# Patient Record
Sex: MALE | Race: BLACK OR AFRICAN AMERICAN | NOT HISPANIC OR LATINO | Employment: OTHER | URBAN - METROPOLITAN AREA
[De-identification: names, ages, dates, MRNs, and addresses within clinical notes are randomized per-mention and may not be internally consistent; named-entity substitution may affect disease eponyms.]

---

## 2017-01-30 ENCOUNTER — TELEPHONE (OUTPATIENT)
Dept: INTERNAL MEDICINE | Facility: CLINIC | Age: 62
End: 2017-01-30

## 2017-01-30 NOTE — TELEPHONE ENCOUNTER
The pt states he has testicular pain and swelling and wants to know who Dr Haque would recommend he see, or if he should come in to Dr Haque..     Please call the pt's cell at [phone]     Appt with DR Clancy at 1:15PM.  Chinyere Lyman RN 3:42 PM on 1/30/2017.

## 2017-01-31 ENCOUNTER — OFFICE VISIT (OUTPATIENT)
Dept: INTERNAL MEDICINE | Facility: CLINIC | Age: 62
End: 2017-01-31

## 2017-01-31 VITALS
BODY MASS INDEX: 24.61 KG/M2 | WEIGHT: 176.4 LBS | DIASTOLIC BLOOD PRESSURE: 74 MMHG | SYSTOLIC BLOOD PRESSURE: 129 MMHG | RESPIRATION RATE: 16 BRPM | HEART RATE: 72 BPM

## 2017-01-31 DIAGNOSIS — N50.89 SCROTAL SWELLING: Primary | ICD-10-CM

## 2017-01-31 ASSESSMENT — PAIN SCALES - GENERAL: PAINLEVEL: NO PAIN (0)

## 2017-01-31 NOTE — PROGRESS NOTES
PRIMARY CARE CLINIC    Resident Clinic Note        Visit Date: January 31, 2017    Russ Agee  MRN: [id number]  YOB: 1955    HPI:  Russ Agee is a 61 year old male with a h/o LUTS, depression and hepatitis C that presents with scrotal pain and swelling.      The patient reports that 2 months ago he started taking chinese herbs to help with his BPH symptoms.  Shortly after he developed left scrotal swelling and pain.  He discontinued the herbs and the pain resolved in 1-1.5 weeks.  However, since that time the swelling has persisted.  Currently pain free.  No penile discharge.  Continues to have weakness of urine stream and nocturia but not worsening.  No skin lesions.  No fever, chills, sweats, weight changes.  Reports inguinal hernia with surgical repair ~10 years ago.  Scrotal swelling was reducible at that time.      ROS:  4 point ROS was reviewed and negative other than stated in HPI    Past medical history reviewed.    Past Medical History   Diagnosis Date     Chronic hepatitis C (H)      Depressive disorder      Substance abuse      Malignant neoplasm of prostate (H)      Mucous cyst of joint      Anxiety      Depression      Palpitations        Allergies   Allergen Reactions     Nkda [No Known Drug Allergies]        Past Surgical History   Procedure Laterality Date     Excise mass finger  2/19/2013     Procedure: EXCISE MASS FINGER;  Mass Excision Left Index Finger     (local anesthesia);  Surgeon: Raina Dorsey MD;  Location:  OR     Esophagoscopy, gastroscopy, duodenoscopy (egd), combined N/A 4/9/2015     Procedure: COMBINED ESOPHAGOSCOPY, GASTROSCOPY, DUODENOSCOPY (EGD);  Surgeon: Raina Romo MD;  Location: [facility]     Endoscopic ultrasound, esophagoscopy, gastroscopy, duodenoscopy (egd), combined  12/16/15      ugi endoscopy w eus N/A 12/16/2015     Procedure: COMBINED ENDOSCOPIC ULTRASOUND, ESOPHAGOSCOPY, GASTROSCOPY, DUODENOSCOPY (EGD);  Surgeon: Brady Acuna  MD Elian;  Location:  GI       Family History   Problem Relation Age of Onset     DIABETES Sister      DIABETES Son      Alcohol/Drug Other      Arthritis Other      Circulatory Other      Eye Disorder Other      Depression Other      Genetic Disorder Other      Psychotic Disorder Other      CANCER No family hx of      no skin cancer     Skin Cancer No family hx of        Social History     Social History     Marital Status: Single     Spouse Name: N/A     Number of Children: N/A     Years of Education: N/A     Occupational History     Not on file.     Social History Main Topics     Smoking status: Former Smoker -- 0.50 packs/day for 6 years     Types: Cigarettes     Quit date: 09/16/2012     Smokeless tobacco: Never Used     Alcohol Use: No      Comment: stopped 10/2014     Drug Use: No     Sexual Activity: Not on file     Other Topics Concern     Not on file     Social History Narrative       Current Outpatient Prescriptions   Medication     esomeprazole (NEXIUM) 40 MG capsule     alfuzosin (UROXATRAL) 10 MG 24 hr tablet     sildenafil (REVATIO/VIAGRA) 20 MG tablet     oxybutynin (DITROPAN) 5 MG tablet     traZODone (DESYREL) 50 MG tablet     calcium polycarbophil (RA FIBER-CAP) 625 MG tablet     clomiPRAMINE (ANAFRANIL) 25 MG capsule     clonazePAM (KLONOPIN) 0.5 MG tablet     diazepam (VALIUM) 5 MG tablet     lidocaine (XYLOCAINE) 2 % jelly     PEG 3350-KCl-NaBcb-NaCl-NaSulf 240 G SOLR     TOPIRAMATE PO     terazosin (HYTRIN) 5 MG capsule     OLANZapine (ZYPREXA) 5 MG tablet     tamsulosin (FLOMAX) 0.4 MG 24 hr capsule     omeprazole (PRILOSEC) 20 MG capsule     gentamicin 0.008% in 1000ml 0.9% NaCl (GARANYCIN) SOLN nasal irrigation     desonide (DESOWEN) 0.05 % ointment     ketoconazole (NIZORAL) 2 % shampoo     sucralfate (CARAFATE) 1 GM tablet     Urea 20 % CREA     pimecrolimus (ELIDEL) 1 % cream     hydrocortisone (ANUSOL-HC) 25 MG suppository     nicotine polacrilex (NICORETTE) 2 MG gum     albuterol  (ALBUTEROL) 108 (90 BASE) MCG/ACT inhaler     vilazodone (VIIBRYD) 40 MG TABS     Cyanocobalamin (VITAMIN B-12 CR PO)     Psyllium (METAMUCIL PO)     No current facility-administered medications for this visit.       Vitals:  /74 mmHg  Pulse 72  Resp 16  Wt 80.015 kg (176 lb 6.4 oz)    Physical Exam:  General: NAD  HEENT: Oral mucosa moist, EOM intact  Resp: Non-labored  Abd: Soft, non-tender, BS+, no masses appreciated  : Left sided swelling with scrotal fullness, difficult to palpate testicle and epididymis, non-tender, no hernia, no palpable LNs  Extremities: WWP, no pedal edema  Neuro: AAOx3, no lateralizing symptoms or focal neurologic deficits    A/P:  Russ was seen today for testicular/scrotal pain.    Diagnoses and all orders for this visit:    Scrotal swelling  Non-tender, no signs of infection or vascular compromise.  Most consistent with hydrocele.  Also consider varicocele, inguinal hernia, testicular mass less likely.  Will obtain scrotal ultrasound for further evaluation.   Comments:  left  Orders:  -     US Testicular and Scrotum; Future    Health Maintenance: None    Follow-up: Will contact with u/s results    Patient seen and discussed with Dr. Kapoor.    Lauri Clancy MD, MPH  PGY-3, Internal Medicine   [phone]         Pt was seen and examined with Dr. Clancy.  I agree with his documentation as noted above.    My additional comments: None    Nabila Kapoor MD

## 2017-01-31 NOTE — MR AVS SNAPSHOT
After Visit Summary   1/31/2017    Russ Agee    MRN: [id number]           Patient Information     Date Of Birth          1955        Visit Information        Provider Department      1/31/2017 1:15 PM Cosme Clancy MD [facility]        Today's Diagnoses     Scrotal swelling    -  1       Care Instructions    Primary Care Center Medication Refill Request Information:  * Please contact your pharmacy regarding ANY request for medication refills.  ** [facility] Prescription Fax = [phone]  * Please allow 3 business days for routine medication refills.  * Please allow 5 business days for controlled substance medication refills.     Primary Care Center Test Result notification information:  *You will be notified with in 7-10 days of your appointment day regarding the results of your test.  If you are on MyChart you will be notified as soon as the provider has reviewed the results and signed off on them.    Scrotal ultrasound, will contact with results.         Follow-ups after your visit        Follow-up notes from your care team     Return if symptoms worsen or fail to improve.      Your next 10 appointments already scheduled     Jan 31, 2017  4:00 PM   US TESTICULAR AND SCROTUM with UCUS3   [facility] ([facility])    [address] [place] [address] [phone]           Please bring a list of your medicines (including vitamins, minerals and over-the-counter drugs). Also, tell your doctor about any allergies you may have. Wear comfortable clothes and leave your valuables at home.  You do not need to do anything special to prepare for your exam.  Please call the Imaging Department at your exam site with any questions.            Feb 23, 2017  1:40 PM   (Arrive by 1:25 PM)   Return Visit with Andrew Schmitz MD   [facility] ([facility])    [address] [facility] [address] [facility] [address]  [address] [facility] [address] [phone]              Future tests that were ordered for you today     Open Future Orders        Priority Expected Expires Ordered    US Testicular and Scrotum Routine  1/31/2018 1/31/2017            Who to contact     Please call your clinic at [phone] to:    Ask questions about your health    Make or cancel appointments    Discuss your medicines    Learn about your test results    Speak to your doctor   If you have compliments or concerns about an experience at your clinic, or if you wish to file a complaint, please contact [facility] Patient Relations at [phone] or email us at [email or web address]         Additional Information About Your Visit        [employer] Information     [employer] gives you secure access to your electronic health record. If you see a primary care provider, you can also send messages to your care team and make appointments. If you have questions, please call your primary care clinic.  If you do not have a primary care provider, please call [phone] and they will assist you.      [employer] is an electronic gateway that provides easy, online access to your medical records. With [employer], you can request a clinic appointment, read your test results, renew a prescription or communicate with your care team.     To access your existing account, please contact your [facility] Clinic or call [phone] for assistance.        Care EveryWhere ID     This is your Care EveryWhere ID. This could be used by other organizations to access your [place] medical records  [id number]        Your Vitals Were     Pulse Respirations                72 16           Blood Pressure from Last 3 Encounters:   01/31/17 129/74   11/01/16 111/71   10/19/16 129/80    Weight from Last 3 Encounters:   01/31/17 80.015 kg (176 lb 6.4 oz)   11/01/16 76.023 kg (167 lb 9.6 oz)    10/19/16 75.297 kg (166 lb)                 Today's Medication Changes          These changes are accurate as of: 1/31/17  1:41 PM.  If you have any questions, ask your nurse or doctor.               These medicines have changed or have updated prescriptions.        Dose/Directions    alfuzosin 10 MG 24 hr tablet   Commonly known as:  UROXATRAL   This may have changed:  Another medication with the same name was removed. Continue taking this medication, and follow the directions you see here.   Used for:  Benign non-nodular prostatic hyperplasia, presence of lower urinary tract symptoms unspecified   Changed by:  Gerardo Trotter MD        Dose:  10 mg   Take 1 tablet (10 mg) by mouth daily   Quantity:  30 tablet   Refills:  11       sucralfate 1 GM tablet   Commonly known as:  CARAFATE   This may have changed:  Another medication with the same name was removed. Continue taking this medication, and follow the directions you see here.   Used for:  Polyp of stomach        Dose:  1 g   Take 1 tablet (1 g) by mouth 4 times daily   Quantity:  40 tablet   Refills:  3         Stop taking these medicines if you haven't already. Please contact your care team if you have questions.     doxycycline 100 MG capsule   Commonly known as:  VIBRAMYCIN   Stopped by:  Cosme Clancy MD           oxyCODONE-acetaminophen 5-325 MG per tablet   Commonly known as:  PERCOCET   Stopped by:  Cosme Clancy MD                    Primary Care Provider Office Phone # Fax #    Donnie SARAVIA MD [place] [phone]       [address] [address]        Thank you!     Thank you for choosing [facility]  for your care. Our goal is always to provide you with excellent care. Hearing back from our patients is one way we can continue to improve our services. Please take a few minutes to complete the written survey that you may receive in the mail after your visit  with us. Thank you!             Your Updated Medication List - Protect others around you: Learn how to safely use, store and throw away your medicines at www.disposemymeds.org.          This list is accurate as of: 1/31/17  1:41 PM.  Always use your most recent med list.                   Brand Name Dispense Instructions for use    albuterol 108 (90 BASE) MCG/ACT Inhaler    albuterol    4 Inhaler    Inhale 2 puffs into the lungs every 6 hours       alfuzosin 10 MG 24 hr tablet    UROXATRAL    30 tablet    Take 1 tablet (10 mg) by mouth daily       clomiPRAMINE 25 MG capsule    ANAFRANIL         clonazePAM 0.5 MG tablet    klonoPIN         desonide 0.05 % ointment    DESOWEN    60 g    To affected areas of the face for itch and red bumps twice a day as needed.       diazepam 5 MG tablet    VALIUM     Take 5 mg by mouth       esomeprazole 40 MG CR capsule    nexIUM    30 capsule    Take 1 capsule (40 mg) by mouth every morning (before breakfast) Take 30-60 minutes before eating.       gentamicin 0.008% in 1000ml 0.9% NaCl Soln nasal irrigation    GARAMYCIN    1000 mL    Spray 30 mLs in nostril 2 times daily Irrigate 30 mL between each nostril       hydrocortisone 25 MG Suppository    ANUSOL-HC    28 suppository    Place 1 suppository (25 mg) rectally 2 times daily       ketoconazole 2 % shampoo    NIZORAL    240 mL    To entire wet scalp and face in affected areas and then wash off after several minutes three times a week.       lidocaine 2 % topical gel    XYLOCAINE     Apply 1 Tube topically       METAMUCIL PO      Take 3 tsp by mouth 2 times daily.       nicotine polacrilex 2 MG gum    NICORETTE     Place 2 mg inside cheek as needed for smoking cessation       omeprazole 20 MG CR capsule    priLOSEC    90 capsule    Take 1 capsule (20 mg) by mouth daily       oxybutynin 5 MG tablet    DITROPAN    180 tablet    Take 1 tablet (5 mg) by mouth 2 times daily       PEG 3350-KCl-NaBcb-NaCl-NaSulf 240 G Solr      Use if  you do not have a Bowel Movement for 24 hours       pimecrolimus 1 % cream    ELIDEL    60 g    Apply topically 2 times daily To affected areas of the face for dryness and itch as needed.       RA FIBER- MG tablet   Generic drug:  calcium polycarbophil      Take 3 tablets by mouth twice daily       sildenafil 20 MG tablet    REVATIO/VIAGRA    90 tablet    Take 1 to 5 tabs as needed for sexual activity.       sucralfate 1 GM tablet    CARAFATE    40 tablet    Take 1 tablet (1 g) by mouth 4 times daily       tamsulosin 0.4 MG capsule    FLOMAX    120 capsule    TAKE 2 CAPSULES BY MOUTH DAILY.       terazosin 5 MG capsule    HYTRIN     Take 5 mg by mouth       TOPIRAMATE PO          traZODone 50 MG tablet    DESYREL         Urea 20 % Crea     60 g    To affected, ridged nails daily.       VIIBRYD 40 MG Tabs tablet   Generic drug:  vilazodone      Take 1 tablet by mouth daily       VITAMIN B-12 CR PO      Take 1 tablet by mouth daily.       zyPREXA 5 MG tablet   Generic drug:  OLANZapine      Take 5 mg by mouth

## 2017-01-31 NOTE — PATIENT INSTRUCTIONS
Primary Care Center Medication Refill Request Information:  * Please contact your pharmacy regarding ANY request for medication refills.  ** [facility] Prescription Fax = [phone]  * Please allow 3 business days for routine medication refills.  * Please allow 5 business days for controlled substance medication refills.     Primary Care Center Test Result notification information:  *You will be notified with in 7-10 days of your appointment day regarding the results of your test.  If you are on MyChart you will be notified as soon as the provider has reviewed the results and signed off on them.    Scrotal ultrasound, will contact with results.

## 2017-02-01 ENCOUNTER — TELEPHONE (OUTPATIENT)
Dept: INTERNAL MEDICINE | Facility: CLINIC | Age: 62
End: 2017-02-01

## 2017-02-01 NOTE — TELEPHONE ENCOUNTER
----- Message from Sergio Uribe RN sent at 1/31/2017  4:44 PM CST -----  Precious, home care nurse from [facility],  She wants to review the med list with you.  Also please fax the med list to [phone].  Precious can be reached at [phone]        Med list faxed.  Chinyere Lyman RN 9:10 AM on 2/1/2017.

## 2017-02-04 ENCOUNTER — MEDICAL CORRESPONDENCE (OUTPATIENT)
Dept: HEALTH INFORMATION MANAGEMENT | Facility: CLINIC | Age: 62
End: 2017-02-04

## 2017-02-20 ENCOUNTER — PRE VISIT (OUTPATIENT)
Dept: UROLOGY | Facility: CLINIC | Age: 62
End: 2017-02-20

## 2017-02-21 NOTE — TELEPHONE ENCOUNTER
Patient coming in to discuss testicle pain. Patient had scrotal US on 1/31/17. Patient last saw urology in 12/2016 with Dr Trotter for BPH. Records/orders in [facility]. No need to call patient

## 2017-02-27 ENCOUNTER — TELEPHONE (OUTPATIENT)
Dept: UROLOGY | Facility: CLINIC | Age: 62
End: 2017-02-27

## 2017-02-27 NOTE — TELEPHONE ENCOUNTER
Patients home health nurse called and stated patient is not taking his alfuzosin or oxybutynin because of side effects .  He is taking over the counter urinozinc and he is feeling better. Tonya Bowman LPN Staff Nurse

## 2017-03-27 DIAGNOSIS — K21.9 GASTROESOPHAGEAL REFLUX DISEASE WITHOUT ESOPHAGITIS: ICD-10-CM

## 2017-03-27 RX ORDER — ESOMEPRAZOLE MAGNESIUM 40 MG/1
40 CAPSULE, DELAYED RELEASE ORAL
Qty: 30 CAPSULE | Refills: 3 | Status: SHIPPED | OUTPATIENT
Start: 2017-03-27 | End: 2017-05-30

## 2017-04-05 ENCOUNTER — MEDICAL CORRESPONDENCE (OUTPATIENT)
Dept: HEALTH INFORMATION MANAGEMENT | Facility: CLINIC | Age: 62
End: 2017-04-05

## 2017-04-10 ENCOUNTER — OFFICE VISIT (OUTPATIENT)
Dept: INTERNAL MEDICINE | Facility: CLINIC | Age: 62
End: 2017-04-10

## 2017-04-10 VITALS
SYSTOLIC BLOOD PRESSURE: 111 MMHG | RESPIRATION RATE: 16 BRPM | BODY MASS INDEX: 24.53 KG/M2 | HEART RATE: 62 BPM | WEIGHT: 175.9 LBS | OXYGEN SATURATION: 96 % | DIASTOLIC BLOOD PRESSURE: 69 MMHG

## 2017-04-10 DIAGNOSIS — M25.511 CHRONIC PAIN OF BOTH SHOULDERS: ICD-10-CM

## 2017-04-10 DIAGNOSIS — M25.512 CHRONIC PAIN OF BOTH SHOULDERS: ICD-10-CM

## 2017-04-10 DIAGNOSIS — B19.20 HEPATITIS C VIRUS INFECTION, UNSPECIFIED CHRONICITY: Primary | ICD-10-CM

## 2017-04-10 DIAGNOSIS — G89.29 CHRONIC PAIN OF BOTH SHOULDERS: ICD-10-CM

## 2017-04-10 DIAGNOSIS — C61 MALIGNANT NEOPLASM OF PROSTATE (H): ICD-10-CM

## 2017-04-10 DIAGNOSIS — B19.20 HEPATITIS C VIRUS INFECTION, UNSPECIFIED CHRONICITY: ICD-10-CM

## 2017-04-10 LAB
ALBUMIN SERPL-MCNC: 3.6 G/DL (ref 3.4–5)
ALP SERPL-CCNC: 54 U/L (ref 40–150)
ALT SERPL W P-5'-P-CCNC: 17 U/L (ref 0–70)
ANION GAP SERPL CALCULATED.3IONS-SCNC: 4 MMOL/L (ref 3–14)
AST SERPL W P-5'-P-CCNC: 16 U/L (ref 0–45)
BASOPHILS # BLD AUTO: 0 10E9/L (ref 0–0.2)
BASOPHILS NFR BLD AUTO: 0.7 %
BILIRUB SERPL-MCNC: 0.3 MG/DL (ref 0.2–1.3)
BUN SERPL-MCNC: 16 MG/DL (ref 7–30)
CALCIUM SERPL-MCNC: 9 MG/DL (ref 8.5–10.1)
CHLORIDE SERPL-SCNC: 107 MMOL/L (ref 94–109)
CO2 SERPL-SCNC: 30 MMOL/L (ref 20–32)
CREAT SERPL-MCNC: 0.95 MG/DL (ref 0.66–1.25)
CRP SERPL-MCNC: 3.3 MG/L (ref 0–8)
DIFFERENTIAL METHOD BLD: ABNORMAL
EOSINOPHIL # BLD AUTO: 0.1 10E9/L (ref 0–0.7)
EOSINOPHIL NFR BLD AUTO: 1.2 %
ERYTHROCYTE [DISTWIDTH] IN BLOOD BY AUTOMATED COUNT: 13.2 % (ref 10–15)
ERYTHROCYTE [SEDIMENTATION RATE] IN BLOOD BY WESTERGREN METHOD: 8 MM/H (ref 0–20)
GFR SERPL CREATININE-BSD FRML MDRD: 80 ML/MIN/1.7M2
GLUCOSE SERPL-MCNC: 84 MG/DL (ref 70–99)
HCT VFR BLD AUTO: 39.9 % (ref 40–53)
HGB BLD-MCNC: 13.1 G/DL (ref 13.3–17.7)
IMM GRANULOCYTES # BLD: 0 10E9/L (ref 0–0.4)
IMM GRANULOCYTES NFR BLD: 0.2 %
LYMPHOCYTES # BLD AUTO: 1.4 10E9/L (ref 0.8–5.3)
LYMPHOCYTES NFR BLD AUTO: 24.4 %
MCH RBC QN AUTO: 32 PG (ref 26.5–33)
MCHC RBC AUTO-ENTMCNC: 32.8 G/DL (ref 31.5–36.5)
MCV RBC AUTO: 97 FL (ref 78–100)
MONOCYTES # BLD AUTO: 0.6 10E9/L (ref 0–1.3)
MONOCYTES NFR BLD AUTO: 10 %
NEUTROPHILS # BLD AUTO: 3.6 10E9/L (ref 1.6–8.3)
NEUTROPHILS NFR BLD AUTO: 63.5 %
NRBC # BLD AUTO: 0 10*3/UL
NRBC BLD AUTO-RTO: 0 /100
PLATELET # BLD AUTO: 150 10E9/L (ref 150–450)
POTASSIUM SERPL-SCNC: 4.4 MMOL/L (ref 3.4–5.3)
PROT SERPL-MCNC: 7.6 G/DL (ref 6.8–8.8)
PSA SERPL-MCNC: 6.08 UG/L (ref 0–4)
RBC # BLD AUTO: 4.1 10E12/L (ref 4.4–5.9)
SODIUM SERPL-SCNC: 141 MMOL/L (ref 133–144)
WBC # BLD AUTO: 5.7 10E9/L (ref 4–11)

## 2017-04-10 PROCEDURE — 87522 HEPATITIS C REVRS TRNSCRPJ: CPT | Performed by: UROLOGY

## 2017-04-10 ASSESSMENT — PAIN SCALES - GENERAL: PAINLEVEL: MODERATE PAIN (5)

## 2017-04-10 NOTE — PROGRESS NOTES
Subjective:  Russ is a pleasant 59 year-old man who came in today for follow up today.  He had colonoscopy 11/13/15 repeat in 5 years. He had upper EGD and EUS with Dr. Acuna, GI 12/16/15 with gastric polyp bx.  He had fairly unremarkable Holter Monitor 1/6/16.   He was 2/16/16 in Dermatology. He was seen by Dr. Delaney, ENT 3/10/16 and 5/16/16 for sinus issues.  He states about one month of B shoulder pain with movement. More L than R; no known injury. He has been treated for Hep C.     Objective:  General appearance: Patient appears to be in pain, but is alert  HEENT; negative   Cardio: Normal rate and rhythm, no murmurs  Respiratory: Normal breath sounds  Abdomen: Non-tender  B shoulders w/o tenderness        Assessment and Plan:    (1) B shoulder complaints; labs X-rays and I will see him back in about 2 weeks.  (2) He will continue to follow up in GI for Hep C management. His last Hep C viral load 12/1/15 was undetectable. I placed referral today 4/10/17. Ordered Viral load today. He was treated with Harvoni and Ribaviran   (3) He was seen by Dr. Trotter Urology 11/1/16 for elevated PSA  (4) He was seen 5/16/16 by Dr. Delaney for neck complaints and sinus issues  (5) He was seen Dr. Naranjo Cardiology for ongoing palpitations 4/13/16.  (6) Reflux changed to Nexium; ordered EGD 10/16  (7) He states he sees his Psychiatry doctor once a  Month in [place].         Total time spent 25 minutes.  More than 50% of the time spent with Mr. Agee on counseling / coordinating his care

## 2017-04-10 NOTE — MR AVS SNAPSHOT
After Visit Summary   4/10/2017    Russ Agee    MRN: [id number]           Patient Information     Date Of Birth          1955        Visit Information        Provider Department      4/10/2017 3:05 PM Donnie Haque MD [facility]        Today's Diagnoses     Hepatitis C virus infection, unspecified chronicity    -  1    Chronic pain of both shoulders          Care Instructions    Primary Care Center Medication Refill Request Information:  * Please contact your pharmacy regarding ANY request for medication refills.  ** [facility] Prescription Fax = [phone]  * Please allow 3 business days for routine medication refills.  * Please allow 5 business days for controlled substance medication refills.     Primary Care Center Test Result notification information:  *You will be notified with in 7-10 days of your appointment day regarding the results of your test.  If you are on MyChart you will be notified as soon as the provider has reviewed the results and signed off on them.    Primary Care Center Phone Number [phone]    Colonoscopy at [facility] [phone]              Follow-ups after your visit        Additional Services     GASTROENTEROLOGY ADULT REFERRAL       Hep C                  Your next 10 appointments already scheduled     Apr 10, 2017  4:00 PM CDT   LAB with  LAB   [facility] ([facility])    [address] [place] [address] [phone]           Patient must bring picture ID.  Patient should be prepared to give a urine specimen  Please do not eat 10-12 hours before your appointment if you are coming in fasting for labs on lipids, cholesterol, or glucose (sugar).  Pregnant women should follow their Care Team instructions. Water with medications is okay. Do not drink coffee or other fluids.   If you have concerns about taking  your medications, please ask at office or if scheduling via [employer], send  a message by clicking on Secure Messaging, Message Your Care Team.            Apr 10, 2017  5:15 PM CDT   XR SHOULDER RIGHT 2 VIEWS with [email or web address]   [facility] ([facility])    [address] [place] [address] [phone]           Please bring a list of your current medicines to your exam. (Include vitamins, minerals and over-thecounter medicines.) Leave your valuables at home.  Tell your doctor if there is a chance you may be pregnant.  You do not need to do anything special for this exam.            Apr 10, 2017  5:35 PM CDT   XR SHOULDER LEFT 2 VIEWS with [email or web address]   [facility] ([facility])    [address] [address] [phone]           Please bring a list of your current medicines to your exam. (Include vitamins, minerals and over-thecounter medicines.) Leave your valuables at home.  Tell your doctor if there is a chance you may be pregnant.  You do not need to do anything special for this exam.            May 01, 2017  2:05 PM CDT   (Arrive by 1:50 PM)   Return Visit with Donnie Haque MD   [facility] ([facility] [place])    [address] [place] [address] [phone]              Future tests that were ordered for you today     Open Future Orders        Priority Expected Expires Ordered    Hepatitis C RNA quantitative Routine 4/10/2017 4/10/2018 4/10/2017    Erythrocyte sedimentation rate Routine 4/10/2017 4/10/2018 4/10/2017    CRP inflammation Routine 4/10/2017 4/10/2018 4/10/2017    CBC with platelets differential Routine 4/10/2017 4/24/2017 4/10/2017    Comprehensive metabolic panel Routine 4/10/2017 4/10/2018 4/10/2017            Who to contact     Please call your clinic at [phone] to:    Ask questions about your health    Make or cancel appointments    Discuss your medicines    Learn  about your test results    Speak to your doctor   If you have compliments or concerns about an experience at your clinic, or if you wish to file a complaint, please contact [facility] Patient Relations at [phone] or email us at [email or web address]         Additional Information About Your Visit        Wikidatahart Information     [employer] gives you secure access to your electronic health record. If you see a primary care provider, you can also send messages to your care team and make appointments. If you have questions, please call your primary care clinic.  If you do not have a primary care provider, please call [phone] and they will assist you.      [employer] is an electronic gateway that provides easy, online access to your medical records. With [employer], you can request a clinic appointment, read your test results, renew a prescription or communicate with your care team.     To access your existing account, please contact your [facility] or call [phone] for assistance.        Care EveryWhere ID     This is your Care EveryWhere ID. This could be used by other organizations to access your [place] medical records  [id number]        Your Vitals Were     Pulse Respirations Pulse Oximetry BMI (Body Mass Index)          62 16 96% 24.53 kg/m2         Blood Pressure from Last 3 Encounters:   04/10/17 111/69   01/31/17 129/74   11/01/16 111/71    Weight from Last 3 Encounters:   04/10/17 79.8 kg (175 lb 14.4 oz)   01/31/17 80 kg (176 lb 6.4 oz)   11/01/16 76 kg (167 lb 9.6 oz)              We Performed the Following     GASTROENTEROLOGY ADULT REFERRAL     XR Shoulder Left 2 Views     XR Shoulder Right 2 Views        Primary Care Provider Office Phone # Fax #    Donnie Haque [phone]       [address] [address]        Thank you!     Thank you for choosing [facility]  for your care. Our goal is always to provide you with excellent care. Hearing back from our patients is one way we can continue to improve our services. Please take a few minutes to complete the written survey that you may receive in the mail after your visit with us. Thank you!             Your Updated Medication List - Protect others around you: Learn how to safely use, store and throw away your medicines at www.disposemymeds.org.          This list is accurate as of: 4/10/17  3:40 PM.  Always use your most recent med list.                   Brand Name Dispense Instructions for use    albuterol 108 (90 BASE) MCG/ACT Inhaler    albuterol    4 Inhaler    Inhale 2 puffs into the lungs every 6 hours       alfuzosin 10 MG 24 hr tablet    UROXATRAL    30 tablet    Take 1 tablet (10 mg) by mouth daily       clomiPRAMINE 25 MG capsule    ANAFRANIL         clonazePAM 0.5 MG tablet    klonoPIN         desonide 0.05 % ointment    DESOWEN    60 g    To affected areas of the face for itch and red bumps twice a day as needed.       diazepam 5 MG tablet    VALIUM     Take 5 mg by mouth       esomeprazole 40 MG CR capsule    nexIUM    30 capsule    Take 1 capsule (40 mg) by mouth every morning (before breakfast) Take 30-60 minutes before eating.       gentamicin 0.008% in 1000ml 0.9% NaCl Soln nasal irrigation    GARAMYCIN    1000 mL    Spray 30 mLs in nostril 2 times daily Irrigate 30 mL between each nostril       hydrocortisone 25 MG Suppository    ANUSOL-HC    28 suppository    Place 1 suppository (25 mg) rectally 2 times daily       ketoconazole 2 % shampoo    NIZORAL    240 mL    To entire wet scalp and face in affected areas and then wash off after several minutes three times a week.       lidocaine 2 % topical gel    XYLOCAINE     Apply 1 Tube topically       METAMUCIL PO      Take 3 tsp by mouth 2 times daily.       nicotine polacrilex 2 MG gum    NICORETTE     Place 2 mg inside cheek as needed for smoking  cessation       omeprazole 20 MG CR capsule    priLOSEC    90 capsule    Take 1 capsule (20 mg) by mouth daily       oxybutynin 5 MG tablet    DITROPAN    180 tablet    Take 1 tablet (5 mg) by mouth 2 times daily       PEG 3350-KCl-NaBcb-NaCl-NaSulf 240 G Solr      Use if you do not have a Bowel Movement for 24 hours       pimecrolimus 1 % cream    ELIDEL    60 g    Apply topically 2 times daily To affected areas of the face for dryness and itch as needed.       RA FIBER- MG tablet   Generic drug:  calcium polycarbophil      Take 3 tablets by mouth twice daily       sildenafil 20 MG tablet    REVATIO/VIAGRA    90 tablet    Take 1 to 5 tabs as needed for sexual activity.       sucralfate 1 GM tablet    CARAFATE    40 tablet    Take 1 tablet (1 g) by mouth 4 times daily       tamsulosin 0.4 MG capsule    FLOMAX    120 capsule    TAKE 2 CAPSULES BY MOUTH DAILY.       terazosin 5 MG capsule    HYTRIN     Take 5 mg by mouth       TOPIRAMATE PO          traZODone 50 MG tablet    DESYREL         Urea 20 % Crea     60 g    To affected, ridged nails daily.       VIIBRYD 40 MG Tabs tablet   Generic drug:  vilazodone      Take 1 tablet by mouth daily       VITAMIN B-12 CR PO      Take 1 tablet by mouth daily.       zyPREXA 5 MG tablet   Generic drug:  OLANZapine      Take 5 mg by mouth

## 2017-04-10 NOTE — NURSING NOTE
Chief Complaint   Patient presents with     Pain     Patient is here to discuss pain in the joints on both sides near the shoulder.      Frida Messer LPN at 3:14 PM on 4/10/2017.

## 2017-04-10 NOTE — PATIENT INSTRUCTIONS
Primary Care Center Medication Refill Request Information:  * Please contact your pharmacy regarding ANY request for medication refills.  ** [facility] Prescription Fax = [phone]  * Please allow 3 business days for routine medication refills.  * Please allow 5 business days for controlled substance medication refills.     Primary Care Center Test Result notification information:  *You will be notified with in 7-10 days of your appointment day regarding the results of your test.  If you are on MyChart you will be notified as soon as the provider has reviewed the results and signed off on them.    Primary Care Center Phone Number [phone]    [facility] [phone]

## 2017-04-13 ENCOUNTER — TELEPHONE (OUTPATIENT)
Dept: INTERNAL MEDICINE | Facility: CLINIC | Age: 62
End: 2017-04-13

## 2017-04-13 DIAGNOSIS — M25.512 CHRONIC PAIN OF BOTH SHOULDERS: Primary | ICD-10-CM

## 2017-04-13 DIAGNOSIS — G89.29 CHRONIC PAIN OF BOTH SHOULDERS: Primary | ICD-10-CM

## 2017-04-13 DIAGNOSIS — M25.511 CHRONIC PAIN OF BOTH SHOULDERS: Primary | ICD-10-CM

## 2017-04-13 LAB
HCV RNA SERPL NAA+PROBE-ACNC: NORMAL [IU]/ML
HCV RNA SERPL NAA+PROBE-LOG IU: NORMAL LOG IU/ML

## 2017-04-13 NOTE — TELEPHONE ENCOUNTER
I sent pt. Results letter and separate letter for future labs because of abnormal X-ray R shoulder    This encounter.     LUTHER Haque        Dear Mr. Agee;    Your labs are stable. Your shoulder X-rays have some subtle findings. I have some future recommendations:    (1) Keep your 5/1/17 follow up with me    (2) I recommend you get some additional laboratory tests and I have placed orders for this today. You can call [phone] to schedule this laboratory test      LUTHER Haque MD

## 2017-05-01 ENCOUNTER — OFFICE VISIT (OUTPATIENT)
Dept: INTERNAL MEDICINE | Facility: CLINIC | Age: 62
End: 2017-05-01

## 2017-05-01 VITALS
DIASTOLIC BLOOD PRESSURE: 86 MMHG | BODY MASS INDEX: 25.13 KG/M2 | HEART RATE: 62 BPM | SYSTOLIC BLOOD PRESSURE: 151 MMHG | WEIGHT: 180.2 LBS

## 2017-05-01 DIAGNOSIS — M25.511 CHRONIC RIGHT SHOULDER PAIN: Primary | ICD-10-CM

## 2017-05-01 DIAGNOSIS — M25.511 CHRONIC PAIN OF BOTH SHOULDERS: ICD-10-CM

## 2017-05-01 DIAGNOSIS — G89.29 CHRONIC RIGHT SHOULDER PAIN: Primary | ICD-10-CM

## 2017-05-01 DIAGNOSIS — G89.29 CHRONIC PAIN OF BOTH SHOULDERS: ICD-10-CM

## 2017-05-01 DIAGNOSIS — M25.512 CHRONIC PAIN OF BOTH SHOULDERS: ICD-10-CM

## 2017-05-01 PROCEDURE — 82784 ASSAY IGA/IGD/IGG/IGM EACH: CPT | Performed by: INTERNAL MEDICINE

## 2017-05-01 PROCEDURE — 86334 IMMUNOFIX E-PHORESIS SERUM: CPT | Performed by: INTERNAL MEDICINE

## 2017-05-01 PROCEDURE — 00000402 ZZHCL STATISTIC TOTAL PROTEIN: Performed by: INTERNAL MEDICINE

## 2017-05-01 PROCEDURE — 83883 ASSAY NEPHELOMETRY NOT SPEC: CPT | Performed by: INTERNAL MEDICINE

## 2017-05-01 PROCEDURE — 84165 PROTEIN E-PHORESIS SERUM: CPT | Performed by: INTERNAL MEDICINE

## 2017-05-01 PROCEDURE — 86335 IMMUNFIX E-PHORSIS/URINE/CSF: CPT | Performed by: INTERNAL MEDICINE

## 2017-05-01 ASSESSMENT — PAIN SCALES - GENERAL: PAINLEVEL: SEVERE PAIN (7)

## 2017-05-01 NOTE — PROGRESS NOTES
Subjective:  Russ is a pleasant 59 year-old man who came in today for follow up today.  He had colonoscopy 11/13/15 repeat in 5 years. He had upper EGD and EUS with Dr. Acuna, GI 12/16/15 with gastric polyp bx.  He had fairly unremarkable Holter Monitor 1/6/16.   He was 2/16/16 in Dermatology. He was seen by Dr. Delaney, ENT 3/10/16 and 5/16/16 for sinus issues.  He states about one month of B shoulder pain with movement. More R than L today He has been treated for Hep C.     Objective:  General appearance: Patient appears to be in pain, but is alert  HEENT; negative   Cardio: Normal rate and rhythm, no murmurs  Respiratory: Normal breath sounds  Abdomen: Non-tender  R shoulder with some decreased ROM; tenderness on top of shoulder.         Assessment and Plan:    (1) B shoulder complaints; he states R more than L shoulder. X-ray done on 4/10/17 R side shows some subtle lucency. Will get MRI R shoulder and have him see ortho. Ordered SPEP/immunofixation as well.   (2) He will continue to follow up in GI for Hep C management. His last Hep C viral load 12/1/15 was undetectable. I placed referral today 4/10/17. Ordered Viral load today. He was treated with Harvoni and Ribaviran   (3) He was seen by Dr. Trotter Urology 11/1/16 for elevated PSA  (4) He was seen 5/16/16 by Dr. Delaney for neck complaints and sinus issues  (5) He was seen Dr. Naranjo Cardiology for ongoing palpitations 4/13/16.  (6) Reflux changed to Nexium; ordered EGD 10/16  (7) He states he sees his Psychiatry doctor once a  Month in [place].         Total time spent 25 minutes.  More than 50% of the time spent with Mr. Agee on counseling / coordinating his care

## 2017-05-01 NOTE — NURSING NOTE
Chief Complaint   Patient presents with     Shoulder Pain     Patient here for shoulder pain     Mckenna Mckeon LPN at 2:11 PM on 5/1/2017.

## 2017-05-01 NOTE — MR AVS SNAPSHOT
After Visit Summary   5/1/2017    Russ Agee    MRN: [id number]           Patient Information     Date Of Birth          1955        Visit Information        Provider Department      5/1/2017 2:05 PM Donnie Haque MD [facility]        Today's Diagnoses     Chronic right shoulder pain    -  1      Care Instructions    Radiology (Imaging Center) [phone]  Radiology ([facility]) [phone] (2nd Floor [facility])    Orthopaedics & Xray [phone]          Follow-ups after your visit        Additional Services     ORTHOPEDICS ADULT REFERRAL       R shoulder pain                  Future tests that were ordered for you today     Open Future Orders        Priority Expected Expires Ordered    MR Upper Ext Joint Rt w/o & w Contrast Routine  5/1/2018 5/1/2017            Who to contact     Please call your clinic at [phone] to:    Ask questions about your health    Make or cancel appointments    Discuss your medicines    Learn about your test results    Speak to your doctor   If you have compliments or concerns about an experience at your clinic, or if you wish to file a complaint, please contact [facility] Patient Relations at [phone] or email us at [email or web address]         Additional Information About Your Visit        MyChart Information     [employer] (formerly [employer]) gives you secure access to your electronic health record. If you see a primary care provider, you can also send messages to your care team and make appointments. If you have questions, please call your primary care clinic.  If you do not have a primary care provider, please call [phone] and they will assist you.      [employer] (formerly [employer]) is an electronic gateway that provides easy, online access to your medical records. With [employer] ([employer]), you can request a clinic appointment, read your test results, renew a prescription or communicate with your care team.     To access your existing  account, please contact your [facility] or call [phone] for assistance.        Care EveryWhere ID     This is your Care EveryWhere ID. This could be used by other organizations to access your [place] medical records  [id number]        Your Vitals Were     Pulse BMI (Body Mass Index)                62 25.13 kg/m2           Blood Pressure from Last 3 Encounters:   05/01/17 151/86   04/10/17 111/69   01/31/17 129/74    Weight from Last 3 Encounters:   05/01/17 81.7 kg (180 lb 3.2 oz)   04/10/17 79.8 kg (175 lb 14.4 oz)   01/31/17 80 kg (176 lb 6.4 oz)              We Performed the Following     ORTHOPEDICS ADULT REFERRAL        Primary Care Provider Office Phone # Fax #    Donnie Haque [phone]       [address] [address]        Thank you!     Thank you for choosing [facility]  for your care. Our goal is always to provide you with excellent care. Hearing back from our patients is one way we can continue to improve our services. Please take a few minutes to complete the written survey that you may receive in the mail after your visit with us. Thank you!             Your Updated Medication List - Protect others around you: Learn how to safely use, store and throw away your medicines at www.disposemymeds.org.          This list is accurate as of: 5/1/17  3:46 PM.  Always use your most recent med list.                   Brand Name Dispense Instructions for use    albuterol 108 (90 BASE) MCG/ACT Inhaler    albuterol    4 Inhaler    Inhale 2 puffs into the lungs every 6 hours       alfuzosin 10 MG 24 hr tablet    UROXATRAL    30 tablet    Take 1 tablet (10 mg) by mouth daily       clomiPRAMINE 25 MG capsule    ANAFRANIL         clonazePAM 0.5 MG tablet    klonoPIN         desonide 0.05 % ointment    DESOWEN    60 g    To affected areas of the face for itch and red bumps twice a day as needed.        diazepam 5 MG tablet    VALIUM     Take 5 mg by mouth       esomeprazole 40 MG CR capsule    nexIUM    30 capsule    Take 1 capsule (40 mg) by mouth every morning (before breakfast) Take 30-60 minutes before eating.       gentamicin 0.008% in 1000ml 0.9% NaCl Soln nasal irrigation    GARAMYCIN    1000 mL    Spray 30 mLs in nostril 2 times daily Irrigate 30 mL between each nostril       hydrocortisone 25 MG Suppository    ANUSOL-HC    28 suppository    Place 1 suppository (25 mg) rectally 2 times daily       ketoconazole 2 % shampoo    NIZORAL    240 mL    To entire wet scalp and face in affected areas and then wash off after several minutes three times a week.       lidocaine 2 % topical gel    XYLOCAINE     Apply 1 Tube topically       METAMUCIL PO      Take 3 tsp by mouth 2 times daily.       nicotine polacrilex 2 MG gum    NICORETTE     Place 2 mg inside cheek as needed for smoking cessation       omeprazole 20 MG CR capsule    priLOSEC    90 capsule    Take 1 capsule (20 mg) by mouth daily       oxybutynin 5 MG tablet    DITROPAN    180 tablet    Take 1 tablet (5 mg) by mouth 2 times daily       PEG 3350-KCl-NaBcb-NaCl-NaSulf 240 G Solr      Use if you do not have a Bowel Movement for 24 hours       pimecrolimus 1 % cream    ELIDEL    60 g    Apply topically 2 times daily To affected areas of the face for dryness and itch as needed.       RA FIBER- MG tablet   Generic drug:  calcium polycarbophil      Take 3 tablets by mouth twice daily       sildenafil 20 MG tablet    REVATIO/VIAGRA    90 tablet    Take 1 to 5 tabs as needed for sexual activity.       sucralfate 1 GM tablet    CARAFATE    40 tablet    Take 1 tablet (1 g) by mouth 4 times daily       tamsulosin 0.4 MG capsule    FLOMAX    120 capsule    TAKE 2 CAPSULES BY MOUTH DAILY.       terazosin 5 MG capsule    HYTRIN     Take 5 mg by mouth       TOPIRAMATE PO          traZODone 50 MG tablet    DESYREL         Urea 20 % Crea cream     60 g    To  affected, ridged nails daily.       VIIBRYD 40 MG Tabs tablet   Generic drug:  vilazodone      Take 1 tablet by mouth daily       VITAMIN B-12 CR PO      Take 1 tablet by mouth daily.       zyPREXA 5 MG tablet   Generic drug:  OLANZapine      Take 5 mg by mouth

## 2017-05-01 NOTE — PATIENT INSTRUCTIONS
Radiology ([facility] Center) [phone]  Radiology ([facility]) [phone] (2nd Floor [facility])    Orthopaedics & Xray [phone]

## 2017-05-02 LAB
ALBUMIN SERPL ELPH-MCNC: 3.8 G/DL (ref 3.7–5.1)
ALPHA1 GLOB SERPL ELPH-MCNC: 0.2 G/DL (ref 0.2–0.4)
ALPHA2 GLOB SERPL ELPH-MCNC: 0.7 G/DL (ref 0.5–0.9)
B-GLOBULIN SERPL ELPH-MCNC: 0.6 G/DL (ref 0.6–1)
GAMMA GLOB SERPL ELPH-MCNC: 1.2 G/DL (ref 0.7–1.6)
IGA SERPL-MCNC: 189 MG/DL (ref 70–380)
IGG SERPL-MCNC: 1350 MG/DL (ref 695–1620)
IGM SERPL-MCNC: 28 MG/DL (ref 60–265)
KAPPA LC UR-MCNC: 1.42 MG/DL (ref 0.33–1.94)
KAPPA LC/LAMBDA SER: 0.85 {RATIO} (ref 0.26–1.65)
LAMBDA LC SERPL-MCNC: 1.68 MG/DL (ref 0.57–2.63)
M PROTEIN SERPL ELPH-MCNC: 0 G/DL
PROT ELPH PNL UR ELPH: NORMAL
PROT PATTERN SERPL ELPH-IMP: NORMAL
PROT PATTERN SERPL IFE-IMP: ABNORMAL

## 2017-05-04 LAB
ALBUMIN UR QL: ABNORMAL
ALPHA1 GLOB 24H UR QL ELPH: ABNORMAL
ALPHA2 GLOB UR QL ELPH: ABNORMAL
B-GLOBULIN UR QL ELPH: ABNORMAL
COLLECT DURATION TIME SPEC: ABNORMAL H
GAMMA GLOB UR QL ELPH: ABNORMAL
INTERPRETATION UR IFE-IMP: ABNORMAL
KAPPA LC FREE 24H UR-MRATE: ABNORMAL MG/(24.H)
KAPPA LC FREE UR-MCNC: 0.96 MG/DL
KAPPA LC FREE/LAMBDA FREE UR: 24
LAMBDA LC FREE 24H UR-MRATE: ABNORMAL MG/(24.H)
LAMBDA LC FREE UR-MCNC: 0.04 MG/DL
PROT 24H UR-MRATE: ABNORMAL G/(24.H)
SPECIMEN VOL ?TM UR: ABNORMAL ML

## 2017-05-15 DIAGNOSIS — R94.5 ABNORMAL RESULTS OF LIVER FUNCTION STUDIES: Primary | ICD-10-CM

## 2017-05-18 ENCOUNTER — PRE VISIT (OUTPATIENT)
Dept: GASTROENTEROLOGY | Facility: CLINIC | Age: 62
End: 2017-05-18

## 2017-05-18 NOTE — TELEPHONE ENCOUNTER
Was the patient contacted by phone and reminded of the upcoming visit? Yes, spoke with pt re moving appointments up so provider can have requested 60 min appt due to not seeing him in 7 years. Pt verbally understood and agreed.    Was the patient instructed to bring a current list of all medications to the appointment or instructed to bring in all medication bottles? Yes, patient verbalized understanding    Was the patient instructed to arrive prior to the appointment time to have ordered labs drawn? Yes, patient verbalized understanding    Were the needed lab orders placed? Yes    Diya Tracy CMA

## 2017-05-30 ENCOUNTER — OFFICE VISIT (OUTPATIENT)
Dept: GASTROENTEROLOGY | Facility: CLINIC | Age: 62
End: 2017-05-30
Attending: NURSE PRACTITIONER
Payer: MEDICARE

## 2017-05-30 ENCOUNTER — OFFICE VISIT (OUTPATIENT)
Dept: INTERNAL MEDICINE | Facility: CLINIC | Age: 62
End: 2017-05-30

## 2017-05-30 VITALS
HEIGHT: 71 IN | HEART RATE: 67 BPM | RESPIRATION RATE: 16 BRPM | WEIGHT: 175.2 LBS | BODY MASS INDEX: 24.53 KG/M2 | TEMPERATURE: 98 F | SYSTOLIC BLOOD PRESSURE: 119 MMHG | OXYGEN SATURATION: 99 % | DIASTOLIC BLOOD PRESSURE: 74 MMHG

## 2017-05-30 DIAGNOSIS — R76.8 HEPATITIS B CORE ANTIBODY POSITIVE: ICD-10-CM

## 2017-05-30 DIAGNOSIS — G89.29 CHRONIC PAIN OF BOTH SHOULDERS: ICD-10-CM

## 2017-05-30 DIAGNOSIS — M25.512 CHRONIC PAIN OF BOTH SHOULDERS: ICD-10-CM

## 2017-05-30 DIAGNOSIS — B19.20 HEPATITIS C VIRUS INFECTION, UNSPECIFIED CHRONICITY: ICD-10-CM

## 2017-05-30 DIAGNOSIS — R76.8 HEPATITIS B CORE ANTIBODY POSITIVE: Primary | ICD-10-CM

## 2017-05-30 DIAGNOSIS — M25.511 CHRONIC PAIN OF BOTH SHOULDERS: ICD-10-CM

## 2017-05-30 DIAGNOSIS — M25.511 CHRONIC RIGHT SHOULDER PAIN: Primary | ICD-10-CM

## 2017-05-30 DIAGNOSIS — R94.5 ABNORMAL RESULTS OF LIVER FUNCTION STUDIES: ICD-10-CM

## 2017-05-30 DIAGNOSIS — G89.29 CHRONIC RIGHT SHOULDER PAIN: Primary | ICD-10-CM

## 2017-05-30 LAB
ALBUMIN SERPL-MCNC: 3.5 G/DL (ref 3.4–5)
ALP SERPL-CCNC: 58 U/L (ref 40–150)
ALT SERPL W P-5'-P-CCNC: 16 U/L (ref 0–70)
ANION GAP SERPL CALCULATED.3IONS-SCNC: 7 MMOL/L (ref 3–14)
AST SERPL W P-5'-P-CCNC: 19 U/L (ref 0–45)
BILIRUB DIRECT SERPL-MCNC: <0.1 MG/DL (ref 0–0.2)
BILIRUB SERPL-MCNC: 0.4 MG/DL (ref 0.2–1.3)
BUN SERPL-MCNC: 16 MG/DL (ref 7–30)
CALCIUM SERPL-MCNC: 8.8 MG/DL (ref 8.5–10.1)
CHLORIDE SERPL-SCNC: 109 MMOL/L (ref 94–109)
CO2 SERPL-SCNC: 29 MMOL/L (ref 20–32)
CREAT SERPL-MCNC: 0.96 MG/DL (ref 0.66–1.25)
ERYTHROCYTE [DISTWIDTH] IN BLOOD BY AUTOMATED COUNT: 13.2 % (ref 10–15)
GFR SERPL CREATININE-BSD FRML MDRD: 80 ML/MIN/1.7M2
GLUCOSE SERPL-MCNC: 121 MG/DL (ref 70–99)
HCT VFR BLD AUTO: 41.7 % (ref 40–53)
HGB BLD-MCNC: 13.5 G/DL (ref 13.3–17.7)
INR PPP: 1.14 (ref 0.86–1.14)
MCH RBC QN AUTO: 31.3 PG (ref 26.5–33)
MCHC RBC AUTO-ENTMCNC: 32.4 G/DL (ref 31.5–36.5)
MCV RBC AUTO: 97 FL (ref 78–100)
PLATELET # BLD AUTO: 186 10E9/L (ref 150–450)
POTASSIUM SERPL-SCNC: 3.8 MMOL/L (ref 3.4–5.3)
PROT SERPL-MCNC: 7.4 G/DL (ref 6.8–8.8)
RBC # BLD AUTO: 4.31 10E12/L (ref 4.4–5.9)
SODIUM SERPL-SCNC: 145 MMOL/L (ref 133–144)
WBC # BLD AUTO: 4.9 10E9/L (ref 4–11)

## 2017-05-30 PROCEDURE — 80076 HEPATIC FUNCTION PANEL: CPT | Performed by: NURSE PRACTITIONER

## 2017-05-30 PROCEDURE — 99212 OFFICE O/P EST SF 10 MIN: CPT | Mod: ZF

## 2017-05-30 PROCEDURE — 85027 COMPLETE CBC AUTOMATED: CPT | Performed by: NURSE PRACTITIONER

## 2017-05-30 PROCEDURE — 80048 BASIC METABOLIC PNL TOTAL CA: CPT | Performed by: NURSE PRACTITIONER

## 2017-05-30 PROCEDURE — 36415 COLL VENOUS BLD VENIPUNCTURE: CPT | Performed by: NURSE PRACTITIONER

## 2017-05-30 PROCEDURE — 85610 PROTHROMBIN TIME: CPT | Performed by: NURSE PRACTITIONER

## 2017-05-30 PROCEDURE — 91200 LIVER ELASTOGRAPHY: CPT | Mod: ZF

## 2017-05-30 ASSESSMENT — PAIN SCALES - GENERAL: PAINLEVEL: NO PAIN (0)

## 2017-05-30 NOTE — MR AVS SNAPSHOT
After Visit Summary   5/30/2017    Russ Agee    MRN: [id number]           Patient Information     Date Of Birth          1955        Visit Information        Provider Department      5/30/2017 1:35 PM Donnie Haque MD [facility]        Today's Diagnoses     Chronic right shoulder pain    -  1       Follow-ups after your visit        Your next 10 appointments already scheduled     May 30, 2017  1:35 PM CDT   (Arrive by 1:20 PM)   Return Visit with Donnie Haque MD   [facility] ([facility])    [address] [place] [address]  [facility] [id number] [phone]            Jun 02, 2017  3:30 PM CDT   (Arrive by 3:15 PM)   New Patient Visit with Shorty Melvin MD   [facility] ([facility])    [address] [place] [address]  [facility] [id number] [phone]            Jul 26, 2017  1:35 PM CDT   (Arrive by 1:20 PM)   Return Visit with Donnie Haque MD   [facility] ([facility])    [address] [place] [address]  [facility] [id number] [phone]              Who to contact     Please call your clinic at [phone] to:    Ask questions about your health    Make or cancel appointments    Discuss your medicines    Learn about your test results    Speak to your doctor   If you have compliments or concerns about an experience at your clinic, or if you wish to file a complaint, please contact [facility] Patient Relations at [phone] or email us at [email or web address]         Additional Information About Your Visit        Care EveryWhere ID     This is your Care EveryWhere ID. This could be used by other organizations to access your [facility] medical records  [id number]         Blood Pressure from Last 3 Encounters:   05/30/17 119/74   05/01/17 151/86   04/10/17 111/69    Weight from  Last 3 Encounters:   05/30/17 79.5 kg (175 lb 3.2 oz)   05/01/17 81.7 kg (180 lb 3.2 oz)   04/10/17 79.8 kg (175 lb 14.4 oz)              Today, you had the following     No orders found for display       Primary Care Provider Office Phone # Fax #    Donnie Haque [phone]       [address] [address]        Thank you!     Thank you for choosing [facility]  for your care. Our goal is always to provide you with excellent care. Hearing back from our patients is one way we can continue to improve our services. Please take a few minutes to complete the written survey that you may receive in the mail after your visit with us. Thank you!             Your Updated Medication List - Protect others around you: Learn how to safely use, store and throw away your medicines at www.disposemymeds.org.          This list is accurate as of: 5/30/17 12:44 PM.  Always use your most recent med list.                   Brand Name Dispense Instructions for use    albuterol 108 (90 BASE) MCG/ACT Inhaler    albuterol    4 Inhaler    Inhale 2 puffs into the lungs every 6 hours       alfuzosin 10 MG 24 hr tablet    UROXATRAL    30 tablet    Take 1 tablet (10 mg) by mouth daily       desonide 0.05 % ointment    DESOWEN    60 g    To affected areas of the face for itch and red bumps twice a day as needed.       hydrocortisone 25 MG Suppository    ANUSOL-HC    28 suppository    Place 1 suppository (25 mg) rectally 2 times daily       ketoconazole 2 % shampoo    NIZORAL    240 mL    To entire wet scalp and face in affected areas and then wash off after several minutes three times a week.       lidocaine 2 % topical gel    XYLOCAINE     Apply 1 Tube topically       METAMUCIL PO      Take 3 tsp by mouth 2 times daily.       nicotine polacrilex 2 MG gum    NICORETTE     Place 2 mg inside cheek as needed for smoking cessation       RA FIBER- MG tablet   Generic  drug:  calcium polycarbophil      Take 3 tablets by mouth twice daily       sildenafil 20 MG tablet    REVATIO/VIAGRA    90 tablet    Take 1 to 5 tabs as needed for sexual activity.       traZODone 50 MG tablet    DESYREL     Take 50 mg by mouth At Bedtime       Urea 20 % Crea cream     60 g    To affected, ridged nails daily.

## 2017-05-30 NOTE — MR AVS SNAPSHOT
After Visit Summary   5/30/2017    Russ Agee    MRN: [id number]           Patient Information     Date Of Birth          1955        Visit Information        Provider Department      5/30/2017 11:00 AM Branden Hinojosa NP [facility]        Today's Diagnoses     Hepatitis B core antibody positive    -  1    Hepatitis C virus infection, unspecified chronicity        Chronic pain of both shoulders          Care Instructions    You are cured of Hepatitis C and have no scarring in the liver.     You have had Hepatitis B but have gotten rid of it.     You do not need any further follow up in the liver clinic. The best news we can give our patients.           Follow-ups after your visit        Your next 10 appointments already scheduled     May 30, 2017  1:35 PM CDT   (Arrive by 1:20 PM)   Return Visit with Donnie Haque MD   [facility] ([facility])    [address] [place] [address] [phone]              Future tests that were ordered for you today     Open Future Orders        Priority Expected Expires Ordered    Fibrosis Scan (In-Clinic) Routine 5/30/2017 5/30/2018 5/30/2017            Who to contact     If you have questions or need follow up information about today's clinic visit or your schedule please contact [facility] directly at [phone].  Normal or non-critical lab and imaging results will be communicated to you by MyChart, letter or phone within 4 business days after the clinic has received the results. If you do not hear from us within 7 days, please contact the clinic through MyChart or phone. If you have a critical or abnormal lab result, we will notify you by phone as soon as possible.  Submit refill requests through [email or web address] or call your pharmacy and they will forward the refill request to us. Please allow 3 business days for your refill to be completed.          Additional  "Information About Your Visit        Care EveryWhere ID     This is your Care EveryWhere ID. This could be used by other organizations to access your [facility] medical records  [id number]        Your Vitals Were     Pulse Temperature Respirations Height Pulse Oximetry BMI (Body Mass Index)    67 98  F (36.7  C) (Oral) 16 1.803 m (5' 10.98\") 99% 24.45 kg/m2       Blood Pressure from Last 3 Encounters:   05/30/17 119/74   05/01/17 151/86   04/10/17 111/69    Weight from Last 3 Encounters:   05/30/17 79.5 kg (175 lb 3.2 oz)   05/01/17 81.7 kg (180 lb 3.2 oz)   04/10/17 79.8 kg (175 lb 14.4 oz)               Primary Care Provider Office Phone # Fax #    Donnie Haque [phone]       [address] [address]        Thank you!     Thank you for choosing [facility]  for your care. Our goal is always to provide you with excellent care. Hearing back from our patients is one way we can continue to improve our services. Please take a few minutes to complete the written survey that you may receive in the mail after your visit with us. Thank you!             Your Updated Medication List - Protect others around you: Learn how to safely use, store and throw away your medicines at www.disposemymeds.org.          This list is accurate as of: 5/30/17 11:38 AM.  Always use your most recent med list.                   Brand Name Dispense Instructions for use    albuterol 108 (90 BASE) MCG/ACT Inhaler    albuterol    4 Inhaler    Inhale 2 puffs into the lungs every 6 hours       alfuzosin 10 MG 24 hr tablet    UROXATRAL    30 tablet    Take 1 tablet (10 mg) by mouth daily       desonide 0.05 % ointment    DESOWEN    60 g    To affected areas of the face for itch and red bumps twice a day as needed.       hydrocortisone 25 MG Suppository    ANUSOL-HC    28 suppository    Place 1 suppository (25 mg) rectally 2 times daily       ketoconazole 2 % shampoo    NIZORAL    240 " mL    To entire wet scalp and face in affected areas and then wash off after several minutes three times a week.       lidocaine 2 % topical gel    XYLOCAINE     Apply 1 Tube topically       METAMUCIL PO      Take 3 tsp by mouth 2 times daily.       nicotine polacrilex 2 MG gum    NICORETTE     Place 2 mg inside cheek as needed for smoking cessation       RA FIBER- MG tablet   Generic drug:  calcium polycarbophil      Take 3 tablets by mouth twice daily       sildenafil 20 MG tablet    REVATIO/VIAGRA    90 tablet    Take 1 to 5 tabs as needed for sexual activity.       traZODone 50 MG tablet    DESYREL     Take 50 mg by mouth At Bedtime       Urea 20 % Crea cream     60 g    To affected, ridged nails daily.

## 2017-05-30 NOTE — PATIENT INSTRUCTIONS
You are cured of Hepatitis C and have no scarring in the liver.     You have had Hepatitis B but have gotten rid of it.     You do not need any further follow up in the liver clinic. The best news we can give our patients.

## 2017-05-30 NOTE — NURSING NOTE
"Chief Complaint   Patient presents with     RECHECK     History of Hep C       Initial /74 (BP Location: Right arm, Patient Position: Chair, Cuff Size: Adult Regular)  Pulse 67  Temp 98  F (36.7  C) (Oral)  Resp 16  Ht 1.803 m (5' 10.98\")  Wt 79.5 kg (175 lb 3.2 oz)  SpO2 99%  BMI 24.45 kg/m2 Estimated body mass index is 24.45 kg/(m^2) as calculated from the following:    Height as of this encounter: 1.803 m (5' 10.98\").    Weight as of this encounter: 79.5 kg (175 lb 3.2 oz).  Medication Reconciliation: complete     Diya Tracy [facility]  5/30/2017 11:02 AM        "

## 2017-05-30 NOTE — PROGRESS NOTES
REASON FOR VISIT:  Followup previous hepatitis C infection.      HISTORY OF PRESENT ILLNESS:  Mr. Russ Agee is a pleasant 61-year-old male who has a remote history of hepatitis C infection.  He has undergone hepatitis C treatment twice before for his  genotype 3 (once in 2010 with peginterferon and ribavirin) and a second time with Harvoni and ribavirin for 24 weeks.  Fortunately, he did achieve a sustained virologic response.  He did undergo a liver biopsy in 2006 which showed stage 0 fibrosis.  Of note, he did previously have hepatitis B but has gotten rid of it.  He was sent by his primary care provider, Dr. Donnie Haque, for a followup appointment to just double check and make sure he did not need any more followup here in the Liver Clinic.      ALLERGIES AND MEDICATIONS:  Listed in the electronic medical record.      VITAL SIGNS:  Blood pressure 119/74, heart rate 67 beats per minute, respiratory rate 16, temperature 98, O2 sat 99% on room air.      LABORATORY DATA:  Results from today showed the following:  White count 4.9, hemoglobin 13.5, platelet count 186,000.  Sodium 145, potassium 3.8, glucose 121, creatinine 0.96.  Liver panel entirely normal.  INR 1.14.        Fibrosis scan from today showed his liver stiffness score to be 5.6, which puts him in the stage 0 fibrosis range.      ASSESSMENT:  Previous hepatitis B infection, previous hepatitis C infection.      PLAN:  At this time, I did spend time talking with Mr. Agee that he is indeed cured of hepatitis C and does not have any scarring in the liver.  We did spend time talking about his old history of hepatitis B infection and the risk factors for reactivation in the setting of immunosuppression medications.  I did get his permission to add this history on his problem list to help prevent any issues of prescribers inadvertently giving him strong immunosuppressant medications without follow up in the Liver Clinic.  Otherwise, he is doing remarkably well  and does not need any more followup in the Liver Clinic unless there are significant changes.  He appreciates this evaluation and is pleased with the news.  All of his questions were addressed.      Ten minutes were spent with this patient, greater than 50% spent in counseling and coordination of care.        cc: Donnie Haque MD

## 2017-05-30 NOTE — LETTER
5/30/2017      RE: Russ Agee  [address]  [address]  [place] [address]       REASON FOR VISIT:  Followup previous hepatitis C infection.      HISTORY OF PRESENT ILLNESS:  Mr. Russ Agee is a pleasant 61-year-old male who has a remote history of hepatitis C infection.  He has undergone hepatitis C treatment twice before for his  genotype 3 (once in 2010 with peginterferon and ribavirin) and a second time with Harvoni and ribavirin for 24 weeks.  Fortunately, he did achieve a sustained virologic response.  He did undergo a liver biopsy in 2006 which showed stage 0 fibrosis.  Of note, he did previously have hepatitis B but has gotten rid of it.  He was sent by his primary care provider, Dr. Donnie Haque, for a followup appointment to just double check and make sure he did not need any more followup here in the Liver Clinic.      ALLERGIES AND MEDICATIONS:  Listed in the electronic medical record.      VITAL SIGNS:  Blood pressure 119/74, heart rate 67 beats per minute, respiratory rate 16, temperature 98, O2 sat 99% on room air.      LABORATORY DATA:  Results from today showed the following:  White count 4.9, hemoglobin 13.5, platelet count 186,000.  Sodium 145, potassium 3.8, glucose 121, creatinine 0.96.  Liver panel entirely normal.  INR 1.14.        Fibrosis scan from today showed his liver stiffness score to be 5.6, which puts him in the stage 0 fibrosis range.      ASSESSMENT:  Previous hepatitis B infection, previous hepatitis C infection.      PLAN:  At this time, I did spend time talking with Mr. Agee that he is indeed cured of hepatitis C and does not have any scarring in the liver.  We did spend time talking about his old history of hepatitis B infection and the risk factors for reactivation in the setting of immunosuppression medications.  I did get his permission to add this history on his problem list to help prevent any issues of prescribers inadvertently giving him strong immunosuppressant  medications without follow up in the Liver Clinic.  Otherwise, he is doing remarkably well and does not need any more followup in the Liver Clinic unless there are significant changes.  He appreciates this evaluation and is pleased with the news.  All of his questions were addressed.      Ten minutes were spent with this patient, greater than 50% spent in counseling and coordination of care.        cc: MD Branden Kebede NP

## 2017-05-30 NOTE — PROGRESS NOTES
Subjective:  Russ is a pleasant 59 year-old man who came in today for follow up today.  He had colonoscopy 11/13/15 repeat in 5 years. He had upper EGD and EUS with Dr. Acuna, GI 12/16/15 with gastric polyp bx.  He had fairly unremarkable Holter Monitor 1/6/16.   He was 2/16/16 in Dermatology. He was seen by Dr. Delaney, ENT 3/10/16 and 5/16/16 for sinus issues.  He states about one month of B shoulder pain with movement. More R than L today He has been treated for Hep C.     Objective:  General appearance:   HEENT; negative   Cardio: Normal rate and rhythm, no murmurs  Respiratory: Normal breath sounds  Abdomen: Non-tender  R shoulder with some decreased ROM; tenderness on top of shoulder.         Assessment and Plan:    (1) B shoulder complaints; he states R more than L shoulder. X-ray done on 4/10/17 R side shows some subtle lucency.  MRI R shoulder and have him see ortho (referral placed 5/1/17). Ordered SPEP/immunofixation as well normal   (2) He will continue to follow up in GI for Hep C management. His last Hep C viral load 12/1/15 was undetectable. He was seen by Ms. Hinojosa today  (3) He was seen by Dr. Trotter [facility] 11/1/16 for elevated PSA  (4) He was seen 5/16/16 by Dr. Delaney for neck complaints and sinus issues  (5) He was seen Dr. Naranjo Cardiology for ongoing palpitations 4/13/16.  (6) Reflux changed to Nexium; ordered EGD 10/16  (7) He states he sees his Psychiatry doctor once a  Month in [place].         Total time spent 15 minutes.  More than 50% of the time spent with Mr. Agee on counseling / coordinating his care

## 2017-05-31 ENCOUNTER — TELEPHONE (OUTPATIENT)
Dept: INTERNAL MEDICINE | Facility: CLINIC | Age: 62
End: 2017-05-31

## 2017-05-31 DIAGNOSIS — K21.9 ESOPHAGEAL REFLUX: Primary | ICD-10-CM

## 2017-05-31 RX ORDER — ESOMEPRAZOLE MAGNESIUM 40 MG/1
40 CAPSULE, DELAYED RELEASE ORAL
Qty: 90 CAPSULE | Refills: 3 | Status: SHIPPED | OUTPATIENT
Start: 2017-05-31 | End: 2018-06-25

## 2017-05-31 NOTE — TELEPHONE ENCOUNTER
RX sent to [facility] and pt called.  Chinyere Lyman RN 3:53 PM on 5/31/2017.

## 2017-05-31 NOTE — TELEPHONE ENCOUNTER
----- Message from Shazia Loomis sent at 5/31/2017 11:13 AM CDT -----  Regarding: Medication  Please call Precious Robles [phone] regarding patients medication.  Precious is the Home Health nurse    Nexium for reflux- needing prior auth. Call pt's [place] [facility]-Precious [phone]

## 2017-05-31 NOTE — TELEPHONE ENCOUNTER
Prior Authorization Not Needed per Insurance    Medication: Nexium   Insurance Company: [employer] - Phone [phone] Fax [phone]  Expected CoPay:      Pharmacy Filling the Rx:    Pharmacy Notified:    Patient Notified:

## 2017-06-02 ENCOUNTER — OFFICE VISIT (OUTPATIENT)
Dept: ORTHOPEDICS | Facility: CLINIC | Age: 62
End: 2017-06-02

## 2017-06-02 VITALS
WEIGHT: 175 LBS | OXYGEN SATURATION: 99 % | DIASTOLIC BLOOD PRESSURE: 76 MMHG | HEART RATE: 70 BPM | HEIGHT: 70 IN | BODY MASS INDEX: 25.05 KG/M2 | SYSTOLIC BLOOD PRESSURE: 120 MMHG

## 2017-06-02 DIAGNOSIS — M75.41 SHOULDER IMPINGEMENT SYNDROME, RIGHT: Primary | ICD-10-CM

## 2017-06-02 DIAGNOSIS — M75.111 INCOMPLETE TEAR OF RIGHT ROTATOR CUFF: ICD-10-CM

## 2017-06-02 RX ORDER — LIDOCAINE HYDROCHLORIDE 10 MG/ML
5 INJECTION, SOLUTION INFILTRATION; PERINEURAL ONCE
Status: DISCONTINUED | OUTPATIENT
Start: 2017-06-02 | End: 2017-11-14

## 2017-06-02 RX ORDER — TRIAMCINOLONE ACETONIDE 40 MG/ML
40 INJECTION, SUSPENSION INTRA-ARTICULAR; INTRAMUSCULAR ONCE
Status: DISCONTINUED | OUTPATIENT
Start: 2017-06-02 | End: 2017-11-14

## 2017-06-02 NOTE — LETTER
6/2/2017      RE: Russ Agee  [address]  [address]  [facility] [address]        Subjective:   Russ Agee is a 61 year old male who comes in today for RIGHT shoulder pain.    Background:   Date of injury: NKDA   Duration of symptoms: 4 months  Mechanism of Injury: Insidious Onset; Unknown   Intensity: 6/10 at rest, 9/10 with activity   Aggravating factors: Lifting, reaching  Relieving Factors: rest  Prior Evaluation: Prior Physician Evalutation: Dr. Haque  ALLERGIES: He is allergic to nkda [no known drug allergies].    CURRENT MEDICATIONS: He has a current medication list which includes the following prescription(s): esomeprazole, alfuzosin, sildenafil, trazodone, calcium polycarbophil, lidocaine, desonide, ketoconazole, urea, hydrocortisone, nicotine polacrilex, albuterol, and psyllium.          Exam:   There were no vitals taken for this visit.            PMH:  Past Medical History:   Diagnosis Date     Anxiety      Chronic hepatitis C (H)      Depression      Depressive disorder      Malignant neoplasm of prostate (H)      Mucous cyst of joint      Palpitations      Substance abuse        Active problem list:  Patient Active Problem List   Diagnosis     CARDIOVASCULAR SCREENING; LDL GOAL LESS THAN 160     Alcohol addiction (H)     Malignant neoplasm of prostate (H)     Mucous cyst of joint     Depressive disorder     Skin lesion     Patellofemoral disorder of left knee     Right knee DJD     Oral lesion     Candidiasis of mouth     SVT (supraventricular tachycardia) (H)     Other chest pain     Dermatitis, seborrheic     History of hepatitis C     Palpitations     Hepatitis B core antibody positive       FH:  Family History   Problem Relation Age of Onset     DIABETES Sister      DIABETES Son      Alcohol/Drug Other      Arthritis Other      Circulatory Other      Eye Disorder Other      Depression Other      Genetic Disorder Other      Psychotic Disorder Other      CANCER No family hx of      no skin cancer      Skin Cancer No family hx of        SH:  Social History     Social History     Marital status: Single     Spouse name: N/A     Number of children: N/A     Years of education: N/A     Occupational History     Not on file.     Social History Main Topics     Smoking status: Former Smoker     Packs/day: 0.50     Years: 6.00     Types: Cigarettes     Quit date: 9/16/2012     Smokeless tobacco: Never Used     Alcohol use No      Comment: stopped 10/2014     Drug use: No     Sexual activity: Not on file     Other Topics Concern     Not on file     Social History Narrative       MEDS:  See EMR, reviewed  ALL:  See EMR, reviewed    REVIEW OF SYSTEMS:  CONSTITUTIONAL:NEGATIVE for fever, chills, change in weight  INTEGUMENTARY/SKIN: NEGATIVE for worrisome rashes, moles or lesions  EYES: NEGATIVE for vision changes or irritation  ENT/MOUTH: NEGATIVE for ear, mouth and throat problems  RESP:NEGATIVE for significant cough or SOB  BREAST: NEGATIVE for masses, tenderness or discharge  CV: NEGATIVE for chest pain, palpitations or peripheral edema  GI: NEGATIVE for nausea, abdominal pain, heartburn, or change in bowel habits  :NEGATIVE for frequency, dysuria, or hematuria  :NEGATIVE for frequency, dysuria, or hematuria  NEURO: NEGATIVE for weakness, dizziness or paresthesias  ENDOCRINE: NEGATIVE for temperature intolerance, skin/hair changes  HEME/ALLERGY/IMMUNE: NEGATIVE for bleeding problems  PSYCHIATRIC: NEGATIVE for changes in mood or affect      EXAM: MR Right  Shoulder without  contrast 5/5/2017 8:40 AM     TECHNIQUE: Multiplanar, multisequence imaging of the right shoulder  were obtained without administration of intravenous or intra-articular  gadolinium contrast with increased field of view in attempt to include  more part of humerus.     History: Pain in right shoulder, Other chronic pain     Comparison: April 10, 2017     Findings:     ROTATOR CUFF and ASSOCIATED STRUCTURES  Rotator cuff: Low to moderate grade  bursal sided tearing involving the  anterior most fibers of the supraspinatus at the footprint. There is  also moderate grade intrasubstance tearing with likely articular sided  tear communication of the supraspinatus/infraspinatus junctional  fibers near the footprint. Tendinosis of the supraspinatus and  infraspinatus. Cystic change of the greater tuberosity. Teres minor  tendon is intact. Tendinosis of subscapularis.     Bursa: Trace subacromial/subdeltoid bursal fluid.     Musculature: Muscle bulk of rotator cuff is preserved.  Deltoid muscle  bulk is also preserved.  No muscle edema.     Acromioclavicular joint  There are mild degenerative changes of the acromioclavicular joint.  Acromion is type 3 in sagittal morphology with 9 mm inferiorly  oriented enthesophyte.  Coracoacromial ligament is not thickened.     OSSEOUS STRUCTURES  No fracture, marrow contusion or marrow infiltration.     LONG BICIPITAL TENDON  The long head of the biceps tendon is normally situated within the  bicipital groove. No complete or partial biceps tendon tear is  present.     GLENOHUMERAL JOINT  Joint fluid: Physiologic amount of joint fluid is  present.     Cartilage and subarticular bone:  No focal hyaline cartilage defects  are noted. No Hill-Sachs, reverse Hill-Sachs, or bony Bankart lesions  are seen.     Labrum: Limited assessment on this study with relative lack of joint  distention shows posterosuperior labral tearing approximately from  9:00 to 12:00.     ANCILLARY FINDINGS:         Impression:  1. Low to moderate grade bursal sided tearing of the anteriormost  fibers of supraspinatus at the footprint.  2. Moderate grade intrasubstance tear with likely small articular  sided tearing extension of the supraspinatus/infraspinatus junctional  fibers near the footprint.  3. 9:00 to 12:00 labral tearing.  4. Type III (hooked) acromion.  5. The visualized portion of bone marrow is unremarkable though not  entire extent of the  humerus matching to the previous radiograph is  included in the field-of-view despite relative increase in the  field-of-view.     JESUS ALBERTO SANTOS        3 views right shoulder radiographs 4/10/2017 4:16 PM     History: Unspecified viral hepatitis C without hepatic coma, Pain in  left shoulder, Other chronic pain, Pain in right shoulder     Comparison: None available.     Findings:     AP internal, external rotation and transscapular Y views of the right  shoulder were obtained.      No acute osseous abnormality.  Glenohumeral and acromioclavicular  joints are congruent.     Moderate degenerative changes of the acromioclavicular joint. Mild  degenerative change of the glenohumeral joint.     There is areas of patchy lucencies involving the right humeral shaft.  The remainder of bone appears osteopenic.     Soft tissue is unremarkable.  The visualized lung is clear.         Impression:  1. No acute osseous abnormality.  2. Patchy lucency of the right humeral shaft, which may be secondary  to osteopenia though underlying marrow replacing process cannot be  excluded. Consider lab correlation.     JESUS ALBERTO SANTOS        3 views left shoulder radiographs 4/10/2017 4:19 PM     History: Unspecified viral hepatitis C without hepatic coma, Pain in  left shoulder, Other chronic pain, Pain in right shoulder     Comparison: None available.     Findings:     AP internal, external rotation and transscapular Y views of the left  shoulder were obtained.      No acute osseous abnormality.  Glenohumeral and acromioclavicular  joints are congruent.     Moderate degenerative changes of the acromioclavicular joint.  No  substantial degenerative change of the glenohumeral joint.     Bones appear osteopenic.     Soft tissue is unremarkable.  The visualized lung is clear.         Impression:  1. No acute osseous abnormality.  2. Moderate acromioclavicular joint degenerative change.     JESUS ALBERTO SANTOS          SUBJECTIVE:  This  61-year-old male is seen at the request of Dr. Haque in consultation.  Over the last 2 months, he has a tendency to notice right-sided shoulder discomfort with overhead reaching and an outstretched arm.  He has been having trouble putting on his clothes because of it.  He denies any specific injury to his shoulder.  He does have a history of prostate cancer.  He has had an MRI of his shoulder that shows a partially torn rotator cuff and some labral degenerative changes as well.  He also has a type 3 acromion with some spurring noted.      OBJECTIVE:  He has a head forward, shoulder forward posture.  Overhead impingement signs are positive, but in the supine position with the shoulders relaxed, I am able to get full forward flexion and abduction and internal rotation to the shoulder, symmetric to his opposite shoulder with no signs of a frozen shoulder.  His strength is intact to the deltoid, supraspinatus, infraspinatus and subscapularis.  He is nontender over the AC joint, anterior cuff or biceps tendon.      PLAN:  He would like to try a cortisone shot for pain relief, so after informed consent about bleeding, infection or steroid flare and after prepping with surgical scrub, he was injected in the right shoulder from a posterior subacromial approach with 1 cc of Kenalog-40 and 5 cc of 1% lidocaine with the last cc directed over the tender anterior cuff.  The medicine went in easily.  He left the clinic ambulatory.  He will see Physical Therapy at [place] for a rotator cuff protocol.  He understands that if he is not improving with this it may be reasonable to consider seeing a surgeon due to the subacromial spur and concomitant rotator cuff changes seen on his MRI.  He will follow up if not improving.         Shorty Melvin MD

## 2017-06-02 NOTE — MR AVS SNAPSHOT
"              After Visit Summary   6/2/2017    Russ Agee    MRN: [id number]           Patient Information     Date Of Birth          1955        Visit Information        Provider Department      6/2/2017 3:30 PM Shorty Melvin MD [facility]        Today's Diagnoses     Shoulder impingement syndrome, right    -  1    Incomplete tear of right rotator cuff           Follow-ups after your visit        Additional Services     PHYSICAL THERAPY REFERRAL (Internal)       Physical Therapy Referral                  Your next 10 appointments already scheduled     Jul 26, 2017  1:35 PM CDT   (Arrive by 1:20 PM)   Return Visit with Donnie Haque MD   [facility] ([facility])    [address] [place] [address]  [address] [facility] [address] [phone]              Who to contact     Please call your clinic at [phone] to:    Ask questions about your health    Make or cancel appointments    Discuss your medicines    Learn about your test results    Speak to your doctor   If you have compliments or concerns about an experience at your clinic, or if you wish to file a complaint, please contact [facility] Patient Relations at [phone] or email us at [email or web address]         Additional Information About Your Visit        Care EveryWhere ID     This is your Care EveryWhere ID. This could be used by other organizations to access your [facility] medical records  [id number]        Your Vitals Were     Pulse Height Pulse Oximetry BMI (Body Mass Index)          70 5' 10\" (1.778 m) 99% 25.11 kg/m2         Blood Pressure from Last 3 Encounters:   06/02/17 120/76   05/30/17 119/74   05/01/17 151/86    Weight from Last 3 Encounters:   06/02/17 175 lb (79.4 kg)   05/30/17 175 lb 3.2 oz (79.5 kg)   05/01/17 180 lb 3.2 oz (81.7 kg)              We Performed the Following     Large Joint/Bursa injection and/or drainage - " Unilateral (Shoulder, Knee) [20610]     PHYSICAL THERAPY REFERRAL (Internal)        Primary Care Provider Office Phone # Fax #    Donnie Haque [phone]       [address] [address]        Thank you!     Thank you for choosing [facility]  for your care. Our goal is always to provide you with excellent care. Hearing back from our patients is one way we can continue to improve our services. Please take a few minutes to complete the written survey that you may receive in the mail after your visit with us. Thank you!             Your Updated Medication List - Protect others around you: Learn how to safely use, store and throw away your medicines at www.disposemymeds.org.          This list is accurate as of: 6/2/17 11:59 PM.  Always use your most recent med list.                   Brand Name Dispense Instructions for use    albuterol 108 (90 BASE) MCG/ACT Inhaler    albuterol    4 Inhaler    Inhale 2 puffs into the lungs every 6 hours       alfuzosin 10 MG 24 hr tablet    UROXATRAL    30 tablet    Take 1 tablet (10 mg) by mouth daily       desonide 0.05 % ointment    DESOWEN    60 g    To affected areas of the face for itch and red bumps twice a day as needed.       esomeprazole 40 MG CR capsule    nexIUM    90 capsule    Take 1 capsule (40 mg) by mouth every morning (before breakfast) Take 30-60 minutes before eating.       hydrocortisone 25 MG Suppository    ANUSOL-HC    28 suppository    Place 1 suppository (25 mg) rectally 2 times daily       ketoconazole 2 % shampoo    NIZORAL    240 mL    To entire wet scalp and face in affected areas and then wash off after several minutes three times a week.       lidocaine 2 % topical gel    XYLOCAINE     Apply 1 Tube topically       METAMUCIL PO      Take 3 tsp by mouth 2 times daily.       nicotine polacrilex 2 MG gum    NICORETTE     Place 2 mg inside cheek as needed for smoking cessation       RA  FIBER- MG tablet   Generic drug:  calcium polycarbophil      Take 3 tablets by mouth twice daily       sildenafil 20 MG tablet    REVATIO/VIAGRA    90 tablet    Take 1 to 5 tabs as needed for sexual activity.       traZODone 50 MG tablet    DESYREL     Take 50 mg by mouth At Bedtime       Urea 20 % Crea cream     60 g    To affected, ridged nails daily.

## 2017-06-02 NOTE — PROGRESS NOTES
Subjective:   Russ Agee is a 61 year old male who comes in today for RIGHT shoulder pain.    Background:   Date of injury: NKDA   Duration of symptoms: 4 months  Mechanism of Injury: Insidious Onset; Unknown   Intensity: 6/10 at rest, 9/10 with activity   Aggravating factors: Lifting, reaching  Relieving Factors: rest  Prior Evaluation: Prior Physician Evalutation: Dr. Haque  ALLERGIES: He is allergic to nkda [no known drug allergies].    CURRENT MEDICATIONS: He has a current medication list which includes the following prescription(s): esomeprazole, alfuzosin, sildenafil, trazodone, calcium polycarbophil, lidocaine, desonide, ketoconazole, urea, hydrocortisone, nicotine polacrilex, albuterol, and psyllium.          Exam:   There were no vitals taken for this visit.            PMH:  Past Medical History:   Diagnosis Date     Anxiety      Chronic hepatitis C (H)      Depression      Depressive disorder      Malignant neoplasm of prostate (H)      Mucous cyst of joint      Palpitations      Substance abuse        Active problem list:  Patient Active Problem List   Diagnosis     CARDIOVASCULAR SCREENING; LDL GOAL LESS THAN 160     Alcohol addiction (H)     Malignant neoplasm of prostate (H)     Mucous cyst of joint     Depressive disorder     Skin lesion     Patellofemoral disorder of left knee     Right knee DJD     Oral lesion     Candidiasis of mouth     SVT (supraventricular tachycardia) (H)     Other chest pain     Dermatitis, seborrheic     History of hepatitis C     Palpitations     Hepatitis B core antibody positive       FH:  Family History   Problem Relation Age of Onset     DIABETES Sister      DIABETES Son      Alcohol/Drug Other      Arthritis Other      Circulatory Other      Eye Disorder Other      Depression Other      Genetic Disorder Other      Psychotic Disorder Other      CANCER No family hx of      no skin cancer     Skin Cancer No family hx of        SH:  Social History     Social History      Marital status: Single     Spouse name: N/A     Number of children: N/A     Years of education: N/A     Occupational History     Not on file.     Social History Main Topics     Smoking status: Former Smoker     Packs/day: 0.50     Years: 6.00     Types: Cigarettes     Quit date: 9/16/2012     Smokeless tobacco: Never Used     Alcohol use No      Comment: stopped 10/2014     Drug use: No     Sexual activity: Not on file     Other Topics Concern     Not on file     Social History Narrative       MEDS:  See EMR, reviewed  ALL:  See EMR, reviewed    REVIEW OF SYSTEMS:  CONSTITUTIONAL:NEGATIVE for fever, chills, change in weight  INTEGUMENTARY/SKIN: NEGATIVE for worrisome rashes, moles or lesions  EYES: NEGATIVE for vision changes or irritation  ENT/MOUTH: NEGATIVE for ear, mouth and throat problems  RESP:NEGATIVE for significant cough or SOB  BREAST: NEGATIVE for masses, tenderness or discharge  CV: NEGATIVE for chest pain, palpitations or peripheral edema  GI: NEGATIVE for nausea, abdominal pain, heartburn, or change in bowel habits  :NEGATIVE for frequency, dysuria, or hematuria  :NEGATIVE for frequency, dysuria, or hematuria  NEURO: NEGATIVE for weakness, dizziness or paresthesias  ENDOCRINE: NEGATIVE for temperature intolerance, skin/hair changes  HEME/ALLERGY/IMMUNE: NEGATIVE for bleeding problems  PSYCHIATRIC: NEGATIVE for changes in mood or affect      EXAM: MR Right  Shoulder without  contrast 5/5/2017 8:40 AM     TECHNIQUE: Multiplanar, multisequence imaging of the right shoulder  were obtained without administration of intravenous or intra-articular  gadolinium contrast with increased field of view in attempt to include  more part of humerus.     History: Pain in right shoulder, Other chronic pain     Comparison: April 10, 2017     Findings:     ROTATOR CUFF and ASSOCIATED STRUCTURES  Rotator cuff: Low to moderate grade bursal sided tearing involving the  anterior most fibers of the supraspinatus at the  footprint. There is  also moderate grade intrasubstance tearing with likely articular sided  tear communication of the supraspinatus/infraspinatus junctional  fibers near the footprint. Tendinosis of the supraspinatus and  infraspinatus. Cystic change of the greater tuberosity. Teres minor  tendon is intact. Tendinosis of subscapularis.     Bursa: Trace subacromial/subdeltoid bursal fluid.     Musculature: Muscle bulk of rotator cuff is preserved.  Deltoid muscle  bulk is also preserved.  No muscle edema.     Acromioclavicular joint  There are mild degenerative changes of the acromioclavicular joint.  Acromion is type 3 in sagittal morphology with 9 mm inferiorly  oriented enthesophyte.  Coracoacromial ligament is not thickened.     OSSEOUS STRUCTURES  No fracture, marrow contusion or marrow infiltration.     LONG BICIPITAL TENDON  The long head of the biceps tendon is normally situated within the  bicipital groove. No complete or partial biceps tendon tear is  present.     GLENOHUMERAL JOINT  Joint fluid: Physiologic amount of joint fluid is  present.     Cartilage and subarticular bone:  No focal hyaline cartilage defects  are noted. No Hill-Sachs, reverse Hill-Sachs, or bony Bankart lesions  are seen.     Labrum: Limited assessment on this study with relative lack of joint  distention shows posterosuperior labral tearing approximately from  9:00 to 12:00.     ANCILLARY FINDINGS:         Impression:  1. Low to moderate grade bursal sided tearing of the anteriormost  fibers of supraspinatus at the footprint.  2. Moderate grade intrasubstance tear with likely small articular  sided tearing extension of the supraspinatus/infraspinatus junctional  fibers near the footprint.  3. 9:00 to 12:00 labral tearing.  4. Type III (hooked) acromion.  5. The visualized portion of bone marrow is unremarkable though not  entire extent of the humerus matching to the previous radiograph is  included in the field-of-view despite  relative increase in the  field-of-view.     JESUS ALBERTO SANTOS        3 views right shoulder radiographs 4/10/2017 4:16 PM     History: Unspecified viral hepatitis C without hepatic coma, Pain in  left shoulder, Other chronic pain, Pain in right shoulder     Comparison: None available.     Findings:     AP internal, external rotation and transscapular Y views of the right  shoulder were obtained.      No acute osseous abnormality.  Glenohumeral and acromioclavicular  joints are congruent.     Moderate degenerative changes of the acromioclavicular joint. Mild  degenerative change of the glenohumeral joint.     There is areas of patchy lucencies involving the right humeral shaft.  The remainder of bone appears osteopenic.     Soft tissue is unremarkable.  The visualized lung is clear.         Impression:  1. No acute osseous abnormality.  2. Patchy lucency of the right humeral shaft, which may be secondary  to osteopenia though underlying marrow replacing process cannot be  excluded. Consider lab correlation.     JESUS ALBERTO SANTOS        3 views left shoulder radiographs 4/10/2017 4:19 PM     History: Unspecified viral hepatitis C without hepatic coma, Pain in  left shoulder, Other chronic pain, Pain in right shoulder     Comparison: None available.     Findings:     AP internal, external rotation and transscapular Y views of the left  shoulder were obtained.      No acute osseous abnormality.  Glenohumeral and acromioclavicular  joints are congruent.     Moderate degenerative changes of the acromioclavicular joint.  No  substantial degenerative change of the glenohumeral joint.     Bones appear osteopenic.     Soft tissue is unremarkable.  The visualized lung is clear.         Impression:  1. No acute osseous abnormality.  2. Moderate acromioclavicular joint degenerative change.     JESUS ALBERTO SANTOS          SUBJECTIVE:  This 61-year-old male is seen at the request of Dr. Haque in consultation.  Over the last 2  months, he has a tendency to notice right-sided shoulder discomfort with overhead reaching and an outstretched arm.  He has been having trouble putting on his clothes because of it.  He denies any specific injury to his shoulder.  He does have a history of prostate cancer.  He has had an MRI of his shoulder that shows a partially torn rotator cuff and some labral degenerative changes as well.  He also has a type 3 acromion with some spurring noted.      OBJECTIVE:  He has a head forward, shoulder forward posture.  Overhead impingement signs are positive, but in the supine position with the shoulders relaxed, I am able to get full forward flexion and abduction and internal rotation to the shoulder, symmetric to his opposite shoulder with no signs of a frozen shoulder.  His strength is intact to the deltoid, supraspinatus, infraspinatus and subscapularis.  He is nontender over the AC joint, anterior cuff or biceps tendon.      PLAN:  He would like to try a cortisone shot for pain relief, so after informed consent about bleeding, infection or steroid flare and after prepping with surgical scrub, he was injected in the right shoulder from a posterior subacromial approach with 1 cc of Kenalog-40 and 5 cc of 1% lidocaine with the last cc directed over the tender anterior cuff.  The medicine went in easily.  He left the clinic ambulatory.  He will see Physical Therapy at [place] for a rotator cuff protocol.  He understands that if he is not improving with this it may be reasonable to consider seeing a surgeon due to the subacromial spur and concomitant rotator cuff changes seen on his MRI.  He will follow up if not improving.

## 2017-06-02 NOTE — Clinical Note
Thank you for allowing me to see your patient in Sports Medicine Clinic.  Please see the attached copy of our visit.  Sincerely,  Shorty Melvin MD

## 2017-07-11 ENCOUNTER — TELEPHONE (OUTPATIENT)
Dept: INTERNAL MEDICINE | Facility: CLINIC | Age: 62
End: 2017-07-11

## 2017-07-11 NOTE — TELEPHONE ENCOUNTER
----- Message from Lenore Shelton sent at 7/10/2017 11:52 AM CDT -----  Regarding: Dr. Haque, Suspected small stroke per pt. Nurse please f/u  AIDA Brooks says patient has droopiness in the R. side of face, thinks patient may have had small stoke. Patient was walking yesterday in the park, patient reports feeling dizzy and then not knowing what happened. Patient has been very weak, but patient is walking ok today and has equal hand grasp. BP was 90/60 but wasn't drinking water  today. Temp is fine and lungs are clear. Please call Precious back at Phone: [phone] Ok to leave a message.      Left message for pt/Precious to call back.  Chinyere Lyman RN 9:56 AM on 7/11/2017.  Pt to ER for evaluation.  Chinyere Lyman RN 10:48 AM on 7/11/2017.

## 2017-07-11 NOTE — TELEPHONE ENCOUNTER
----- Message from [place] sent at 7/11/2017  2:32 PM CDT -----  Regarding: Return call   Contact: [phone]  Precious called you back.     Called patient and tried to get pt to go to ED but patient refused to go. She states she tried her best to convince the patient to go to the ED, but he states he is feeling better and does not want to go.   Noted and documented.  Chinyere Lyman RN 3:11 PM on 7/11/2017.

## 2017-07-26 ENCOUNTER — HOSPITAL ENCOUNTER (OUTPATIENT)
Dept: MRI IMAGING | Facility: CLINIC | Age: 62
Discharge: HOME | End: 2017-07-26
Attending: INTERNAL MEDICINE | Admitting: INTERNAL MEDICINE
Payer: MEDICARE

## 2017-07-26 ENCOUNTER — OFFICE VISIT (OUTPATIENT)
Dept: INTERNAL MEDICINE | Facility: CLINIC | Age: 62
End: 2017-07-26

## 2017-07-26 ENCOUNTER — HOSPITAL ENCOUNTER (OUTPATIENT)
Dept: MRI IMAGING | Facility: CLINIC | Age: 62
End: 2017-07-26
Attending: INTERNAL MEDICINE
Payer: MEDICARE

## 2017-07-26 VITALS
WEIGHT: 172 LBS | SYSTOLIC BLOOD PRESSURE: 124 MMHG | DIASTOLIC BLOOD PRESSURE: 71 MMHG | BODY MASS INDEX: 24.68 KG/M2 | RESPIRATION RATE: 18 BRPM | HEART RATE: 56 BPM

## 2017-07-26 DIAGNOSIS — G83.9 PARALYSIS (H): ICD-10-CM

## 2017-07-26 DIAGNOSIS — R97.20 ELEVATED PROSTATE SPECIFIC ANTIGEN (PSA): ICD-10-CM

## 2017-07-26 DIAGNOSIS — Z12.5 ENCOUNTER FOR SCREENING FOR MALIGNANT NEOPLASM OF PROSTATE: ICD-10-CM

## 2017-07-26 DIAGNOSIS — M79.622 PAIN OF LEFT UPPER ARM: ICD-10-CM

## 2017-07-26 DIAGNOSIS — M79.622 PAIN OF LEFT UPPER ARM: Primary | ICD-10-CM

## 2017-07-26 LAB
ALBUMIN SERPL-MCNC: 3.5 G/DL (ref 3.4–5)
ALP SERPL-CCNC: 58 U/L (ref 40–150)
ALT SERPL W P-5'-P-CCNC: 17 U/L (ref 0–70)
ANION GAP SERPL CALCULATED.3IONS-SCNC: 5 MMOL/L (ref 3–14)
AST SERPL W P-5'-P-CCNC: 14 U/L (ref 0–45)
BASOPHILS # BLD AUTO: 0 10E9/L (ref 0–0.2)
BASOPHILS NFR BLD AUTO: 0.4 %
BILIRUB SERPL-MCNC: 0.4 MG/DL (ref 0.2–1.3)
BUN SERPL-MCNC: 11 MG/DL (ref 7–30)
CALCIUM SERPL-MCNC: 8.7 MG/DL (ref 8.5–10.1)
CHLORIDE SERPL-SCNC: 107 MMOL/L (ref 94–109)
CO2 SERPL-SCNC: 28 MMOL/L (ref 20–32)
CREAT SERPL-MCNC: 0.9 MG/DL (ref 0.66–1.25)
CRP SERPL-MCNC: 7.4 MG/L (ref 0–8)
DIFFERENTIAL METHOD BLD: ABNORMAL
EOSINOPHIL # BLD AUTO: 0.1 10E9/L (ref 0–0.7)
EOSINOPHIL NFR BLD AUTO: 2.5 %
ERYTHROCYTE [DISTWIDTH] IN BLOOD BY AUTOMATED COUNT: 13.3 % (ref 10–15)
ERYTHROCYTE [SEDIMENTATION RATE] IN BLOOD BY WESTERGREN METHOD: 18 MM/H (ref 0–20)
GFR SERPL CREATININE-BSD FRML MDRD: 85 ML/MIN/1.7M2
GLUCOSE SERPL-MCNC: 74 MG/DL (ref 70–99)
HCT VFR BLD AUTO: 38.4 % (ref 40–53)
HGB BLD-MCNC: 12.8 G/DL (ref 13.3–17.7)
IMM GRANULOCYTES # BLD: 0 10E9/L (ref 0–0.4)
IMM GRANULOCYTES NFR BLD: 0.4 %
LYMPHOCYTES # BLD AUTO: 1.5 10E9/L (ref 0.8–5.3)
LYMPHOCYTES NFR BLD AUTO: 27 %
MCH RBC QN AUTO: 31.8 PG (ref 26.5–33)
MCHC RBC AUTO-ENTMCNC: 33.3 G/DL (ref 31.5–36.5)
MCV RBC AUTO: 96 FL (ref 78–100)
MONOCYTES # BLD AUTO: 0.6 10E9/L (ref 0–1.3)
MONOCYTES NFR BLD AUTO: 11 %
NEUTROPHILS # BLD AUTO: 3.3 10E9/L (ref 1.6–8.3)
NEUTROPHILS NFR BLD AUTO: 58.7 %
NRBC # BLD AUTO: 0 10*3/UL
NRBC BLD AUTO-RTO: 0 /100
PLATELET # BLD AUTO: 142 10E9/L (ref 150–450)
POTASSIUM SERPL-SCNC: 4.3 MMOL/L (ref 3.4–5.3)
PROT SERPL-MCNC: 7.2 G/DL (ref 6.8–8.8)
PSA SERPL-ACNC: 6.42 UG/L (ref 0–4)
RBC # BLD AUTO: 4.02 10E12/L (ref 4.4–5.9)
SODIUM SERPL-SCNC: 140 MMOL/L (ref 133–144)
TROPONIN I SERPL-MCNC: NORMAL UG/L (ref 0–0.04)
WBC # BLD AUTO: 5.6 10E9/L (ref 4–11)

## 2017-07-26 PROCEDURE — 72141 MRI NECK SPINE W/O DYE: CPT

## 2017-07-26 PROCEDURE — 25000128 H RX IP 250 OP 636: Performed by: INTERNAL MEDICINE

## 2017-07-26 PROCEDURE — A9585 GADOBUTROL INJECTION: HCPCS | Performed by: INTERNAL MEDICINE

## 2017-07-26 PROCEDURE — 70553 MRI BRAIN STEM W/O & W/DYE: CPT

## 2017-07-26 RX ORDER — GADOBUTROL 604.72 MG/ML
7.5 INJECTION INTRAVENOUS ONCE
Status: COMPLETED | OUTPATIENT
Start: 2017-07-26 | End: 2017-07-26

## 2017-07-26 RX ADMIN — GADOBUTROL 7.5 ML: 604.72 INJECTION INTRAVENOUS at 17:46

## 2017-07-26 ASSESSMENT — PAIN SCALES - GENERAL: PAINLEVEL: MODERATE PAIN (5)

## 2017-07-26 NOTE — NURSING NOTE
Chief Complaint   Patient presents with     Shoulder right     Here for right shoulder joint pain     Paralysis     Also here for paralysis of index and pinky left finger including painful right arm.     Roderick Livingston CMA at 1:47 PM on 7/26/2017

## 2017-07-26 NOTE — PROGRESS NOTES
Subjective:  Russ is a pleasant 59 year-old man who came in today for follow up today.  He had colonoscopy 11/13/15 repeat in 5 years. He had upper EGD and EUS with Dr. Acnua, GI 12/16/15 with gastric polyp bx.  He had fairly unremarkable Holter Monitor 1/6/16.   He was 2/16/16 in Dermatology. He was seen by Dr. Delaney, ENT 3/10/16 and 5/16/16 for sinus issues. He has been treated for Hep C. He states about 6 months (he did not mentioned this before) paralysis of one or more of L fingers. More often 4th and 5th fingers followed by L arm pain. This can happen several times a week and last episode was last week. He has some weakness as well. No injury, no chest, axilla, neck or L arm injury. No F/C/NS.  No new medication.     Objective:  General appearance: NAD, cooperative, alert.  HEENT; negative   Cardio: Normal rate and rhythm, no murmurs  Respiratory: Normal breath sounds  Abdomen: Non-tender  Neurological exam: B UE/LE/proximal/distal is normal and symmetric for sensation, reflexes, and strength. No tenderness to palpation of L shoulder L upper arm, L forearm, or hand. No hand contractures. Balance and gait are normal. B Cranial nerves normal.     EKG; SR at 56; no acute findings and no change from 2/17/17    Assessment and Plan:    (1) L arm/hand complaints. Labs, CXR, EKG, and MRI/MRA brain and cervical spine. He may need EMG (bracial and/or carpal tunnel pathology). CT chest for L axial pathology?    (2) He will continue to follow up in GI for Hep C management. His last Hep C viral load 12/1/15 was undetectable. He was seen by Ms. Hinojosa today  (3) He was seen by Dr. Trotter Urology 11/1/16 for elevated PSA  (4) He was seen 5/16/16 by Dr. Delaney for neck complaints and sinus issues  (5) He was seen Dr. Naranjo Cardiology for ongoing palpitations 4/13/16.  (6) Reflux changed to Nexium; ordered EGD 10/16  (7) He states he sees his Psychiatry doctor once a  Month in [place].     I will see him back tomorrow  7/27/17.      Total time spent 40 minutes.  More than 50% of the time spent with Mr. Agee on counseling / coordinating his care

## 2017-07-26 NOTE — PATIENT INSTRUCTIONS
[facility] Medication Refill Request Information:  * Please contact your pharmacy regarding ANY request for medication refills.  ** [facility] Prescription Fax = [phone]  * Please allow 3 business days for routine medication refills.  * Please allow 5 business days for controlled substance medication refills.     [facility] Test Result notification information:  *You will be notified with in 7-10 days of your appointment day regarding the results of your test.  If you are on MyChart you will be notified as soon as the provider has reviewed the results and signed off on them.    [facility] [phone]

## 2017-07-26 NOTE — LETTER
Patient:  Russ Agee  :   1955  MRN:     [id number]        Mr. Russ Agee  [address]  [place] [address]        2017    Dear Mr. Agee,    Thank you for choosing the [facility] for your healthcare needs.  We appreciate the opportunity to serve you.    The following are your recent test results.     Dear Mr. Agee;     Your results are attached:     (1) I recommend thyroid ultrasound and endocrinology appt. To evaluate the thyroid nodules seen on the MRI. I placed orders for this today     (2) Your PSA is still elevated and rising and I recommend you follow up with Dr. Trotter Urology and I placed a referral today     (3) You have a lung nodule and I recommend a chest CT scan today     We will discuss this further at your appointment with me today.     Results for orders placed or performed in visit on 17   MRI Cervical Spine w/o Contrast    Narrative    MRI CERVICAL SPINE WITHOUT CONTRAST 2017 5:51 PM     HISTORY: Left upper arm pain.    TECHNIQUE: Multiplanar, multisequence MRI of the cervical spine  without contrast.     COMPARISON: None.    FINDINGS: Normal cervical lordosis. Anterior posterior alignment of  the spine is within normal limits. Vertebral body height is maintained  without evidence of fracture. There are no destructive osseous  lesions. There is mild loss of disc height and disc desiccation at  C2-C3, C3-C4, C4-C5, C5-C6, and C6-C7.    The cervical spinal cord and visualized portions of the thoracic  spinal cord appear normal. The visualized portions of the brainstem  and cerebellum appear normal.    Level by level as follows:    C2-C3:  Central posterior disc osteophyte complex without spinal canal  or neural foraminal narrowing.     C3-C4:  Small posterior disc osteophyte bulge asymmetric in the right  foraminal region results in mild right neural foraminal narrowing. No  central canal narrowing or left neural  foraminal narrowing.     C4-C5:  Small posterior disc osteophyte complex and right-sided  uncinate and facet hypertrophy results in mild right neural foraminal  narrowing. No central canal stenosis or left neural foraminal  narrowing.     C5-C6:  Small posterior disc osteophyte complex and bilateral  uncovertebral and facet hypertrophy results in mild bilateral neural  foraminal narrowing. No spinal canal narrowing.     C6-C7:  Small posterior disc osteophyte complex. No significant spinal  canal or neural foraminal narrowing.     C7-T1: No significant spinal canal or neural foraminal narrowing.     There are bilateral thyroid gland nodules measuring 1.1 cm on the left  and 0.5 cm on the right. Management per the guidelines as follows: In  a patient without known thyroid disease, an incidental thyroid nodule  without microcalcifications measuring <1.0 cm in size in a patient <35  years old or <1.5 cm in a patient >35 years old is most likely benign  and does not typically require follow-up.      Impression    IMPRESSION:    1. Multilevel degenerative changes in the cervical spine as described  above with mild right neural foraminal narrowing at C3-C4 and C4-C5  and mild bilateral neural foraminal narrowing at C5-C6.    2. Bilateral thyroid gland nodules as described above.    BRIAN CASTANEDA MD   MRI Brain w & w/o contrast    Narrative    MRI BRAIN WITHOUT AND WITH CONTRAST  7/26/2017 6:11 PM    HISTORY: Paralytic syndrome. Pain in left upper arm.     TECHNIQUE: Multiplanar, multisequence MRI of the brain without and  with 7.5ml Gadavist.    COMPARISON: Sinus CT 12/3/2013.    FINDINGS:  There is generalized atrophy of the brain. White matter T2  hyperintensities are seen in the cerebral hemispheres consistent with  sequelae of small vessel ischemic disease. There is no evidence of  hemorrhage, mass, acute infarct, or anomaly. There are no gadolinium  enhancing lesions.    The facial structures appear normal. The  arteries at the base of the  brain and the dural venous sinuses appear patent.       Impression    IMPRESSION:  1. No evidence of acute infarct, mass, or hemorrhage. No etiology  identified to account for left upper arm pain.  2. There is generalized atrophy of the brain. White matter changes are  present in the cerebral hemispheres that are consistent with small  vessel ischemic disease in this age patient.        BRIAN CASTANEDA MD   EKG Performed in Clinic w/ Provider Reading Fee   Result Value Ref Range    Interpretation ECG Click View Image link to view waveform and result      Please contact your provider if you have any questions or concerns.  We look forward to serving your needs in the future.      Sincerely,    LUTHER Haque

## 2017-07-26 NOTE — MR AVS SNAPSHOT
After Visit Summary   7/26/2017    Russ Agee    MRN: [id number]           Patient Information     Date Of Birth          1955        Visit Information        Provider Department      7/26/2017 1:35 PM Donnie Haque MD [facility]        Today's Diagnoses     Pain of left upper arm    -  1    Paralysis (H)        Elevated prostate specific antigen (PSA)        Encounter for screening for malignant neoplasm of prostate           Care Instructions    Primary Care Center Medication Refill Request Information:  * Please contact your pharmacy regarding ANY request for medication refills.  ** [facility] Prescription Fax = [phone]  * Please allow 3 business days for routine medication refills.  * Please allow 5 business days for controlled substance medication refills.     Primary Care Center Test Result notification information:  *You will be notified with in 7-10 days of your appointment day regarding the results of your test.  If you are on MyChart you will be notified as soon as the provider has reviewed the results and signed off on them.    Primary Care Center [phone]               Follow-ups after your visit        Your next 10 appointments already scheduled     Jul 26, 2017  2:45 PM CDT   LAB with  LAB   [facility] ([facility])    [address] [place] [address] [phone]           Patient must bring picture ID. Patient should be prepared to give a urine specimen  Please do not eat 10-12 hours before your appointment if you are coming in fasting for labs on lipids, cholesterol, or glucose (sugar). Pregnant women should follow their Care Team instructions. Water with medications is okay. Do not drink coffee or other fluids. If you have concerns about taking  your medications, please ask at office or if scheduling via Grow, send a message by clicking on Secure Messaging, Message Your Care Team.            Jul  26, 2017  3:05 PM CDT   (Arrive by 2:50 PM)   XR CHEST 2 VIEWS with UCXR1   [facility] ([facility])    [address]  1st Floor  [facility] [id number] [phone]           Please bring a list of your current medicines to your exam. (Include vitamins, minerals and over-thecounter medicines.) Leave your valuables at home.  Tell your doctor if there is a chance you may be pregnant.  You do not need to do anything special for this exam.            Jul 26, 2017  5:00 PM CDT   MR CERVICAL SPINE W/O CONTRAST with URMR2   [facility] [place] MRI ([facility])    [address] [facility] [address] [phone]           Take your medicines as usual, unless your doctor tells you not to. Bring a list of your current medicines to your exam (including vitamins, minerals and over-the-counter drugs). Also bring the results of similar scans you may have had.  Please remove any body piercings and hair extensions before you arrive.  Follow your doctor s orders. If you do not, we may have to postpone your exam.  You will not have contrast for this exam. You do not need to do anything special to prepare.  The MRI machine uses a strong magnet. Please wear clothes without metal (snaps, zippers). A sweatsuit works well, or we may give you a hospital gown.   **IMPORTANT** THE INSTRUCTIONS BELOW ARE ONLY FOR THOSE PATIENTS WHO HAVE BEEN TOLD THEY WILL RECEIVE SEDATION OR GENERAL ANESTHESIA DURING THEIR MRI PROCEDURE:  IF YOU WILL RECEIVE SEDATION (take medicine to help you relax during your exam):   You must get the medicine from your doctor before you arrive. Bring the medicine to the exam. Do not take it at home.   Arrive one hour early. Bring someone who can take you home after the test. Your medicine will make you sleepy. After the exam, you may not drive, take a bus or take a taxi by yourself.   No eating 8 hours before  your exam. You may have clear liquids up until 4 hours before your exam. (Clear liquids include water, clear tea, black coffee and fruit juice without pulp.)  IF YOU WILL RECEIVE ANESTHESIA (be asleep for your exam):   Arrive 1 1/2 hours early. Bring someone who can take you home after the test. You may not drive, take a bus or take a taxi by yourself.   No eating 8 hours before your exam. You may have clear liquids up until 4 hours before your exam. (Clear liquids include water, clear tea, black coffee and fruit juice without pulp.)   You will spend four to five hours in the recovery room.  Please call the Imaging Department at your exam site with any questions.            Jul 27, 2017  2:30 PM CDT   (Arrive by 2:15 PM)   Return Visit with Donnie Haque MD   [facility] ([facility])    [address] [place] [address]  [address] [facility] [address] [phone]            Sep 18, 2017  1:15 PM CDT   (Arrive by 1:00 PM)   New Allergy with Wellington Calderon MD   [facility] ([facility])    [address] [place] [address] [phone]           Do not take anti-histamines or Zantac for seven day prior to your appointment.              Future tests that were ordered for you today     Open Future Orders        Priority Expected Expires Ordered    PSA screen Routine 7/26/2017 7/26/2018 7/26/2017    XR Chest 2 Views Routine 7/26/2017 7/26/2018 7/26/2017    Troponin I Routine 7/26/2017 8/9/2017 7/26/2017    CBC with platelets differential Routine 7/26/2017 8/9/2017 7/26/2017    Erythrocyte sedimentation rate Routine 7/26/2017 7/26/2018 7/26/2017    CRP inflammation Routine 7/26/2017 7/26/2018 7/26/2017    Comprehensive metabolic panel Routine 7/26/2017 7/26/2018 7/26/2017            Who to contact     Please call your clinic at [phone] to:    Ask questions about your health    Make or  cancel appointments    Discuss your medicines    Learn about your test results    Speak to your doctor   If you have compliments or concerns about an experience at your clinic, or if you wish to file a complaint, please contact [facility] Patient Relations at [phone] or email us at [email or web address]         Additional Information About Your Visit        Care EveryWhere ID     This is your Care EveryWhere ID. This could be used by other organizations to access your [facility] medical records  [id number]        Your Vitals Were     Pulse Respirations BMI (Body Mass Index)             56 18 24.68 kg/m2          Blood Pressure from Last 3 Encounters:   07/26/17 124/71   06/02/17 120/76   05/30/17 119/74    Weight from Last 3 Encounters:   07/26/17 78 kg (172 lb)   06/02/17 79.4 kg (175 lb)   05/30/17 79.5 kg (175 lb 3.2 oz)              We Performed the Following     EKG Performed in Clinic w/ Provider Reading Fee     MRI Brain w & w/o contrast     MRI Cervical Spine w/o Contrast        Primary Care Provider Office Phone # Fax #    Donnie SARAVIA MD [place] [phone]       [facility] [address] [address]        Equal Access to Services     MEREDITH LEÓN : Hadii aad ku hadasho Soomaali, waaxda luqadaha, qaybta kaalmada adeegyada, waxay kusumin jese paz. So [facility] [phone].    ATENCIÓN: Si habla español, tiene a french disposición servicios gratuitos de asistencia lingüística. Llame al [phone].    We comply with applicable federal civil rights laws and [place] laws. We do not discriminate on the basis of race, color, national origin, age, disability sex, sexual orientation or gender identity.            Thank you!     Thank you for choosing [facility]  for your care. Our goal is always to provide you with excellent care. Hearing back from our patients is one way we can continue to improve our  services. Please take a few minutes to complete the written survey that you may receive in the mail after your visit with us. Thank you!             Your Updated Medication List - Protect others around you: Learn how to safely use, store and throw away your medicines at www.disposemymeds.org.          This list is accurate as of: 7/26/17  2:40 PM.  Always use your most recent med list.                   Brand Name Dispense Instructions for use Diagnosis    albuterol 108 (90 BASE) MCG/ACT Inhaler    albuterol    4 Inhaler    Inhale 2 puffs into the lungs every 6 hours    COPD (chronic obstructive pulmonary disease) (H)       alfuzosin 10 MG 24 hr tablet    UROXATRAL    30 tablet    Take 1 tablet (10 mg) by mouth daily    Benign non-nodular prostatic hyperplasia, presence of lower urinary tract symptoms unspecified       desonide 0.05 % ointment    DESOWEN    60 g    To affected areas of the face for itch and red bumps twice a day as needed.    Dermatitis, seborrheic       esomeprazole 40 MG CR capsule    nexIUM    90 capsule    Take 1 capsule (40 mg) by mouth every morning (before breakfast) Take 30-60 minutes before eating.    Esophageal reflux       hydrocortisone 25 MG Suppository    ANUSOL-HC    28 suppository    Place 1 suppository (25 mg) rectally 2 times daily    Internal hemorrhoids       ketoconazole 2 % shampoo    NIZORAL    240 mL    To entire wet scalp and face in affected areas and then wash off after several minutes three times a week.    Dermatitis, seborrheic       lidocaine 2 % topical gel    XYLOCAINE     Apply 1 Tube topically        METAMUCIL PO      Take 3 tsp by mouth 2 times daily.        nicotine polacrilex 2 MG gum    NICORETTE     Place 2 mg inside cheek as needed for smoking cessation        RA FIBER- MG tablet   Generic drug:  calcium polycarbophil      Take 3 tablets by mouth twice daily        sildenafil 20 MG tablet    REVATIO/VIAGRA    90 tablet    Take 1 to 5 tabs as needed for  sexual activity.    Other male erectile dysfunction       traZODone 50 MG tablet    DESYREL     Take 50 mg by mouth At Bedtime        Urea 20 % Crea cream     60 g    To affected, ridged nails daily.    Dystrophic nail

## 2017-07-27 ENCOUNTER — OFFICE VISIT (OUTPATIENT)
Dept: INTERNAL MEDICINE | Facility: CLINIC | Age: 62
End: 2017-07-27

## 2017-07-27 VITALS
WEIGHT: 172.7 LBS | HEART RATE: 73 BPM | BODY MASS INDEX: 24.78 KG/M2 | SYSTOLIC BLOOD PRESSURE: 116 MMHG | DIASTOLIC BLOOD PRESSURE: 71 MMHG

## 2017-07-27 DIAGNOSIS — M79.602 PAIN OF LEFT UPPER EXTREMITY: ICD-10-CM

## 2017-07-27 DIAGNOSIS — R91.8 PULMONARY NODULES: ICD-10-CM

## 2017-07-27 DIAGNOSIS — C61 PROSTATE CANCER (H): ICD-10-CM

## 2017-07-27 DIAGNOSIS — E04.1 THYROID NODULE: Primary | ICD-10-CM

## 2017-07-27 LAB — INTERPRETATION ECG - MUSE: NORMAL

## 2017-07-27 ASSESSMENT — PAIN SCALES - GENERAL: PAINLEVEL: NO PAIN (0)

## 2017-07-27 NOTE — MR AVS SNAPSHOT
After Visit Summary   7/27/2017    Russ Agee    MRN: [id number]           Patient Information     Date Of Birth          1955        Visit Information        Provider Department      7/27/2017 2:30 PM Donnie Haque MD [facility]        Today's Diagnoses     Thyroid nodule    -  1    Prostate cancer (H)        Pulmonary nodules        Pain of left upper extremity           Follow-ups after your visit        Additional Services     ENDOCRINOLOGY ADULT REFERRAL       Thyroid nodule(s)            UROLOGY ADULT REFERRAL       Prostate cancer elevated PSA                  Your next 10 appointments already scheduled     Jul 27, 2017  4:20 PM CDT   (Arrive by 4:05 PM)   CT CHEST W/O CONTRAST with UCCT1   [facility] CT ([facility])    [address] [place] [id number] [phone]           Please bring any scans or X-rays taken at other hospitals, if similar tests were done. Also bring a list of your medicines, including vitamins, minerals and over-the-counter drugs. It is safest to leave personal items at home.  Be sure to tell your doctor:   If you have any allergies.   If there s any chance you are pregnant.   If you are breastfeeding.   If you have any special needs.  You do not need to do anything special to prepare.  Please wear loose clothing, such as a sweat suit or jogging clothes. Avoid snaps, zippers and other metal. We may ask you to undress and put on a hospital gown.            Jul 27, 2017  5:00 PM CDT   US THYROID with UCUS3   [facility] US ([facility])    [address] [address] [phone]           Please bring a list of your medicines (including vitamins, minerals and over-the-counter drugs). Also, tell your doctor about any allergies you may have. Wear comfortable clothes and leave your valuables at home.  You do not need  to do anything special to prepare for your exam.  Please call the Imaging Department at your exam site with any questions.            Aug 28, 2017 11:00 AM CDT   (Arrive by 10:45 AM)   EMG with Syed Valera MD   [facility] ([facility])    [address] [place] [id number] [phone]            Aug 28, 2017 12:35 PM CDT   (Arrive by 12:20 PM)   Return Visit with Donnie Haque MD   [facility] ([facility])    [address] [place] [id number] [phone]            Sep 18, 2017  1:15 PM CDT   (Arrive by 1:00 PM)   New Allergy with Wellington Calderon MD   [facility] ([facility])    [address] [place] [id number] [phone]           Do not take anti-histamines or Zantac for seven day prior to your appointment.            Dec 05, 2017 10:00 AM CST   (Arrive by 9:45 AM)   Return Prostate Cancer with Gerardo Trotter MD   [facility] ([facility])    [address] [place] [id number] [phone]              Future tests that were ordered for you today     Open Future Orders        Priority Expected Expires Ordered    EMG Routine  7/27/2018 7/27/2017    US Thyroid Routine  7/27/2018 7/27/2017            Who to contact     Please call your clinic at [phone] to:    Ask questions about your health    Make or cancel appointments    Discuss your medicines    Learn about your test results    Speak to your doctor   If you have compliments or concerns about an experience at your clinic, or if you wish to file a complaint, please contact [facility] Patient Relations at [phone] or email us at [email or web address]         Additional Information About  Your Visit        Care EveryWhere ID     This is your Care EveryWhere ID. This could be used by other organizations to access your [facility] medical records  [id number]        Your Vitals Were     Pulse BMI (Body Mass Index)                73 24.78 kg/m2           Blood Pressure from Last 3 Encounters:   07/27/17 116/71   07/26/17 124/71   06/02/17 120/76    Weight from Last 3 Encounters:   07/27/17 78.3 kg (172 lb 11.2 oz)   07/26/17 78 kg (172 lb)   06/02/17 79.4 kg (175 lb)              We Performed the Following     CT Chest w/o contrast     ENDOCRINOLOGY ADULT REFERRAL     UROLOGY ADULT REFERRAL        Primary Care Provider Office Phone # Fax #    Donnie Haque [phone]       [address] [address]        Equal Access to Services     ELENA [facility] Center[facility]EL : Hadii lamont hurtado hadasho Socaleb, waaxda luqadaha, qaybta kaalmada adeegyada, machelle herndon haymagalie stein . So [facility] [phone].    ATENCIÓN: Si habla español, tiene a french disposición servicios gratuitos de asistencia lingüística. Llame al [phone].    We comply with applicable federal civil rights laws and [place] laws. We do not discriminate on the basis of race, color, national origin, age, disability sex, sexual orientation or gender identity.            Thank you!     Thank you for choosing [facility]  for your care. Our goal is always to provide you with excellent care. Hearing back from our patients is one way we can continue to improve our services. Please take a few minutes to complete the written survey that you may receive in the mail after your visit with us. Thank you!             Your Updated Medication List - Protect others around you: Learn how to safely use, store and throw away your medicines at www.disposemymeds.org.          This list is accurate as of: 7/27/17  3:30 PM.  Always use your most recent med list.                   Brand Name Dispense  Instructions for use Diagnosis    albuterol 108 (90 BASE) MCG/ACT Inhaler    albuterol    4 Inhaler    Inhale 2 puffs into the lungs every 6 hours    COPD (chronic obstructive pulmonary disease) (H)       alfuzosin 10 MG 24 hr tablet    UROXATRAL    30 tablet    Take 1 tablet (10 mg) by mouth daily    Benign non-nodular prostatic hyperplasia, presence of lower urinary tract symptoms unspecified       desonide 0.05 % ointment    DESOWEN    60 g    To affected areas of the face for itch and red bumps twice a day as needed.    Dermatitis, seborrheic       esomeprazole 40 MG CR capsule    nexIUM    90 capsule    Take 1 capsule (40 mg) by mouth every morning (before breakfast) Take 30-60 minutes before eating.    Esophageal reflux       hydrocortisone 25 MG Suppository    ANUSOL-HC    28 suppository    Place 1 suppository (25 mg) rectally 2 times daily    Internal hemorrhoids       ketoconazole 2 % shampoo    NIZORAL    240 mL    To entire wet scalp and face in affected areas and then wash off after several minutes three times a week.    Dermatitis, seborrheic       lidocaine 2 % topical gel    XYLOCAINE     Apply 1 Tube topically        METAMUCIL PO      Take 3 tsp by mouth 2 times daily.        nicotine polacrilex 2 MG gum    NICORETTE     Place 2 mg inside cheek as needed for smoking cessation        RA FIBER- MG tablet   Generic drug:  calcium polycarbophil      Take 3 tablets by mouth twice daily        sildenafil 20 MG tablet    REVATIO/VIAGRA    90 tablet    Take 1 to 5 tabs as needed for sexual activity.    Other male erectile dysfunction       traZODone 50 MG tablet    DESYREL     Take 50 mg by mouth At Bedtime        Urea 20 % Crea cream     60 g    To affected, ridged nails daily.    Dystrophic nail

## 2017-07-27 NOTE — NURSING NOTE
Chief Complaint   Patient presents with     Results     Patient here to go over results.     Mckenna Mckeon LPN at 2:35 PM on 7/27/2017.

## 2017-07-27 NOTE — PROGRESS NOTES
Subjective:  Russ is a pleasant 59 year-old man who came in today for follow up today. I saw him yesterday for several complaints listed below.  He had colonoscopy 11/13/15 repeat in 5 years. He had upper EGD and EUS with Dr. Acuna, GI 12/16/15 with gastric polyp bx.  He had fairly unremarkable Holter Monitor 1/6/16.   He was 2/16/16 in Dermatology. He was seen by Dr. Delaney, ENT 3/10/16 and 5/16/16 for sinus issues. He has been treated for Hep C. He states about 6 months (he did not mentioned this before) paralysis of one or more of L fingers. More often 4th and 5th fingers followed by L arm pain. This can happen several times a week and last episode was last week. He has some weakness as well. No injury, no chest, axilla, neck or L arm injury. No F/C/NS.  No new medication.     Objective:  General appearance: NAD, cooperative, alert.  HEENT; negative   Cardio: Normal rate and rhythm, no murmurs  Respiratory: Normal breath sounds  Abdomen: Non-tender  Neurological exam: B UE/LE/proximal/distal is normal and symmetric for sensation, reflexes, and strength. No tenderness to palpation of L shoulder L upper arm, L forearm, or hand. No hand contractures. Balance and gait are normal. B Cranial nerves normal.       Assessment and Plan:    (1) L arm/hand complaints. Labs, CXR, EKG done yesterday.  MRI/MRA brain and cervical spine w/o significant findings. Ordered  EMG (bracial and/or carpal tunnel pathology). CT chest for L axial pathology ordered.   (2) He will continue to follow up in GI for Hep C management. His last Hep C viral load 12/1/15 was undetectable. He was seen by Ms. Hinojosa today  (3) He was seen by Dr. Trotter Urology 11/1/16 for elevated PSA; PSA still elevated and placed Urology referral today 7/27/17  (4) He was seen 5/16/16 by Dr. Delaney for neck complaints and sinus issues  (5) He was seen Dr. Naranjo Cardiology for ongoing palpitations 4/13/16.  (6) Reflux changed to Nexium; ordered EGD 10/16  (7) He states  he sees his Psychiatry doctor once a  Month in [place].   (8) Thyroid nodules seen on Cervical MRI; thyroid U/S and referral to Endocrine    I will see him back in about 3 weeks.          Total time spent 25 minutes.  More than 50% of the time spent with Mr. Agee on counseling / coordinating his care

## 2017-08-03 ENCOUNTER — MEDICAL CORRESPONDENCE (OUTPATIENT)
Dept: HEALTH INFORMATION MANAGEMENT | Facility: CLINIC | Age: 62
End: 2017-08-03

## 2017-09-06 ENCOUNTER — OFFICE VISIT (OUTPATIENT)
Dept: ENDOCRINOLOGY | Facility: CLINIC | Age: 62
End: 2017-09-06

## 2017-09-06 VITALS
DIASTOLIC BLOOD PRESSURE: 77 MMHG | HEART RATE: 73 BPM | HEIGHT: 70 IN | BODY MASS INDEX: 24.42 KG/M2 | WEIGHT: 170.6 LBS | SYSTOLIC BLOOD PRESSURE: 126 MMHG

## 2017-09-06 DIAGNOSIS — E04.1 THYROID NODULE: ICD-10-CM

## 2017-09-06 DIAGNOSIS — E04.1 THYROID NODULE: Primary | ICD-10-CM

## 2017-09-06 LAB — TSH SERPL DL<=0.005 MIU/L-ACNC: 0.7 MU/L (ref 0.4–4)

## 2017-09-06 ASSESSMENT — PAIN SCALES - GENERAL: PAINLEVEL: NO PAIN (0)

## 2017-09-06 NOTE — PATIENT INSTRUCTIONS
Blood work today.     We will assist you to schedule a needle biopsy of the thyroid nodule.     INFORMATION FOR PATIENTS  THYROID NODULES AND   FINE NEEDLE ASPIRATION BIOPSY OF THE THYROID    The finding of a thyroid nodule is almost never an emergency.  Thyroid nodules are common, occurring in up to 50% of patients over the age of 50 years.      Innocent (not important enough to have been detected during life) thyroid cancer may be found in around 5% of people.   Likewise, about 5-15% of patients with demonstrated thyroid nodules will prove to have thyroid cancer if subjected to aggressive diagnostic measures.  As a rule, thyroid cancer has an excellent prognosis.  Therefore, in each patient with thyroid nodules, the decision has to be made about how important it is to identify and treat a cancer, if present.      When we find nodules on the thyroid we use ultrasound and either palpation or ultrasound guided biopsy to help determine which patients, from the large number with nodules on the thyroid, are in the group containing an important thyroid cancer.      Ultrasound Guided Fine Needle Aspiration Biopsy of the Thyroid uses the ultrasound eye to see the nodule and the needle during the biopsy procedure.  This procedure is performed in the radiology department.  The radiologist uses a small needle to remove cells from the specific area of the thyroid. The cells are then analyzed under the microscope to determine whether or not they might be cancer.      The results of thyroid biopsy come in 4 categories    1. Benign or negative for cancer.  This is most common result, found in approximately 60-70% of biopsy specimens.  There remains a < 6 % chance that this result is wrong.    2. Positive for cancer.  This is found about 5 % of the time. There remains a  < 1% chance that cancer is not present when we make this diagnosis by biopsy. This result leads to a recommendation for surgery to make the final diagnosis of  cancer.     3. Indeterminate, or the grey zone.  This is found in approximately 20-30% of patients.  This diagnosis identifies a higher risk group, often resulting in diagnostic surgery to establish the final diagnosis.  If all patients in this group go to surgery, only 10-30%, on average, prove to have cancer.  This group is subdivided into 3 subcategories: atypia of undetermined significance, follicular neoplasm and suspicious, with increasing risk.      4. Insufficient for diagnosis. This is found in 5-10% of biopsies. When this occurs we need to repeat the biopsy.      The greatest risk of thyroid biopsy is that the result is not clear (that it is in the indeterminate grey zone group), resulting in future thyroid surgery for what is likely to be benign thyroid disease.  There is a small risk of bleeding or infection.      If your doctor has recommended you have an ultrasound guided fine needle aspiration biopsy of the thyroid, your appointment may be scheduled by calling [phone].  Be sure to specify that the procedure is to be ultrasound-guided and that it is to be scheduled in radiology        To expedite your medication refill(s), please contact your pharmacy and have them fax a refill request to: [phone].  *Please allow 3 business days for routine medication refills.  *Please allow 5 business days for controlled substance medication refills.  --------------------  For scheduling appointments (including lab work), please request an appointment through [employer], or call: [phone].    For questions for your provider or the endocrine nurse, please send a [employer] message.  For after-hours urgent issues, please dial [phone], and ask to speak with the Endocrinologist On-Call.  --------------------  Please Note: If you are active on [employer], all future test results will be sent by [employer] message only and will no longer be sent by mail. You may also receive communication directly from your  physician.

## 2017-09-06 NOTE — PROGRESS NOTES
"Attending tie-in statement: Patient seen and examined by me, images reviewed and re-read by me, discussed with Dr Dan whose note I have reviewed and with which I agree  .  Key elements and diagnostic points include the following:  'I have reviewed imges  3/4/15 chest CT - left thyroid nodule is seen anteriorly  Neck US 7/27/17: left thyroid nodule anterior superior 1.2 x 1 x 1.6 cm , hypoechoic  9/7/17 FNAB procedure (day following appt) - FNAB of left nodule - proper needle placement   [place]  No radiation exposure  No family history    GENERAL pleasant man in NAD  /77  Pulse 73  Ht 1.778 m (5' 10\")  Wt 77.4 kg (170 lb 9.6 oz)  BMI 24.48 kg/m2  SKIN: normal color, temperature, texture without hirsutism, alopecia or purple striae  HEENT: PER, EOMI, no scleral icterus, eyelid retraction, stare, lid lag, proptosis or conjunctival injection.    NECK: supple.  No visible neck masses, cervical adenopathy, carotid bruits or goiter. I am uanble to feel thyroid or nodules  LUNGS: clear to auscultation bilaterally.   CARDIAC: RRR, S1, S2 without murmurs, rubs or gallops.    ABDOMEN: positive bowel sounds, soft   BACK: normal spinal contour.    NEURO: Alert, responds appropriately to questions,  moves all extremities, gait normal, no tremor of the outstretched hand      A/p:  1.  Thyroid nodules  Left dominant nodule 1.6 cm  Agree with FNAB of this nodule.   Information for patients with thyroid nodules discussed.    Maria C Borrego MD  "

## 2017-09-06 NOTE — LETTER
"9/6/2017       RE: Russ Agee  [address]  [facility] [id number]     Dear Colleague,    Thank you for referring your patient, Russ Agee, to the [facility] ENDOCRINOLOGY at [facility]. Please see a copy of my visit note below.    Attending tie-in statement: Patient seen and examined by me, images reviewed and re-read by me, discussed with Dr Dan whose note I have reviewed and with which I agree  .  Key elements and diagnostic points include the following:  'I have reviewed imges  3/4/15 chest CT - left thyroid nodule is seen anteriorly  Neck US 7/27/17: left thyroid nodule anterior superior 1.2 x 1 x 1.6 cm , hypoechoic  9/7/17 FNAB procedure (day following appt) - FNAB of left nodule - proper needle placement   [place]  No radiation exposure  No family history    GENERAL pleasant man in NAD  /77  Pulse 73  Ht 1.778 m (5' 10\")  Wt 77.4 kg (170 lb 9.6 oz)  BMI 24.48 kg/m2  SKIN: normal color, temperature, texture without hirsutism, alopecia or purple striae  HEENT: PER, EOMI, no scleral icterus, eyelid retraction, stare, lid lag, proptosis or conjunctival injection.    NECK: supple.  No visible neck masses, cervical adenopathy, carotid bruits or goiter. I am uanble to feel thyroid or nodules  LUNGS: clear to auscultation bilaterally.   CARDIAC: RRR, S1, S2 without murmurs, rubs or gallops.    ABDOMEN: positive bowel sounds, soft   BACK: normal spinal contour.    NEURO: Alert, responds appropriately to questions,  moves all extremities, gait normal, no tremor of the outstretched hand      A/p:  1.  Thyroid nodules  Left dominant nodule 1.6 cm  Agree with FNAB of this nodule.   Information for patients with thyroid nodules discussed.    Maria C Borrego MD    Endocrinology Clinic Visit    Chief Complaint: Consult (NEW PATIENT- THYROID NODULE)     Information obtained from:Patient    Subjective:         HPI: Russ Agee is a 61 year old year old male with " history of thyroid nodule who is seen in consultation at Donnie Haque's request for the same.     62 yo male patient who was noted to have an incidental finding of a thyroid nodule done on CT of the chest. This was followed up by ultrasound of the thyroid (report cc below). In summary he has two sub-centimeter  Nodules on the right and left and one left sided nodule which is 1.6 cm in the largest diameter.  Isoechoic nodule.     He doesn't report any family history of thyroid cancer or neck and head radiation treatment.  Denies hyper or hypothyroid symptoms. Denies compressive symptoms including problem with swallowing, breathing or pain involving the neck.        Right lobe:  Nodule 1:  Nodule measurement: 0.7 x 0.5 x 0.8 cm.  Echogenicity: Anechoic  Consistency: cystic  Calcifications: None  Hypervascular: Peripheral vascularity, without discrete solid  component     Isthmus: Unremarkable     Left Lobe:   Nodule 1:  Nodule measurement: 1.2 x 1.0 x 1.6 cm   Echogenicity: Isoechoic  Consistency: Predominantly solid with minimal cystic component.  Calcifications: None  Hypervascular: yes     Nodule 2:  Nodule measurement: 0.4 x 0.3 x 0.5 cm   Echogenicity: Anechoic  Consistency: cystic  Calcifications: None  Hypervascular: no     Allergies   Allergen Reactions     Nkda [No Known Drug Allergies]        Current Outpatient Prescriptions   Medication Sig Dispense Refill     esomeprazole (NEXIUM) 40 MG CR capsule Take 1 capsule (40 mg) by mouth every morning (before breakfast) Take 30-60 minutes before eating. 90 capsule 3     alfuzosin (UROXATRAL) 10 MG 24 hr tablet Take 1 tablet (10 mg) by mouth daily 30 tablet 11     sildenafil (REVATIO/VIAGRA) 20 MG tablet Take 1 to 5 tabs as needed for sexual activity. 90 tablet 11     traZODone (DESYREL) 50 MG tablet Take 50 mg by mouth At Bedtime        calcium polycarbophil (RA FIBER-CAP) 625 MG tablet Take 3 tablets by mouth twice daily       lidocaine (XYLOCAINE) 2 % jelly  Apply 1 Tube topically       desonide (DESOWEN) 0.05 % ointment To affected areas of the face for itch and red bumps twice a day as needed. 60 g 11     ketoconazole (NIZORAL) 2 % shampoo To entire wet scalp and face in affected areas and then wash off after several minutes three times a week. 240 mL 11     Urea 20 % CREA To affected, ridged nails daily. 60 g 5     hydrocortisone (ANUSOL-HC) 25 MG suppository Place 1 suppository (25 mg) rectally 2 times daily 28 suppository 3     nicotine polacrilex (NICORETTE) 2 MG gum Place 2 mg inside cheek as needed for smoking cessation       albuterol (ALBUTEROL) 108 (90 BASE) MCG/ACT inhaler Inhale 2 puffs into the lungs every 6 hours 4 Inhaler 3     Psyllium (METAMUCIL PO) Take 3 tsp by mouth 2 times daily.         Review of Systems     11 point review system (Constitutional, HENT, Eyes, Respiratory, Cardiovascular, Gastrointestinal, Genitourinary, Musculoskeletal,Neurological, Psychiatric/Behavioural, Endocrine) is negative or is as per HPI above except known problem with urination related to his known prostate issue.     Past Medical History:   Diagnosis Date     Anxiety      Chronic hepatitis C (H)      Depression      Depressive disorder      Malignant neoplasm of prostate (H)      Mucous cyst of joint      Palpitations      Substance abuse        Past Surgical History:   Procedure Laterality Date     ENDOSCOPIC ULTRASOUND, ESOPHAGOSCOPY, GASTROSCOPY, DUODENOSCOPY (EGD), COMBINED  12/16/15     ESOPHAGOSCOPY, GASTROSCOPY, DUODENOSCOPY (EGD), COMBINED N/A 4/9/2015    Procedure: COMBINED ESOPHAGOSCOPY, GASTROSCOPY, DUODENOSCOPY (EGD);  Surgeon: Raina Romo MD;  Location: [facility]     EXCISE MASS FINGER  2/19/2013    Procedure: EXCISE MASS FINGER;  Mass Excision Left Index Finger     (local anesthesia);  Surgeon: Raina Dorsey MD;  Location:  OR      [facility] ENDOSCOPY W EUS N/A 12/16/2015    Procedure: COMBINED ENDOSCOPIC ULTRASOUND, ESOPHAGOSCOPY,  "GASTROSCOPY, DUODENOSCOPY (EGD);  Surgeon: Brady Acuna MD;  Location:  GI       Family History   Problem Relation Age of Onset     DIABETES Sister      DIABETES Son      Alcohol/Drug Other      Arthritis Other      Circulatory Other      Eye Disorder Other      Depression Other      Genetic Disorder Other      Psychotic Disorder Other      CANCER No family hx of      no skin cancer     Skin Cancer No family hx of        Social History     Social History     Marital status: Single     Spouse name: N/A     Number of children: N/A     Years of education: N/A     Social History Main Topics     Smoking status: Former Smoker     Packs/day: 0.50     Years: 6.00     Types: Cigarettes     Quit date: 9/16/2012     Smokeless tobacco: Never Used     Alcohol use No      Comment: stopped 10/2014     Drug use: No     Sexual activity: Not Asked     Other Topics Concern     None     Social History Narrative       Objective:   /77  Pulse 73  Ht 1.778 m (5' 10\")  Wt 77.4 kg (170 lb 9.6 oz)  BMI 24.48 kg/m2  Constitutional: Pleasant no acute cardiopulmonary distress.   EYES: anicteric, normal extra-ocular movements, no lid lag or retraction   HEENT: Mouth/Throat: Mucous membrane is moist. Oropharynx is clear. No adenopathy. Thyroid normal in size; right sided more palpable than the left side. No nodule appreciated and no tenderness.   Cardiovascular: RRR, S1, S2 normal.   Pulmonary/Chest: CTAB. No wheezing or rales   Abdominal: +BS. Non tender to palpation.   Neurological: Alert and oriented.   Extremities: No edema.   Psychological: appropriate mood and affect     In House Labs:   Lab Results   Component Value Date    A1C 5.0 11/16/2012    A1C 5.3 04/17/2012       TSH   Date Value Ref Range Status   09/06/2017 0.70 0.40 - 4.00 mU/L Final   04/12/2016 0.59 0.40 - 4.00 mU/L Final   09/28/2015 0.54 0.40 - 4.00 mU/L Final   01/06/2014 0.41 0.4 - 5.0 mU/L Final   05/02/2012 0.71 0.4 - 5.0 mU/L Final     T4 Free   Date " Value Ref Range Status   10/05/2005 1.25 0.70 - 1.85 ng/dL Final       Creatinine   Date Value Ref Range Status   07/26/2017 0.90 0.66 - 1.25 mg/dL Final   ]    Recent Labs   Lab Test  01/06/14   1309   CHOL  179   HDL  64   LDL  101   TRIG  73   CHOLHDLRATIO  2.8       No results found for: [id number], [id number], [id number]      Assessment/Treatment Plan:      Russ Agee is a 61 year old year old male with Thyroid nodule who is here for consultation of the same.     Thyroid nodule: we reviewed thyroid ultrasound and chest CT/2017 and neck CT/2016 with respect to thyroid.  He has two sub-centimeter  Nodules on the right and left and one left sided nodule which is 1.6 cm in the largest diameter.  This nodule meets criteria for a biopsy per guideline. We noted that this nodule was also present in the neck CT from 2016.  Discussed management options with the patient; agrees with FNA.  Radiology referral placed.   FNA was done by radiology on 9/7/17 and results are pending at this time.       Patient Instructions   Blood work today.     We will assist you to schedule a needle biopsy of the thyroid nodule.     INFORMATION FOR PATIENTS  THYROID NODULES AND   FINE NEEDLE ASPIRATION BIOPSY OF THE THYROID    The finding of a thyroid nodule is almost never an emergency.  Thyroid nodules are common, occurring in up to 50% of patients over the age of 50 years.      Innocent (not important enough to have been detected during life) thyroid cancer may be found in around 5% of people.   Likewise, about 5-15% of patients with demonstrated thyroid nodules will prove to have thyroid cancer if subjected to aggressive diagnostic measures.  As a rule, thyroid cancer has an excellent prognosis.  Therefore, in each patient with thyroid nodules, the decision has to be made about how important it is to identify and treat a cancer, if present.      When we find nodules on the thyroid we use ultrasound and either palpation or ultrasound  guided biopsy to help determine which patients, from the large number with nodules on the thyroid, are in the group containing an important thyroid cancer.      Ultrasound Guided Fine Needle Aspiration Biopsy of the Thyroid uses the ultrasound eye to see the nodule and the needle during the biopsy procedure.  This procedure is performed in the radiology department.  The radiologist uses a small needle to remove cells from the specific area of the thyroid. The cells are then analyzed under the microscope to determine whether or not they might be cancer.      The results of thyroid biopsy come in 4 categories    1. Benign or negative for cancer.  This is most common result, found in approximately 60-70% of biopsy specimens.  There remains a < 6 % chance that this result is wrong.    2. Positive for cancer.  This is found about 5 % of the time. There remains a  < 1% chance that cancer is not present when we make this diagnosis by biopsy. This result leads to a recommendation for surgery to make the final diagnosis of cancer.     3. Indeterminate, or the grey zone.  This is found in approximately 20-30% of patients.  This diagnosis identifies a higher risk group, often resulting in diagnostic surgery to establish the final diagnosis.  If all patients in this group go to surgery, only 10-30%, on average, prove to have cancer.  This group is subdivided into 3 subcategories: atypia of undetermined significance, follicular neoplasm and suspicious, with increasing risk.      4. Insufficient for diagnosis. This is found in 5-10% of biopsies. When this occurs we need to repeat the biopsy.      The greatest risk of thyroid biopsy is that the result is not clear (that it is in the indeterminate grey zone group), resulting in future thyroid surgery for what is likely to be benign thyroid disease.  There is a small risk of bleeding or infection.      If your doctor has recommended you have an ultrasound guided fine needle  aspiration biopsy of the thyroid, your appointment may be scheduled by calling [phone].  Be sure to specify that the procedure is to be ultrasound-guided and that it is to be scheduled in radiology        To expedite your medication refill(s), please contact your pharmacy and have them fax a refill request to: [phone].  *Please allow 3 business days for routine medication refills.  *Please allow 5 business days for controlled substance medication refills.  --------------------  For scheduling appointments (including lab work), please request an appointment through [facility] [employer], or call: [phone].    For questions for your provider or the endocrine nurse, please send a [employer] message.  For after-hours urgent issues, please dial [phone], and ask to speak with the Endocrinologist On-Call.  --------------------  Please Note: If you are active on [employer] [employer], all future test results will be sent by [employer] message only and will no longer be sent by mail. You may also receive communication directly from your physician.        I will contact the patient with the test results.  Return to clinic in 12 months.    Test and/or medications prescribed today:  Orders Placed This Encounter   Procedures     US guided thyroid FNA     TSH with free T4 reflex       Patient seen and discussed with endocrinology attending Dr. Borrego.     Jayden Dan MD  Endocrinology fellow   [phone]  Division of Endocrinology and Diabetes

## 2017-09-06 NOTE — MR AVS SNAPSHOT
After Visit Summary   9/6/2017    Russ Agee    MRN: [id number]           Patient Information     Date Of Birth          1955        Visit Information        Provider Department      9/6/2017 2:15 PM Jayden Dan MD [facility] Endocrinology        Today's Diagnoses     Thyroid nodule    -  1      Care Instructions    Blood work today.     We will assist you to schedule a needle biopsy of the thyroid nodule.     INFORMATION FOR PATIENTS  THYROID NODULES AND   FINE NEEDLE ASPIRATION BIOPSY OF THE THYROID    The finding of a thyroid nodule is almost never an emergency.  Thyroid nodules are common, occurring in up to 50% of patients over the age of 50 years.      Innocent (not important enough to have been detected during life) thyroid cancer may be found in around 5% of people.   Likewise, about 5-15% of patients with demonstrated thyroid nodules will prove to have thyroid cancer if subjected to aggressive diagnostic measures.  As a rule, thyroid cancer has an excellent prognosis.  Therefore, in each patient with thyroid nodules, the decision has to be made about how important it is to identify and treat a cancer, if present.      When we find nodules on the thyroid we use ultrasound and either palpation or ultrasound guided biopsy to help determine which patients, from the large number with nodules on the thyroid, are in the group containing an important thyroid cancer.      Ultrasound Guided Fine Needle Aspiration Biopsy of the Thyroid uses the ultrasound eye to see the nodule and the needle during the biopsy procedure.  This procedure is performed in the radiology department.  The radiologist uses a small needle to remove cells from the specific area of the thyroid. The cells are then analyzed under the microscope to determine whether or not they might be cancer.      The results of thyroid biopsy come in 4 categories    1. Benign or negative for cancer.  This is most common result, found  in approximately 60-70% of biopsy specimens.  There remains a < 6 % chance that this result is wrong.    2. Positive for cancer.  This is found about 5 % of the time. There remains a  < 1% chance that cancer is not present when we make this diagnosis by biopsy. This result leads to a recommendation for surgery to make the final diagnosis of cancer.     3. Indeterminate, or the grey zone.  This is found in approximately 20-30% of patients.  This diagnosis identifies a higher risk group, often resulting in diagnostic surgery to establish the final diagnosis.  If all patients in this group go to surgery, only 10-30%, on average, prove to have cancer.  This group is subdivided into 3 subcategories: atypia of undetermined significance, follicular neoplasm and suspicious, with increasing risk.      4. Insufficient for diagnosis. This is found in 5-10% of biopsies. When this occurs we need to repeat the biopsy.      The greatest risk of thyroid biopsy is that the result is not clear (that it is in the indeterminate grey zone group), resulting in future thyroid surgery for what is likely to be benign thyroid disease.  There is a small risk of bleeding or infection.      If your doctor has recommended you have an ultrasound guided fine needle aspiration biopsy of the thyroid, your appointment may be scheduled by calling [phone].  Be sure to specify that the procedure is to be ultrasound-guided and that it is to be scheduled in radiology        To expedite your medication refill(s), please contact your pharmacy and have them fax a refill request to: [phone].  *Please allow 3 business days for routine medication refills.  *Please allow 5 business days for controlled substance medication refills.  --------------------  For scheduling appointments (including lab work), please request an appointment through ideeli, or call: [phone].    For questions for your provider or the endocrine nurse, please send a ideeli  message.  For after-hours urgent issues, please dial [phone], and ask to speak with the Endocrinologist On-Call.  --------------------  Please Note: If you are active on Ujogo, all future test results will be sent by Ujogo message only and will no longer be sent by mail. You may also receive communication directly from your physician.            Follow-ups after your visit        Follow-up notes from your care team     Return in about 1 year (around 9/6/2018).      Your next 10 appointments already scheduled     Sep 06, 2017  3:15 PM CDT   LAB with  LAB   [facility] ([facility])    [address] [place] [address] [phone]           Patient must bring picture ID. Patient should be prepared to give a urine specimen  Please do not eat 10-12 hours before your appointment if you are coming in fasting for labs on lipids, cholesterol, or glucose (sugar). Pregnant women should follow their Care Team instructions. Water with medications is okay. Do not drink coffee or other fluids. If you have concerns about taking  your medications, please ask at office or if scheduling via Ujogo, send a message by clicking on Secure Messaging, Message Your Care Team.            Sep 18, 2017  1:15 PM CDT   (Arrive by 1:00 PM)   New Allergy with Wellington Calderon MD   [facility] ([facility])    [address] [place] [address] [phone]           Do not take anti-histamines or Zantac for seven day prior to your appointment.            Sep 26, 2017  2:35 PM CDT   (Arrive by 2:20 PM)   Return Visit with Donnie Haque MD   [facility] ([facility])    [address] [place] [address]  4th [facility] [address] [phone]            Oct 09, 2017 11:30 AM CDT   (Arrive by 11:15 AM)   EMG with Syed Valera MD   [facility] (  [facility])    [address]  3rd Floor  [facility] [address]   [phone]            Nov 04, 2017 10:30 AM CDT   (Arrive by 10:15 AM)   Return Prostate Cancer with Gerardo Trotter MD   [facility] ([facility])    [address]  4th Floor  [facility] [address]   [phone]            Dec 05, 2017 10:00 AM CST   (Arrive by 9:45 AM)   Return Prostate Cancer with Gerardo Trotter MD   [facility] ([facility])    [address]  4th [facility] [address]   [phone]              Future tests that were ordered for you today     Open Future Orders        Priority Expected Expires Ordered    US guided thyroid FNA Routine  9/6/2018 9/6/2017    TSH with free T4 reflex Routine  9/6/2018 9/6/2017            Who to contact     Please call your clinic at [phone] to:    Ask questions about your health    Make or cancel appointments    Discuss your medicines    Learn about your test results    Speak to your doctor   If you have compliments or concerns about an experience at your clinic, or if you wish to file a complaint, please contact [facility] Patient Relations at [phone] or email us at [email or web address]         Additional Information About Your Visit        [employer] Information     [employer] is an electronic gateway that provides easy, online access to your medical records. With [employer], you can request a clinic appointment, read your test results, renew a prescription or communicate with your care team.     To sign up for [employer] visit the website at [email or web address]   You will be asked to enter the access code listed below, as well as some personal information. Please follow the directions to create your username and password.    "  Your access code is: [email or web address]  Expires: 12/3/2017  6:30 AM     Your access code will  in 90 days. If you need help or a new code, please contact your [facility] or call [phone] for assistance.        Care EveryWhere ID     This is your Care EveryWhere ID. This could be used by other organizations to access your [place] medical records  [id number]        Your Vitals Were     Pulse Height BMI (Body Mass Index)             73 1.778 m (5' 10\") 24.48 kg/m2          Blood Pressure from Last 3 Encounters:   17 126/77   17 116/71   17 124/71    Weight from Last 3 Encounters:   17 77.4 kg (170 lb 9.6 oz)   17 78.3 kg (172 lb 11.2 oz)   17 78 kg (172 lb)               Primary Care Provider Office Phone # Fax #    Donnie Haque [phone]       [address] [address]        Equal Access to Services     [facility]: Hadii lamont ku hadasho Soomaali, waaxda luqadaha, qaybta kaalmada adenellyyada, machelle stein . So [facility] [phone].    ATENCIÓN: Si habla español, tiene a french disposición servicios gratuitos de asistencia lingüística. [facility] [phone].    We comply with applicable federal civil rights laws and [place] laws. We do not discriminate on the basis of race, color, national origin, age, disability sex, sexual orientation or gender identity.            Thank you!     Thank you for choosing [facility] ENDOCRINOLOGY  for your care. Our goal is always to provide you with excellent care. Hearing back from our patients is one way we can continue to improve our services. Please take a few minutes to complete the written survey that you may receive in the mail after your visit with us. Thank you!             Your Updated Medication List - Protect others around you: Learn how to safely use, store and throw away your medicines at www.disposemymeds.org.          This list is " accurate as of: 9/6/17  2:42 PM.  Always use your most recent med list.                   Brand Name Dispense Instructions for use Diagnosis    albuterol 108 (90 BASE) MCG/ACT Inhaler    PROAIR HFA    4 Inhaler    Inhale 2 puffs into the lungs every 6 hours    COPD (chronic obstructive pulmonary disease) (H)       alfuzosin 10 MG 24 hr tablet    UROXATRAL    30 tablet    Take 1 tablet (10 mg) by mouth daily    Benign non-nodular prostatic hyperplasia, presence of lower urinary tract symptoms unspecified       desonide 0.05 % ointment    DESOWEN    60 g    To affected areas of the face for itch and red bumps twice a day as needed.    Dermatitis, seborrheic       esomeprazole 40 MG CR capsule    nexIUM    90 capsule    Take 1 capsule (40 mg) by mouth every morning (before breakfast) Take 30-60 minutes before eating.    Esophageal reflux       hydrocortisone 25 MG Suppository    ANUSOL-HC    28 suppository    Place 1 suppository (25 mg) rectally 2 times daily    Internal hemorrhoids       ketoconazole 2 % shampoo    NIZORAL    240 mL    To entire wet scalp and face in affected areas and then wash off after several minutes three times a week.    Dermatitis, seborrheic       lidocaine 2 % topical gel    XYLOCAINE     Apply 1 Tube topically        METAMUCIL PO      Take 3 tsp by mouth 2 times daily.        nicotine polacrilex 2 MG gum    NICORETTE     Place 2 mg inside cheek as needed for smoking cessation        RA FIBER- MG tablet   Generic drug:  calcium polycarbophil      Take 3 tablets by mouth twice daily        sildenafil 20 MG tablet    REVATIO/VIAGRA    90 tablet    Take 1 to 5 tabs as needed for sexual activity.    Other male erectile dysfunction       traZODone 50 MG tablet    DESYREL     Take 50 mg by mouth At Bedtime        Urea 20 % Crea cream     60 g    To affected, ridged nails daily.    Dystrophic nail

## 2017-09-06 NOTE — NURSING NOTE
Chief Complaint   Patient presents with     Consult     NEW PATIENT- THYROID NODULE     Norma Smiley, FCO  Endocrinology & Diabetes 3G

## 2017-09-08 ENCOUNTER — PRE VISIT (OUTPATIENT)
Dept: PULMONOLOGY | Facility: CLINIC | Age: 62
End: 2017-09-08

## 2017-09-08 NOTE — TELEPHONE ENCOUNTER
1.  Date/reason for appt:9/18/17, Allergy     2.  Referring provider: JAVY SCHREIBER    3.  Call to patient (Yes / No - short description): No, referred     4.  Previous care at / records requested from:   [facility]

## 2017-09-08 NOTE — PROGRESS NOTES
Endocrinology Clinic Visit    Chief Complaint: Consult (NEW PATIENT- THYROID NODULE)     Information obtained from:Patient    Subjective:         HPI: Russ Agee is a 61 year old year old male with history of thyroid nodule who is seen in consultation at Donnie Haque's request for the same.     62 yo male patient who was noted to have an incidental finding of a thyroid nodule done on CT of the chest. This was followed up by ultrasound of the thyroid (report cc below). In summary he has two sub-centimeter  Nodules on the right and left and one left sided nodule which is 1.6 cm in the largest diameter.  Isoechoic nodule.     He doesn't report any family history of thyroid cancer or neck and head radiation treatment.  Denies hyper or hypothyroid symptoms. Denies compressive symptoms including problem with swallowing, breathing or pain involving the neck.        Right lobe:  Nodule 1:  Nodule measurement: 0.7 x 0.5 x 0.8 cm.  Echogenicity: Anechoic  Consistency: cystic  Calcifications: None  Hypervascular: Peripheral vascularity, without discrete solid  component     Isthmus: Unremarkable     Left Lobe:   Nodule 1:  Nodule measurement: 1.2 x 1.0 x 1.6 cm   Echogenicity: Isoechoic  Consistency: Predominantly solid with minimal cystic component.  Calcifications: None  Hypervascular: yes     Nodule 2:  Nodule measurement: 0.4 x 0.3 x 0.5 cm   Echogenicity: Anechoic  Consistency: cystic  Calcifications: None  Hypervascular: no     Allergies   Allergen Reactions     Nkda [No Known Drug Allergies]        Current Outpatient Prescriptions   Medication Sig Dispense Refill     esomeprazole (NEXIUM) 40 MG CR capsule Take 1 capsule (40 mg) by mouth every morning (before breakfast) Take 30-60 minutes before eating. 90 capsule 3     alfuzosin (UROXATRAL) 10 MG 24 hr tablet Take 1 tablet (10 mg) by mouth daily 30 tablet 11     sildenafil (REVATIO/VIAGRA) 20 MG tablet Take 1 to 5 tabs as needed for sexual activity. 90 tablet 11      traZODone (DESYREL) 50 MG tablet Take 50 mg by mouth At Bedtime        calcium polycarbophil (RA FIBER-CAP) 625 MG tablet Take 3 tablets by mouth twice daily       lidocaine (XYLOCAINE) 2 % jelly Apply 1 Tube topically       desonide (DESOWEN) 0.05 % ointment To affected areas of the face for itch and red bumps twice a day as needed. 60 g 11     ketoconazole (NIZORAL) 2 % shampoo To entire wet scalp and face in affected areas and then wash off after several minutes three times a week. 240 mL 11     Urea 20 % CREA To affected, ridged nails daily. 60 g 5     hydrocortisone (ANUSOL-HC) 25 MG suppository Place 1 suppository (25 mg) rectally 2 times daily 28 suppository 3     nicotine polacrilex (NICORETTE) 2 MG gum Place 2 mg inside cheek as needed for smoking cessation       albuterol (ALBUTEROL) 108 (90 BASE) MCG/ACT inhaler Inhale 2 puffs into the lungs every 6 hours 4 Inhaler 3     Psyllium (METAMUCIL PO) Take 3 tsp by mouth 2 times daily.         Review of Systems     11 point review system (Constitutional, HENT, Eyes, Respiratory, Cardiovascular, Gastrointestinal, Genitourinary, Musculoskeletal,Neurological, Psychiatric/Behavioural, Endocrine) is negative or is as per HPI above except known problem with urination related to his known prostate issue.     Past Medical History:   Diagnosis Date     Anxiety      Chronic hepatitis C (H)      Depression      Depressive disorder      Malignant neoplasm of prostate (H)      Mucous cyst of joint      Palpitations      Substance abuse        Past Surgical History:   Procedure Laterality Date     ENDOSCOPIC ULTRASOUND, ESOPHAGOSCOPY, GASTROSCOPY, DUODENOSCOPY (EGD), COMBINED  12/16/15     ESOPHAGOSCOPY, GASTROSCOPY, DUODENOSCOPY (EGD), COMBINED N/A 4/9/2015    Procedure: COMBINED ESOPHAGOSCOPY, GASTROSCOPY, DUODENOSCOPY (EGD);  Surgeon: Raina Romo MD;  Location: [facility]     EXCISE MASS FINGER  2/19/2013    Procedure: EXCISE MASS FINGER;  Mass Excision Left  "Index Finger     (local anesthesia);  Surgeon: Raina Dorsey MD;  Location: [facility]      [facility] ENDOSCOPY W EUS N/A 12/16/2015    Procedure: COMBINED ENDOSCOPIC ULTRASOUND, ESOPHAGOSCOPY, GASTROSCOPY, DUODENOSCOPY (EGD);  Surgeon: Brady Acuna MD;  Location: [facility]       Family History   Problem Relation Age of Onset     DIABETES Sister      DIABETES Son      Alcohol/Drug Other      Arthritis Other      Circulatory Other      Eye Disorder Other      Depression Other      Genetic Disorder Other      Psychotic Disorder Other      CANCER No family hx of      no skin cancer     Skin Cancer No family hx of        Social History     Social History     Marital status: Single     Spouse name: N/A     Number of children: N/A     Years of education: N/A     Social History Main Topics     Smoking status: Former Smoker     Packs/day: 0.50     Years: 6.00     Types: Cigarettes     Quit date: 9/16/2012     Smokeless tobacco: Never Used     Alcohol use No      Comment: stopped 10/2014     Drug use: No     Sexual activity: Not Asked     Other Topics Concern     None     Social History Narrative       Objective:   /77  Pulse 73  Ht 1.778 m (5' 10\")  Wt 77.4 kg (170 lb 9.6 oz)  BMI 24.48 kg/m2  Constitutional: Pleasant no acute cardiopulmonary distress.   EYES: anicteric, normal extra-ocular movements, no lid lag or retraction   HEENT: Mouth/Throat: Mucous membrane is moist. Oropharynx is clear. No adenopathy. Thyroid normal in size; right sided more palpable than the left side. No nodule appreciated and no tenderness.   Cardiovascular: RRR, S1, S2 normal.   Pulmonary/Chest: CTAB. No wheezing or rales   Abdominal: +BS. Non tender to palpation.   Neurological: Alert and oriented.   Extremities: No edema.   Psychological: appropriate mood and affect     In House Labs:   Lab Results   Component Value Date    A1C 5.0 11/16/2012    A1C 5.3 04/17/2012       TSH   Date Value Ref Range Status   09/06/2017 0.70 0.40 - 4.00 " mU/L Final   04/12/2016 0.59 0.40 - 4.00 mU/L Final   09/28/2015 0.54 0.40 - 4.00 mU/L Final   01/06/2014 0.41 0.4 - 5.0 mU/L Final   05/02/2012 0.71 0.4 - 5.0 mU/L Final     T4 Free   Date Value Ref Range Status   10/05/2005 1.25 0.70 - 1.85 ng/dL Final       Creatinine   Date Value Ref Range Status   07/26/2017 0.90 0.66 - 1.25 mg/dL Final   ]    Recent Labs   Lab Test  01/06/14   1309   CHOL  179   HDL  64   LDL  101   TRIG  73   CHOLHDLRATIO  2.8       No results found for: [id number], [id number], [id number]      Assessment/Treatment Plan:      Russ Agee is a 61 year old year old male with Thyroid nodule who is here for consultation of the same.     Thyroid nodule: we reviewed thyroid ultrasound and chest CT/2017 and neck CT/2016 with respect to thyroid.  He has two sub-centimeter  Nodules on the right and left and one left sided nodule which is 1.6 cm in the largest diameter.  This nodule meets criteria for a biopsy per guideline. We noted that this nodule was also present in the neck CT from 2016.  Discussed management options with the patient; agrees with FNA.  Radiology referral placed.   FNA was done by radiology on 9/7/17 and results are pending at this time.       Patient Instructions   Blood work today.     We will assist you to schedule a needle biopsy of the thyroid nodule.     INFORMATION FOR PATIENTS  THYROID NODULES AND   FINE NEEDLE ASPIRATION BIOPSY OF THE THYROID    The finding of a thyroid nodule is almost never an emergency.  Thyroid nodules are common, occurring in up to 50% of patients over the age of 50 years.      Innocent (not important enough to have been detected during life) thyroid cancer may be found in around 5% of people.   Likewise, about 5-15% of patients with demonstrated thyroid nodules will prove to have thyroid cancer if subjected to aggressive diagnostic measures.  As a rule, thyroid cancer has an excellent prognosis.  Therefore, in each patient with thyroid nodules, the  decision has to be made about how important it is to identify and treat a cancer, if present.      When we find nodules on the thyroid we use ultrasound and either palpation or ultrasound guided biopsy to help determine which patients, from the large number with nodules on the thyroid, are in the group containing an important thyroid cancer.      Ultrasound Guided Fine Needle Aspiration Biopsy of the Thyroid uses the ultrasound eye to see the nodule and the needle during the biopsy procedure.  This procedure is performed in the radiology department.  The radiologist uses a small needle to remove cells from the specific area of the thyroid. The cells are then analyzed under the microscope to determine whether or not they might be cancer.      The results of thyroid biopsy come in 4 categories    1. Benign or negative for cancer.  This is most common result, found in approximately 60-70% of biopsy specimens.  There remains a < 6 % chance that this result is wrong.    2. Positive for cancer.  This is found about 5 % of the time. There remains a  < 1% chance that cancer is not present when we make this diagnosis by biopsy. This result leads to a recommendation for surgery to make the final diagnosis of cancer.     3. Indeterminate, or the grey zone.  This is found in approximately 20-30% of patients.  This diagnosis identifies a higher risk group, often resulting in diagnostic surgery to establish the final diagnosis.  If all patients in this group go to surgery, only 10-30%, on average, prove to have cancer.  This group is subdivided into 3 subcategories: atypia of undetermined significance, follicular neoplasm and suspicious, with increasing risk.      4. Insufficient for diagnosis. This is found in 5-10% of biopsies. When this occurs we need to repeat the biopsy.      The greatest risk of thyroid biopsy is that the result is not clear (that it is in the indeterminate grey zone group), resulting in future thyroid  surgery for what is likely to be benign thyroid disease.  There is a small risk of bleeding or infection.      If your doctor has recommended you have an ultrasound guided fine needle aspiration biopsy of the thyroid, your appointment may be scheduled by calling [phone].  Be sure to specify that the procedure is to be ultrasound-guided and that it is to be scheduled in radiology        To expedite your medication refill(s), please contact your pharmacy and have them fax a refill request to: [phone].  *Please allow 3 business days for routine medication refills.  *Please allow 5 business days for controlled substance medication refills.  --------------------  For scheduling appointments (including lab work), please request an appointment through [employer], or call: [phone].    For questions for your provider or the endocrine nurse, please send a [employer] message.  For after-hours urgent issues, please dial [phone], and ask to speak with the Endocrinologist On-Call.  --------------------  Please Note: If you are active on [employer], all future test results will be sent by [employer] message only and will no longer be sent by mail. You may also receive communication directly from your physician.        I will contact the patient with the test results.  Return to clinic in 12 months.    Test and/or medications prescribed today:  Orders Placed This Encounter   Procedures     US guided thyroid FNA     TSH with free T4 reflex       Patient seen and discussed with endocrinology attending Dr. Borrego.     Jayden Dan MD  Endocrinology fellow   [phone]  Division of Endocrinology and Diabetes

## 2017-09-14 ENCOUNTER — TELEPHONE (OUTPATIENT)
Dept: ENDOCRINOLOGY | Facility: CLINIC | Age: 62
End: 2017-09-14

## 2017-09-14 NOTE — TELEPHONE ENCOUNTER
Thyroid, left, nodule #1, ultrasound guided fine needle aspiration:   Benign   Consistent with a benign nodule (includes adenomatoid nodule, colloid   nodule, etc.)     The [place] implied risk of malignancy and recommended clinical   management:   Benign has a 0-3% risk of malignancy, recommended management is clinical   follow-up     The above results and next plan discussed with pt on 9/14/17 at 10:00 am .   Repeat ultrasound in one year.

## 2017-09-18 ENCOUNTER — OFFICE VISIT (OUTPATIENT)
Dept: PULMONOLOGY | Facility: CLINIC | Age: 62
End: 2017-09-18
Attending: ALLERGY & IMMUNOLOGY
Payer: MEDICARE

## 2017-09-18 VITALS
SYSTOLIC BLOOD PRESSURE: 128 MMHG | OXYGEN SATURATION: 100 % | RESPIRATION RATE: 16 BRPM | HEART RATE: 70 BPM | DIASTOLIC BLOOD PRESSURE: 77 MMHG

## 2017-09-18 DIAGNOSIS — J31.0 CHRONIC RHINITIS, UNSPECIFIED TYPE: Primary | ICD-10-CM

## 2017-09-18 DIAGNOSIS — L27.2 DERMATITIS DUE TO FOOD TAKEN INTERNALLY: ICD-10-CM

## 2017-09-18 PROCEDURE — 95004 PERQ TESTS W/ALRGNC XTRCS: CPT | Performed by: ALLERGY & IMMUNOLOGY

## 2017-09-18 PROCEDURE — 99212 OFFICE O/P EST SF 10 MIN: CPT | Mod: ZF

## 2017-09-18 ASSESSMENT — PAIN SCALES - GENERAL: PAINLEVEL: NO PAIN (0)

## 2017-09-18 NOTE — LETTER
"2017       RE: Russ Agee  [address]  [facility] [address]     Dear Colleague,    Thank you for referring your patient, Russ Agee, to the [facility] at [facility]. Please see a copy of my visit note below.    Reason for Visit  Russ Agee is a 61 year old male who is referred by Donnie Haque for allergies.    Allergy HPI    Russ presents today for an initial visit.  He states he is \"allergic to everything.\" States he is allergic to milk corn air.He gets a bad order for foods and thinks this is a food allergy. No causes bloating. He also had rectal prolapse is very young things trigger. No also causes facial swelling. He needs laxatives and has hemorrhoids and wonders if foods are triggering this. He also services allergic to peanuts. For the nasal congestion sores worse. Flonase does help. No allergy felt tried. Has occasional sneezing.    Social history used to smoke stopped 6-7 years.    Family history he said 6 of his brothers  age 2-5 months he thinks because of milk when they were in [place].    The patient was seen and examined by Wellington Garcia MD   Current Outpatient Prescriptions   Medication     esomeprazole (NEXIUM) 40 MG CR capsule     alfuzosin (UROXATRAL) 10 MG 24 hr tablet     sildenafil (REVATIO/VIAGRA) 20 MG tablet     traZODone (DESYREL) 50 MG tablet     calcium polycarbophil (RA FIBER-CAP) 625 MG tablet     lidocaine (XYLOCAINE) 2 % jelly     desonide (DESOWEN) 0.05 % ointment     ketoconazole (NIZORAL) 2 % shampoo     Urea 20 % CREA     hydrocortisone (ANUSOL-HC) 25 MG suppository     nicotine polacrilex (NICORETTE) 2 MG gum     albuterol (ALBUTEROL) 108 (90 BASE) MCG/ACT inhaler     Psyllium (METAMUCIL PO)     Current Facility-Administered Medications   Medication     triamcinolone acetonide (KENALOG-40) injection 40 mg     lidocaine 1 % injection 5 mL     Allergies   Allergen Reactions "     Nkda [No Known Drug Allergies]      Social History     Social History     Marital status: Single     Spouse name: N/A     Number of children: N/A     Years of education: N/A     Occupational History     Not on file.     Social History Main Topics     Smoking status: Former Smoker     Packs/day: 0.50     Years: 6.00     Types: Cigarettes     Quit date: 9/16/2012     Smokeless tobacco: Never Used     Alcohol use No      Comment: stopped 10/2014     Drug use: No     Sexual activity: Not on file     Other Topics Concern     Not on file     Social History Narrative     Past Medical History:   Diagnosis Date     Anxiety      Chronic hepatitis C (H)      Depression      Depressive disorder      Malignant neoplasm of prostate (H)      Mucous cyst of joint      Palpitations      Substance abuse      Past Surgical History:   Procedure Laterality Date     ENDOSCOPIC ULTRASOUND, ESOPHAGOSCOPY, GASTROSCOPY, DUODENOSCOPY (EGD), COMBINED  12/16/15     ESOPHAGOSCOPY, GASTROSCOPY, DUODENOSCOPY (EGD), COMBINED N/A 4/9/2015    Procedure: COMBINED ESOPHAGOSCOPY, GASTROSCOPY, DUODENOSCOPY (EGD);  Surgeon: Raina Romo MD;  Location:  GI     EXCISE MASS FINGER  2/19/2013    Procedure: EXCISE MASS FINGER;  Mass Excision Left Index Finger     (local anesthesia);  Surgeon: Raina Dorsey MD;  Location:  OR      UGI ENDOSCOPY W EUS N/A 12/16/2015    Procedure: COMBINED ENDOSCOPIC ULTRASOUND, ESOPHAGOSCOPY, GASTROSCOPY, DUODENOSCOPY (EGD);  Surgeon: Brady Acuna MD;  Location:  GI     Family History   Problem Relation Age of Onset     DIABETES Sister      DIABETES Son      Alcohol/Drug Other      Arthritis Other      Circulatory Other      Eye Disorder Other      Depression Other      Genetic Disorder Other      Psychotic Disorder Other      CANCER No family hx of      no skin cancer     Skin Cancer No family hx of      Thyroid Disease No family hx of      Thyroid Cancer No family hx of          ROS   A  complete ROS was otherwise negative except as noted in the HPI and the end of the note.  /77 (BP Location: Right arm, Patient Position: Chair, Cuff Size: Adult Regular)  Pulse 70  Resp 16  SpO2 100%  Exam:   GENERAL APPEARANCE: Well developed, well nourished, alert, and in no apparent distress.  EYES: PERRL, EOMI, conjunctiva clear non-injected  HENT: Nasal mucosa with mild edema and no discharge. No nasal polyps.    EARS: Canals clear, TMs normal  MOUTH: Oral mucosa is moist, without any lesions, no tonsillar enlargement, no oropharyngeal exudate.  RESP: Good air flow throughout.  No crackles. No rhonchi. No wheezes.  CV: Normal S1, S2, regular rhythm, normal rate. No murmur.  No rub. No gallop. No LE edema.   MS: Extremities normal. No clubbing. No cyanosis.  SKIN: No rashes noted  NEURO: Speech normal, normal strength and tone, normal gait and stance  PSYCH: Normal mentation, orientation to person, place, and time.  Results: 43 percutaneous environmental and 6 food allergen (and 2 controls) extracts placed by MA and read by MD.  All negative.          Assessment and plan: Probably has a concern that he is allergic to almost everything. I did explain to him what allergic reactions are and how they manifest. Most of his reactions are not allergic in nature. A few reactions that that could possibly be allergic we did testing and he was negative to these I do not feel that most of his allergic reactions are indeed allergic in nature but more intolerances unfortunately allergy medications and testing does not pick these up. He can try off certain foods to see if it is benefiting him.            Again, thank you for allowing me to participate in the care of your patient.      Sincerely,    Wellington Garcia MD

## 2017-09-18 NOTE — PROGRESS NOTES
"Reason for Visit  Russ Agee is a 61 year old male who is referred by Donnie Haque for allergies.    Allergy HPI    Russ presents today for an initial visit.  He states he is \"allergic to everything.\" States he is allergic to milk corn air.He gets a bad order for foods and thinks this is a food allergy. No causes bloating. He also had rectal prolapse is very young things trigger. No also causes facial swelling. He needs laxatives and has hemorrhoids and wonders if foods are triggering this. He also services allergic to peanuts. For the nasal congestion sores worse. Flonase does help. No allergy felt tried. Has occasional sneezing.    Social history used to smoke stopped 6-7 years.    Family history he said 6 of his brothers  age 2-5 months he thinks because of milk when they were in [place].    The patient was seen and examined by Wellington Garcia MD   Current Outpatient Prescriptions   Medication     esomeprazole (NEXIUM) 40 MG CR capsule     alfuzosin (UROXATRAL) 10 MG 24 hr tablet     sildenafil (REVATIO/VIAGRA) 20 MG tablet     traZODone (DESYREL) 50 MG tablet     calcium polycarbophil (RA FIBER-CAP) 625 MG tablet     lidocaine (XYLOCAINE) 2 % jelly     desonide (DESOWEN) 0.05 % ointment     ketoconazole (NIZORAL) 2 % shampoo     Urea 20 % CREA     hydrocortisone (ANUSOL-HC) 25 MG suppository     nicotine polacrilex (NICORETTE) 2 MG gum     albuterol (ALBUTEROL) 108 (90 BASE) MCG/ACT inhaler     Psyllium (METAMUCIL PO)     Current Facility-Administered Medications   Medication     triamcinolone acetonide (KENALOG-40) injection 40 mg     lidocaine 1 % injection 5 mL     Allergies   Allergen Reactions     Nkda [No Known Drug Allergies]      Social History     Social History     Marital status: Single     Spouse name: N/A     Number of children: N/A     Years of education: N/A     Occupational History     Not on file.     Social History Main Topics     Smoking status: Former Smoker     Packs/day: " 0.50     Years: 6.00     Types: Cigarettes     Quit date: 9/16/2012     Smokeless tobacco: Never Used     Alcohol use No      Comment: stopped 10/2014     Drug use: No     Sexual activity: Not on file     Other Topics Concern     Not on file     Social History Narrative     Past Medical History:   Diagnosis Date     Anxiety      Chronic hepatitis C (H)      Depression      Depressive disorder      Malignant neoplasm of prostate (H)      Mucous cyst of joint      Palpitations      Substance abuse      Past Surgical History:   Procedure Laterality Date     ENDOSCOPIC ULTRASOUND, ESOPHAGOSCOPY, GASTROSCOPY, DUODENOSCOPY (EGD), COMBINED  12/16/15     ESOPHAGOSCOPY, GASTROSCOPY, DUODENOSCOPY (EGD), COMBINED N/A 4/9/2015    Procedure: COMBINED ESOPHAGOSCOPY, GASTROSCOPY, DUODENOSCOPY (EGD);  Surgeon: Raina Romo MD;  Location:  GI     EXCISE MASS FINGER  2/19/2013    Procedure: EXCISE MASS FINGER;  Mass Excision Left Index Finger     (local anesthesia);  Surgeon: Raina Dorsey MD;  Location:  OR      UGI ENDOSCOPY W EUS N/A 12/16/2015    Procedure: COMBINED ENDOSCOPIC ULTRASOUND, ESOPHAGOSCOPY, GASTROSCOPY, DUODENOSCOPY (EGD);  Surgeon: Brady Acuna MD;  Location:  GI     Family History   Problem Relation Age of Onset     DIABETES Sister      DIABETES Son      Alcohol/Drug Other      Arthritis Other      Circulatory Other      Eye Disorder Other      Depression Other      Genetic Disorder Other      Psychotic Disorder Other      CANCER No family hx of      no skin cancer     Skin Cancer No family hx of      Thyroid Disease No family hx of      Thyroid Cancer No family hx of          ROS   A complete ROS was otherwise negative except as noted in the HPI and the end of the note.  /77 (BP Location: Right arm, Patient Position: Chair, Cuff Size: Adult Regular)  Pulse 70  Resp 16  SpO2 100%  Exam:   GENERAL APPEARANCE: Well developed, well nourished, alert, and in no apparent  distress.  EYES: PERRL, EOMI, conjunctiva clear non-injected  HENT: Nasal mucosa with mild edema and no discharge. No nasal polyps.    EARS: Canals clear, TMs normal  MOUTH: Oral mucosa is moist, without any lesions, no tonsillar enlargement, no oropharyngeal exudate.  RESP: Good air flow throughout.  No crackles. No rhonchi. No wheezes.  CV: Normal S1, S2, regular rhythm, normal rate. No murmur.  No rub. No gallop. No LE edema.   MS: Extremities normal. No clubbing. No cyanosis.  SKIN: No rashes noted  NEURO: Speech normal, normal strength and tone, normal gait and stance  PSYCH: Normal mentation, orientation to person, place, and time.  Results: 43 percutaneous environmental and 6 food allergen (and 2 controls) extracts placed by MA and read by MD.  All negative.          Assessment and plan: Probably has a concern that he is allergic to almost everything. I did explain to him what allergic reactions are and how they manifest. Most of his reactions are not allergic in nature. A few reactions that that could possibly be allergic we did testing and he was negative to these I do not feel that most of his allergic reactions are indeed allergic in nature but more intolerances unfortunately allergy medications and testing does not pick these up. He can try off certain foods to see if it is benefiting him.

## 2017-09-18 NOTE — NURSING NOTE
Chief Complaint   Patient presents with     Allergy Consult     Patient is being seen for consultation of allergies      Sandrine Hansen CMA at 12:44 PM on 9/18/2017

## 2017-09-18 NOTE — MR AVS SNAPSHOT
After Visit Summary   9/18/2017    Russ Agee    MRN: [id number]           Patient Information     Date Of Birth          1955        Visit Information        Provider Department      9/18/2017 1:15 PM Wellington Calderon MD [facility]        Today's Diagnoses     Chronic rhinitis, unspecified type    -  1    Dermatitis due to food taken internally           Follow-ups after your visit        Follow-up notes from your care team     Return if symptoms worsen or fail to improve.      Your next 10 appointments already scheduled     Sep 26, 2017  2:35 PM CDT   (Arrive by 2:20 PM)   Return Visit with Donnie Haque MD   [facility] ([facility])    [address] [place] [address] [id number] [phone]            Oct 09, 2017 11:30 AM CDT   (Arrive by 11:15 AM)   EMG with Syed Valera MD   [facility] ([facility])    [address] [place] [address] [id number] [phone]            Nov 04, 2017 10:30 AM CDT   (Arrive by 10:15 AM)   Return Prostate Cancer with Gerardo Trotter MD   [facility] ([facility])    [address] [place] [id number] [phone]            Dec 05, 2017 10:00 AM CST   (Arrive by 9:45 AM)   Return Prostate Cancer with Gerardo Trotter MD   [facility] ([facility])    [address] [place] [id number] [phone]              Who to contact     If you have questions or need follow up information about today's clinic visit or your schedule please contact [facility] directly at [phone].  Normal or non-critical lab and imaging results will be communicated to you by Barry  "letter or phone within 4 business days after the clinic has received the results. If you do not hear from us within 7 days, please contact the clinic through Cloze or phone. If you have a critical or abnormal lab result, we will notify you by phone as soon as possible.  Submit refill requests through Cloze or call your pharmacy and they will forward the refill request to us. Please allow 3 business days for your refill to be completed.          Additional Information About Your Visit        Cloze Information     Cloze lets you send messages to your doctor, view your test results, renew your prescriptions, schedule appointments and more. To sign up, go to [email or web address] . Click on \"Log in\" on the left side of the screen, which will take you to the Welcome page. Then click on \"Sign up Now\" on the right side of the page.     You will be asked to enter the access code listed below, as well as some personal information. Please follow the directions to create your username and password.     Your access code is: [email or web address]  Expires: 12/3/2017  6:30 AM     Your access code will  in 90 days. If you need help or a new code, please call your [place] clinic or [phone].        Care EveryWhere ID     This is your Care EveryWhere ID. This could be used by other organizations to access your [place] medical records  [id number]        Your Vitals Were     Pulse Respirations Pulse Oximetry             70 16 100%          Blood Pressure from Last 3 Encounters:   17 128/77   17 126/77   17 116/71    Weight from Last 3 Encounters:   17 77.4 kg (170 lb 9.6 oz)   17 78.3 kg (172 lb 11.2 oz)   17 78 kg (172 lb)              We Performed the Following     Percutaneous test with allergic extract with number of test to be specified        Primary Care Provider Office Phone # Fax #    Donnie Haque [phone]       [address] " [address]        Equal Access to Services     [facility]: Hadii lamont hurtado stuart Bourne, wavidhyada luqroslyn, qavicenteta deysistefanykory oswald, waxpoornima yanick paz. So [facility] [phone].    ATENCIÓN: Si habla español, tiene a french disposición servicios gratuitos de asistencia lingüística. Llame al [phone].    We comply with applicable federal civil rights laws and [place] laws. We do not discriminate on the basis of race, color, national origin, age, disability sex, sexual orientation or gender identity.            Thank you!     Thank you for choosing [facility]  for your care. Our goal is always to provide you with excellent care. Hearing back from our patients is one way we can continue to improve our services. Please take a few minutes to complete the written survey that you may receive in the mail after your visit with us. Thank you!             Your Updated Medication List - Protect others around you: Learn how to safely use, store and throw away your medicines at www.disposemymeds.org.          This list is accurate as of: 9/18/17  2:55 PM.  Always use your most recent med list.                   Brand Name Dispense Instructions for use Diagnosis    albuterol 108 (90 BASE) MCG/ACT Inhaler    PROAIR HFA    4 Inhaler    Inhale 2 puffs into the lungs every 6 hours    COPD (chronic obstructive pulmonary disease) (H)       alfuzosin 10 MG 24 hr tablet    UROXATRAL    30 tablet    Take 1 tablet (10 mg) by mouth daily    Benign non-nodular prostatic hyperplasia, presence of lower urinary tract symptoms unspecified       desonide 0.05 % ointment    DESOWEN    60 g    To affected areas of the face for itch and red bumps twice a day as needed.    Dermatitis, seborrheic       esomeprazole 40 MG CR capsule    nexIUM    90 capsule    Take 1 capsule (40 mg) by mouth every morning (before breakfast) Take 30-60 minutes before eating.    Esophageal reflux       hydrocortisone 25  MG Suppository    ANUSOL-HC    28 suppository    Place 1 suppository (25 mg) rectally 2 times daily    Internal hemorrhoids       ketoconazole 2 % shampoo    NIZORAL    240 mL    To entire wet scalp and face in affected areas and then wash off after several minutes three times a week.    Dermatitis, seborrheic       lidocaine 2 % topical gel    XYLOCAINE     Apply 1 Tube topically        METAMUCIL PO      Take 3 tsp by mouth 2 times daily.        nicotine polacrilex 2 MG gum    NICORETTE     Place 2 mg inside cheek as needed for smoking cessation        RA FIBER- MG tablet   Generic drug:  calcium polycarbophil      Take 3 tablets by mouth twice daily        sildenafil 20 MG tablet    REVATIO    90 tablet    Take 1 to 5 tabs as needed for sexual activity.    Other male erectile dysfunction       traZODone 50 MG tablet    DESYREL     Take 50 mg by mouth At Bedtime        Urea 20 % Crea cream     60 g    To affected, ridged nails daily.    Dystrophic nail

## 2017-09-26 ENCOUNTER — OFFICE VISIT (OUTPATIENT)
Dept: INTERNAL MEDICINE | Facility: CLINIC | Age: 62
End: 2017-09-26

## 2017-09-26 VITALS
BODY MASS INDEX: 24.54 KG/M2 | RESPIRATION RATE: 16 BRPM | SYSTOLIC BLOOD PRESSURE: 130 MMHG | DIASTOLIC BLOOD PRESSURE: 83 MMHG | WEIGHT: 171 LBS | HEART RATE: 61 BPM

## 2017-09-26 DIAGNOSIS — Z23 NEED FOR PROPHYLACTIC VACCINATION AND INOCULATION AGAINST INFLUENZA: ICD-10-CM

## 2017-09-26 DIAGNOSIS — Z23 NEED FOR PROPHYLACTIC VACCINATION WITH TETANUS-DIPHTHERIA (TD): ICD-10-CM

## 2017-09-26 ASSESSMENT — PAIN SCALES - GENERAL: PAINLEVEL: NO PAIN (0)

## 2017-09-26 NOTE — NURSING NOTE
"FLU VACCINE QUESTIONNAIRE:  Ask the following questions of all parties who want influenza vaccination:     CONTRAINDICATIONS  1.  Is the patient age less than 6 months?  NO  2.  Has the person to be vaccinated ever had Guillain-[place] syndrome? NO  3.  Has the person to be vaccinated had the vaccine this year? NO  4.  Is the person to be vaccinated sick today? NO  5.  Does the person to be vaccinated have an allergy to eggs or a component of the vaccine? NO  6.  Has the person to vaccinated ever had a serious reaction to an influenza vaccination in the past? NO    If the answer to ALL of the above questions is \"No\", then please administer the influenza vaccine per the standard protocol.  If the patient answered \"Yes\" to questions 1 or 2, do not administer the vaccine. If the patient answered \"Yes\" to question 3, do not administer the vaccine unless the patient is a child receiving the vaccine in two doses. If the patient answered \"Yes\" to questions 4, 5, and/or 6, get additional details on sickness and/or reaction and refer to provider. If you have any questions regarding contraindications, please refer to the provider.                                                         INFLUENZA VACCINATION NOTE      Information sheet given to patient and questions answered.       "

## 2017-09-26 NOTE — PATIENT INSTRUCTIONS
[facility], [place]: [phone]     [facility] Medication Refill Request Information:  * Please contact your pharmacy regarding ANY request for medication refills.  ** [facility] Prescription Fax = [phone]  * Please allow 3 business days for routine medication refills.  * Please allow 5 business days for controlled substance medication refills.     [facility] Test Result notification information:  *You will be notified with in 7-10 days of your appointment day regarding the results of your test.  If you are on MyChart you will be notified as soon as the provider has reviewed the results and signed off on them.

## 2017-09-26 NOTE — NURSING NOTE
Chief Complaint   Patient presents with     Results     Patient is here to follow up with CT results     Christina Grove CMA 2:52 PM on 9/26/2017.'

## 2017-09-26 NOTE — MR AVS SNAPSHOT
After Visit Summary   9/26/2017    Russ Agee    MRN: [id number]           Patient Information     Date Of Birth          1955        Visit Information        Provider Department      9/26/2017 2:35 PM Donnie Haque MD [facility]        Today's Diagnoses     Need for prophylactic vaccination and inoculation against influenza        Need for prophylactic vaccination with tetanus-diphtheria (TD)          Care Instructions    [facility]: [phone]     [facility] Medication Refill Request Information:  * Please contact your pharmacy regarding ANY request for medication refills.  ** [facility] Prescription Fax = [phone]  * Please allow 3 business days for routine medication refills.  * Please allow 5 business days for controlled substance medication refills.     [facility] Test Result notification information:  *You will be notified with in 7-10 days of your appointment day regarding the results of your test.  If you are on MyChart you will be notified as soon as the provider has reviewed the results and signed off on them.            Follow-ups after your visit        Your next 10 appointments already scheduled     Oct 09, 2017 11:30 AM CDT   (Arrive by 11:15 AM)   EMG with Syed Valera MD   [facility] EMG ([facility])    [address] [place] [address]  [address] [facility] [address] [phone]            Oct 28, 2017 10:30 AM CDT   (Arrive by 10:15 AM)   Return Prostate Cancer with Gerardo Trotter MD   [facility]rology and Inst for Prostate and Urologic Cancers ([facility])    [address] [place] [address]  [address] [facility] [id number] [phone]            Nov 27, 2017  2:35 PM CST   (Arrive by 2:20 PM)   Return Visit with Donnie Haque MD   [facility] Primary Care Clinic ([facility])    [address] [place] [address]    [phone]            Dec 05, 2017 10:00 AM CST   (Arrive by 9:45 AM)   Return Prostate Cancer with Gerardo Trotter MD   [facility] and Urologic Cancers ([facility])    [address] [place] [id number] [phone]              Who to contact     Please call your clinic at [phone] to:    Ask questions about your health    Make or cancel appointments    Discuss your medicines    Learn about your test results    Speak to your doctor   If you have compliments or concerns about an experience at your clinic, or if you wish to file a complaint, please contact [facility] Patient Relations at [phone] or email us at [email or web address]         Additional Information About Your Visit        [employer]OM[employer] Innovations[employer]upen Information     ShopSocially is an electronic gateway that provides easy, online access to your medical records. With ShopSocially, you can request a clinic appointment, read your test results, renew a prescription or communicate with your care team.     To sign up for ShopSocially visit the website at [email or web address]   You will be asked to enter the access code listed below, as well as some personal information. Please follow the directions to create your username and password.     Your access code is: [email or web address]  Expires: 12/3/2017  6:30 AM     Your access code will  in 90 days. If you need help or a new code, please contact your [facility] or call [phone] for assistance.        Care EveryWhere ID     This is your Care EveryWhere ID. This could be used by other organizations to access your [place] medical records  [id number]        Your Vitals Were     Pulse Respirations BMI (Body Mass Index)             61 16 24.54 kg/m2          Blood Pressure from Last 3 Encounters:   17 130/83   17 128/77   17 126/77    Weight from Last 3  Encounters:   09/26/17 77.6 kg (171 lb)   09/06/17 77.4 kg (170 lb 9.6 oz)   07/27/17 78.3 kg (172 lb 11.2 oz)              We Performed the Following     FLU VACCINE, 3 YRS +, IM  [01055]     TDAP ( BOOSTRIX AGES 10-64)        Primary Care Provider Office Phone # Fax #    Donnie RANI MD [place] [phone] [address] [address]        Equal Access to Services     [facility]: Hadii aad ku hadasho Soomaali, waaxda luqadaha, qaybta kaalmada adeegyada, waxpoornima stein . So [facility] [phone].    ATENCIÓN: Si habla español, tiene a french disposición servicios gratuitos de asistencia lingüística. Llame al [phone].    We comply with applicable federal civil rights laws and [place] laws. We do not discriminate on the basis of race, color, national origin, age, disability sex, sexual orientation or gender identity.            Thank you!     Thank you for choosing [facility]  for your care. Our goal is always to provide you with excellent care. Hearing back from our patients is one way we can continue to improve our services. Please take a few minutes to complete the written survey that you may receive in the mail after your visit with us. Thank you!             Your Updated Medication List - Protect others around you: Learn how to safely use, store and throw away your medicines at www.disposemymeds.org.          This list is accurate as of: 9/26/17  3:37 PM.  Always use your most recent med list.                   Brand Name Dispense Instructions for use Diagnosis    albuterol 108 (90 BASE) MCG/ACT Inhaler    PROAIR HFA    4 Inhaler    Inhale 2 puffs into the lungs every 6 hours    COPD (chronic obstructive pulmonary disease) (H)       alfuzosin 10 MG 24 hr tablet    UROXATRAL    30 tablet    Take 1 tablet (10 mg) by mouth daily    Benign non-nodular prostatic hyperplasia, presence of lower urinary tract symptoms unspecified        desonide 0.05 % ointment    DESOWEN    60 g    To affected areas of the face for itch and red bumps twice a day as needed.    Dermatitis, seborrheic       esomeprazole 40 MG CR capsule    nexIUM    90 capsule    Take 1 capsule (40 mg) by mouth every morning (before breakfast) Take 30-60 minutes before eating.    Esophageal reflux       hydrocortisone 25 MG Suppository    ANUSOL-HC    28 suppository    Place 1 suppository (25 mg) rectally 2 times daily    Internal hemorrhoids       ketoconazole 2 % shampoo    NIZORAL    240 mL    To entire wet scalp and face in affected areas and then wash off after several minutes three times a week.    Dermatitis, seborrheic       lidocaine 2 % topical gel    XYLOCAINE     Apply 1 Tube topically        METAMUCIL PO      Take 3 tsp by mouth 2 times daily.        nicotine polacrilex 2 MG gum    NICORETTE     Place 2 mg inside cheek as needed for smoking cessation        RA FIBER- MG tablet   Generic drug:  calcium polycarbophil      Take 3 tablets by mouth twice daily        sildenafil 20 MG tablet    REVATIO    90 tablet    Take 1 to 5 tabs as needed for sexual activity.    Other male erectile dysfunction       traZODone 50 MG tablet    DESYREL     Take 50 mg by mouth At Bedtime        Urea 20 % Crea cream     60 g    To affected, ridged nails daily.    Dystrophic nail

## 2017-09-26 NOTE — PROGRESS NOTES
Subjective:  Russ is a pleasant 59 year-old man who came in today for follow up today. I saw him yesterday for several complaints listed below.  He had colonoscopy 11/13/15 repeat in 5 years. He had upper EGD and EUS with Dr. Acuna, GI 12/16/15 with gastric polyp bx.  He had fairly unremarkable Holter Monitor 1/6/16.   He was 2/16/16 in Dermatology. He was seen by Dr. Delaney, ENT 3/10/16 and 5/16/16 for sinus issues. He has been treated for Hep C. He states about 6 months (he did not mentioned this before) paralysis of one or more of L fingers. More often 4th and 5th fingers followed by L arm pain. This can happen several times a week and last episode was last week. He has some weakness as well. No injury, no chest, axilla, neck or L arm injury. No F/C/NS.  No new medication.     Objective:  General appearance: NAD, cooperative, alert.  HEENT; negative   Cardio: Normal rate and rhythm, no murmurs  Respiratory: Normal breath sounds  Abdomen: Non-tender  Neurological exam: B UE/LE/proximal/distal is normal and symmetric for sensation, reflexes, and strength. No tenderness to palpation of L shoulder L upper arm, L forearm, or hand. No hand contractures. Balance and gait are normal. B Cranial nerves normal.       Assessment and Plan:    (1) L arm/hand complaints.   MRI/MRA brain and cervical spine w/o significant findings. Ordered  EMG (bracial and/or carpal tunnel pathology). CT chest for L axial pathology ordered. This is scheduled for 10/9/17  (2) He will continue to follow up in GI for Hep C management. His last Hep C viral load 12/1/15 was undetectable.   (3) He was seen by Dr. Trotter Urology 11/1/16 for elevated PSA; PSA still elevated and he has  Urology follow up with Dr. Trotter 10/17  (4) He was seen 5/16/16 by Dr. Delaney for neck complaints and sinus issues  (5) He was seen Dr. Naranjo Cardiology for ongoing palpitations 4/13/16.  (6) Reflux changed to Nexium;  EGD 10/15  (7) He states he sees his Psychiatry  doctor once a  Month in [place].   (8) Thyroid nodules seen on Cervical MRI; thyroid U/S; seen in  Endocrine 9/7/17 bx. Was benign   (9) Immunizations today for Td and influenza    I will see him back in about 2 months.          Total time spent 15 minutes.  More than 50% of the time spent with Mr. Agee on counseling / coordinating his care

## 2017-10-02 ENCOUNTER — MEDICAL CORRESPONDENCE (OUTPATIENT)
Dept: HEALTH INFORMATION MANAGEMENT | Facility: CLINIC | Age: 62
End: 2017-10-02

## 2017-10-09 ENCOUNTER — OFFICE VISIT (OUTPATIENT)
Dept: NEUROLOGY | Facility: CLINIC | Age: 62
End: 2017-10-09

## 2017-10-09 DIAGNOSIS — M25.512 BILATERAL SHOULDER PAIN, UNSPECIFIED CHRONICITY: Primary | ICD-10-CM

## 2017-10-09 DIAGNOSIS — M25.511 BILATERAL SHOULDER PAIN, UNSPECIFIED CHRONICITY: Primary | ICD-10-CM

## 2017-10-09 NOTE — PROGRESS NOTES
[facility]  Electrodiagnostic Laboratory    Nerve Conduction & EMG Report          Patient:       Russ Agee  Patient ID:    [id number]  Gender:        Male  YOB: 1955  Age:           61 Years 10 Months        History & Examination: 61 years old male with bilateral shoulder pain.       Techniques: Motor conduction studies were done with surface recording electrodes. Sensory conduction studies were performed with surface electrodes, unless indicated otherwise by (n), designating the use of subdermal recording electrodes. Temperature was monitored and recorded throughout the study. Upper extremities were maintained at a temperature of 32 degrees Centigrade or higher.  Lower extremities were maintained at a temperature of 31degrees Centigrade or higher. EMG was done with a concentric needle electrode.        Results:   Nerve conduction studies  1. Sensory motor nerve conduction studies in the left upper extremities are normal.    Needle EMG  1. Needle EMG was performed in the selected muscles in the left upper extremity. There were no abnormal spontaneous activities. MUAPs were normal in morphology and recruitment patterns.     Interpretation:  1. This test is normal.   2. Neurophysiologic findings are not supportive of mononeuropathy, brachial plexopathy or cervical radiculopathy in the left upper extremity.       EMG Physician: Syed Valera MD              Sensory NCS      Nerve / Sites Rec. Site Onset Peak NP Amp Ref. PP Amp Dist Michael Ref. Temp     ms ms  V  V  V cm m/s m/s  C   L MEDIAN - Dig II Anti      Wrist Dig II 2.55 3.85 45.3 10.0 90.2 14 54.9 48.0 29.2   L ULNAR - Dig V Anti      Wrist Dig V 2.55 3.65 22.7 8.0 14.2 12.5 49.0 48.0 31.5   L RADIAL - Snuff      Forearm Snuff 2.14 2.76 29.8 15.0 44.6 12.5 58.5 48.0 31.6       Motor NCS      Nerve / Sites Rec. Site Lat Ref. Amp Ref. Rel Amp Dist Michael Ref. Dur. Area Temp.     ms ms mV mV % cm m/s m/s ms %  C   L MEDIAN - APB       Wrist APB 3.44 4.40 9.1 5.0 100 8   8.18 100 31.6      Elbow APB 8.02  8.8  96.6 25 54.5 48.0 8.59 99.5 31.7   L ULNAR - ADM      Wrist ADM 3.33 3.50 8.0 5.0 100 8   8.44 100 31.6      B.Elbow ADM 7.81  7.6  95.2 23 51.3 48.0 8.02 95.9 31.6      A.Elbow ADM 9.69  7.5  94.4 9 48.0 48.0 8.39 95 31.6       F  Wave      Nerve Min F Lat Max F Lat Mean FLat Temp.    ms ms ms  C   L MEDIAN 27.92 32.14 29.16 31.7   L ULNAR 28.85 34.11 31.77 31.6       EMG Summary Table     Spontaneous MUAP Recruitment    IA Fib Fasc H.F. Amp Dur. PPP Pattern   L. BICEPS Normal None None None N N None Normal   L. DELTOID Normal None None None N N None Normal   L. TRICEPS Normal None None None N N None Normal   L. FLEX CARPI RAD Normal None None None N N None Normal   L. PRON TERES Normal None None None N N None Normal   L. FIRST D INTEROSS Normal None None None N N None Normal

## 2017-10-09 NOTE — LETTER
10/9/2017       RE: Russ Agee  [address]  [facility] [address]     Dear Colleague,    Thank you for referring your patient, Russ Agee, to the [facility] EMG at [facility]. Please see a copy of my visit note below.        [facility]  Electrodiagnostic Laboratory    Nerve Conduction & EMG Report          Patient:       Russ Agee  Patient ID:    [id number]  Gender:        Male  YOB: 1955  Age:           61 Years 10 Months        History & Examination: 61 years old male with bilateral shoulder pain.       Techniques: Motor conduction studies were done with surface recording electrodes. Sensory conduction studies were performed with surface electrodes, unless indicated otherwise by (n), designating the use of subdermal recording electrodes. Temperature was monitored and recorded throughout the study. Upper extremities were maintained at a temperature of 32 degrees Centigrade or higher.  Lower extremities were maintained at a temperature of 31degrees Centigrade or higher. EMG was done with a concentric needle electrode.        Results:   Nerve conduction studies  1. Sensory motor nerve conduction studies in the left upper extremities are normal.    Needle EMG  1. Needle EMG was performed in the selected muscles in the left upper extremity. There were no abnormal spontaneous activities. MUAPs were normal in morphology and recruitment patterns.     Interpretation:  1. This test is normal.   2. Neurophysiologic findings are not supportive of mononeuropathy, brachial plexopathy or cervical radiculopathy in the left upper extremity.       EMG Physician: Syed Valera MD              Sensory NCS      Nerve / Sites Rec. Site Onset Peak NP Amp Ref. PP Amp Dist Michael Ref. Temp     ms ms  V  V  V cm m/s m/s  C   L MEDIAN - Dig II Anti      Wrist Dig II 2.55 3.85 45.3 10.0 90.2 14 54.9 48.0 29.2   L ULNAR - Dig V Anti      Wrist Dig V 2.55 3.65 22.7  8.0 14.2 12.5 49.0 48.0 31.5   L RADIAL - Snuff      Forearm Snuff 2.14 2.76 29.8 15.0 44.6 12.5 58.5 48.0 31.6       Motor NCS      Nerve / Sites Rec. Site Lat Ref. Amp Ref. Rel Amp Dist Michael Ref. Dur. Area Temp.     ms ms mV mV % cm m/s m/s ms %  C   L MEDIAN - APB      Wrist APB 3.44 4.40 9.1 5.0 100 8   8.18 100 31.6      Elbow APB 8.02  8.8  96.6 25 54.5 48.0 8.59 99.5 31.7   L ULNAR - ADM      Wrist ADM 3.33 3.50 8.0 5.0 100 8   8.44 100 31.6      B.Elbow ADM 7.81  7.6  95.2 23 51.3 48.0 8.02 95.9 31.6      A.Elbow ADM 9.69  7.5  94.4 9 48.0 48.0 8.39 95 31.6       F  Wave      Nerve Min F Lat Max F Lat Mean FLat Temp.    ms ms ms  C   L MEDIAN 27.92 32.14 29.16 31.7   L ULNAR 28.85 34.11 31.77 31.6       EMG Summary Table     Spontaneous MUAP Recruitment    IA Fib Fasc H.F. Amp Dur. PPP Pattern   L. BICEPS Normal None None None N N None Normal   L. DELTOID Normal None None None N N None Normal   L. TRICEPS Normal None None None N N None Normal   L. FLEX CARPI RAD Normal None None None N N None Normal   L. PRON TERES Normal None None None N N None Normal   L. FIRST D INTEROSS Normal None None None N N None Normal                          Again, thank you for allowing me to participate in the care of your patient.      Sincerely,    Syed Valera MD

## 2017-10-09 NOTE — MR AVS SNAPSHOT
After Visit Summary   10/9/2017    Russ Agee    MRN: [id number]           Patient Information     Date Of Birth          1955        Visit Information        Provider Department      10/9/2017 11:30 AM Syed Valera MD [facility] EMG        Today's Diagnoses     Bilateral shoulder pain, unspecified chronicity    -  1       Follow-ups after your visit        Your next 10 appointments already scheduled     Oct 28, 2017 10:30 AM CDT   (Arrive by 10:15 AM)   Return Prostate Cancer with Gerardo Trotter MD   [facility] Urology and Inst for Prostate and Urologic Cancers ([facility])    [address] [place] [id number] [phone]            Oct 30, 2017  6:45 PM CDT   (Arrive by 6:30 PM)   Return Visit with Luzmaria Delaney MD   [facility] Ear Nose and Throat ([facility])    [address] [place] [id number] [phone]            Nov 27, 2017  2:35 PM CST   (Arrive by 2:20 PM)   Return Visit with Donnie Haque MD   [facility] Primary Care Clinic ([facility])    [address] [place] [address] [phone]            Dec 05, 2017 10:00 AM CST   (Arrive by 9:45 AM)   Return Prostate Cancer with Gerardo Trotter MD   [facility] Inst for Prostate and Urologic Cancers ([facility])    [address] [place] [id number] [phone]              Who to contact     Please call your clinic at [phone] to:    Ask questions about your health    Make or cancel appointments    Discuss your medicines    Learn about your test results    Speak to your doctor   If you have compliments or concerns about an experience at your clinic, or if you wish to file a complaint, please contact [facility] Patient Relations at [phone] or email us at  [email or web address]         Additional Information About Your Visit        ConnectifyharOMG Information     Connectifyhart is an electronic gateway that provides easy, online access to your medical records. With Skycross, you can request a clinic appointment, read your test results, renew a prescription or communicate with your care team.     To sign up for Skycross visit the website at [email or web address]   You will be asked to enter the access code listed below, as well as some personal information. Please follow the directions to create your username and password.     Your access code is: [email or web address]  Expires: 12/3/2017  6:30 AM     Your access code will  in 90 days. If you need help or a new code, please contact your [facility] or call [phone] for assistance.        Care EveryWhere ID     This is your Care EveryWhere ID. This could be used by other organizations to access your [place] medical records  [id number]         Blood Pressure from Last 3 Encounters:   17 130/83   17 128/77   17 126/77    Weight from Last 3 Encounters:   17 77.6 kg (171 lb)   17 77.4 kg (170 lb 9.6 oz)   17 78.3 kg (172 lb 11.2 oz)              We Performed the Following     NCS Motor with or without F-Wave, 7-8 nerves (76665)     NEEDLE EMG 1 EXTREMITY (73053)        Primary Care Provider Office Phone # Fax #    Donnie Haque [phone]       [address] [address]        Equal Access to Services     MEREDITH LEÓN : Hadseamus Bourne, wavidhyada migueladaha, qavicenteta deysialmachelle shea. So [facility] [phone].    ATENCIÓN: Si habla español, tiene a french disposición servicios gratuitos de asistencia lingüística. Grace yun [phone].    We comply with applicable federal civil rights laws and [place] laws. We do not discriminate on the basis of race, color, national  origin, age, disability, sex, sexual orientation, or gender identity.            Thank you!     Thank you for choosing [facility]  for your care. Our goal is always to provide you with excellent care. Hearing back from our patients is one way we can continue to improve our services. Please take a few minutes to complete the written survey that you may receive in the mail after your visit with us. Thank you!             Your Updated Medication List - Protect others around you: Learn how to safely use, store and throw away your medicines at www.disposemymeds.org.          This list is accurate as of: 10/9/17 11:59 PM.  Always use your most recent med list.                   Brand Name Dispense Instructions for use Diagnosis    albuterol 108 (90 BASE) MCG/ACT Inhaler    PROAIR HFA    4 Inhaler    Inhale 2 puffs into the lungs every 6 hours    COPD (chronic obstructive pulmonary disease) (H)       alfuzosin 10 MG 24 hr tablet    UROXATRAL    30 tablet    Take 1 tablet (10 mg) by mouth daily    Benign non-nodular prostatic hyperplasia, presence of lower urinary tract symptoms unspecified       desonide 0.05 % ointment    DESOWEN    60 g    To affected areas of the face for itch and red bumps twice a day as needed.    Dermatitis, seborrheic       esomeprazole 40 MG CR capsule    nexIUM    90 capsule    Take 1 capsule (40 mg) by mouth every morning (before breakfast) Take 30-60 minutes before eating.    Esophageal reflux       hydrocortisone 25 MG Suppository    ANUSOL-HC    28 suppository    Place 1 suppository (25 mg) rectally 2 times daily    Internal hemorrhoids       ketoconazole 2 % shampoo    NIZORAL    240 mL    To entire wet scalp and face in affected areas and then wash off after several minutes three times a week.    Dermatitis, seborrheic       lidocaine 2 % topical gel    XYLOCAINE     Apply 1 Tube topically        METAMUCIL PO      Take 3 tsp by mouth 2 times daily.        nicotine polacrilex 2 MG gum     NICORETTE     Place 2 mg inside cheek as needed for smoking cessation        RA FIBER- MG tablet   Generic drug:  calcium polycarbophil      Take 3 tablets by mouth twice daily        sildenafil 20 MG tablet    REVATIO    90 tablet    Take 1 to 5 tabs as needed for sexual activity.    Other male erectile dysfunction       traZODone 50 MG tablet    DESYREL     Take 50 mg by mouth At Bedtime        Urea 20 % Crea cream     60 g    To affected, ridged nails daily.    Dystrophic nail

## 2017-10-10 ENCOUNTER — PRE VISIT (OUTPATIENT)
Dept: UROLOGY | Facility: CLINIC | Age: 62
End: 2017-10-10

## 2017-10-28 ENCOUNTER — OFFICE VISIT (OUTPATIENT)
Dept: UROLOGY | Facility: CLINIC | Age: 62
End: 2017-10-28

## 2017-10-28 VITALS
HEART RATE: 82 BPM | BODY MASS INDEX: 24.5 KG/M2 | HEIGHT: 71 IN | WEIGHT: 175 LBS | SYSTOLIC BLOOD PRESSURE: 119 MMHG | DIASTOLIC BLOOD PRESSURE: 75 MMHG

## 2017-10-28 DIAGNOSIS — R39.9 LOWER URINARY TRACT SYMPTOMS (LUTS): Primary | ICD-10-CM

## 2017-10-28 LAB
ALBUMIN UR-MCNC: NEGATIVE MG/DL
APPEARANCE UR: CLEAR
BILIRUB UR QL STRIP: NEGATIVE
COLOR UR AUTO: YELLOW
GLUCOSE UR STRIP-MCNC: NEGATIVE MG/DL
HGB UR QL STRIP: NEGATIVE
KETONES UR STRIP-MCNC: NEGATIVE MG/DL
LEUKOCYTE ESTERASE UR QL STRIP: NEGATIVE
MUCOUS THREADS #/AREA URNS LPF: PRESENT /LPF
NITRATE UR QL: NEGATIVE
PH UR STRIP: 5 PH (ref 5–7)
RBC #/AREA URNS AUTO: <1 /HPF (ref 0–2)
SOURCE: ABNORMAL
SP GR UR STRIP: 1.01 (ref 1–1.03)
SQUAMOUS #/AREA URNS AUTO: <1 /HPF (ref 0–1)
UROBILINOGEN UR STRIP-MCNC: 0 MG/DL (ref 0–2)
WBC #/AREA URNS AUTO: 1 /HPF (ref 0–2)

## 2017-10-28 RX ORDER — CEFAZOLIN SODIUM 1 G/3ML
1 INJECTION, POWDER, FOR SOLUTION INTRAMUSCULAR; INTRAVENOUS SEE ADMIN INSTRUCTIONS
Status: CANCELLED | OUTPATIENT
Start: 2017-10-28

## 2017-10-28 RX ORDER — MIRABEGRON 25 MG/1
25 TABLET, FILM COATED, EXTENDED RELEASE ORAL DAILY
Qty: 30 TABLET | Refills: 11 | Status: SHIPPED | OUTPATIENT
Start: 2017-10-28 | End: 2017-11-14

## 2017-10-28 ASSESSMENT — PAIN SCALES - GENERAL: PAINLEVEL: NO PAIN (0)

## 2017-10-28 NOTE — LETTER
10/28/2017      RE: Russ Agee  [address]  [facility] [address]       REASON FOR VISIT TODAY:  Prostate cancer.      HISTORY OF PRESENT ILLNESS:  Mr. Russ Agee is a 61-year-old gentleman followed in our clinic for a history of prostate cancer.  The patient was originally diagnosed with [place] 3 + 3 = 6 in 1% of 1 core in 2009.  He subsequently had a negative prostate biopsy on 10/22/2010 as well as a negative biopsy in 2012 and an MRI-guided biopsy in 2014, all of which have been negative for further evidence of cancer.  The patient's PSAs have been stable in the mid 5 range for most of the time period.         The patient also had an MRI on 09/12/2016 that showed a 59 gram prostate with only PI-RADS 2 lesions.  He also had urodynamics on 10/26/2016 that did show obstruction.        The patient has tried multiple medications for his urination including most recently Uroxatral, but this did not help.  He has tried some Chinese herbs, which he feels helped a little but did not completely alleviate his symptoms.  He is interested in other possible interventions for his urination at this time.  The patient does also take sildenafil for some erectile dysfunction and notes that he does have what appears to be retrograde ejaculation.      PHYSICAL EXAMINATION:  On exam, his blood pressure 119/75, pulse is 82.  He is in no acute distress.      DATA:  The patient's PSAs include values of 6.08 in 04/2017, 6.42 on 07/26/2017 and 6.02 back on 04/20/2015.  The patient's AUA symptom score today is measured at 35/35, bothersome index of 6, and his BRENDA is 0.      ASSESSMENT AND PLAN:  Over half of today's 45-minute visit was spent counseling the patient regarding his prostate cancer and his lower urinary tract symptoms.  I suggested to Mr. Agee that his PSA remains largely stable close to the values they have been over the last several years, and given his 4 previous biopsies at this point, we will not intervene  further, but instead will continue to monitor him.      With regard to his urination, the patient again is interested in further intervention.  We discussed medication options including trying Myrbetriq versus finasteride versus interventional procedures including a Rezum water vapor treatment versus transurethral resection of the prostate or even a ***.  The patient has had a cystoscopy in the past.        At this point in time, the patient is interested in moving forward with Rezum water vapor treatment.  The patient did also request some medications to try again in the meantime.  We therefore have given him a prescription for Myrbetriq but the patient was cautioned that it may not be covered by his insurance and the patient will therefore have to determine if it is financially possible or not.       We will look to get the patient on the schedule in the near future for his Rezum and we will collect a urine today for culture in preparation.         ERICKA TROTTER MD             D: 10/28/2017 12:54   T: 10/31/2017 13:21   MT: JASON      Name:     TONIA MC   MRN:      [id number]        Account:      [id number]   :      1955           Service Date: 10/28/2017      Document: [id number]       Ericka Trotter MD

## 2017-10-28 NOTE — LETTER
10/28/2017      RE: Russ Agee  [address]  [facility] [address]       REASON FOR VISIT TODAY:  Prostate cancer.      HISTORY OF PRESENT ILLNESS:  Mr. Russ Agee is a 61-year-old gentleman followed in our clinic for a history of prostate cancer.  The patient was originally diagnosed with [place] 3 + 3 = 6 in 1% of 1 core in 2009.  He subsequently had a negative prostate biopsy on 10/22/2010 as well as a negative biopsy in 2012 and an MRI-guided biopsy in 2014, all of which have been negative for further evidence of cancer.  The patient's PSAs have been stable in the mid 5 range for most of the time period.         The patient also had an MRI on 09/12/2016 that showed a 59 gram prostate with only PI-RADS 2 lesions.  He also had urodynamics on 10/26/2016 that did show obstruction.        The patient has tried multiple medications for his urination including most recently Uroxatral, but this did not help.  He has tried some Chinese herbs, which he feels helped a little but did not completely alleviate his symptoms.  He is interested in other possible interventions for his urination at this time.  The patient does also take sildenafil for some erectile dysfunction and notes that he does have what appears to be retrograde ejaculation.      PHYSICAL EXAMINATION:  On exam, his blood pressure 119/75, pulse is 82.  He is in no acute distress.      DATA:  The patient's PSAs include values of 6.08 in 04/2017, 6.42 on 07/26/2017 and 6.02 back on 04/20/2015.  The patient's AUA symptom score today is measured at 35/35, bothersome index of 6, and his BRENDA is 0.      ASSESSMENT AND PLAN:  Over half of today's 45-minute visit was spent counseling the patient regarding his prostate cancer and his lower urinary tract symptoms.  I suggested to Mr. Agee that his PSA remains largely stable close to the values they have been over the last several years, and given his 4 previous biopsies at this point, we will not intervene  further, but instead will continue to monitor him.      With regard to his urination, the patient again is interested in further intervention.  We discussed medication options including trying Myrbetriq versus finasteride versus interventional procedures including a Rezum water vapor treatment versus transurethral resection of the prostate or even a ***.  The patient has had a cystoscopy in the past.        At this point in time, the patient is interested in moving forward with Rezum water vapor treatment.  The patient did also request some medications to try again in the meantime.  We therefore have given him a prescription for Myrbetriq but the patient was cautioned that it may not be covered by his insurance and the patient will therefore have to determine if it is financially possible or not.       We will look to get the patient on the schedule in the near future for his Rezum and we will collect a urine today for culture in preparation.         ERICKA TROTTER MD             D: 10/28/2017 12:54   T: 10/31/2017 13:21   MT: JASON      Name:     TONIA MC   MRN:      [id number]        Account:      [id number]   :      1955           Service Date: 10/28/2017      Document: [id number]       Ericka Trotter MD

## 2017-10-28 NOTE — LETTER
10/28/2017      RE: Russ Agee  [address]  [facility] [place] [address]       REASON FOR VISIT TODAY:  Prostate cancer.      HISTORY OF PRESENT ILLNESS:  Mr. Russ Agee is a 61-year-old gentleman followed in our clinic for a history of prostate cancer.  The patient was originally diagnosed with [place] 3 + 3 = 6 in 1% of 1 core in 2009.  He subsequently had a negative prostate biopsy on 10/22/2010 as well as a negative biopsy in 2012 and an MRI-guided biopsy in 2014, all of which have been negative for further evidence of cancer.  The patient's PSAs have been stable in the mid 5 range for most of the time period.         The patient also had an MRI on 09/12/2016 that showed a 59 gram prostate with only PI-RADS 2 lesions.  He also had urodynamics on 10/26/2016 that did show obstruction.        The patient has tried multiple medications for his urination including most recently Uroxatral, but this did not help.  He has tried some Chinese herbs, which he feels helped a little but did not completely alleviate his symptoms.  He is interested in other possible interventions for his urination at this time.  The patient does also take sildenafil for some erectile dysfunction and notes that he does have what appears to be retrograde ejaculation.      PHYSICAL EXAMINATION:  On exam, his blood pressure 119/75, pulse is 82.  He is in no acute distress.      DATA:  The patient's PSAs include values of 6.08 in 04/2017, 6.42 on 07/26/2017 and 6.02 back on 04/20/2015.  The patient's AUA symptom score today is measured at 35/35, bothersome index of 6, and his BRENDA is 0.      ASSESSMENT AND PLAN:  Over half of today's 45-minute visit was spent counseling the patient regarding his prostate cancer and his lower urinary tract symptoms.  I suggested to Mr. Agee that his PSA remains largely stable close to the values they have been over the last several years, and given his 4 previous biopsies at this point, we will not intervene  further, but instead will continue to monitor him.      With regard to his urination, the patient again is interested in further intervention.  We discussed medication options including trying Myrbetriq versus finasteride versus interventional procedures including a Rezum water vapor treatment versus transurethral resection of the prostate or even a ***.  The patient has had a cystoscopy in the past.        At this point in time, the patient is interested in moving forward with Rezum water vapor treatment.  The patient did also request some medications to try again in the meantime.  We therefore have given him a prescription for Myrbetriq but the patient was cautioned that it may not be covered by his insurance and the patient will therefore have to determine if it is financially possible or not.       We will look to get the patient on the schedule in the near future for his Rezum and we will collect a urine today for culture in preparation.         ERICKA TROTTER MD             D: 10/28/2017 12:54   T: 10/31/2017 13:21   MT: JASON      Name:     TONIA MC   MRN:      [id number]        Account:      [id number]   :      1955           Service Date: 10/28/2017      Document: [id number]       Ericka Trotter MD

## 2017-10-28 NOTE — LETTER
10/28/2017    RE: Russ Agee  [address]  [facility] [address]     REASON FOR VISIT TODAY:  Prostate cancer.      HISTORY OF PRESENT ILLNESS:  Mr. Russ Agee is a 61-year-old gentleman followed in our clinic for a history of prostate cancer.  The patient was originally diagnosed with [place] 3 + 3 = 6 in 1% of 1 core in 2009.  He subsequently had a negative prostate biopsy on 10/22/2010 as well as a negative biopsy in 2012 and an MRI-guided biopsy in 2014, all of which have been negative for further evidence of cancer.  The patient's PSAs have been stable in the mid 5 range for most of the time period.         The patient also had an MRI on 09/12/2016 that showed a 59 gram prostate with only PI-RADS 2 lesions.  He also had urodynamics on 10/26/2016 that did show obstruction.        The patient has tried multiple medications for his urination including most recently Uroxatral, but this did not help.  He has tried some Chinese herbs, which he feels helped a little but did not completely alleviate his symptoms.  He is interested in other possible interventions for his urination at this time.  The patient does also take sildenafil for some erectile dysfunction and notes that he does have what appears to be retrograde ejaculation.      PHYSICAL EXAMINATION:  On exam, his blood pressure 119/75, pulse is 82.  He is in no acute distress.      DATA:  The patient's PSAs include values of 6.08 in 04/2017, 6.42 on 07/26/2017 and 6.02 back on 04/20/2015.  The patient's AUA symptom score today is measured at 35/35, bothersome index of 6, and his BRENDA is 0.      ASSESSMENT AND PLAN:  Over half of today's 45-minute visit was spent counseling the patient regarding his prostate cancer and his lower urinary tract symptoms.  I suggested to Mr. Agee that his PSA remains largely stable close to the values they have been over the last several years, and given his 4 previous biopsies at this point, we will not intervene  further, but instead will continue to monitor him.      With regard to his urination, the patient again is interested in further intervention.  We discussed medication options including trying Myrbetriq versus finasteride versus interventional procedures including a Rezum water vapor treatment versus transurethral resection of the prostate or even a ***.  The patient has had a cystoscopy in the past.        At this point in time, the patient is interested in moving forward with Rezum water vapor treatment.  The patient did also request some medications to try again in the meantime.  We therefore have given him a prescription for Myrbetriq but the patient was cautioned that it may not be covered by his insurance and the patient will therefore have to determine if it is financially possible or not.       We will look to get the patient on the schedule in the near future for his Rezum and we will collect a urine today for culture in preparation.         ERICKA PARNELL MD        D: 10/28/2017 12:54   T: 10/31/2017 13:21   MT: JASON    Name:     TONIA MC   MRN:      [id number]        Account:      [id number]   :      1955           Service Date: 10/28/2017    Document: [id number]

## 2017-10-28 NOTE — LETTER
10/28/2017    RE: Russ Agee  [address]  [facility] [address]     REASON FOR VISIT TODAY:  Prostate cancer.      HISTORY OF PRESENT ILLNESS:  Mr. Russ Agee is a 61-year-old gentleman followed in our clinic for a history of prostate cancer.  The patient was originally diagnosed with [place] 3 + 3 = 6 in 1% of 1 core in 2009.  He subsequently had a negative prostate biopsy on 10/22/2010 as well as a negative biopsy in 2012 and an MRI-guided biopsy in 2014, all of which have been negative for further evidence of cancer.  The patient's PSAs have been stable in the mid 5 range for most of the time period.         The patient also had an MRI on 09/12/2016 that showed a 59 gram prostate with only PI-RADS 2 lesions.  He also had urodynamics on 10/26/2016 that did show obstruction.        The patient has tried multiple medications for his urination including most recently Uroxatral, but this did not help.  He has tried some Chinese herbs, which he feels helped a little but did not completely alleviate his symptoms.  He is interested in other possible interventions for his urination at this time.  The patient does also take sildenafil for some erectile dysfunction and notes that he does have what appears to be retrograde ejaculation.      PHYSICAL EXAMINATION:  On exam, his blood pressure 119/75, pulse is 82.  He is in no acute distress.      DATA:  The patient's PSAs include values of 6.08 in 04/2017, 6.42 on 07/26/2017 and 6.02 back on 04/20/2015.  The patient's AUA symptom score today is measured at 35/35, bothersome index of 6, and his BRENDA is 0.      ASSESSMENT AND PLAN:  Over half of today's 45-minute visit was spent counseling the patient regarding his prostate cancer and his lower urinary tract symptoms.  I suggested to Mr. Agee that his PSA remains largely stable close to the values they have been over the last several years, and given his 4 previous biopsies at this point, we will not intervene  further, but instead will continue to monitor him.      With regard to his urination, the patient again is interested in further intervention.  We discussed medication options including trying Myrbetriq versus finasteride versus interventional procedures including a Rezum water vapor treatment versus transurethral resection of the prostate or even a HoLEP.  The patient has had a cystoscopy in the past.        At this point in time, the patient is interested in moving forward with Rezum water vapor treatment.  The patient did also request some medications to try again in the meantime.  We therefore have given him a prescription for Myrbetriq but the patient was cautioned that it may not be covered by his insurance and the patient will therefore have to determine if it is financially possible or not.       We will look to get the patient on the schedule in the near future for his Rezum and we will collect a urine today for culture in preparation.         ERICKA PARNELL MD        D: 10/28/2017 12:54   T: 10/31/2017 13:21   MT: JASON    Name:     TONIA MC   MRN:      [id number]        Account:      [id number]   :      1955           Service Date: 10/28/2017    Document: [id number]

## 2017-10-28 NOTE — MR AVS SNAPSHOT
After Visit Summary   10/28/2017    Russ Agee    MRN: [id number]           Patient Information     Date Of Birth          1955        Visit Information        Provider Department      10/28/2017 10:30 AM Gerardo Trotter MD [facility]state [facility] Urologic [facility]        Today's Diagnoses     Lower urinary tract symptoms (LUTS)    -  1      Care Instructions    Rezume           Follow-ups after your visit        Additional Services     PAC Visit Referral (For [facility] Only)       Does this visit require an Anesthesia consult?  No    H&P done by: PAC      Please be aware that coverage of these services is subject to the terms and limitations of your health insurance plan.  Call member services at your health plan with any benefit or coverage questions.      Please bring the following to your appointment:  >>   Any x-rays, CTs or MRIs which have been performed.  Contact the facility where they were done to arrange for  prior to your scheduled appointment.  Any new CT, MRI or other procedures ordered by your specialist must be performed at a [place] facility or coordinated by your clinic's referral office.    >>   List of current medications  >>   This referral request   >>   Any documents/labs given to you for this referral                  Your next 10 appointments already scheduled     Nov 27, 2017  2:35 PM CST   (Arrive by 2:20 PM)   Return Visit with Donnie Haque MD   [facility] ([facility])    [address] [place] [id number] [phone]            Dec 05, 2017 10:00 AM CST   (Arrive by 9:45 AM)   Return Prostate Cancer with Gerardo Trotter MD   [facility] and [facility] for Prostate [facility] Urologic Cancers ([facility])    [address] [place] [id number] [phone]              Who to contact     Please call your clinic  "at [phone] to:    Ask questions about your health    Make or cancel appointments    Discuss your medicines    Learn about your test results    Speak to your doctor   If you have compliments or concerns about an experience at your clinic, or if you wish to file a complaint, please contact [facility] Patient Relations at [phone] or email us at [email or web address]         Additional Information About Your Visit        [employer] Information     [employer] is an electronic gateway that provides easy, online access to your medical records. With [employer], you can request a clinic appointment, read your test results, renew a prescription or communicate with your care team.     To sign up for [employer] visit the website at [email or web address]   You will be asked to enter the access code listed below, as well as some personal information. Please follow the directions to create your username and password.     Your access code is: [email or web address]  Expires: 12/3/2017  6:30 AM     Your access code will  in 90 days. If you need help or a new code, please contact your [facility] Clinic or call [phone] for assistance.        Care EveryWhere ID     This is your Care EveryWhere ID. This could be used by other organizations to access your [facility] medical records  [id number]        Your Vitals Were     Pulse Height BMI (Body Mass Index)             82 1.803 m (5' 11\") 24.41 kg/m2          Blood Pressure from Last 3 Encounters:   No data found for BP    Weight from Last 3 Encounters:   No data found for Wt              Today, you had the following     No orders found for display         Today's Medication Changes          These changes are accurate as of: 10/28/17 11:59 PM.  If you have any questions, ask your nurse or doctor.               Start taking these medicines.        Dose/Directions    mirabegron 25 MG 24 hr tablet   Commonly known as:  " MYRBETRIQ   Used for:  Lower urinary tract symptoms (LUTS)   Started by:  Gerardo Trotter MD        Dose:  25 mg   Take 1 tablet (25 mg) by mouth daily   Quantity:  30 tablet   Refills:  11            Where to get your medicines      These medications were sent to [facility][id number] - [place], [place] - [address]  [address] [address]     Phone:  [phone]     mirabegron 25 MG 24 hr tablet                Primary Care Provider Office Phone # Fax #    Donnie Haque [phone] [address] [facility] [address]        Equal Access to Services     ELENA [facility] Center[facility]ANUEL : Hadii lamont hurtado hadasho Socaleb, waaxda luqadaha, qaybta kaalmada adeegyada, machelle paz. So [facility] [phone].    ATENCIÓN: Si habla español, tiene a french disposición servicios gratuitos de asistencia lingüística. Llame al [phone].    We comply with applicable federal civil rights laws and [place] laws. We do not discriminate on the basis of race, color, national origin, age, disability, sex, sexual orientation, or gender identity.            Thank you!     Thank you for choosing [facility] AND [facility] FOR PROSTATE AND UROLOGIC CANCERS  for your care. Our goal is always to provide you with excellent care. Hearing back from our patients is one way we can continue to improve our services. Please take a few minutes to complete the written survey that you may receive in the mail after your visit with us. Thank you!             Your Updated Medication List - Protect others around you: Learn how to safely use, store and throw away your medicines at www.disposemymeds.org.          This list is accurate as of: 10/28/17 11:59 PM.  Always use your most recent med list.                   Brand Name Dispense Instructions for use Diagnosis    albuterol 108 (90 BASE) MCG/ACT Inhaler    PROAIR HFA    4 Inhaler    Inhale 2 puffs into the lungs every 6  hours    COPD (chronic obstructive pulmonary disease) (H)       alfuzosin 10 MG 24 hr tablet    UROXATRAL    30 tablet    Take 1 tablet (10 mg) by mouth daily    Benign non-nodular prostatic hyperplasia, presence of lower urinary tract symptoms unspecified       desonide 0.05 % ointment    DESOWEN    60 g    To affected areas of the face for itch and red bumps twice a day as needed.    Dermatitis, seborrheic       esomeprazole 40 MG CR capsule    nexIUM    90 capsule    Take 1 capsule (40 mg) by mouth every morning (before breakfast) Take 30-60 minutes before eating.    Esophageal reflux       hydrocortisone 25 MG Suppository    ANUSOL-HC    28 suppository    Place 1 suppository (25 mg) rectally 2 times daily    Internal hemorrhoids       ketoconazole 2 % shampoo    NIZORAL    240 mL    To entire wet scalp and face in affected areas and then wash off after several minutes three times a week.    Dermatitis, seborrheic       lidocaine 2 % topical gel    XYLOCAINE     Apply 1 Tube topically        METAMUCIL PO      Take 3 tsp by mouth 2 times daily.        mirabegron 25 MG 24 hr tablet    MYRBETRIQ    30 tablet    Take 1 tablet (25 mg) by mouth daily    Lower urinary tract symptoms (LUTS)       nicotine polacrilex 2 MG gum    NICORETTE     Place 2 mg inside cheek as needed for smoking cessation        RA FIBER- MG tablet   Generic drug:  calcium polycarbophil      Take 3 tablets by mouth twice daily        sildenafil 20 MG tablet    REVATIO    90 tablet    Take 1 to 5 tabs as needed for sexual activity.    Other male erectile dysfunction       traZODone 50 MG tablet    DESYREL     Take 50 mg by mouth At Bedtime        Urea 20 % Crea cream     60 g    To affected, ridged nails daily.    Dystrophic nail

## 2017-10-29 LAB
BACTERIA SPEC CULT: NO GROWTH
Lab: NORMAL
SPECIMEN SOURCE: NORMAL

## 2017-10-30 ENCOUNTER — OFFICE VISIT (OUTPATIENT)
Dept: OTOLARYNGOLOGY | Facility: CLINIC | Age: 62
End: 2017-10-30

## 2017-10-30 VITALS — BODY MASS INDEX: 24.5 KG/M2 | HEIGHT: 71 IN | WEIGHT: 175 LBS

## 2017-10-30 DIAGNOSIS — J32.4 CHRONIC PANSINUSITIS: Primary | ICD-10-CM

## 2017-10-30 ASSESSMENT — PAIN SCALES - GENERAL: PAINLEVEL: NO PAIN (0)

## 2017-10-30 NOTE — PROGRESS NOTES
HISTORY OF PRESENT ILLNESS:  Russ Agee returns.  He is a patient who has concerns about the smell in his nose and halitosis.  He has seen myself in the past.  He has also seen Dr. Calderon recently.  He has had previous imaging studies that showed no sinus abnormalities.  He had a recent MRI in July that shows no obvious problems with his sinuses.  He comes in today because last time I saw him, we offered him gentamicin irrigations.  Unfortunately, his refrigerator broke down so he was never able to use those and now he is interested in trying that again.      PHYSICAL EXAMINATION:  I examined him today.  He continues to have healthy-appearing nasal mucosa on anterior rhinoscopy.  I do not see any concerning findings such as infection, polyps or masses.        ASSESSMENT AND PLAN:  I reviewed his CT and MRI from previous, and they all again look clean and clear so I think it is reasonable to go ahead and have him try gentamicin irrigations.  He will report back to us and let us know how he is doing.  This could be something he could use for short periods of time to reduce bacterial colonization in his nose.

## 2017-10-30 NOTE — NURSING NOTE
Chief Complaint   Patient presents with     RECHECK     f/u infection       Sindhu Maldonado Medical Assistant

## 2017-10-30 NOTE — MR AVS SNAPSHOT
After Visit Summary   10/30/2017    Russ Agee    MRN: [id number]           Patient Information     Date Of Birth          1955        Visit Information        Provider Department      10/30/2017 6:45 PM Luzmaria Delaney MD [facility] Ear Nose and Thro[facility]        Care Instructions    Plan of care:  Buy a sinus rinse bottle  When you get the medication from the pharmacy, put 30-50 ml in the bottle and squirt each side of your nose, twice/day  When you start to run out of the medication order more from the pharmacy  Rinse for 4 weeks  Clinic contact information:  1. To schedule an appointment call [phone], option 1  2. To talk to the Triage RN call [phone], option 3  3. If you need to speak to Jacey or get a message to your doctor on a Friday, call the triage RN  4. JaceyRN: [phone]  5. Surgery scheduling:      Brandie Mansfield: [phone]      Rahel Luna: [phone]  6. Fax: [phone]  7. [phone]            Follow-ups after your visit        Your next 10 appointments already scheduled     2017  2:35 PM CST   (Arrive by 2:20 PM)   Return Visit with Donnie Haque MD   [facility] ([facility])    [address] [place] [id number] [phone]            Dec 05, 2017 10:00 AM CST   (Arrive by 9:45 AM)   Return Prostate Cancer with Gerardo Trotter MD   [facility] ([facility])    [address] [place] [id number] [phone]              Who to contact     Please call your clinic at [phone] to:    Ask questions about your health    Make or cancel appointments    Discuss your medicines    Learn about your test results    Speak to your doctor   If you have compliments or concerns about an experience at your clinic, or if you wish to file a complaint, please contact [facility]  "[place] [facility] Patient Relations at [phone] or email us at [email or web address]         Additional Information About Your Visit        [employer] Information     [employer] is an electronic gateway that provides easy, online access to your medical records. With [employer], you can request a clinic appointment, read your test results, renew a prescription or communicate with your care team.     To sign up for [employer] visit the website at [email or web address]   You will be asked to enter the access code listed below, as well as some personal information. Please follow the directions to create your username and password.     Your access code is: [email or web address]  Expires: 12/3/2017  6:30 AM     Your access code will  in 90 days. If you need help or a new code, please contact your [facility] or call [phone] for assistance.        Care EveryWhere ID     This is your Care EveryWhere ID. This could be used by other organizations to access your [facility] medical records  [id number]        Your Vitals Were     Height BMI (Body Mass Index)                1.803 m (5' 10.98\") 24.42 kg/m2           Blood Pressure from Last 3 Encounters:   10/28/17 119/75   17 130/83   17 128/77    Weight from Last 3 Encounters:   10/30/17 79.4 kg (175 lb)   10/28/17 79.4 kg (175 lb)   17 77.6 kg (171 lb)              Today, you had the following     No orders found for display       Primary Care Provider Office Phone # Fax #    Donnie Haque [phone] [address] [address]        Equal Access to Services     MEREDITH LEÓN : Natasha Bourne, michael vanegas, adeline joaleliot oswald, machelle paz. So [facility] [phone].    ATENCIÓN: Si habla español, tiene a french disposición servicios gratuitos de asistencia lingüística. Llame al [phone].    We comply with applicable " federal civil rights laws and [place] laws. We do not discriminate on the basis of race, color, national origin, age, disability, sex, sexual orientation, or gender identity.            Thank you!     Thank you for choosing [facility] EAR NOSE AND TH[facility]AT  for your care. Our goal is always to provide you with excellent care. Hearing back from our patients is one way we can continue to improve our services. Please take a few minutes to complete the written survey that you may receive in the mail after your visit with us. Thank you!             Your Updated Medication List - Protect others around you: Learn how to safely use, store and throw away your medicines at www.disposemymeds.org.          This list is accurate as of: 10/30/17  6:56 PM.  Always use your most recent med list.                   Brand Name Dispense Instructions for use Diagnosis    albuterol 108 (90 BASE) MCG/ACT Inhaler    PROAIR HFA    4 Inhaler    Inhale 2 puffs into the lungs every 6 hours    COPD (chronic obstructive pulmonary disease) (H)       alfuzosin 10 MG 24 hr tablet    UROXATRAL    30 tablet    Take 1 tablet (10 mg) by mouth daily    Benign non-nodular prostatic hyperplasia, presence of lower urinary tract symptoms unspecified       desonide 0.05 % ointment    DESOWEN    60 g    To affected areas of the face for itch and red bumps twice a day as needed.    Dermatitis, seborrheic       esomeprazole 40 MG CR capsule    nexIUM    90 capsule    Take 1 capsule (40 mg) by mouth every morning (before breakfast) Take 30-60 minutes before eating.    Esophageal reflux       hydrocortisone 25 MG Suppository    ANUSOL-HC    28 suppository    Place 1 suppository (25 mg) rectally 2 times daily    Internal hemorrhoids       ketoconazole 2 % shampoo    NIZORAL    240 mL    To entire wet scalp and face in affected areas and then wash off after several minutes three times a week.    Dermatitis, seborrheic       lidocaine 2 % topical gel    XYLOCAINE      Apply 1 Tube topically        METAMUCIL PO      Take 3 tsp by mouth 2 times daily.        mirabegron 25 MG 24 hr tablet    MYRBETRIQ    30 tablet    Take 1 tablet (25 mg) by mouth daily    Lower urinary tract symptoms (LUTS)       nicotine polacrilex 2 MG gum    NICORETTE     Place 2 mg inside cheek as needed for smoking cessation        RA FIBER- MG tablet   Generic drug:  calcium polycarbophil      Take 3 tablets by mouth twice daily        sildenafil 20 MG tablet    REVATIO    90 tablet    Take 1 to 5 tabs as needed for sexual activity.    Other male erectile dysfunction       traZODone 50 MG tablet    DESYREL     Take 50 mg by mouth At Bedtime        Urea 20 % Crea cream     60 g    To affected, ridged nails daily.    Dystrophic nail

## 2017-10-30 NOTE — PATIENT INSTRUCTIONS
Plan of care:  Buy a sinus rinse bottle  When you get the medication from the pharmacy, put 30-50 ml in the bottle and squirt each side of your nose, twice/day  When you start to run out of the medication order more from the pharmacy  Rinse for 4 weeks  Clinic contact information:  1. To schedule an appointment call [phone], option 1  2. To talk to the Triage RN call [phone], option 3  3. If you need to speak to Jacey or get a message to your doctor on a Friday, call the triage RN  4. JaceyRN: [phone]  5. Surgery scheduling:      Brandie Mansfield: [phone]      Rahel Luna: [phone]  6. Fax: [phone]  7. [phone]

## 2017-10-30 NOTE — LETTER
10/30/2017       RE: Russ gAee  [address]  [facility] [address]     Dear Colleague,    Thank you for referring your patient, Russ Agee, to the [facility] E[facility] NOSE [facility] TH[facility]AT at [facility]. Please see a copy of my visit note below.    HISTORY OF PRESENT ILLNESS:  Russ Agee returns.  He is a patient who has concerns about the smell in his nose and halitosis.  He has seen myself in the past.  He has also seen Dr. Calderon recently.  He has had previous imaging studies that showed no sinus abnormalities.  He had a recent MRI in July that shows no obvious problems with his sinuses.  He comes in today because last time I saw him, we offered him gentamicin irrigations.  Unfortunately, his refrigerator broke down so he was never able to use those and now he is interested in trying that again.      PHYSICAL EXAMINATION:  I examined him today.  He continues to have healthy-appearing nasal mucosa on anterior rhinoscopy.  I do not see any concerning findings such as infection, polyps or masses.        ASSESSMENT AND PLAN:  I reviewed his CT and MRI from previous, and they all again look clean and clear so I think it is reasonable to go ahead and have him try gentamicin irrigations.  He will report back to us and let us know how he is doing.  This could be something he could use for short periods of time to reduce bacterial colonization in his nose.         Again, thank you for allowing me to participate in the care of your patient.      Sincerely,    Luzmaria Delaney MD

## 2017-10-31 NOTE — PROGRESS NOTES
REASON FOR VISIT TODAY:  Prostate cancer.      HISTORY OF PRESENT ILLNESS:  Mr. Russ Agee is a 61-year-old gentleman followed in our clinic for a history of prostate cancer.  The patient was originally diagnosed with Radha 3 + 3 = 6 in 1% of 1 core in 2009.  He subsequently had a negative prostate biopsy on 10/22/2010 as well as a negative biopsy in 2012 and an MRI-guided biopsy in 2014, all of which have been negative for further evidence of cancer.  The patient's PSAs have been stable in the mid 5 range for most of the time period.         The patient also had an MRI on 09/12/2016 that showed a 59 gram prostate with only PI-RADS 2 lesions.  He also had urodynamics on 10/26/2016 that did show obstruction.        The patient has tried multiple medications for his urination including most recently Uroxatral, but this did not help.  He has tried some Chinese herbs, which he feels helped a little but did not completely alleviate his symptoms.  He is interested in other possible interventions for his urination at this time.  The patient does also take sildenafil for some erectile dysfunction and notes that he does have what appears to be retrograde ejaculation.      PHYSICAL EXAMINATION:  On exam, his blood pressure 119/75, pulse is 82.  He is in no acute distress.      DATA:  The patient's PSAs include values of 6.08 in 04/2017, 6.42 on 07/26/2017 and 6.02 back on 04/20/2015.  The patient's AUA symptom score today is measured at 35/35, bothersome index of 6, and his BRENDA is 0.      ASSESSMENT AND PLAN:  Over half of today's 45-minute visit was spent counseling the patient regarding his prostate cancer and his lower urinary tract symptoms.  I suggested to Mr. Agee that his PSA remains largely stable close to the values they have been over the last several years, and given his 4 previous biopsies at this point, we will not intervene further, but instead will continue to monitor him.      With regard to his urination,  the patient again is interested in further intervention.  We discussed medication options including trying Myrbetriq versus finasteride versus interventional procedures including a Rezum water vapor treatment versus transurethral resection of the prostate or even a HoLEP.  The patient has had a cystoscopy in the past.        At this point in time, the patient is interested in moving forward with Rezum water vapor treatment.  The patient did also request some medications to try again in the meantime.  We therefore have given him a prescription for Myrbetriq but the patient was cautioned that it may not be covered by his insurance and the patient will therefore have to determine if it is financially possible or not.       We will look to get the patient on the schedule in the near future for his Rezum and we will collect a urine today for culture in preparation.         ERICKA PARNELL MD             D: 10/28/2017 12:54   T: 10/31/2017 13:21   MT: JASON      Name:     TONIA MC   MRN:      [id number]        Account:      [id number]   :      1955           Service Date: 10/28/2017      Document: [id number]

## 2017-11-02 ENCOUNTER — TELEPHONE (OUTPATIENT)
Dept: UROLOGY | Facility: CLINIC | Age: 62
End: 2017-11-02

## 2017-11-02 NOTE — TELEPHONE ENCOUNTER
Called to schedule surgery for patient with Dr Trotter.  Left voice mail for patient to call back.  Left direct phone number.

## 2017-11-13 ENCOUNTER — ANESTHESIA EVENT (OUTPATIENT)
Dept: SURGERY | Facility: AMBULATORY SURGERY CENTER | Age: 62
End: 2017-11-13

## 2017-11-14 ENCOUNTER — ALLIED HEALTH/NURSE VISIT (OUTPATIENT)
Dept: SURGERY | Facility: CLINIC | Age: 62
End: 2017-11-14

## 2017-11-14 ENCOUNTER — OFFICE VISIT (OUTPATIENT)
Dept: SURGERY | Facility: CLINIC | Age: 62
End: 2017-11-14

## 2017-11-14 VITALS
OXYGEN SATURATION: 98 % | TEMPERATURE: 98 F | SYSTOLIC BLOOD PRESSURE: 117 MMHG | DIASTOLIC BLOOD PRESSURE: 74 MMHG | HEART RATE: 57 BPM | WEIGHT: 170.5 LBS | HEIGHT: 71 IN | BODY MASS INDEX: 23.87 KG/M2 | RESPIRATION RATE: 16 BRPM

## 2017-11-14 DIAGNOSIS — N40.1 BENIGN PROSTATIC HYPERPLASIA WITH INCOMPLETE BLADDER EMPTYING: ICD-10-CM

## 2017-11-14 DIAGNOSIS — R39.14 BENIGN PROSTATIC HYPERPLASIA WITH INCOMPLETE BLADDER EMPTYING: ICD-10-CM

## 2017-11-14 DIAGNOSIS — Z01.818 PRE-OP EVALUATION: ICD-10-CM

## 2017-11-14 DIAGNOSIS — R39.14 BENIGN PROSTATIC HYPERPLASIA WITH INCOMPLETE BLADDER EMPTYING: Primary | ICD-10-CM

## 2017-11-14 DIAGNOSIS — N40.1 BENIGN PROSTATIC HYPERPLASIA WITH INCOMPLETE BLADDER EMPTYING: Primary | ICD-10-CM

## 2017-11-14 LAB
ALBUMIN UR-MCNC: NEGATIVE MG/DL
APPEARANCE UR: CLEAR
BILIRUB UR QL STRIP: NEGATIVE
COLOR UR AUTO: YELLOW
CREAT SERPL-MCNC: 0.87 MG/DL (ref 0.66–1.25)
GFR SERPL CREATININE-BSD FRML MDRD: 89 ML/MIN/1.7M2
GLUCOSE UR STRIP-MCNC: NEGATIVE MG/DL
HGB BLD-MCNC: 12.8 G/DL (ref 13.3–17.7)
HGB UR QL STRIP: NEGATIVE
KETONES UR STRIP-MCNC: NEGATIVE MG/DL
LEUKOCYTE ESTERASE UR QL STRIP: NEGATIVE
NITRATE UR QL: NEGATIVE
PH UR STRIP: 5 PH (ref 5–7)
POTASSIUM SERPL-SCNC: 3.8 MMOL/L (ref 3.4–5.3)
SOURCE: NORMAL
SP GR UR STRIP: 1.01 (ref 1–1.03)
UROBILINOGEN UR STRIP-MCNC: 0 MG/DL (ref 0–2)

## 2017-11-14 ASSESSMENT — LIFESTYLE VARIABLES: TOBACCO_USE: 1

## 2017-11-14 NOTE — PATIENT INSTRUCTIONS
Preparing for Your Surgery      Name:  Russ Agee   MRN:  [id number]   :  1955   Today's Date:  2017     Arriving for surgery:  Surgery date:  17  Arrival time:  8 am    Please come to:   [facility]  [address] [place] [id number]     Parking is available in front of the Clinics and Surgery Center building from 5:30AM to 8:00PM.  -  Proceed to the 5th floor to check into the Ambulatory Surgery Center.              >> There will be patient concierges on the 1st and 5th floor, for assistance or an escort, if you would like.              >> Please call [phone] with any questions.  -  Bring your ID and insurance card.    What can I eat or drink?  -  You may have solid food or milk products until 8 hours prior to your surgery. (1: 30 am )  -  You may have water, apple juice, BLACK coffee (NO creamer or nondairy creamer), or 7up/Sprite until 2 hours prior to your surgery. (7:30 am )    Which medicines can I take?       -  Do not bring your own medications to the hospital.        -  Follow Urology Clinic instructions regarding Ibuprofen. If no instructions given, NO Ibuprofen the day prior to surgery.     -  Do NOT take these medications in the morning, the day of surgery:  Calcium Polycarbophil (Fiber), Psyllium (Metamucil), Hydrocortisone suppository.      No creams, gels, or ointments on the skin.    -  Please take these medications the morning of surgery:  Mirabegron (Myrbetriq, Esomeprazole (Nexium), Alfuzosin (Uroxatral),       Albuterol inhaler      Acetaminophen (Tylenol) if needed    How do I prepare myself?  -  Take two showers: one the night before surgery; and one the morning of surgery.         Use Scrubcare or Hibiclens to wash from neck down.  You may use your own shampoo and conditioner. No other hair products.   -  Do NOT use lotion, powder, colognes, deodorant, or antiperspirant the day of your surgery.  -  Do NOT wear any  caleb.    Questions or Concerns:  If you have questions or concerns, please call the  Preoperative Assessment Center, Monday-Friday 7AM-7PM:  [phone]    AFTER YOUR SURGERY  Breathing exercises   Breathing exercises help you recover faster. Take deep breaths and let the air out slowly. This will:     Help you wake up after surgery.    Help prevent complications like pneumonia.  Preventing complications will help you go home sooner.   We may give you a breathing device (incentive spirometer) to encourage you to breathe deeply.   Nausea and vomiting   You may feel sick to your stomach after surgery; if so, let your nurse know.    Pain control:  After surgery, you may have pain. Our goal is to help you manage your pain. Pain medicine will help you feel comfortable enough to do activities that will help you heal.  These activities may include breathing exercises, walking and physical therapy.   To help your health care team treat your pain we will ask: 1) If you have pain  2) where it is located 3) describe your pain in your words  Methods of pain control include medications given by mouth, vein or by nerve block for some surgeries.  Sequential Compression Device (SCD) or Pneumo Boots:  You may need to wear SCD S on your legs or feet. These are wraps connected to a machine that pumps in air and releases it. The repeated pumping helps prevent blood clots from forming.

## 2017-11-14 NOTE — MR AVS SNAPSHOT
After Visit Summary   2017    Russ Agee    MRN: [id number]           Patient Information     Date Of Birth          1955        Visit Information        Provider Department      2017 3:30 PM Rn, [facility] Preoperative Assessment Center        Care Instructions    Preparing for Your Surgery      Name:  Russ Agee   MRN:  [id number]   :  1955   Today's Date:  2017     Arriving for surgery:  Surgery date:  17  Arrival time:  8 am    Please come to:   [facility]  [address] [place] [id number]    [employer] Parking is available in front of the Clinics and Surgery Center building from 5:30AM to 8:00PM.  -  Proceed to the 5th floor to check into the Ambulatory Surgery Center.              >> There will be patient concierges on the 1st and 5th floor, for assistance or an escort, if you would like.              >> Please call [phone] with any questions.  -  Bring your ID and insurance card.    What can I eat or drink?  -  You may have solid food or milk products until 8 hours prior to your surgery. (1: 30 am )  -  You may have water, apple juice, BLACK coffee (NO creamer or nondairy creamer), or 7up/Sprite until 2 hours prior to your surgery. (7:30 am )    Which medicines can I take?       -  Do not bring your own medications to the hospital.        -  Follow Urology Clinic instructions regarding Ibuprofen. If no instructions given, NO Ibuprofen the day prior to surgery.     -  Do NOT take these medications in the morning, the day of surgery:  Calcium Polycarbophil (Fiber), Psyllium (Metamucil), Hydrocortisone suppository.      No creams, gels, or ointments on the skin.    -  Please take these medications the morning of surgery:  Mirabegron (Myrbetriq, Esomeprazole (Nexium), Alfuzosin (Uroxatral),       Albuterol inhaler      Acetaminophen (Tylenol) if needed    How do I prepare myself?  -  Take two showers: one the night  before surgery; and one the morning of surgery.         Use Scrubcare or Hibiclens to wash from neck down.  You may use your own shampoo and conditioner. No other hair products.   -  Do NOT use lotion, powder, colognes, deodorant, or antiperspirant the day of your surgery.  -  Do NOT wear any jewelry.    Questions or Concerns:  If you have questions or concerns, please call the  Preoperative Assessment Center, Monday-Friday 7AM-7PM:  [phone]    AFTER YOUR SURGERY  Breathing exercises   Breathing exercises help you recover faster. Take deep breaths and let the air out slowly. This will:     Help you wake up after surgery.    Help prevent complications like pneumonia.  Preventing complications will help you go home sooner.   We may give you a breathing device (incentive spirometer) to encourage you to breathe deeply.   Nausea and vomiting   You may feel sick to your stomach after surgery; if so, let your nurse know.    Pain control:  After surgery, you may have pain. Our goal is to help you manage your pain. Pain medicine will help you feel comfortable enough to do activities that will help you heal.  These activities may include breathing exercises, walking and physical therapy.   To help your health care team treat your pain we will ask: 1) If you have pain  2) where it is located 3) describe your pain in your words  Methods of pain control include medications given by mouth, vein or by nerve block for some surgeries.  Sequential Compression Device (SCD) or Pneumo Boots:  You may need to wear SCD S on your legs or feet. These are wraps connected to a machine that pumps in air and releases it. The repeated pumping helps prevent blood clots from forming.                     Follow-ups after your visit        Your next 10 appointments already scheduled     Nov 14, 2017  4:10 PM CST   (Arrive by 3:55 PM)   PAC Anesthesia Consult with  Pac Anesthesiologist   [facility] Preoperative Assessment Center ([facility]  [facility])    [address] [place] [address]  4th [facility] [id number] [phone]            Nov 14, 2017  4:30 PM CST   LAB with  LAB   [facility] ([facility])    [address] [place] [id number] [phone]           Please do not eat 10-12 hours before your appointment if you are coming in fasting for labs on lipids, cholesterol, or glucose (sugar). This does not apply to pregnant women. Water, hot tea and black coffee (with nothing added) are okay. Do not drink other fluids, diet soda or chew gum.            Nov 27, 2017   Procedure with Gerardo Trotter MD   [facility] ([facility])    [address] [place] [address]  5th [facility] [address] [phone]           Located in the [facility] at [address] [place] [address].   parking is very convenient and highly recommended.  is a $6 flat rate fee.  Both  and self parkers should enter the main arrival plaza from [facility]; parking attendants will direct you based on your parking preference.            Nov 27, 2017  2:35 PM CST   (Arrive by 2:20 PM)   Return Visit with Donnie Haque MD   [facility] ([facility])    [address] [place] [id number] [phone]            Nov 30, 2017 10:30 AM CST   (Arrive by 10:15 AM)   Return Visit with  Prostate Cancer Ctr Nurse   [facility] Prostate and Urologic Cancers ([facility])    [address] [place] [id number] [phone]            Dec 05, 2017 10:00 AM CST   (Arrive by 9:45 AM)   Return Prostate Cancer with Gerardo Trotter MD   [facility] Prostate and Urologic Cancers ([facility])    [address] [place] [address]  [place] [address]   [phone]            Dec 12, 2017 12:30 PM CST   (Arrive by 12:15 PM)   Post-Op with Gerardo Trotter MD   [facility] for Prostate and Urologic Cancers ([facility])    [address] [place] [address]  4th [facility] [address]   [phone]            Feb 27, 2018 10:00 AM CST   (Arrive by 9:45 AM)   Return Visit with Gerardo Trotter MD   [facility] and [facility] for Prostate and Urologic Cancers ([facility])    [address] [facility]  4th [facility] [address]   [phone]              Future tests that were ordered for you today     Open Future Orders        Priority Expected Expires Ordered    UA reflex to Microscopic and Culture Routine 11/14/2017 12/14/2017 11/14/2017    Potassium Routine 11/14/2017 12/14/2017 11/14/2017    Hemoglobin Routine 11/14/2017 12/14/2017 11/14/2017    Creatinine Routine 11/14/2017 12/14/2017 11/14/2017            Who to contact     Please call your clinic at [phone] to:    Ask questions about your health    Make or cancel appointments    Discuss your medicines    Learn about your test results    Speak to your doctor   If you have compliments or concerns about an experience at your clinic, or if you wish to file a complaint, please contact [facility] Patient Relations at [phone] or email us at [email or web address]         Additional Information About Your Visit        [employer] Information     [employer] is an electronic gateway that provides easy, online access to your medical records. With [employer], you can request a clinic appointment, read your test results, renew a prescription or communicate with your care team.     To sign up for [employer] visit the website at [email or web address]   You will be asked to enter the access code listed below, as well as some personal information. Please follow the directions to create  your username and password.     Your access code is: [email or web address]  Expires: 12/3/2017  5:30 AM     Your access code will  in 90 days. If you need help or a new code, please contact your [facility] or call [phone] for assistance.        Care EveryWhere ID     This is your Care EveryWhere ID. This could be used by other organizations to access your [facility] medical records  [id number]         Blood Pressure from Last 3 Encounters:   17 117/74   10/28/17 119/75   17 130/83    Weight from Last 3 Encounters:   17 77.3 kg (170 lb 8 oz)   10/30/17 79.4 kg (175 lb)   10/28/17 79.4 kg (175 lb)              Today, you had the following     No orders found for display       Primary Care Provider Office Phone # Fax #    Donnie Haque [phone]       [address] [address]        Equal Access to Services     [facility]: Hadii aad ku hadasho Soomaali, waaxda luqadaha, qaybta kaalmada adeegyada, waxay idiin haymagalie stein . So [facility] [phone].    ATENCIÓN: Si habla español, tiene a french disposición servicios gratuitos de asistencia lingüística. Llame al [phone].    We comply with applicable federal civil rights laws and [place] laws. We do not discriminate on the basis of race, color, national origin, age, disability, sex, sexual orientation, or gender identity.            Thank you!     Thank you for choosing [facility] PREOPERATIVE ASSESSMENT CENTER  for your care. Our goal is always to provide you with excellent care. Hearing back from our patients is one way we can continue to improve our services. Please take a few minutes to complete the written survey that you may receive in the mail after your visit with us. Thank you!             Your Updated Medication List - Protect others around you: Learn how to safely use, store and throw away your medicines at www.disposemymeds.org.          This list is  accurate as of: 11/14/17  3:43 PM.  Always use your most recent med list.                   Brand Name Dispense Instructions for use Diagnosis    alfuzosin 10 MG 24 hr tablet    UROXATRAL    30 tablet    Take 1 tablet (10 mg) by mouth daily    Benign non-nodular prostatic hyperplasia, presence of lower urinary tract symptoms unspecified       desonide 0.05 % ointment    DESOWEN    60 g    To affected areas of the face for itch and red bumps twice a day as needed.    Dermatitis, seborrheic       esomeprazole 40 MG CR capsule    nexIUM    90 capsule    Take 1 capsule (40 mg) by mouth every morning (before breakfast) Take 30-60 minutes before eating.    Esophageal reflux       gentamicin 0.008% in 1000ml 0.9% NaCl Soln nasal irrigation    GARAMYCIN    1000 mL    Spray 30 mLs in nostril 2 times daily Irrigate 30 mL between each nostril BID for a total of 60ml/day PLEASE MAIL TO PATIENT    Chronic pansinusitis       hydrocortisone 25 MG Suppository    ANUSOL-HC    28 suppository    Place 1 suppository (25 mg) rectally 2 times daily    Internal hemorrhoids       ketoconazole 2 % shampoo    NIZORAL    240 mL    To entire wet scalp and face in affected areas and then wash off after several minutes three times a week.    Dermatitis, seborrheic       lidocaine 2 % topical gel    XYLOCAINE     Apply 1 Tube topically        METAMUCIL PO      Take 3 tsp by mouth 2 times daily.        nicotine polacrilex 2 MG gum    NICORETTE     Place 2 mg inside cheek as needed for smoking cessation        RA FIBER- MG tablet   Generic drug:  calcium polycarbophil      Take 3 tablets by mouth twice daily        sildenafil 20 MG tablet    REVATIO    90 tablet    Take 1 to 5 tabs as needed for sexual activity.    Other male erectile dysfunction       traZODone 50 MG tablet    DESYREL     Take 50 mg by mouth At Bedtime        Urea 20 % Crea cream     60 g    To affected, ridged nails daily.    Dystrophic nail

## 2017-11-14 NOTE — ANESTHESIA PREPROCEDURE EVALUATION
Anesthesia Evaluation     . Pt has had prior anesthetic. Type: MAC and General    No history of anesthetic complications          ROS/MED HX    ENT/Pulmonary:     (+)ANNAMARIA risk factors snores loudly, daytime somnolence, observed stopped breathing tobacco use, Past use , . .    Neurologic:  - neg neurologic ROS     Cardiovascular:     (+) ----. : . . AGUIRRE, . :. . Previous cardiac testing date:results:Stress Testdate:2014 - see below for results results:ECG reviewed date: results:Cath date: CT coronaries results:         (-) CAD   METS/Exercise Tolerance: Comment: Walks 4-5 blocks.  >4 METS   Hematologic:  - neg hematologic  ROS       Musculoskeletal:  - neg musculoskeletal ROS       GI/Hepatic:     (+) GERD Asymptomatic on medication, hepatitis type C, liver disease, Other GI/Hepatic Hep C treated.       Renal/Genitourinary:     (+) BPH, Other Renal/ Genitourinary, hx of prostate cancer      Endo:  - neg endo ROS       Psychiatric:     (+) psychiatric history depression      Infectious Disease:  - neg infectious disease ROS       Malignancy:   (+) Malignancy History of Prostate          Other:                     Physical Exam      Airway   Mallampati: II  TM distance: >3 FB  Neck ROM: full    Dental   (+) implants    Cardiovascular   Rhythm and rate: regular and normal      Pulmonary    breath sounds clear to auscultation    Other findings:     11/14/2017 16:15  Potassium: 3.8  Creatinine: 0.87  GFR Estimate: 89  GFR Estimate If Black: >90    Hemoglobin: 12.8 (L)    11/14/2017 16:19  Color Urine: Yellow  Appearance Urine: Clear  Glucose Urine: Negative  Bilirubin Urine: Negative  Ketones Urine: Negative  Specific Gravity Urine: 1.006  pH Urine: 5.0  Protein Albumin Urine: Negative  Urobilinogen mg/dL: 0.0  Nitrite Urine: Negative  Blood Urine: Negative  Leukocyte Esterase Urine: Negative  Source: Midstream Urine        7/26/2017 14:56  Sodium: 140  Potassium: 4.3  Chloride: 107  Carbon Dioxide: 28  Urea Nitrogen:  11  Creatinine: 0.90  GFR Estimate: 85  GFR Estimate If Black: >90...  Calcium: 8.7  Anion Gap: 5  Albumin: 3.5  Protein Total: 7.2  Bilirubin Total: 0.4  Alkaline Phosphatase: 58  ALT: 17  AST: 14  CRP Inflammation: 7.4  PSA: 6.42 (H)  Troponin I ES: <0.015...  Glucose: 74  WBC: 5.6  Hemoglobin: 12.8 (L)  Hematocrit: 38.4 (L)  Platelet Count: 142 (L)      CT cor 2015  CORONARY CALCIUM SCORE: The total Agatston calcium score is 0, Left  main: 0, left anterior descendin,  circumflex: 0, right coronary  artery: 0. This places the patient in the     lowest percentile when  compared to age and gender matched control group.     CORONARY ANGIOGRAPHY : Trivial proximal left anterior descending  artery disease. Normal circumflex and right coronary arteries.    Stress test :  EF 74%.  Normal.            PAC Discussion and Assessment    ASA Classification: 3  Case is suitable for: ASC  Anesthetic techniques and relevant risks discussed: MAC with GA as backup  Invasive monitoring and risk discussed: No  Types:   Possibility and Risk of blood transfusion discussed: No  NPO instructions given:   Additional anesthetic preparation and risks discussed:   Needs early admission to pre-op area:   Other:     PAC Resident/NP Anesthesia Assessment:  Scheduled for Cystoscopy, Rezum Procedure TRANSURETHERAL DESTRUCTION OF PROSTATE BY THERMOTHERAPY     on 17 by Dr. Trotter in treatment of BPH.  PAC referral for risk assessment and optimization for anesthesia with comorbid conditions of:    Pre-operative considerations:  1.  Cardiac:  Functional status good.  Walks 4-5 blocks. 0.4%  risk of major adverse cardiac event.  Normal stress test .  EF 74%. Risk of MACE < 1%. METS>4.  No further cardiac evaluation needed per 2014 ACC/AHA guidelines for non-cardiac surgery.  2.  Pulm:  Airway feasible.  ANNAMARIA risk:  High.  Needs OP sleep study.   3.  GI:  Risk of PONV score = 1.  If 3 or > anti-emetic intervention recommended (with  2 or more meds).   4.  ID: history of chronic hepatitis C, treated.  Levels undetectable 4/2017.   5.  : history of prostate cancer PSA 6.42 - per urology  6.  Psych:  Anxiety, depression, history of ETOH abuse.  Drinks 5 drinks once per week.       Patient is optimized and is acceptable candidate for the proposed procedure.  No further diagnostic evaluation is needed.     Patient also evaluated by Dr. Quevedo. See recommendations below.           Reviewed and Signed by PAC Mid-Level Provider/Resident  Mid-Level Provider/Resident: Debbi Ashton PA-C  Date: 11/14/17  Time: 1445    Attending Anesthesiologist Anesthesia Assessment:  61 year old for thermal destruction of prostate. Chart reviewed, patient seen and evaluated; agree with above assessment. Patient has no significant cardiac or pulmonary disease.     Patient is appropriate for the planned procedure without further workup or medical management change. The final anesthesia plan will be determined by the physician anesthesiologist caring for the patient on the day of surgery.    Reviewed and Signed by PAC Anesthesiologist  Anesthesiologist: tania  Date: 11/14/2017  Time:   Pass/Fail: Pass  Disposition:     PAC Pharmacist Assessment:        Pharmacist:   Date:   Time:      Anesthesia Plan      History & Physical Review  History and physical reviewed and following examination; no interval change.    ASA Status:  2 .    NPO Status:  > 6 hours    Plan for MAC with Intravenous and Propofol induction. Maintenance will be TIVA.  Reason for MAC:  Deep or markedly invasive procedure (G8)  PONV prophylaxis:  Ondansetron (or other 5HT-3) and Dexamethasone or Solumedrol       Postoperative Care  Postoperative pain management:  IV analgesics and Oral pain medications.      Consents  Anesthetic plan, risks, benefits and alternatives discussed with:  Patient..                  History and physical assessed; Patient examined.   Risks and alternatives presented and discussed.  Patient and family agree. All questions answered.  PAC assessment appreciated      Cosme Hernández MD  Staff Anesthesiologist  *[phone]

## 2017-11-14 NOTE — H&P
Pre-Operative H & P     CC:  Preoperative exam to assess for increased cardiopulmonary risk while undergoing surgery and anesthesia.    Date of Encounter: 11/14/2017  Primary Care Physician:  Donnie Haque  Russ Agee is a 61 year old male who presents for pre-operative H & P in preparation for Cystoscopy, Rezum Procedure TRANSURETHERAL DESTRUCTION OF PROSTATE BY THERMOTHERAPY     on 11/27/17 by Dr. Trotter in treatment of BPH. Surgery at [facility].  History of symptomatic BPH with slow urine stream, stops and starts, has to strain to void, + post void fullness.  Helps to run water.  No dysuria.    History is obtained from the patient and electronic health record.     Past Medical History  Past Medical History:   Diagnosis Date     Anxiety      Chronic hepatitis C (H)     treated.  Undetectable     Depressive disorder     followed by Dr. Mccormick     ETOH abuse      GERD (gastroesophageal reflux disease)      Malignant neoplasm of prostate (H)      Mucous cyst of joint      Palpitations        Past Surgical History  Past Surgical History:   Procedure Laterality Date     ENDOSCOPIC ULTRASOUND, ESOPHAGOSCOPY, GASTROSCOPY, DUODENOSCOPY (EGD), COMBINED  12/16/15     ESOPHAGOSCOPY, GASTROSCOPY, DUODENOSCOPY (EGD), COMBINED N/A 4/9/2015    Procedure: COMBINED ESOPHAGOSCOPY, GASTROSCOPY, DUODENOSCOPY (EGD);  Surgeon: Raina Romo MD;  Location:  GI     EXCISE MASS FINGER  2/19/2013    Procedure: EXCISE MASS FINGER;  Mass Excision Left Index Finger     (local anesthesia);  Surgeon: Raina Dorsey MD;  Location:  OR      [facility] ENDOSCOPY W EUS N/A 12/16/2015    Procedure: COMBINED ENDOSCOPIC ULTRASOUND, ESOPHAGOSCOPY, GASTROSCOPY, DUODENOSCOPY (EGD);  Surgeon: Brady Acuna MD;  Location: [facility]     PROSTATE SURGERY         Hx of Blood transfusions/reactions: no     Hx of abnormal bleeding or anti-platelet use: no    Steroid use in the last year: no    Personal  or FH with difficulty with Anesthesia:  no    Prior to Admission Medications  Current Outpatient Prescriptions   Medication Sig Dispense Refill     Mirtazapine (REMERON PO) Take 15 mg by mouth At Bedtime       RisperiDONE (RISPERDAL PO) Take 3 mg by mouth At Bedtime       gentamicin 0.008% in 1000ml 0.9% NaCl (GARAMYCIN) SOLN nasal irrigation Spray 30 mLs in nostril 2 times daily Irrigate 30 mL between each nostril BID for a total of 60ml/day PLEASE MAIL TO PATIENT 1000 mL 3     esomeprazole (NEXIUM) 40 MG CR capsule Take 1 capsule (40 mg) by mouth every morning (before breakfast) Take 30-60 minutes before eating. 90 capsule 3     sildenafil (REVATIO/VIAGRA) 20 MG tablet Take 1 to 5 tabs as needed for sexual activity. 90 tablet 11     traZODone (DESYREL) 50 MG tablet Take 50 mg by mouth At Bedtime        calcium polycarbophil (RA FIBER-CAP) 625 MG tablet Take 3 tablets by mouth twice daily       lidocaine (XYLOCAINE) 2 % jelly Apply 1 Tube topically       desonide (DESOWEN) 0.05 % ointment To affected areas of the face for itch and red bumps twice a day as needed. 60 g 11     ketoconazole (NIZORAL) 2 % shampoo To entire wet scalp and face in affected areas and then wash off after several minutes three times a week. 240 mL 11     Urea 20 % CREA To affected, ridged nails daily. 60 g 5     hydrocortisone (ANUSOL-HC) 25 MG suppository Place 1 suppository (25 mg) rectally 2 times daily 28 suppository 3     nicotine polacrilex (NICORETTE) 2 MG gum Place 2 mg inside cheek as needed for smoking cessation       Psyllium (METAMUCIL PO) Take 3 tsp by mouth 2 times daily.         Allergies  Allergies   Allergen Reactions     Nkda [No Known Drug Allergies]        Social History  Social History     Social History     Marital status: Single     Spouse name: N/A     Number of children: N/A     Years of education: N/A     Occupational History     Not on file.     Social History Main Topics     Smoking status: Former Smoker      Packs/day: 0.50     Years: 6.00     Types: Cigarettes     Quit date: 9/16/2012     Smokeless tobacco: Never Used      Comment: nicotine gum     Alcohol use No      Comment: stopped 10/2014.  Resumed 3 mo ago. 4 drinks once per week currently.      Drug use: No     Sexual activity: Not on file     Other Topics Concern     Not on file     Social History Narrative    Single.  [place] American.     Retired.    10 children - healthy    9 siblings - 2 alive.  ? History.     No family history of bleeding, clotting disorders or complications with anesthesia.               Family History  Family History   Problem Relation Age of Onset     DIABETES Sister      DIABETES Son      Alcohol/Drug Other      Arthritis Other      Circulatory Other      Eye Disorder Other      Depression Other      Genetic Disorder Other      Psychotic Disorder Other      CANCER No family hx of      no skin cancer     Skin Cancer No family hx of      Thyroid Disease No family hx of      Thyroid Cancer No family hx of        ROS/MED HX  The complete review of systems is negative other than noted in the HPI or here.     ENT/Pulmonary:     (+)ANNAMARIA risk factors snores loudly, daytime somnolence, observed stopped breathing tobacco use, Past use , . .    Neurologic:  - neg neurologic ROS     Cardiovascular:     (+) ----. : . . AGUIRRE, . :. .      (-) CAD   METS/Exercise Tolerance: Comment: Walks 4-5 blocks.  >4 METS   Hematologic:    neg     Musculoskeletal:    neg     GI/Hepatic:     (+) GERD Asymptomatic on medication, hepatitis type C, liver disease, Other GI/Hepatic Hep C treated.       Renal/Genitourinary:     (+) BPH, Other Renal/ Genitourinary, hx of prostate cancer      Endo:  - neg endo ROS       Psychiatric:     (+) psychiatric history depression      Infectious Disease:  - neg infectious disease ROS       Malignancy:   (+) Malignancy History of Prostate          Other:          Temp: 98  F (36.7  C) Temp src: Oral BP: 117/74 Pulse: 57   Resp: 16 SpO2:  "98 %         170 lbs 8 oz  5' 11\"   Body mass index is 23.78 kg/(m^2).       Physical Exam  Constitutional: Awake, alert, cooperative, no apparent distress, and appears stated age.  Speaks clear English.   Eyes: Pupils equal, round and reactive to light, extra ocular muscles intact, sclera clear, conjunctiva normal.  HENT: Normocephalic, oral pharynx with moist mucus membranes, good dentition. No goiter appreciated.   Respiratory: Clear to auscultation bilaterally, no crackles or wheezing.  Cardiovascular: Regular rate and rhythm, normal S1 and S2, and no murmur noted.  Carotids +2, no bruits. No edema. Palpable pulses to  DP and PT arteries.   GI: Normal bowel sounds, soft, non-distended, non-tender, no masses palpated, no hepatosplenomegaly.    Lymph/Hematologic: No cervical lymphadenopathy and no supraclavicular lymphadenopathy.  Genitourinary:  Per urology  Skin: Warm and dry.  No rashes at anticipated surgical site.   Musculoskeletal: Full ROM of neck. There is no redness, warmth, or swelling of the joints. Gross motor strength is normal.    Neurologic: Awake, alert, oriented to name, place and time. Cranial nerves II-XII are grossly intact. Gait is normal.   Neuropsychiatric: Calm, cooperative. Normal affect.     Labs: (personally reviewed)    11/14/2017 16:15  Potassium: 3.8  Creatinine: 0.87  GFR Estimate: 89  GFR Estimate If Black: >90    Hemoglobin: 12.8 (L)    11/14/2017 16:19  Color Urine: Yellow  Appearance Urine: Clear  Glucose Urine: Negative  Bilirubin Urine: Negative  Ketones Urine: Negative  Specific Gravity Urine: 1.006  pH Urine: 5.0  Protein Albumin Urine: Negative  Urobilinogen mg/dL: 0.0  Nitrite Urine: Negative  Blood Urine: Negative  Leukocyte Esterase Urine: Negative  Source: Midstream Urine    7/26/2017   Sodium: 140  Potassium: 4.3  Chloride: 107  Carbon Dioxide: 28  Urea Nitrogen: 11  Creatinine: 0.90  GFR Estimate: 85  GFR Estimate If Black: >90...  Calcium: 8.7  Anion Gap: 5  Albumin: " 3.5  Protein Total: 7.2  Bilirubin Total: 0.4  Alkaline Phosphatase: 58  ALT: 17  AST: 14  CRP Inflammation: 7.4    PSA: 6.42 (H)    Troponin I ES: <0.015...  Glucose: 74    WBC: 5.6  Hemoglobin: 12.8 (L)  Hematocrit: 38.4 (L)  Platelet Count: 142 (L)  CT cor 2015  CORONARY CALCIUM SCORE: The total Agatston calcium score is 0, Left  main: 0, left anterior descendin,  circumflex: 0, right coronary  artery: 0. This places the patient in the     lowest percentile when  compared to age and gender matched control group.     CORONARY ANGIOGRAPHY : Trivial proximal left anterior descending  artery disease. Normal circumflex and right coronary arteries.    Stress test :  EF 74%.  Normal.     EKG: Personally reviewed 2017:  SB at 56.  QTc 395.       Outside records reviewed from: NA    ASSESSMENT and PLAN  Russ Agee is a 61 year old male scheduled to undergo  Cystoscopy, Rezum Procedure TRANSURETHERAL DESTRUCTION OF PROSTATE BY THERMOTHERAPY     on 17 by Dr. Trotter in treatment of BPH.  PAC referral for risk assessment and optimization for anesthesia with comorbid conditions of:    Pre-operative considerations:  1.  Cardiac:  Functional status good.  Walks 4-5 blocks. 0.4%  risk of major adverse cardiac event.  Normal stress test .  EF 74%. Risk of MACE < 1%. METS>4.  No further cardiac evaluation needed per 2014 ACC/AHA guidelines for non-cardiac surgery.  2.  Pulm:  Airway feasible.  ANNAMARIA risk:  High.  Needs OP sleep study.   3.  GI:  Risk of PONV score = 1.  If 3 or > anti-emetic intervention recommended (with 2 or more meds).   4.  ID: history of chronic hepatitis C, treated.  Levels undetectable 2017.   5.  : history of prostate cancer PSA 6.42 - per urology.  + BPH (? Taking Uroxatrol --> told nursing he does not take it)  6.  Psych:  Anxiety, depression, history of ETOH abuse.  Drinks 5 drinks once per week.  Will confirm meds with pharmacy.  Also takes Risperidone and Mirtazapine at HS.      Labs ordered:  Hemoglobin, K+ Cr  For complete medication and diet instructions for surgery, please refer to the AVS completed by nursing.   Patient is optimized and is acceptable candidate for the proposed procedure.  No further diagnostic evaluation is needed.   Patient was discussed with Dr Quevedo.    Debbi Ashton PA-C  Preoperative Assessment Center  [facility]  Phone: [phone]  Fax: [phone]

## 2017-11-20 ENCOUNTER — PRE VISIT (OUTPATIENT)
Dept: UROLOGY | Facility: CLINIC | Age: 62
End: 2017-11-20

## 2017-11-27 ENCOUNTER — ANESTHESIA (OUTPATIENT)
Dept: SURGERY | Facility: AMBULATORY SURGERY CENTER | Age: 62
End: 2017-11-27

## 2017-11-27 ENCOUNTER — HOSPITAL ENCOUNTER (OUTPATIENT)
Facility: AMBULATORY SURGERY CENTER | Age: 62
End: 2017-11-27
Attending: UROLOGY

## 2017-11-27 ENCOUNTER — PRE VISIT (OUTPATIENT)
Dept: UROLOGY | Facility: CLINIC | Age: 62
End: 2017-11-27

## 2017-11-27 ENCOUNTER — MEDICAL CORRESPONDENCE (OUTPATIENT)
Dept: HEALTH INFORMATION MANAGEMENT | Facility: CLINIC | Age: 62
End: 2017-11-27

## 2017-11-27 ENCOUNTER — SURGERY (OUTPATIENT)
Age: 62
End: 2017-11-27

## 2017-11-27 VITALS
TEMPERATURE: 98 F | RESPIRATION RATE: 16 BRPM | HEIGHT: 71 IN | BODY MASS INDEX: 24.36 KG/M2 | OXYGEN SATURATION: 99 % | WEIGHT: 174 LBS | SYSTOLIC BLOOD PRESSURE: 132 MMHG | HEART RATE: 68 BPM | DIASTOLIC BLOOD PRESSURE: 80 MMHG

## 2017-11-27 DIAGNOSIS — R39.9 LOWER URINARY TRACT SYMPTOMS (LUTS): ICD-10-CM

## 2017-11-27 RX ORDER — ONDANSETRON 2 MG/ML
INJECTION INTRAMUSCULAR; INTRAVENOUS PRN
Status: DISCONTINUED | OUTPATIENT
Start: 2017-11-27 | End: 2017-11-27

## 2017-11-27 RX ORDER — PROPOFOL 10 MG/ML
INJECTION, EMULSION INTRAVENOUS CONTINUOUS PRN
Status: DISCONTINUED | OUTPATIENT
Start: 2017-11-27 | End: 2017-11-27

## 2017-11-27 RX ORDER — ONDANSETRON 2 MG/ML
4 INJECTION INTRAMUSCULAR; INTRAVENOUS EVERY 30 MIN PRN
Status: DISCONTINUED | OUTPATIENT
Start: 2017-11-27 | End: 2017-11-28 | Stop reason: HOSPADM

## 2017-11-27 RX ORDER — NALOXONE HYDROCHLORIDE 0.4 MG/ML
.1-.4 INJECTION, SOLUTION INTRAMUSCULAR; INTRAVENOUS; SUBCUTANEOUS
Status: DISCONTINUED | OUTPATIENT
Start: 2017-11-27 | End: 2017-11-28 | Stop reason: HOSPADM

## 2017-11-27 RX ORDER — ACETAMINOPHEN 325 MG/1
975 TABLET ORAL ONCE
Status: COMPLETED | OUTPATIENT
Start: 2017-11-27 | End: 2017-11-27

## 2017-11-27 RX ORDER — FENTANYL CITRATE 50 UG/ML
25-50 INJECTION, SOLUTION INTRAMUSCULAR; INTRAVENOUS
Status: DISCONTINUED | OUTPATIENT
Start: 2017-11-27 | End: 2017-11-28 | Stop reason: HOSPADM

## 2017-11-27 RX ORDER — LIDOCAINE 40 MG/G
CREAM TOPICAL
Status: DISCONTINUED | OUTPATIENT
Start: 2017-11-27 | End: 2017-11-28 | Stop reason: HOSPADM

## 2017-11-27 RX ORDER — ONDANSETRON 4 MG/1
4 TABLET, ORALLY DISINTEGRATING ORAL EVERY 30 MIN PRN
Status: DISCONTINUED | OUTPATIENT
Start: 2017-11-27 | End: 2017-11-28 | Stop reason: HOSPADM

## 2017-11-27 RX ORDER — PROPOFOL 10 MG/ML
INJECTION, EMULSION INTRAVENOUS PRN
Status: DISCONTINUED | OUTPATIENT
Start: 2017-11-27 | End: 2017-11-27

## 2017-11-27 RX ORDER — HYDROMORPHONE HYDROCHLORIDE 1 MG/ML
.3-.5 INJECTION, SOLUTION INTRAMUSCULAR; INTRAVENOUS; SUBCUTANEOUS EVERY 10 MIN PRN
Status: DISCONTINUED | OUTPATIENT
Start: 2017-11-27 | End: 2017-11-28 | Stop reason: HOSPADM

## 2017-11-27 RX ORDER — SODIUM CHLORIDE, SODIUM LACTATE, POTASSIUM CHLORIDE, CALCIUM CHLORIDE 600; 310; 30; 20 MG/100ML; MG/100ML; MG/100ML; MG/100ML
INJECTION, SOLUTION INTRAVENOUS CONTINUOUS
Status: DISCONTINUED | OUTPATIENT
Start: 2017-11-27 | End: 2017-11-28 | Stop reason: HOSPADM

## 2017-11-27 RX ORDER — GABAPENTIN 300 MG/1
300 CAPSULE ORAL ONCE
Status: COMPLETED | OUTPATIENT
Start: 2017-11-27 | End: 2017-11-27

## 2017-11-27 RX ORDER — MEPERIDINE HYDROCHLORIDE 25 MG/ML
12.5 INJECTION INTRAMUSCULAR; INTRAVENOUS; SUBCUTANEOUS
Status: DISCONTINUED | OUTPATIENT
Start: 2017-11-27 | End: 2017-11-28 | Stop reason: HOSPADM

## 2017-11-27 RX ORDER — FENTANYL CITRATE 50 UG/ML
INJECTION, SOLUTION INTRAMUSCULAR; INTRAVENOUS PRN
Status: DISCONTINUED | OUTPATIENT
Start: 2017-11-27 | End: 2017-11-27

## 2017-11-27 RX ADMIN — ACETAMINOPHEN 975 MG: 325 TABLET ORAL at 08:46

## 2017-11-27 RX ADMIN — PROPOFOL 100 MCG/KG/MIN: 10 INJECTION, EMULSION INTRAVENOUS at 10:57

## 2017-11-27 RX ADMIN — SODIUM CHLORIDE, SODIUM LACTATE, POTASSIUM CHLORIDE, CALCIUM CHLORIDE: 600; 310; 30; 20 INJECTION, SOLUTION INTRAVENOUS at 10:52

## 2017-11-27 RX ADMIN — ONDANSETRON 4 MG: 2 INJECTION INTRAMUSCULAR; INTRAVENOUS at 11:20

## 2017-11-27 RX ADMIN — PROPOFOL 20 MG: 10 INJECTION, EMULSION INTRAVENOUS at 11:10

## 2017-11-27 RX ADMIN — PROPOFOL 20 MG: 10 INJECTION, EMULSION INTRAVENOUS at 10:58

## 2017-11-27 RX ADMIN — GABAPENTIN 300 MG: 300 CAPSULE ORAL at 08:46

## 2017-11-27 RX ADMIN — FENTANYL CITRATE 50 MCG: 50 INJECTION, SOLUTION INTRAMUSCULAR; INTRAVENOUS at 11:12

## 2017-11-27 RX ADMIN — FENTANYL CITRATE 50 MCG: 50 INJECTION, SOLUTION INTRAMUSCULAR; INTRAVENOUS at 10:56

## 2017-11-27 NOTE — OP NOTE
DATE OF PROCEDURE:  11/27/2017      PREOPERATIVE DIAGNOSIS:  Benign prostatic hypertrophy.        POSTOPERATIVE DIAGNOSES:  Benign prostatic hypertrophy.        PROCEDURE:  Cystoscopy and Rezum procedure.        SURGEON:  Ericka Trotter MD      ESTIMATED BLOOD LOSS:  1 mL.        SPECIMENS:  None.      COMPLICATIONS:  None.      DRAINS:  16-Hungarian Glover catheter.      INDICATIONS:  Mr. Agee is a 62-year-old gentleman followed in our clinic for history of prostate cancer diagnosed in 2009 for which the patient is on active surveillance as well as lower urinary tract symptoms secondary to BPH.  The patient presented today for a cystoscopy and Rezum procedure.      DESCRIPTION OF PROCEDURE:  After informed consent was obtained, the patient was brought to the operating room where he was administered MAC anesthesia.  After suitable level of anesthesia was attained, he was placed in lithotomy position with all pressure points padded.  He was administered preoperative antibiotics.  He was prepped and draped in standard sterile fashion.  Next, the Rezum device was placed into the patient's urethra.  Inspection of the prostate demonstrated lateral lobe hypertrophy, very prominent at the bladder neck and at the apex.  There did not appear to be much tissue coming up from the floor of the prostate.  Next, we delivered 3 treatments along the lower aspect of each lateral lobe of the prostate in the heart of the tissue and then an additional treatment on each side in the middle of the prostate more anteriorly for a total of 8 treatments altogether.  Next, a 16-Hungarian Glover catheter was placed into the patient's bladder and the balloon inflated with 10 mL of water.  The patient was then awakened and brought to recovery room in stable condition.         ERICKA TROTTER MD             D: 11/27/2017 11:28   T: 11/27/2017 11:44   MT: cecil      Name:     TONIA AGEE   MRN:      [id number]        Account:        [id number]    :      1955           Procedure Date: 2017      Document: [id number]

## 2017-11-27 NOTE — IP AVS SNAPSHOT
[facility]    [address] [place] [id number]    Phone:  [phone]    Fax:  [phone]                                       After Visit Summary   11/27/2017    Russ Agee    MRN: [id number]           After Visit Summary Signature Page     I have received my discharge instructions, and my questions have been answered. I have discussed any challenges I see with this plan with the nurse or doctor.    ..........................................................................................................................................  Patient/Patient Representative Signature      ..........................................................................................................................................  Patient Representative Print Name and Relationship to Patient    ..................................................               ................................................  Date                                            Time    ..........................................................................................................................................  Reviewed by Signature/Title    ...................................................              ..............................................  Date                                                            Time

## 2017-11-27 NOTE — DISCHARGE INSTRUCTIONS
[facility]  Home Care Following Anesthesia  For 24 hours after surgery:  1. Get plenty of rest.  A responsible adult must stay with you for at least 24 hours after you leave the surgery center.  2. Do not drive or use heavy equipment.  If you have weakness or tingling, don't drive or use heavy equipment until this feeling goes away.   3. Do not drink alcohol.   4. Avoid strenuous or risky activities.  Ask for help when climbing stairs.  5. You may feel lightheaded.  IF so, sit for a few minutes before standing.  Have someone help you get up.   6. If you have nausea (feel sick to your stomach): Drink only clear liquids such as apple juice, ginger ale, broth or 7-Up.  Rest may also help.  Be sure to drink enough fluids.  Move to a regular diet as you feel able.   7. You may have a slight fever.  Call the doctor if your fever is over 100 F (37.7 C) (taken under the tongue) or lasts longer than 24 hours.  8. You may have a dry mouth, a sore throat, muscle aches or trouble sleeping. These should go away after 24 hours.  9. Do not make important or legal decisions.               Tips for taking pain medications  To get the best pain relief possible, remember these points:    Take pain medications as directed, before pain becomes severe.    Pain medication can upset your stomach: taking it with food may help.    Constipation is a common side effect of pain medication. Drink plenty of  fluids.    Eat foods high in fiber. Take a stool softener if recommended by your doctor or pharmacist.    Do not drink alcohol, drive or operate machinery while taking pain medications.    Ask about other ways to control pain, such as with heat, ice or relaxation.    Tylenol/Acetaminophen Consumption  To help encourage the safe use of acetaminophen, the makers of TYLENOL  have lowered the maximum daily dose for single-ingredient Extra Strength TYLENOL  (acetaminophen) products sold in the [place] from 8  pills per day (4,000 mg) to 6 pills per day (3,000 mg). The dosing interval has also changed from 2 pills every 4-6 hours to 2 pills every 6 hours.    If you feel your pain relief is insufficient, you may take Tylenol/Acetaminophen in addition to your narcotic pain medication.     Be careful not to exceed 3,000 mg of Tylenol/Acetaminophen in a 24 hour period from all sources.    If you are taking extra strength Tylenol/acetaminophen (500 mg), the maximum dose is 6 tablets in 24 hours.    If you are taking regular strength acetaminophen (325 mg), the maximum dose is 9 tablets in 24 hours.    Call a doctor for any of the followin. Signs of infection (fever, growing tenderness at the surgery site, a large amount of drainage or bleeding, severe pain, foul-smelling drainage, redness, swelling).  2. It has been over 8 to 10 hours since surgery and you are still not able to urinate (pass water).  3. Headache for over 24 hours.  4. Numbness, tingling or weakness the day after surgery (if you had spinal anesthesia).  Your doctor is:       Dr. Trotter, Prostate and Urology: [phone]               Or dial [phone] and ask for the resident on call for:  Prostate Urology  For emergency care, call the:  [facility]:  [phone] (TTY for hearing impaired: [phone])            Notify physician if fever/chills/sweats.  You may see blood in the urine while the catheter is in place.  Follow up Wednesday in urology clinic for catheter removal and teaching of clean intermittent catheterization.

## 2017-11-27 NOTE — ANESTHESIA POSTPROCEDURE EVALUATION
Patient: Russ Agee    Procedure(s):  Cystoscopy, Rezum Procedure - Wound Class: II-Clean Contaminated   - Wound Class: II-Clean Contaminated    Diagnosis:Benign Prostatic Hyperplasia  Diagnosis Additional Information: No value filed.    Anesthesia Type:  MAC    Note:  Anesthesia Post Evaluation    Patient location during evaluation: Phase 2  Patient participation: Able to fully participate in evaluation  Level of consciousness: awake and alert  Pain management: adequate  Airway patency: patent  Cardiovascular status: acceptable  Respiratory status: acceptable  Hydration status: acceptable  PONV: none     Anesthetic complications: None          Last vitals:  Vitals:    11/27/17 1133 11/27/17 1156 11/27/17 1221   BP: 121/75 138/81 132/80   Pulse:      Resp: 16 16 16   Temp: 36.3  C (97.3  F) 36.7  C (98  F) 36.7  C (98  F)   SpO2: 98% 99% 99%         Electronically Signed By: Cosme Hernández MD  November 27, 2017  12:33 PM

## 2017-11-27 NOTE — ANESTHESIA CARE TRANSFER NOTE
Patient: Russ Agee    Procedure(s):  Cystoscopy, Rezum Procedure - Wound Class: II-Clean Contaminated   - Wound Class: II-Clean Contaminated    Diagnosis: Benign Prostatic Hyperplasia  Diagnosis Additional Information: No value filed.    Anesthesia Type:   MAC     Note:  Airway :Room Air  Patient transferred to:Phase II  Comments: 121/75 98% [id number] Report: Identifed the Patient, Identified the Reponsible Provider, Reviewed the pertinent medical history, Discussed the surgical course, Reviewed Intra-OP anesthesia mangement and issues during anesthesia, Set expectations for post-procedure period and Allowed opportunity for questions and acknowledgement of understanding      Vitals: (Last set prior to Anesthesia Care Transfer)    CRNA VITALS  11/27/2017 1059 - 11/27/2017 1134      11/27/2017             Resp Rate (set): 10                Electronically Signed By: HAWA Duarte CRNA  November 27, 2017  11:34 AM

## 2017-11-27 NOTE — BRIEF OP NOTE
Pre-op Dx:BPH  Post-op Dx:same  Procedure:cystoscopy, Rezume procedure  EBL: 1 cc  Specimens:none  Drains:16 Fr. Glover  No complications.

## 2017-11-27 NOTE — IP AVS SNAPSHOT
MRN:[id number]                      After Visit Summary   11/27/2017    Russ Agee    MRN: [id number]           Thank you!     Thank you for choosing [facility] for your care. Our goal is always to provide you with excellent care. Hearing back from our patients is one way we can continue to improve our services. Please take a few minutes to complete the written survey that you may receive in the mail after you visit with us. Thank you!        Patient Information     Date Of Birth          1955        About your hospital stay     You were admitted on:  November 27, 2017 You last received care in the:  [facility]    You were discharged on:  November 27, 2017       Who to Call     For medical emergencies, please call 911.  For non-urgent questions about your medical care, please call your primary care provider or clinic, [phone]  For questions related to your surgery, please call your surgery clinic        Attending Provider     Provider Gerardo Chavarria MD Urology       Primary Care Provider Office Phone # Fax #    Donnie Haque [phone]      Your next 10 appointments already scheduled     Nov 30, 2017 10:30 AM CST   (Arrive by 10:15 AM)   Return Visit with  Prostate Cancer Ctr Nurse   [facility]rologic [facility] ([facility])    [address] [place] [id number] [phone]            Dec 12, 2017 12:30 PM CST   (Arrive by 12:15 PM)   Post-Op with Gerardo Trotter MD   [facility] Urologic Cancers ([facility])    [address] [place] [id number] [phone]            Dec 20, 2017  2:35 PM CST   (Arrive by 2:20 PM)   Return Visit with Donnie Haque MD   [facility] ([facility])    [address] [place] [address]  [facility]  [address] [facility] [address]   [phone]            Feb 27, 2018 10:00 AM CST   (Arrive by 9:45 AM)   Return Visit with Gerardo Trotter MD   [facility] ([facility])    [address] [address]   [phone]              Further instructions from your care team       [facility]  Home Care Following Anesthesia  For 24 hours after surgery:  1. Get plenty of rest.  A responsible adult must stay with you for at least 24 hours after you leave the surgery center.  2. Do not drive or use heavy equipment.  If you have weakness or tingling, don't drive or use heavy equipment until this feeling goes away.   3. Do not drink alcohol.   4. Avoid strenuous or risky activities.  Ask for help when climbing stairs.  5. You may feel lightheaded.  IF so, sit for a few minutes before standing.  Have someone help you get up.   6. If you have nausea (feel sick to your stomach): Drink only clear liquids such as apple juice, ginger ale, broth or 7-Up.  Rest may also help.  Be sure to drink enough fluids.  Move to a regular diet as you feel able.   7. You may have a slight fever.  Call the doctor if your fever is over 100 F (37.7 C) (taken under the tongue) or lasts longer than 24 hours.  8. You may have a dry mouth, a sore throat, muscle aches or trouble sleeping. These should go away after 24 hours.  9. Do not make important or legal decisions.               Tips for taking pain medications  To get the best pain relief possible, remember these points:    Take pain medications as directed, before pain becomes severe.    Pain medication can upset your stomach: taking it with food may help.    Constipation is a common side effect of pain medication. Drink plenty of  fluids.    Eat foods high in fiber. Take a stool softener if recommended by your doctor or  pharmacist.    Do not drink alcohol, drive or operate machinery while taking pain medications.    Ask about other ways to control pain, such as with heat, ice or relaxation.    Tylenol/Acetaminophen Consumption  To help encourage the safe use of acetaminophen, the makers of TYLENOL  have lowered the maximum daily dose for single-ingredient Extra Strength TYLENOL  (acetaminophen) products sold in the [place] from 8 pills per day (4,000 mg) to 6 pills per day (3,000 mg). The dosing interval has also changed from 2 pills every 4-6 hours to 2 pills every 6 hours.    If you feel your pain relief is insufficient, you may take Tylenol/Acetaminophen in addition to your narcotic pain medication.     Be careful not to exceed 3,000 mg of Tylenol/Acetaminophen in a 24 hour period from all sources.    If you are taking extra strength Tylenol/acetaminophen (500 mg), the maximum dose is 6 tablets in 24 hours.    If you are taking regular strength acetaminophen (325 mg), the maximum dose is 9 tablets in 24 hours.    Call a doctor for any of the followin. Signs of infection (fever, growing tenderness at the surgery site, a large amount of drainage or bleeding, severe pain, foul-smelling drainage, redness, swelling).  2. It has been over 8 to 10 hours since surgery and you are still not able to urinate (pass water).  3. Headache for over 24 hours.  4. Numbness, tingling or weakness the day after surgery (if you had spinal anesthesia).  Your doctor is:       Dr. Trotter, Prostate and Urology: [phone]               Or dial [phone] and ask for the resident on call for:  Prostate Urology  For emergency care, call the:  [facility]:  [phone] (TTY for hearing impaired: [phone])            Notify physician if fever/chills/sweats.  You may see blood in the urine while the catheter is in place.  Follow up Wednesday in urology clinic for catheter removal and teaching of clean intermittent  "catheterization.    Pending Results     No orders found from 2017 to 2017.            Admission Information     Date & Time Provider Department Dept. Phone    2017 Gerardo Trotter MD [facility] [phone]      Your Vitals Were     Blood Pressure Pulse Temperature Respirations Height Weight    121/75 68 97.3  F (36.3  C) (Oral) 16 1.803 m (5' 11\") 78.9 kg (174 lb)    Pulse Oximetry BMI (Body Mass Index)                98% 24.27 kg/m2          MyChart Information     Waffle is an electronic gateway that provides easy, online access to your medical records. With Waffle, you can request a clinic appointment, read your test results, renew a prescription or communicate with your care team.     To sign up for Waffle visit the website at [email or web address]   You will be asked to enter the access code listed below, as well as some personal information. Please follow the directions to create your username and password.     Your access code is: [email or web address]  Expires: 12/3/2017  5:30 AM     Your access code will  in 90 days. If you need help or a new code, please contact your [facility] or call [phone] for assistance.        Care EveryWhere ID     This is your Care EveryWhere ID. This could be used by other organizations to access your [facility] medical records  [id number]        Equal Access to Services     MEREDITH LEÓN AH: Hadii lamont briscoeo Socaleb, waaxda luqadaha, qaybta kaalmada adeegyada, waxay yanick stein . So [facility] [phone].    ATENCIÓN: Si habla español, tiene a french disposición servicios gratuitos de asistencia lingüística. Llame al [phone].    We comply with applicable federal civil rights laws and [place] laws. We do not discriminate on the basis of race, color, national origin, age, disability, sex, sexual orientation, or gender identity.               Review of your " medicines      CONTINUE these medicines which have NOT CHANGED        Dose / Directions    desonide 0.05 % ointment   Commonly known as:  DESOWEN   Used for:  Dermatitis, seborrheic        To affected areas of the face for itch and red bumps twice a day as needed.   Quantity:  60 g   Refills:  11       esomeprazole 40 MG CR capsule   Commonly known as:  nexIUM   Used for:  Esophageal reflux        Dose:  40 mg   Take 1 capsule (40 mg) by mouth every morning (before breakfast) Take 30-60 minutes before eating.   Quantity:  90 capsule   Refills:  3       gentamicin 0.008% in 1000ml 0.9% NaCl Soln nasal irrigation   Commonly known as:  GARAMYCIN   Used for:  Chronic pansinusitis        Dose:  30 mL   Spray 30 mLs in nostril 2 times daily Irrigate 30 mL between each nostril BID for a total of 60ml/day PLEASE MAIL TO PATIENT   Quantity:  1000 mL   Refills:  3       hydrocortisone 25 MG Suppository   Commonly known as:  ANUSOL-HC   Used for:  Internal hemorrhoids        Dose:  25 mg   Place 1 suppository (25 mg) rectally 2 times daily   Quantity:  28 suppository   Refills:  3       ketoconazole 2 % shampoo   Commonly known as:  NIZORAL   Used for:  Dermatitis, seborrheic        To entire wet scalp and face in affected areas and then wash off after several minutes three times a week.   Quantity:  240 mL   Refills:  11       lidocaine 2 % topical gel   Commonly known as:  XYLOCAINE        Dose:  1 Tube   Apply 1 Tube topically   Refills:  0       METAMUCIL PO        Dose:  3 tsp.   Take 3 tsp by mouth 2 times daily.   Refills:  0       nicotine polacrilex 2 MG gum   Commonly known as:  NICORETTE        Dose:  2 mg   Place 2 mg inside cheek as needed for smoking cessation   Refills:  0       RA FIBER- MG tablet   Generic drug:  calcium polycarbophil        Take 3 tablets by mouth twice daily   Refills:  0       REMERON PO        Dose:  15 mg   Take 15 mg by mouth At Bedtime   Refills:  0       RISPERDAL PO         Dose:  3 mg   Take 3 mg by mouth At Bedtime   Refills:  0       sildenafil 20 MG tablet   Commonly known as:  REVATIO   Used for:  Other male erectile dysfunction        Take 1 to 5 tabs as needed for sexual activity.   Quantity:  90 tablet   Refills:  11       traZODone 50 MG tablet   Commonly known as:  DESYREL        Dose:  50 mg   Take 50 mg by mouth At Bedtime   Refills:  0       Urea 20 % Crea cream   Used for:  Dystrophic nail        To affected, ridged nails daily.   Quantity:  60 g   Refills:  5                Protect others around you: Learn how to safely use, store and throw away your medicines at www.disposemymeds.org.             Medication List: This is a list of all your medications and when to take them. Check marks below indicate your daily home schedule. Keep this list as a reference.      Medications           Morning Afternoon Evening Bedtime As Needed    desonide 0.05 % ointment   Commonly known as:  DESOWEN   To affected areas of the face for itch and red bumps twice a day as needed.                                esomeprazole 40 MG CR capsule   Commonly known as:  nexIUM   Take 1 capsule (40 mg) by mouth every morning (before breakfast) Take 30-60 minutes before eating.                                gentamicin 0.008% in 1000ml 0.9% NaCl Soln nasal irrigation   Commonly known as:  GARAMYCIN   Spray 30 mLs in nostril 2 times daily Irrigate 30 mL between each nostril BID for a total of 60ml/day PLEASE MAIL TO PATIENT                                hydrocortisone 25 MG Suppository   Commonly known as:  ANUSOL-HC   Place 1 suppository (25 mg) rectally 2 times daily                                ketoconazole 2 % shampoo   Commonly known as:  NIZORAL   To entire wet scalp and face in affected areas and then wash off after several minutes three times a week.                                lidocaine 2 % topical gel   Commonly known as:  XYLOCAINE   Apply 1 Tube topically                                 METAMUCIL PO   Take 3 tsp by mouth 2 times daily.                                nicotine polacrilex 2 MG gum   Commonly known as:  NICORETTE   Place 2 mg inside cheek as needed for smoking cessation                                RA FIBER- MG tablet   Take 3 tablets by mouth twice daily   Generic drug:  calcium polycarbophil                                REMERON PO   Take 15 mg by mouth At Bedtime                                RISPERDAL PO   Take 3 mg by mouth At Bedtime                                sildenafil 20 MG tablet   Commonly known as:  REVATIO   Take 1 to 5 tabs as needed for sexual activity.                                traZODone 50 MG tablet   Commonly known as:  DESYREL   Take 50 mg by mouth At Bedtime                                Urea 20 % Crea cream   To affected, ridged nails daily.

## 2017-11-30 ENCOUNTER — ALLIED HEALTH/NURSE VISIT (OUTPATIENT)
Dept: UROLOGY | Facility: CLINIC | Age: 62
End: 2017-11-30

## 2017-11-30 DIAGNOSIS — R39.9 LOWER URINARY TRACT SYMPTOMS (LUTS): Primary | ICD-10-CM

## 2017-11-30 NOTE — PROGRESS NOTES
Russ Agee comes into clinic today at the request of  Ordering Provider for TOV (trial of void).      This service provided today was under the supervising provider of the day Jaci Claros PA-C , who was available if needed.    Emely Way MA    Patient presents to the clinic today for a trial of void, catheter removal.  Patient is Russ Agee     Order by:      Cipro 500 mg PO administered prior to procedure.    Approx 240 mL of sterile water instilled into the bladder via catheter.  16 Jordanian indwelling urethral Catheter then removed with out difficulty  10mL water removed from balloon, balloon intact  Patient voided approx 250mL of mihai urine.   Post-void residual was: 20mL per bladder scan.  Patient tolerated procedure well.      Emely Way MA

## 2017-11-30 NOTE — MR AVS SNAPSHOT
After Visit Summary   11/30/2017    Russ Agee    MRN: [id number]           Patient Information     Date Of Birth          1955        Visit Information        Provider Department      11/30/2017 10:30 AM Nurse, [facility] Prostate and Urologic Cancers        Today's Diagnoses     Lower urinary tract symptoms (LUTS)    -  1       Follow-ups after your visit        Your next 10 appointments already scheduled     Dec 12, 2017 12:30 PM CST   (Arrive by 12:15 PM)   Post-Op with Gerardo Trotter MD   [facility] Prostate and Urologic Cancers ([facility])    [address] [place] [id number] [phone]            Dec 20, 2017  2:35 PM CST   (Arrive by 2:20 PM)   Return Visit with Donnie Haque MD   [facility] ([facility])    [address] [place] [id number] [phone]            Feb 27, 2018 10:00 AM CST   (Arrive by 9:45 AM)   Return Visit with Gerardo Trotter MD   [facility] Prostate and Urologic Cancers ([facility])    [address] [place] [id number] [phone]              Who to contact     Please call your clinic at [phone] to:    Ask questions about your health    Make or cancel appointments    Discuss your medicines    Learn about your test results    Speak to your doctor   If you have compliments or concerns about an experience at your clinic, or if you wish to file a complaint, please contact [facility] Patient Relations at [phone] or email us at [email or web address]         Additional Information About Your Visit        DIY Geniushart Information     Appetise is an electronic gateway that provides easy, online access to your medical records. With Appetise, you can request  a clinic appointment, read your test results, renew a prescription or communicate with your care team.     To sign up for Digital Envoyhart visit the website at [email or web address]   You will be asked to enter the access code listed below, as well as some personal information. Please follow the directions to create your username and password.     Your access code is: [email or web address]  Expires: 12/3/2017  5:30 AM     Your access code will  in 90 days. If you need help or a new code, please contact your [facility] or call [phone] for assistance.        Care EveryWhere ID     This is your Care EveryWhere ID. This could be used by other organizations to access your [facility] medical records  [id number]         Blood Pressure from Last 3 Encounters:   17 132/80   17 117/74   10/28/17 119/75    Weight from Last 3 Encounters:   17 78.9 kg (174 lb)   17 77.3 kg (170 lb 8 oz)   10/30/17 79.4 kg (175 lb)              We Performed the Following     POST-VOID RESIDUAL BLADDER SCAN        Primary Care Provider Office Phone # Fax #    Donnie Haque -[phone]       [address] [address]        Equal Access to Services     MEREDITH LEÓN : Hadii aad ku hadasho Soomaali, waaxda luqadaha, qaybta kaalmada adeegyada, waxay kusumin haymagalie stein . So [facility] [phone].    ATENCIÓN: Si habla español, tiene a french disposición servicios gratuitos de asistencia lingüística. Llame al [phone].    We comply with applicable federal civil rights laws and [place] laws. We do not discriminate on the basis of race, color, national origin, age, disability, sex, sexual orientation, or gender identity.            Thank you!     Thank you for choosing [facility] AND [facility] FOR PROSTATE AND UROLOGIC CANCERS  for your care. Our goal is always to provide you with excellent care. Hearing back from our patients is one way we can  continue to improve our services. Please take a few minutes to complete the written survey that you may receive in the mail after your visit with us. Thank you!             Your Updated Medication List - Protect others around you: Learn how to safely use, store and throw away your medicines at www.disposemymeds.org.          This list is accurate as of: 11/30/17 11:34 AM.  Always use your most recent med list.                   Brand Name Dispense Instructions for use Diagnosis    desonide 0.05 % ointment    DESOWEN    60 g    To affected areas of the face for itch and red bumps twice a day as needed.    Dermatitis, seborrheic       esomeprazole 40 MG CR capsule    nexIUM    90 capsule    Take 1 capsule (40 mg) by mouth every morning (before breakfast) Take 30-60 minutes before eating.    Esophageal reflux       gentamicin 0.008% in 1000ml 0.9% NaCl Soln nasal irrigation    GARAMYCIN    1000 mL    Spray 30 mLs in nostril 2 times daily Irrigate 30 mL between each nostril BID for a total of 60ml/day PLEASE MAIL TO PATIENT    Chronic pansinusitis       hydrocortisone 25 MG Suppository    ANUSOL-HC    28 suppository    Place 1 suppository (25 mg) rectally 2 times daily    Internal hemorrhoids       ketoconazole 2 % shampoo    NIZORAL    240 mL    To entire wet scalp and face in affected areas and then wash off after several minutes three times a week.    Dermatitis, seborrheic       lidocaine 2 % topical gel    XYLOCAINE     Apply 1 Tube topically        METAMUCIL PO      Take 3 tsp by mouth 2 times daily.        nicotine polacrilex 2 MG gum    NICORETTE     Place 2 mg inside cheek as needed for smoking cessation        RA FIBER- MG tablet   Generic drug:  calcium polycarbophil      Take 3 tablets by mouth twice daily        REMERON PO      Take 15 mg by mouth At Bedtime        RISPERDAL PO      Take 3 mg by mouth At Bedtime        sildenafil 20 MG tablet    REVATIO    90 tablet    Take 1 to 5 tabs as needed for  sexual activity.    Other male erectile dysfunction       traZODone 50 MG tablet    DESYREL     Take 50 mg by mouth At Bedtime        Urea 20 % Crea cream     60 g    To affected, ridged nails daily.    Dystrophic nail

## 2017-12-01 ENCOUNTER — MEDICAL CORRESPONDENCE (OUTPATIENT)
Dept: HEALTH INFORMATION MANAGEMENT | Facility: CLINIC | Age: 62
End: 2017-12-01

## 2017-12-12 ENCOUNTER — OFFICE VISIT (OUTPATIENT)
Dept: UROLOGY | Facility: CLINIC | Age: 62
End: 2017-12-12

## 2017-12-12 VITALS
BODY MASS INDEX: 24.36 KG/M2 | HEART RATE: 65 BPM | HEIGHT: 71 IN | DIASTOLIC BLOOD PRESSURE: 76 MMHG | SYSTOLIC BLOOD PRESSURE: 132 MMHG | WEIGHT: 174 LBS

## 2017-12-12 DIAGNOSIS — N40.0 BENIGN PROSTATIC HYPERPLASIA, UNSPECIFIED WHETHER LOWER URINARY TRACT SYMPTOMS PRESENT: Primary | ICD-10-CM

## 2017-12-12 ASSESSMENT — PAIN SCALES - GENERAL: PAINLEVEL: NO PAIN (0)

## 2017-12-12 NOTE — LETTER
12/12/2017       RE: Russ Agee  [address]  [facility] [place] [facility] [address]     Dear Colleague,    Thank you for referring your patient, Russ Agee, to the [facility] UROLOGY AND INST FOR PROSTATE AND UROLOGIC CANCERS at [facility]. Please see a copy of my visit note below.    REASON FOR VISIT TODAY:  BPH and history of prostate cancer.      HISTORY OF PRESENT ILLNESS:  Mr. Agee is a 62-year-old gentleman followed in our clinic for a history of [place] 3 + 3 = 6 prostate cancer diagnosed in 1% of 1 core from 2009, for which he has been on active surveillance.  He also has some lower urinary tract symptoms that were refractory to medications.  He recently underwent a REZUM water vapor treatment 2 weeks ago.  The patient comes in today noting that he did very well following the procedure.  He has no pain.  He has no blood in the urine.  He already feels that his urination is starting to improve.  Specifically, he notes that he has a longer voiding interval of now being able to go 2-3 hours between voids.  He notes that while he still has some degree of urgency, this has significantly improved and notes that his stream may be a little bit stronger at this time.      PHYSICAL EXAMINATION:  The patient's blood pressure is 132/76.  Pulse is 65.  He is in no acute distress.      DATA:  His AUA symptom score today is 33/35 with a bothersome index of 5.      ASSESSMENT AND PLAN:  Over half of today's 15 minute visit was spent counseling the patient regarding his lower urinary tract symptoms.  I suggested to Mr. Agee that we are pleased to hear that he is already subjectively noticing some improvement in his urination.  His AUA symptom score does not bear out significant improvement as of yet, but the patient went from 35/35 with a bothersome index of 6 to 33/35 with a bothersome index of 5.  However, subjectively, the patient notes that he is much happier with things at this time,  suggesting a possible language barrier with the AUA symptom score correlation.  In any case, we will plan on seeing Mr. Mc back at 3 months from his REZUM procedure for a cystoscopy and another AUA symptom score at that time.      D: 2017 13:17   T: 2017 14:13   MT: alphonso      Name:     TONIA MC   MRN:      [id number]        Account:      [id number]   :      1955           Service Date: 2017      Document: [id number]              ERICKA PARNELL MD

## 2017-12-12 NOTE — PROGRESS NOTES
REASON FOR VISIT TODAY:  BPH and history of prostate cancer.      HISTORY OF PRESENT ILLNESS:  Mr. Agee is a 62-year-old gentleman followed in our clinic for a history of Radha 3 + 3 = 6 prostate cancer diagnosed in 1% of 1 core from 2009, for which he has been on active surveillance.  He also has some lower urinary tract symptoms that were refractory to medications.  He recently underwent a REZUM water vapor treatment 2 weeks ago.  The patient comes in today noting that he did very well following the procedure.  He has no pain.  He has no blood in the urine.  He already feels that his urination is starting to improve.  Specifically, he notes that he has a longer voiding interval of now being able to go 2-3 hours between voids.  He notes that while he still has some degree of urgency, this has significantly improved and notes that his stream may be a little bit stronger at this time.      PHYSICAL EXAMINATION:  The patient's blood pressure is 132/76.  Pulse is 65.  He is in no acute distress.      DATA:  His AUA symptom score today is 33/35 with a bothersome index of 5.      ASSESSMENT AND PLAN:  Over half of today's 15 minute visit was spent counseling the patient regarding his lower urinary tract symptoms.  I suggested to Mr. Agee that we are pleased to hear that he is already subjectively noticing some improvement in his urination.  His AUA symptom score does not bear out significant improvement as of yet, but the patient went from 35/35 with a bothersome index of 6 to 33/35 with a bothersome index of 5.  However, subjectively, the patient notes that he is much happier with things at this time, suggesting a possible language barrier with the AUA symptom score correlation.  In any case, we will plan on seeing Mr. Agee back at 3 months from his REZUM procedure for a cystoscopy and another AUA symptom score at that time.         ERICKA PARNELL MD             D: 12/12/2017 13:17   T: 12/12/2017 14:13   MT: alphonso       Name:     TONIA MC   MRN:      [id number]        Account:      [id number]   :      1955           Service Date: 2017      Document: [id number]

## 2017-12-12 NOTE — MR AVS SNAPSHOT
After Visit Summary   12/12/2017    Russ Agee    MRN: [id number]           Patient Information     Date Of Birth          1955        Visit Information        Provider Department      12/12/2017 12:30 PM Gerardo Trotter MD [facility] for Prostate and Urologic Cancers        Today's Diagnoses     Benign prostatic hyperplasia, unspecified whether lower urinary tract symptoms present    -  1       Follow-ups after your visit        Your next 10 appointments already scheduled     Dec 20, 2017  2:35 PM CST   (Arrive by 2:20 PM)   Return Visit with Donnie Haque MD   [facility] ([facility])    [address] [place] [address] [phone]            Feb 27, 2018 10:00 AM CST   (Arrive by 9:45 AM)   Return Visit with Gerardo Trotter MD   [facility] for Prostate and Urologic Cancers ([facility])    [address] [place] [address] [phone]              Who to contact     Please call your clinic at [phone] to:    Ask questions about your health    Make or cancel appointments    Discuss your medicines    Learn about your test results    Speak to your doctor   If you have compliments or concerns about an experience at your clinic, or if you wish to file a complaint, please contact [facility] Patient Relations at [phone] or email us at [email or web address]         Additional Information About Your Visit        MyChart Information     Jumptap is an electronic gateway that provides easy, online access to your medical records. With Jumptap, you can request a clinic appointment, read your test results, renew a prescription or communicate with your care team.     To sign up for Jumptap visit the website at [email or web address]   You will be asked to enter the access code  "listed below, as well as some personal information. Please follow the directions to create your username and password.     Your access code is: [email or web address]  Expires: 3/6/2018  6:30 AM     Your access code will  in 90 days. If you need help or a new code, please contact your [facility] or call [phone] for assistance.        Care EveryWhere ID     This is your Care EveryWhere ID. This could be used by other organizations to access your [place] medical records  [id number]        Your Vitals Were     Pulse Height BMI (Body Mass Index)             65 1.803 m (5' 11\") 24.27 kg/m2          Blood Pressure from Last 3 Encounters:   17 132/76   17 132/80   17 117/74    Weight from Last 3 Encounters:   17 78.9 kg (174 lb)   17 78.9 kg (174 lb)   17 77.3 kg (170 lb 8 oz)              Today, you had the following     No orders found for display       Primary Care Provider Office Phone # Fax #    Donnie Haque [phone] [address] [address]        Equal Access to Services     MEREDITH LEÓN : Hadii aad ku hadasho Soomaali, waaxda luqadaha, qaybta kaalmada adeegyada, waxay idiin hayisin juanis kohler ladejan . So [facility] [phone].    ATENCIÓN: Si habla español, tiene a french disposición servicios gratuitos de asistencia lingüística. Llame al [phone].    We comply with applicable federal civil rights laws and [place] laws. We do not discriminate on the basis of race, color, national origin, age, disability, sex, sexual orientation, or gender identity.            Thank you!     Thank you for choosing [facility] AND [facility] FOR PROSTATE AND UROLOGIC CANCERS  for your care. Our goal is always to provide you with excellent care. Hearing back from our patients is one way we can continue to improve our services. Please take a few minutes to complete the written survey that you may receive in the mail " after your visit with us. Thank you!             Your Updated Medication List - Protect others around you: Learn how to safely use, store and throw away your medicines at www.disposemymeds.org.          This list is accurate as of: 12/12/17 11:59 PM.  Always use your most recent med list.                   Brand Name Dispense Instructions for use Diagnosis    desonide 0.05 % ointment    DESOWEN    60 g    To affected areas of the face for itch and red bumps twice a day as needed.    Dermatitis, seborrheic       esomeprazole 40 MG CR capsule    nexIUM    90 capsule    Take 1 capsule (40 mg) by mouth every morning (before breakfast) Take 30-60 minutes before eating.    Esophageal reflux       gentamicin 0.008% in 1000ml 0.9% NaCl Soln nasal irrigation    GARAMYCIN    1000 mL    Spray 30 mLs in nostril 2 times daily Irrigate 30 mL between each nostril BID for a total of 60ml/day PLEASE MAIL TO PATIENT    Chronic pansinusitis       hydrocortisone 25 MG Suppository    ANUSOL-HC    28 suppository    Place 1 suppository (25 mg) rectally 2 times daily    Internal hemorrhoids       ketoconazole 2 % shampoo    NIZORAL    240 mL    To entire wet scalp and face in affected areas and then wash off after several minutes three times a week.    Dermatitis, seborrheic       lidocaine 2 % topical gel    XYLOCAINE     Apply 1 Tube topically        METAMUCIL PO      Take 3 tsp by mouth 2 times daily.        nicotine polacrilex 2 MG gum    NICORETTE     Place 2 mg inside cheek as needed for smoking cessation        RA FIBER- MG tablet   Generic drug:  calcium polycarbophil      Take 3 tablets by mouth twice daily        REMERON PO      Take 15 mg by mouth At Bedtime        RISPERDAL PO      Take 3 mg by mouth At Bedtime        sildenafil 20 MG tablet    REVATIO    90 tablet    Take 1 to 5 tabs as needed for sexual activity.    Other male erectile dysfunction       traZODone 50 MG tablet    DESYREL     Take 50 mg by mouth At  Bedtime        Urea 20 % Crea cream     60 g    To affected, ridged nails daily.    Dystrophic nail

## 2017-12-20 ENCOUNTER — OFFICE VISIT (OUTPATIENT)
Dept: INTERNAL MEDICINE | Facility: CLINIC | Age: 62
End: 2017-12-20
Payer: MEDICARE

## 2017-12-20 VITALS
HEART RATE: 58 BPM | DIASTOLIC BLOOD PRESSURE: 75 MMHG | SYSTOLIC BLOOD PRESSURE: 121 MMHG | BODY MASS INDEX: 24.24 KG/M2 | WEIGHT: 173.8 LBS

## 2017-12-20 DIAGNOSIS — Z71.6 ENCOUNTER FOR SMOKING CESSATION COUNSELING: Primary | ICD-10-CM

## 2017-12-20 ASSESSMENT — PAIN SCALES - GENERAL: PAINLEVEL: NO PAIN (0)

## 2017-12-20 NOTE — PROGRESS NOTES
Subjective:  Russ is a pleasant 59 year-old man who came in today for follow up today. I saw him yesterday for several complaints listed below.  He had colonoscopy 11/13/15 repeat in 5 years. He had upper EGD and EUS with Dr. Acuna, GI 12/16/15 with gastric polyp bx.  He had fairly unremarkable Holter Monitor 1/6/16.   He was 2/16/16 in Dermatology. He would like to stop smoking. He has tried before. Smoking for about 30 years. He currently smokes about 10 cigarettes a day.     Objective:  General appearance: NAD, cooperative, alert.  HEENT; negative   Cardio: Normal rate and rhythm, no murmurs  Respiratory: Normal breath sounds  Abdomen: Non-tender  Neurological exam: B UE/LE/proximal/distal is normal and symmetric for sensation, reflexes, and strength.    Assessment and Plan:    (1) MTM referral PCC for smoking cessation  (2) He will continue to follow up in GI for Hep C management. His last Hep C viral load 12/1/15 was undetectable.   (3) He was seen by Dr. Trotter Urology 12/12/17 for elevated PSA and prostate cancer/BPH  (4) He was seen 10/30/17 by Dr. Delaney for neck complaints and sinus issues  (5) He was seen Dr. Naranjo Cardiology for ongoing palpitations 4/13/16.  (6) Reflux changed to Nexium;  EGD 10/15  (7) He states he sees his Psychiatry doctor once a  Month in [place].   (8) Thyroid nodules seen on Cervical MRI; thyroid U/S; seen in  Endocrine 9/7/17 bx. Was benign   (9) Immunizations done 9/26/17 for Td and influenza              Total time spent 25 minutes.  More than 50% of the time spent with Mr. Agee on counseling / coordinating his care

## 2017-12-20 NOTE — MR AVS SNAPSHOT
After Visit Summary   12/20/2017    Russ Agee    MRN: [id number]           Patient Information     Date Of Birth          1955        Visit Information        Provider Department      12/20/2017 2:35 PM Donnie Haque MD [facility]        Today's Diagnoses     Encounter for smoking cessation counseling    -  1      Care Instructions    Primary Care Center [phone] ([facility]) Follow up in one  month.             Follow-ups after your visit        Additional Services     PHARMACY (MTM) REFERRAL       MTM PCC referral for smoking cessation                  Your next 10 appointments already scheduled     Jan 05, 2018  2:00 PM CST   (Arrive by 1:45 PM)   Office Visit with Alejandrina Ji [facility] ([facility])    [address] [place] [address] [phone]           Bring a current list of meds and any records pertaining to this visit. For Physicals, please bring immunization records and any forms needing to be filled out. Please arrive 10 minutes early to complete paperwork.            Feb 27, 2018 10:00 AM CST   (Arrive by 9:45 AM)   Return Visit with Gerardo Trotter MD   [facility] for Prostate and Urologic Cancers ([facility])    [address] [place] [address] [phone]            Feb 28, 2018  2:05 PM CST   (Arrive by 1:50 PM)   Return Visit with Donnie Haque MD   [facility] ([facility])    [address] [place] [address] [phone]              Who to contact     Please call your clinic at [phone] to:    Ask questions about your health    Make or cancel appointments    Discuss your medicines    Learn about your test results    Speak to your doctor   If you have compliments or concerns about an experience  at your clinic, or if you wish to file a complaint, please contact [facility] Patient Relations at [phone] or email us at [email or web address]         Additional Information About Your Visit        [email or web address] Information     [employer] is an electronic gateway that provides easy, online access to your medical records. With [employer], you can request a clinic appointment, read your test results, renew a prescription or communicate with your care team.     To sign up for [employer] visit the website at [email or web address]   You will be asked to enter the access code listed below, as well as some personal information. Please follow the directions to create your username and password.     Your access code is: [email or web address]  Expires: 3/6/2018  6:30 AM     Your access code will  in 90 days. If you need help or a new code, please contact your [facility] or call [phone] for assistance.        Care EveryWhere ID     This is your Care EveryWhere ID. This could be used by other organizations to access your [place] medical records  [id number]        Your Vitals Were     Pulse BMI (Body Mass Index)                58 24.24 kg/m2           Blood Pressure from Last 3 Encounters:   17 121/75   17 132/76   17 132/80    Weight from Last 3 Encounters:   17 78.8 kg (173 lb 12.8 oz)   17 78.9 kg (174 lb)   17 78.9 kg (174 lb)              We Performed the Following     PHARMACY (MTM) REFERRAL          Today's Medication Changes          These changes are accurate as of: 17  3:35 PM.  If you have any questions, ask your nurse or doctor.               These medicines have changed or have updated prescriptions.        Dose/Directions    * nicotine polacrilex 2 MG gum   Commonly known as:  NICORETTE   This may have changed:  Another medication with the same name was added. Make sure you understand how and when to take  each.        Dose:  2 mg   Place 2 mg inside cheek as needed for smoking cessation   Refills:  0       * nicotine polacrilex 4 MG lozenge   This may have changed:  You were already taking a medication with the same name, and this prescription was added. Make sure you understand how and when to take each.   Used for:  Encounter for smoking cessation counseling        Use up to 4 times a day as needed   Quantity:  24 tablet   Refills:  1       * Notice:  This list has 2 medication(s) that are the same as other medications prescribed for you. Read the directions carefully, and ask your doctor or other care provider to review them with you.         Where to get your medicines      These medications were sent to [id number] - [place], [place] - [address]  [address] [address]     Phone:  [phone]     nicotine polacrilex 4 MG lozenge                Primary Care Provider Office Phone # Fax #    Donnie Haque [phone]        [address] [address]        Equal Access to Services     [facility] CenterEMANUEL : Hadii lamont ku hadasho Soomaali, waaxda luqadaha, qaybta kaalmada adeegyada, waxay idiin haymagalie stein . So [facility] [phone].    ATENCIÓN: Si habla español, tiene a french disposición servicios gratuitos de asistencia lingüística. Llame al [phone].    We comply with applicable federal civil rights laws and [place] laws. We do not discriminate on the basis of race, color, national origin, age, disability, sex, sexual orientation, or gender identity.            Thank you!     Thank you for choosing [facility]  for your care. Our goal is always to provide you with excellent care. Hearing back from our patients is one way we can continue to improve our services. Please take a few minutes to complete the written survey that you may receive in the mail after your visit with us. Thank you!             Your Updated  Medication List - Protect others around you: Learn how to safely use, store and throw away your medicines at www.disposemymeds.org.          This list is accurate as of: 12/20/17  3:35 PM.  Always use your most recent med list.                   Brand Name Dispense Instructions for use Diagnosis    desonide 0.05 % ointment    DESOWEN    60 g    To affected areas of the face for itch and red bumps twice a day as needed.    Dermatitis, seborrheic       esomeprazole 40 MG CR capsule    nexIUM    90 capsule    Take 1 capsule (40 mg) by mouth every morning (before breakfast) Take 30-60 minutes before eating.    Esophageal reflux       gentamicin 0.008% in 1000ml 0.9% NaCl Soln nasal irrigation    GARAMYCIN    1000 mL    Spray 30 mLs in nostril 2 times daily Irrigate 30 mL between each nostril BID for a total of 60ml/day PLEASE MAIL TO PATIENT    Chronic pansinusitis       hydrocortisone 25 MG Suppository    ANUSOL-HC    28 suppository    Place 1 suppository (25 mg) rectally 2 times daily    Internal hemorrhoids       ketoconazole 2 % shampoo    NIZORAL    240 mL    To entire wet scalp and face in affected areas and then wash off after several minutes three times a week.    Dermatitis, seborrheic       lidocaine 2 % topical gel    XYLOCAINE     Apply 1 Tube topically        METAMUCIL PO      Take 3 tsp by mouth 2 times daily.        * nicotine polacrilex 2 MG gum    NICORETTE     Place 2 mg inside cheek as needed for smoking cessation        * nicotine polacrilex 4 MG lozenge     24 tablet    Use up to 4 times a day as needed    Encounter for smoking cessation counseling       RA FIBER- MG tablet   Generic drug:  calcium polycarbophil      Take 3 tablets by mouth twice daily        REMERON PO      Take 15 mg by mouth At Bedtime        RISPERDAL PO      Take 3 mg by mouth At Bedtime        sildenafil 20 MG tablet    REVATIO    90 tablet    Take 1 to 5 tabs as needed for sexual activity.    Other male erectile  dysfunction       traZODone 50 MG tablet    DESYREL     Take 50 mg by mouth At Bedtime        Urea 20 % Crea cream     60 g    To affected, ridged nails daily.    Dystrophic nail       * Notice:  This list has 2 medication(s) that are the same as other medications prescribed for you. Read the directions carefully, and ask your doctor or other care provider to review them with you.

## 2018-01-05 ENCOUNTER — OFFICE VISIT (OUTPATIENT)
Dept: PHARMACY | Facility: CLINIC | Age: 63
End: 2018-01-05
Payer: COMMERCIAL

## 2018-01-05 DIAGNOSIS — Z71.6 ENCOUNTER FOR SMOKING CESSATION COUNSELING: Primary | ICD-10-CM

## 2018-01-05 PROCEDURE — 99207 ZZC NO CHARGE LOS: CPT | Mod: GY | Performed by: PHARMACIST

## 2018-01-05 RX ORDER — NICOTINE 21 MG/24HR
1 PATCH, TRANSDERMAL 24 HOURS TRANSDERMAL EVERY 24 HOURS
Qty: 30 PATCH | Refills: 2 | Status: SHIPPED | OUTPATIENT
Start: 2018-01-05 | End: 2018-11-23

## 2018-01-05 NOTE — MR AVS SNAPSHOT
After Visit Summary   1/5/2018    Russ Agee    MRN: [id number]           Patient Information     Date Of Birth          1955        Visit Information        Provider Department      1/5/2018 2:00 PM Alejandrina Ji RPH [facility] Medication Therapy Management        Today's Diagnoses     Encounter for smoking cessation counseling    -  1       Follow-ups after your visit        Your next 10 appointments already scheduled     Feb 27, 2018 10:00 AM CST   (Arrive by 9:45 AM)   Return Visit with Gerardo Trotter MD   [facility] Urology and [facility] for Prostate and Urologic Cancers ([facility])    [address] [place] [id number] [phone]            Feb 28, 2018  2:05 PM CST   (Arrive by 1:50 PM)   Return Visit with Donnie Haque MD   [facility] Primary Care Clinic ([facility])    [address] [place] [id number] [phone]              Who to contact     If you have questions or need follow up information about today's clinic visit or your schedule please contact [facility] THERAPY MANAGEMENT directly at [phone].  Normal or non-critical lab and imaging results will be communicated to you by MyChart, letter or phone within 4 business days after the clinic has received the results. If you do not hear from us within 7 days, please contact the clinic through MyChart or phone. If you have a critical or abnormal lab result, we will notify you by phone as soon as possible.  Submit refill requests through [employer] or call your pharmacy and they will forward the refill request to us. Please allow 3 business days for your refill to be completed.          Additional Information About Your Visit        People and Pageshart Information     [employer] lets you send messages to your doctor, view your test results, renew your prescriptions, schedule appointments and more. To sign up, go to  [email or web address] . Click on \"Log in\" on the left side of the screen, which will take you to the Welcome page. Then click on \"Sign up Now\" on the right side of the page.     You will be asked to enter the access code listed below, as well as some personal information. Please follow the directions to create your username and password.     Your access code is: [email or web address]  Expires: 3/6/2018  6:30 AM     Your access code will  in 90 days. If you need help or a new code, please call your [facility] clinic or [phone].        Care EveryWhere ID     This is your Care EveryWhere ID. This could be used by other organizations to access your [facility] medical records  [id number]         Blood Pressure from Last 3 Encounters:   17 121/75   17 132/76   17 132/80    Weight from Last 3 Encounters:   17 173 lb 12.8 oz (78.8 kg)   17 174 lb (78.9 kg)   17 174 lb (78.9 kg)              Today, you had the following     No orders found for display         Today's Medication Changes          These changes are accurate as of: 18  2:39 PM.  If you have any questions, ask your nurse or doctor.               Start taking these medicines.        Dose/Directions    nicotine 14 MG/24HR 24 hr patch   Commonly known as:  NICODERM CQ   Used for:  Encounter for smoking cessation counseling   Started by:  Alejandrina Ji RPH        Dose:  1 patch   Place 1 patch onto the skin every 24 hours   Quantity:  30 patch   Refills:  2            Where to get your medicines      These medications were sent to [id number] - [place], [place] - [address] [facility] [address] [facility]  [address] [place] [address]     Phone:  [phone]     nicotine 14 MG/24HR 24 hr patch                Primary Care Provider Office Phone # Fax #    Donnie Haque [phone]       [address] [address]        Equal Access to Services     MEREDITH LEÓN AH: Natasha hurtado " stuart Bourne, wavidhyada luqadaha, qaybta kastefanyda darek, machelle sawant jonatannelly vanessagavino labrunogillian brandi. So [facility] [place] [phone].    ATENCIÓN: Si antonietala ray, tiene a french disposición servicios gratuitos de asistencia lingüística. Grace al [phone].    We comply with applicable federal civil rights laws and [place] laws. We do not discriminate on the basis of race, color, national origin, age, disability, sex, sexual orientation, or gender identity.            Thank you!     Thank you for choosing [facility] MEDICATION THERAPY MANAGEMENT  for your care. Our goal is always to provide you with excellent care. Hearing back from our patients is one way we can continue to improve our services. Please take a few minutes to complete the written survey that you may receive in the mail after your visit with us. Thank you!             Your Updated Medication List - Protect others around you: Learn how to safely use, store and throw away your medicines at www.disposemymeds.org.          This list is accurate as of: 1/5/18  2:39 PM.  Always use your most recent med list.                   Brand Name Dispense Instructions for use Diagnosis    desonide 0.05 % ointment    DESOWEN    60 g    To affected areas of the face for itch and red bumps twice a day as needed.    Dermatitis, seborrheic       esomeprazole 40 MG CR capsule    nexIUM    90 capsule    Take 1 capsule (40 mg) by mouth every morning (before breakfast) Take 30-60 minutes before eating.    Esophageal reflux       gentamicin 0.008% in 1000ml 0.9% NaCl Soln nasal irrigation    GARAMYCIN    1000 mL    Spray 30 mLs in nostril 2 times daily Irrigate 30 mL between each nostril BID for a total of 60ml/day PLEASE MAIL TO PATIENT    Chronic pansinusitis       hydrocortisone 25 MG Suppository    ANUSOL-HC    28 suppository    Place 1 suppository (25 mg) rectally 2 times daily    Internal hemorrhoids       ketoconazole 2 % shampoo    NIZORAL    240 mL    To entire wet scalp and  face in affected areas and then wash off after several minutes three times a week.    Dermatitis, seborrheic       lidocaine 2 % topical gel    XYLOCAINE     Apply 1 Tube topically        METAMUCIL PO      Take 3 tsp by mouth 2 times daily.        nicotine 14 MG/24HR 24 hr patch    NICODERM CQ    30 patch    Place 1 patch onto the skin every 24 hours    Encounter for smoking cessation counseling       nicotine polacrilex 2 MG gum    NICORETTE     Place 2 mg inside cheek as needed for smoking cessation        RA FIBER- MG tablet   Generic drug:  calcium polycarbophil      Take 3 tablets by mouth twice daily        REMERON PO      Take 15 mg by mouth At Bedtime        RISPERDAL PO      Take 3 mg by mouth At Bedtime        sildenafil 20 MG tablet    REVATIO    90 tablet    Take 1 to 5 tabs as needed for sexual activity.    Other male erectile dysfunction       traZODone 50 MG tablet    DESYREL     Take 50 mg by mouth At Bedtime        Urea 20 % Crea cream     60 g    To affected, ridged nails daily.    Dystrophic nail

## 2018-01-09 ENCOUNTER — TELEPHONE (OUTPATIENT)
Dept: PHARMACY | Facility: CLINIC | Age: 63
End: 2018-01-09

## 2018-01-09 NOTE — TELEPHONE ENCOUNTER
I called to follow-up with Anuel today regarding his nicotine patches.  I called [facility] to find out why they were not covered.  It turns out that they do not have his [facility] prescription insurance card on file.  Anuel is instructed to bring that card to the pharmacy so it can be applied to his prescriptions going forward.  I will check in at the end of the week to see if any progress has been made.      Alejandrina Ji, Pharm.D., [facility]  Medication Therapy Management Pharmacist  Page/VM:  [phone]

## 2018-01-12 ENCOUNTER — TELEPHONE (OUTPATIENT)
Dept: PHARMACY | Facility: CLINIC | Age: 63
End: 2018-01-12

## 2018-01-12 NOTE — TELEPHONE ENCOUNTER
I called Anuel to see if he was able to present his [facility] care to the pharmacy.  He was able to and his nicotine patches are now covered.  He is wearing them and is still smoke free.  He likes the patches a lot better than the gum he was chewing.  I will call in 2 weeks to see how he is managing with smoking cessation.      Alejandrina Ji, Pharm.D., [facility]  Medication Therapy Management Pharmacist  Page/VM:  [phone]

## 2018-01-18 ENCOUNTER — TELEPHONE (OUTPATIENT)
Dept: UROLOGY | Facility: CLINIC | Age: 63
End: 2018-01-18

## 2018-01-18 ENCOUNTER — PRE VISIT (OUTPATIENT)
Dept: UROLOGY | Facility: CLINIC | Age: 63
End: 2018-01-18

## 2018-01-18 ENCOUNTER — TELEPHONE (OUTPATIENT)
Dept: INTERNAL MEDICINE | Facility: CLINIC | Age: 63
End: 2018-01-18

## 2018-01-18 NOTE — TELEPHONE ENCOUNTER
Patients home nurse called and stated that he is having a great difficulty urinating  A lot of burning and only drops come out.  Told him to come in for uauc and pvr  We need to be sure he is emptying if not call [facility] for orders . Tonya Bowman LPN Staff Nurse

## 2018-01-18 NOTE — TELEPHONE ENCOUNTER
----- Message from [place] sent at 1/18/2018  2:32 PM CST -----  Regarding: Rx request  Contact: [phone]  Precious (RN case manager)  [phone]    She reports Pt has the flu, is coughing, productive with phlegm.     Pt wants robitussin ordered for him. Wondering if you can help them with this. Would like a call back.     Message left for Precious [facility] to call back.  Chinyere Lyman RN 3:13 PM on 1/18/2018.

## 2018-01-26 ENCOUNTER — CARE COORDINATION (OUTPATIENT)
Dept: UROLOGY | Facility: CLINIC | Age: 63
End: 2018-01-26

## 2018-01-26 ENCOUNTER — TELEPHONE (OUTPATIENT)
Dept: PHARMACY | Facility: CLINIC | Age: 63
End: 2018-01-26

## 2018-01-26 DIAGNOSIS — R30.0 DYSURIA: ICD-10-CM

## 2018-01-26 DIAGNOSIS — R30.0 DYSURIA: Primary | ICD-10-CM

## 2018-01-26 LAB
ALBUMIN UR-MCNC: NEGATIVE MG/DL
APPEARANCE UR: CLEAR
BILIRUB UR QL STRIP: NEGATIVE
COLOR UR AUTO: YELLOW
GLUCOSE UR STRIP-MCNC: NEGATIVE MG/DL
HGB UR QL STRIP: ABNORMAL
KETONES UR STRIP-MCNC: NEGATIVE MG/DL
LEUKOCYTE ESTERASE UR QL STRIP: ABNORMAL
MUCOUS THREADS #/AREA URNS LPF: PRESENT /LPF
NITRATE UR QL: NEGATIVE
PH UR STRIP: 5 PH (ref 5–7)
RBC #/AREA URNS AUTO: 1 /HPF (ref 0–2)
SOURCE: ABNORMAL
SP GR UR STRIP: 1.01 (ref 1–1.03)
UROBILINOGEN UR STRIP-MCNC: 0 MG/DL (ref 0–2)
WBC #/AREA URNS AUTO: 5 /HPF (ref 0–2)

## 2018-01-26 PROCEDURE — 87086 URINE CULTURE/COLONY COUNT: CPT | Performed by: UROLOGY

## 2018-01-26 NOTE — PROGRESS NOTES
Patient called in today with reports of burning and urgency.  Patient to come in for UA/UC today. Lab appt set up.  Will contact patient with results. Patient states understanding.

## 2018-01-26 NOTE — TELEPHONE ENCOUNTER
I call Anuel today to check in on smoking today. He tells me that he is sick currently and that he had a surgery that didn't work well. He notes that his patch is not working as well and he is struggling but supplementing with nicotine gum. He is still smoke free today.  He intends to keep using both the patch and the gum together to help with his cravings.  He is uncertain why the patch isn't working as well.  We both guess that perhaps it has to do with the stress of his surgery not going as planned recently.  I will follow-up with Anuel in 4 weeks to see how he is doing with smoking cessation.     Alejandrina Ji, Pharm.D., BCACP  Medication Therapy Management Pharmacist  Page/VM:  [phone]

## 2018-01-27 LAB
BACTERIA SPEC CULT: NO GROWTH
Lab: NORMAL
SPECIMEN SOURCE: NORMAL

## 2018-01-29 ENCOUNTER — TELEPHONE (OUTPATIENT)
Dept: INTERNAL MEDICINE | Facility: CLINIC | Age: 63
End: 2018-01-29

## 2018-01-29 NOTE — TELEPHONE ENCOUNTER
----- Message from [place] sent at 1/26/2018  9:51 AM CST -----  Regarding: Home care request/question   Contact: [phone]  Rodriguez with [facility]     Pt is requesting additional home care services.  He is wondering if that is needed.    Need to know prognosis and what would be helpful for patient to have home care assistance for.       Left message for Rodriguez for [facility] to call back.  Chinyere Lyman RN 11:04 AM on 1/29/2018.

## 2018-01-30 ENCOUNTER — MEDICAL CORRESPONDENCE (OUTPATIENT)
Dept: HEALTH INFORMATION MANAGEMENT | Facility: CLINIC | Age: 63
End: 2018-01-30

## 2018-02-05 ENCOUNTER — PRE VISIT (OUTPATIENT)
Dept: UROLOGY | Facility: CLINIC | Age: 63
End: 2018-02-05

## 2018-02-08 ENCOUNTER — MEDICAL CORRESPONDENCE (OUTPATIENT)
Dept: HEALTH INFORMATION MANAGEMENT | Facility: CLINIC | Age: 63
End: 2018-02-08

## 2018-02-27 ENCOUNTER — OFFICE VISIT (OUTPATIENT)
Dept: UROLOGY | Facility: CLINIC | Age: 63
End: 2018-02-27
Payer: MEDICARE

## 2018-02-27 VITALS
BODY MASS INDEX: 24.36 KG/M2 | HEART RATE: 62 BPM | WEIGHT: 174 LBS | HEIGHT: 71 IN | SYSTOLIC BLOOD PRESSURE: 115 MMHG | DIASTOLIC BLOOD PRESSURE: 65 MMHG

## 2018-02-27 DIAGNOSIS — N40.0 BENIGN PROSTATIC HYPERPLASIA, UNSPECIFIED WHETHER LOWER URINARY TRACT SYMPTOMS PRESENT: Primary | ICD-10-CM

## 2018-02-27 ASSESSMENT — PAIN SCALES - GENERAL
PAINLEVEL: NO PAIN (0)
PAINLEVEL: NO PAIN (0)

## 2018-02-27 NOTE — LETTER
2/27/2018       RE: Russ Agee  [address]  [place] [address]     Dear Colleague,    Thank you for referring your patient, Russ Agee, to the [facility] UROLOGY AND INST FOR PROSTATE AND UROLOGIC CANCERS at [facility]. Please see a copy of my visit note below.    Service Date: 02/27/2018      REASON FOR VISIT TODAY:  Cystoscopy for lower urinary tract symptoms.      HISTORY OF PRESENT ILLNESS:  Mr. Agee is a 62-year-old gentleman followed in our clinic for history of lower urinary tract symptoms.  The patient underwent a Rezum water vapor treatment 3 months ago on 11/27/2017.  The patient presents today for cystoscopy for further evaluation.  Of note, the patient was also diagnosed with 1 core of Radha 3+3 equals 6 prostate cancer back in 2009 for which he is on active surveillance.      After informed consent was obtained, the patient was prepped and draped in standard sterile fashion.  A flexible cystoscope was introduced into the patient's bladder without difficulty.  Inspection of the bladder revealed orthotopic UOs.  There were no mucosal lesions, stones or foreign objects in the bladder.  Upon retroflexion, there was still some nodular prostate tissue growing up into the bladder to some extent.  The surface of this did appear to be somewhat denuded.  Pulling the scope back into the prostatic fossa, there was still some lateral lobe hypertrophy, though there was clearly a defect along the floor of the right distal aspect of the right lateral lobe as well as some still necrotic-appearing material in the left lateral lobe as well.      The patient's AUA symptom score today was measured at 29/35 with a bothersome index of 4.  BRENDA of 10 today.      ASSESSMENT AND PLAN:  Mr. Agee reports that he finds his urinary symptoms are markedly better than they were prior.  The patient had trouble initiating his urination prior to the Rezum treatment but notes it is very  easy to go now, and in fact, his complaint currently is some degree of urgency and urge incontinence occasionally.  I counseled the patient that he can help minimize this with timed voiding, voiding every 2-3 hours whether he feels the need to void or not.  Further, we hope that any residual irritation of the bladder from the procedure will be gone shortly as well.  Mr. Mc reports that he is very happy with his urination right now and that things are quite well.  Interestingly, the patient's AUA symptom score does not reflect this, and again we wonder if there is a language barrier with the AUA symptom score or perhaps whether the patient is describing things to me more favorably than they are.  In any case, there is no further indication for intervention right now.  The patient's postvoid residual today was measured at 33 cc and so again no indication for further intervention.  We will plan to see the patient back in 9 months from now, which will be 1 year from his procedure to further assess his urination with another AUA symptom score and a postvoid residual check.  Mr. Mc is in agreement with the plan.         ERICKA PARNELL MD             D: 2018   T: 2018   MT: cecil      Name:     TONIA MC   MRN:      [id number]        Account:      [id number]   :      1955           Service Date: 2018      Document: [id number]

## 2018-02-27 NOTE — NURSING NOTE
Invasive Procedure Safety Checklist:    Procedure: cysto     NDC [id number] LOT [id number] EXP 10-19    Action: Complete sections and checkboxes as appropriate.    Pre-procedure:  1. Patient ID Verified with 2 identifiers (Julia and  or MRN) : YES    2. Procedure and site verified with patient/designee (when able) : YES    3. Accurate consent documentation in medical record : YES    4. H&P (or appropriate assessment) documented in medical record : NO  H&P must be up to 30 days prior to procedure an updated within 24 hours of                 Procedure as applicable.     5. Relevant diagnostic and radiology test results appropriately labeled and displayed as applicable : YES    6. Blood products, implants, devices, and/or special equipment available for the procedure as applicable : YES    7. Procedure site(s) marked with provider initials [Exclusions: none] : NO    8. Marking not required. Reason : Yes  Procedure does not require site marking    Time Out:     Time-Out performed immediately prior to starting procedure, including verbal and active participation of all team members addressing: YES    1. Correct patient identity.  2. Confirmed that the correct side and site are marked.  3. An accurate procedure to be done.  4. Agreement on the procedure to be done.  5. Correct patient position.  6. Relevant images and results are properly labeled and appropriately displayed.  7. The need to administer antibiotics or fluids for irrigation purposes during the procedure as applicable.  8. Safety precautions based on patient history or medication use.    During Procedure: Verification of correct person, site, and procedure occurs any time the responsibility for care of the patient is transferred to another member of the care team.

## 2018-02-27 NOTE — MR AVS SNAPSHOT
After Visit Summary   2/27/2018    Russ Agee    MRN: [id number]           Patient Information     Date Of Birth          1955        Visit Information        Provider Department      2/27/2018 10:00 AM Gerardo Trotter MD [facility] Urology and [facility] for Prostate and Urologic Cancers        Today's Diagnoses     Benign prostatic hyperplasia, unspecified whether lower urinary tract symptoms present    -  1       Follow-ups after your visit        Your next 10 appointments already scheduled     Mar 07, 2018  1:00 PM CST   US ABDOMEN COMPLETE with UCUS3   [facility] US ([facility])    [address] [facility] [address] [phone]           Please bring a list of your medicines (including vitamins, minerals and over-the-counter drugs). Also, tell your doctor about any allergies you may have. Wear comfortable clothes and leave your valuables at home.  Adults: No eating or drinking for 8 hours before the exam. You may take medicine with a small sip of water.  Children: - Children 6+ years: No food or drink for 6 hours before exam. - Children 1-5 years: No food or drink for 4 hours before exam. - Infants, breast-fed: may have breast milk up to 2 hours before exam. - Infants, formula: may have bottle until 4 hours before exam.  Please call the Imaging Department at your exam site with any questions.            Mar 14, 2018  2:20 PM CDT   (Arrive by 2:05 PM)   ENRIQUE Extremity with Phylicia Piedra PT   [facility] ([facility])    [address] [place] [address] [phone]            Mar 28, 2018  2:35 PM CDT   (Arrive by 2:20 PM)   Return Visit with Donnie Haque MD   [facility] Primary Care Clinic ([facility])    [address] [facility] [address] [phone]              Who to contact     Please call your clinic at [phone]  "to:    Ask questions about your health    Make or cancel appointments    Discuss your medicines    Learn about your test results    Speak to your doctor            Additional Information About Your Visit        MyChart Information     [employer] is an electronic gateway that provides easy, online access to your medical records. With [employer], you can request a clinic appointment, read your test results, renew a prescription or communicate with your care team.     To sign up for [employer] visit the website at [email or web address]   You will be asked to enter the access code listed below, as well as some personal information. Please follow the directions to create your username and password.     Your access code is: [email or web address]  Expires: 3/6/2018  6:30 AM     Your access code will  in 90 days. If you need help or a new code, please contact your [facility] or call [phone] for assistance.        Care EveryWhere ID     This is your Care EveryWhere ID. This could be used by other organizations to access your [place] medical records  [id number]        Your Vitals Were     Pulse Height BMI (Body Mass Index)             62 1.803 m (5' 11\") 24.27 kg/m2          Blood Pressure from Last 3 Encounters:   18 111/70   18 115/65   17 121/75    Weight from Last 3 Encounters:   18 77.7 kg (171 lb 4.8 oz)   18 78.9 kg (174 lb)   17 78.8 kg (173 lb 12.8 oz)              We Performed the Following     CYSTOURETHROSCOPY     POST-VOID RESIDUAL BLADDER SCAN        Primary Care Provider Office Phone # Fax #    Donnie Haque [phone] [address] [address]        Equal Access to Services     MEREDITH LEÓN : Natasha Bourne, michael vanegas, adeline oswald, machelle paz. So [facility] [phone].    ATENCIÓN: Si habla español, tiene a french disposición servicios " sandra de asistencia lingüística. Grace yun [phone].    We comply with applicable federal civil rights laws and [place] laws. We do not discriminate on the basis of race, color, national origin, age, disability, sex, sexual orientation, or gender identity.            Thank you!     Thank you for choosing [facility] UROLOGY AND [facility] FOR PROSTATE AND UROLOGIC CANCERS  for your care. Our goal is always to provide you with excellent care. Hearing back from our patients is one way we can continue to improve our services. Please take a few minutes to complete the written survey that you may receive in the mail after your visit with us. Thank you!             Your Updated Medication List - Protect others around you: Learn how to safely use, store and throw away your medicines at www.disposemymeds.org.          This list is accurate as of 2/27/18 11:59 PM.  Always use your most recent med list.                   Brand Name Dispense Instructions for use Diagnosis    desonide 0.05 % ointment    DESOWEN    60 g    To affected areas of the face for itch and red bumps twice a day as needed.    Dermatitis, seborrheic       esomeprazole 40 MG CR capsule    nexIUM    90 capsule    Take 1 capsule (40 mg) by mouth every morning (before breakfast) Take 30-60 minutes before eating.    Esophageal reflux       gentamicin 0.008% in 1000ml 0.9% NaCl Soln nasal irrigation    GARAMYCIN    1000 mL    Spray 30 mLs in nostril 2 times daily Irrigate 30 mL between each nostril BID for a total of 60ml/day PLEASE MAIL TO PATIENT    Chronic pansinusitis       hydrocortisone 25 MG Suppository    ANUSOL-HC    28 suppository    Place 1 suppository (25 mg) rectally 2 times daily    Internal hemorrhoids       ketoconazole 2 % shampoo    NIZORAL    240 mL    To entire wet scalp and face in affected areas and then wash off after several minutes three times a week.    Dermatitis, seborrheic       lidocaine 2 % topical gel    XYLOCAINE     Apply 1  Tube topically        METAMUCIL PO      Take 3 tsp by mouth 2 times daily.        nicotine 14 MG/24HR 24 hr patch    NICODERM CQ    30 patch    Place 1 patch onto the skin every 24 hours    Encounter for smoking cessation counseling       nicotine polacrilex 2 MG gum    NICORETTE     Place 2 mg inside cheek as needed for smoking cessation        RA FIBER- MG tablet   Generic drug:  calcium polycarbophil      Take 3 tablets by mouth twice daily        REMERON PO      Take 15 mg by mouth At Bedtime        RISPERDAL PO      Take 3 mg by mouth At Bedtime        sildenafil 20 MG tablet    REVATIO    90 tablet    Take 1 to 5 tabs as needed for sexual activity.    Other male erectile dysfunction       traZODone 50 MG tablet    DESYREL     Take 50 mg by mouth At Bedtime        Urea 20 % Crea cream     60 g    To affected, ridged nails daily.    Dystrophic nail

## 2018-02-28 ENCOUNTER — OFFICE VISIT (OUTPATIENT)
Dept: INTERNAL MEDICINE | Facility: CLINIC | Age: 63
End: 2018-02-28
Payer: MEDICARE

## 2018-02-28 VITALS
BODY MASS INDEX: 23.89 KG/M2 | DIASTOLIC BLOOD PRESSURE: 70 MMHG | WEIGHT: 171.3 LBS | HEART RATE: 62 BPM | SYSTOLIC BLOOD PRESSURE: 111 MMHG

## 2018-02-28 DIAGNOSIS — G89.29 CHRONIC PAIN OF BOTH SHOULDERS: ICD-10-CM

## 2018-02-28 DIAGNOSIS — B18.2 CHRONIC HEPATITIS C WITHOUT HEPATIC COMA (H): Primary | ICD-10-CM

## 2018-02-28 DIAGNOSIS — B18.2 CHRONIC HEPATITIS C WITHOUT HEPATIC COMA (H): ICD-10-CM

## 2018-02-28 DIAGNOSIS — M25.511 CHRONIC PAIN OF BOTH SHOULDERS: ICD-10-CM

## 2018-02-28 DIAGNOSIS — M25.512 CHRONIC PAIN OF BOTH SHOULDERS: ICD-10-CM

## 2018-02-28 LAB
ALBUMIN SERPL-MCNC: 3.8 G/DL (ref 3.4–5)
ALP SERPL-CCNC: 61 U/L (ref 40–150)
ALT SERPL W P-5'-P-CCNC: 13 U/L (ref 0–70)
ANION GAP SERPL CALCULATED.3IONS-SCNC: 6 MMOL/L (ref 3–14)
AST SERPL W P-5'-P-CCNC: 14 U/L (ref 0–45)
BASOPHILS # BLD AUTO: 0 10E9/L (ref 0–0.2)
BASOPHILS NFR BLD AUTO: 0.4 %
BILIRUB SERPL-MCNC: 0.3 MG/DL (ref 0.2–1.3)
BUN SERPL-MCNC: 11 MG/DL (ref 7–30)
CALCIUM SERPL-MCNC: 9 MG/DL (ref 8.5–10.1)
CHLORIDE SERPL-SCNC: 104 MMOL/L (ref 94–109)
CO2 SERPL-SCNC: 27 MMOL/L (ref 20–32)
CREAT SERPL-MCNC: 0.91 MG/DL (ref 0.66–1.25)
DIFFERENTIAL METHOD BLD: ABNORMAL
EOSINOPHIL # BLD AUTO: 0.1 10E9/L (ref 0–0.7)
EOSINOPHIL NFR BLD AUTO: 2.5 %
ERYTHROCYTE [DISTWIDTH] IN BLOOD BY AUTOMATED COUNT: 12.6 % (ref 10–15)
GFR SERPL CREATININE-BSD FRML MDRD: 85 ML/MIN/1.7M2
GLUCOSE SERPL-MCNC: 111 MG/DL (ref 70–99)
HCT VFR BLD AUTO: 39.2 % (ref 40–53)
HGB BLD-MCNC: 12.8 G/DL (ref 13.3–17.7)
IMM GRANULOCYTES # BLD: 0 10E9/L (ref 0–0.4)
IMM GRANULOCYTES NFR BLD: 0.2 %
LYMPHOCYTES # BLD AUTO: 1.5 10E9/L (ref 0.8–5.3)
LYMPHOCYTES NFR BLD AUTO: 28.4 %
MCH RBC QN AUTO: 31.5 PG (ref 26.5–33)
MCHC RBC AUTO-ENTMCNC: 32.7 G/DL (ref 31.5–36.5)
MCV RBC AUTO: 97 FL (ref 78–100)
MONOCYTES # BLD AUTO: 0.6 10E9/L (ref 0–1.3)
MONOCYTES NFR BLD AUTO: 10.9 %
NEUTROPHILS # BLD AUTO: 3 10E9/L (ref 1.6–8.3)
NEUTROPHILS NFR BLD AUTO: 57.6 %
NRBC # BLD AUTO: 0 10*3/UL
NRBC BLD AUTO-RTO: 0 /100
PLATELET # BLD AUTO: 142 10E9/L (ref 150–450)
POTASSIUM SERPL-SCNC: 4.2 MMOL/L (ref 3.4–5.3)
PROT SERPL-MCNC: 7.4 G/DL (ref 6.8–8.8)
RBC # BLD AUTO: 4.06 10E12/L (ref 4.4–5.9)
SODIUM SERPL-SCNC: 137 MMOL/L (ref 133–144)
WBC # BLD AUTO: 5.2 10E9/L (ref 4–11)

## 2018-02-28 PROCEDURE — 87522 HEPATITIS C REVRS TRNSCRPJ: CPT | Performed by: INTERNAL MEDICINE

## 2018-02-28 PROCEDURE — G0499 HEPB SCREEN HIGH RISK INDIV: HCPCS | Performed by: INTERNAL MEDICINE

## 2018-02-28 PROCEDURE — 86704 HEP B CORE ANTIBODY TOTAL: CPT | Performed by: INTERNAL MEDICINE

## 2018-02-28 PROCEDURE — 86706 HEP B SURFACE ANTIBODY: CPT | Performed by: INTERNAL MEDICINE

## 2018-02-28 ASSESSMENT — PAIN SCALES - GENERAL: PAINLEVEL: MODERATE PAIN (4)

## 2018-02-28 NOTE — PROGRESS NOTES
Service Date: 02/27/2018      REASON FOR VISIT TODAY:  Cystoscopy for lower urinary tract symptoms.      HISTORY OF PRESENT ILLNESS:  Mr. Agee is a 62-year-old gentleman followed in our clinic for history of lower urinary tract symptoms.  The patient underwent a Rezum water vapor treatment 3 months ago on 11/27/2017.  The patient presents today for cystoscopy for further evaluation.  Of note, the patient was also diagnosed with 1 core of [place] 3+3 equals 6 prostate cancer back in 2009 for which he is on active surveillance.      After informed consent was obtained, the patient was prepped and draped in standard sterile fashion.  A flexible cystoscope was introduced into the patient's bladder without difficulty.  Inspection of the bladder revealed orthotopic UOs.  There were no mucosal lesions, stones or foreign objects in the bladder.  Upon retroflexion, there was still some nodular prostate tissue growing up into the bladder to some extent.  The surface of this did appear to be somewhat denuded.  Pulling the scope back into the prostatic fossa, there was still some lateral lobe hypertrophy, though there was clearly a defect along the floor of the right distal aspect of the right lateral lobe as well as some still necrotic-appearing material in the left lateral lobe as well.      The patient's AUA symptom score today was measured at 29/35 with a bothersome index of 4.  BRENDA of 10 today.      ASSESSMENT AND PLAN:  Mr. Agee reports that he finds his urinary symptoms are markedly better than they were prior.  The patient had trouble initiating his urination prior to the Rezum treatment but notes it is very easy to go now, and in fact, his complaint currently is some degree of urgency and urge incontinence occasionally.  I counseled the patient that he can help minimize this with timed voiding, voiding every 2-3 hours whether he feels the need to void or not.  Further, we hope that any residual irritation of the  bladder from the procedure will be gone shortly as well.  Mr. Mc reports that he is very happy with his urination right now and that things are quite well.  Interestingly, the patient's AUA symptom score does not reflect this, and again we wonder if there is a language barrier with the AUA symptom score or perhaps whether the patient is describing things to me more favorably than they are.  In any case, there is no further indication for intervention right now.  The patient's postvoid residual today was measured at 33 cc and so again no indication for further intervention.  We will plan to see the patient back in 9 months from now, which will be 1 year from his procedure to further assess his urination with another AUA symptom score and a postvoid residual check.  Mr. Mc is in agreement with the plan.         ERICKA PARNELL MD             D: 2018   T: 2018   MT: cecil      Name:     TONIA MC   MRN:      [id number]        Account:      [id number]   :      1955           Service Date: 2018      Document: [id number]

## 2018-02-28 NOTE — PROGRESS NOTES
Subjective:  Russ is a pleasant 59 year-old man who came in today for follow up today. .  He had colonoscopy 11/13/15 repeat in 5 years. He had upper EGD and EUS with Dr. Acuna, GI 12/16/15 with gastric polyp bx.  He had fairly unremarkable Holter Monitor 1/6/16.   He was 2/16/16 in Dermatology. He would like to stop smoking. He has tried before. Smoking for about 30 years.   Still some B shoulder pain     Objective:  General appearance: NAD, cooperative, alert.  HEENT; negative   Cardio: Normal rate and rhythm, no murmurs  Respiratory: Normal breath sounds  Abdomen: Non-tender  Neurological exam: B UE/LE/proximal/distal is normal and symmetric for sensation, reflexes, and strength.  Some slight tenderness to palpation R anterior shoulder.     Assessment and Plan:    (1) Seen by Ms. Ji, 1/5/2018 MT for smoking cessation  (2) He will continue to follow up in GI for Hep C management. His last Hep C viral load 12/1/15 was undetectable. Will recheck labs Hep C viral load and Hep B and get abdominal U/S   (3) He was seen by Dr. Trotter Urology yesterday 2/27/2018  for elevated PSA and prostate cancer/BPH  (4) He was seen 10/30/17 by Dr. Delaney for neck complaints and sinus issues  (5) He was seen Dr. Naranjo Cardiology for ongoing palpitations 4/13/16.  (6) Reflux changed to Nexium;  EGD 10/15  (7) He states he sees his Psychiatry doctor once a  Month in [place].   (8) Thyroid nodules seen on Cervical MRI; thyroid U/S; seen in  Endocrine 9/7/17 bx. Was benign   (9) Immunizations done 9/26/17 for Td and influenza. New Zoster Vaccine when available.   (10) Will get PT for B shoulder pain. He was seen by Dr. Melvin, ortho 6/2017 and had imaging.               Total time spent 25 minutes.  More than 50% of the time spent with Mr. Agee on counseling / coordinating his care

## 2018-02-28 NOTE — MR AVS SNAPSHOT
After Visit Summary   2/28/2018    Russ Agee    MRN: [id number]           Patient Information     Date Of Birth          1955        Visit Information        Provider Department      2/28/2018 2:05 PM Donnie Haque MD [facility]        Today's Diagnoses     Chronic hepatitis C without hepatic coma (H)    -  1    Chronic pain of both shoulders           Follow-ups after your visit        Additional Services     PHYSICAL THERAPY REFERRAL       B shoulder pain                  Your next 10 appointments already scheduled     Feb 28, 2018  3:00 PM CST   LAB with  LAB   [facility] ([facility])    [address] [place] [id number] [phone]           Please do not eat 10-12 hours before your appointment if you are coming in fasting for labs on lipids, cholesterol, or glucose (sugar). This does not apply to pregnant women. Water, hot tea and black coffee (with nothing added) are okay. Do not drink other fluids, diet soda or chew gum.            Mar 07, 2018  1:00 PM CST   US ABDOMEN COMPLETE with UCUS3   [facility] ([facility])    [address] [place] [address] [id number] [phone]           Please bring a list of your medicines (including vitamins, minerals and over-the-counter drugs). Also, tell your doctor about any allergies you may have. Wear comfortable clothes and leave your valuables at home.  Adults: No eating or drinking for 8 hours before the exam. You may take medicine with a small sip of water.  Children: - Children 6+ years: No food or drink for 6 hours before exam. - Children 1-5 years: No food or drink for 4 hours before exam. - Infants, breast-fed: may have breast milk up to 2 hours before exam. - Infants, formula: may have bottle until 4 hours before exam.  Please call the Imaging Department at your exam site with any questions.            Mar  2018  2:35 PM CDT   (Arrive by 2:20 PM)   Return Visit with Donnie Haque MD   [facility] ([email or web address] and [facility] [place])    [address] [address] [phone]              Future tests that were ordered for you today     Open Future Orders        Priority Expected Expires Ordered    CBC with platelets differential Routine 2018 3/14/2018 2018    Hepatitis B Surface Antibody Routine 2018    Hepatitis B core antibody Routine 2018    Hepatitis B surface antigen Routine 2018    US Abdomen complete Routine  2019    Hepatitis C RNA quantitative Routine 2018    Comprehensive metabolic panel Routine 2018 3/14/2018 2018            Who to contact     Please call your clinic at [phone] to:    Ask questions about your health    Make or cancel appointments    Discuss your medicines    Learn about your test results    Speak to your doctor            Additional Information About Your Visit        Screaming [employer]art Information     Baobabt is an electronic gateway that provides easy, online access to your medical records. With City[employer], you can request a clinic appointment, read your test results, renew a prescription or communicate with your care team.     To sign up for Baobabt visit the website at [email or web address]   You will be asked to enter the access code listed below, as well as some personal information. Please follow the directions to create your username and password.     Your access code is: [email or web address]  Expires: 3/6/2018  6:30 AM     Your access code will  in 90 days. If you need help or a new code, please contact your [facility] or call [phone] for assistance.        Care EveryWhere ID     This is your Care EveryWhere ID. This could be used by other organizations to  access your [place] medical records  [id number]        Your Vitals Were     Pulse BMI (Body Mass Index)                62 23.89 kg/m2           Blood Pressure from Last 3 Encounters:   02/28/18 111/70   02/27/18 115/65   12/20/17 121/75    Weight from Last 3 Encounters:   02/28/18 77.7 kg (171 lb 4.8 oz)   02/27/18 78.9 kg (174 lb)   12/20/17 78.8 kg (173 lb 12.8 oz)              We Performed the Following     PHYSICAL THERAPY REFERRAL        Primary Care Provider Office Phone # Fax #    Donnie Haque [phone] [address] [address]        Equal Access to Services     MEREDITH LEÓN : Hadii aad ku hadasho Socaleb, waaxda luqadaha, qaybta kaalmada adeegyada, waxpoornima paz. So [facility] [phone].    ATENCIÓN: Si habla español, tiene a french disposición servicios gratuitos de asistencia lingüística. [facility] [phone].    We comply with applicable federal civil rights laws and [place] laws. We do not discriminate on the basis of race, color, national origin, age, disability, sex, sexual orientation, or gender identity.            Thank you!     Thank you for choosing [facility]  for your care. Our goal is always to provide you with excellent care. Hearing back from our patients is one way we can continue to improve our services. Please take a few minutes to complete the written survey that you may receive in the mail after your visit with us. Thank you!             Your Updated Medication List - Protect others around you: Learn how to safely use, store and throw away your medicines at www.disposemymeds.org.          This list is accurate as of 2/28/18  2:52 PM.  Always use your most recent med list.                   Brand Name Dispense Instructions for use Diagnosis    desonide 0.05 % ointment    DESOWEN    60 g    To affected areas of the face for itch and red bumps twice a day as needed.    Dermatitis, seborrheic        esomeprazole 40 MG CR capsule    nexIUM    90 capsule    Take 1 capsule (40 mg) by mouth every morning (before breakfast) Take 30-60 minutes before eating.    Esophageal reflux       gentamicin 0.008% in 1000ml 0.9% NaCl Soln nasal irrigation    GARAMYCIN    1000 mL    Spray 30 mLs in nostril 2 times daily Irrigate 30 mL between each nostril BID for a total of 60ml/day PLEASE MAIL TO PATIENT    Chronic pansinusitis       hydrocortisone 25 MG Suppository    ANUSOL-HC    28 suppository    Place 1 suppository (25 mg) rectally 2 times daily    Internal hemorrhoids       ketoconazole 2 % shampoo    NIZORAL    240 mL    To entire wet scalp and face in affected areas and then wash off after several minutes three times a week.    Dermatitis, seborrheic       lidocaine 2 % topical gel    XYLOCAINE     Apply 1 Tube topically        METAMUCIL PO      Take 3 tsp by mouth 2 times daily.        nicotine 14 MG/24HR 24 hr patch    NICODERM CQ    30 patch    Place 1 patch onto the skin every 24 hours    Encounter for smoking cessation counseling       nicotine polacrilex 2 MG gum    NICORETTE     Place 2 mg inside cheek as needed for smoking cessation        RA FIBER- MG tablet   Generic drug:  calcium polycarbophil      Take 3 tablets by mouth twice daily        REMERON PO      Take 15 mg by mouth At Bedtime        RISPERDAL PO      Take 3 mg by mouth At Bedtime        sildenafil 20 MG tablet    REVATIO    90 tablet    Take 1 to 5 tabs as needed for sexual activity.    Other male erectile dysfunction       traZODone 50 MG tablet    DESYREL     Take 50 mg by mouth At Bedtime        Urea 20 % Crea cream     60 g    To affected, ridged nails daily.    Dystrophic nail

## 2018-02-28 NOTE — NURSING NOTE
Chief Complaint   Patient presents with     Recheck Medication     Routine general health follow up.

## 2018-03-01 ENCOUNTER — MEDICAL CORRESPONDENCE (OUTPATIENT)
Dept: HEALTH INFORMATION MANAGEMENT | Facility: CLINIC | Age: 63
End: 2018-03-01

## 2018-03-01 LAB
HBV SURFACE AB SERPL IA-ACNC: >1000 M[IU]/ML
HBV SURFACE AG SERPL QL IA: NONREACTIVE
HCV RNA SERPL NAA+PROBE-ACNC: NORMAL [IU]/ML
HCV RNA SERPL NAA+PROBE-LOG IU: NORMAL LOG IU/ML

## 2018-03-02 LAB — HBV CORE AB SERPL QL IA: REACTIVE

## 2018-03-08 ENCOUNTER — RADIANT APPOINTMENT (OUTPATIENT)
Dept: ULTRASOUND IMAGING | Facility: CLINIC | Age: 63
End: 2018-03-08
Attending: INTERNAL MEDICINE
Payer: MEDICARE

## 2018-03-08 DIAGNOSIS — B18.2 CHRONIC HEPATITIS C WITHOUT HEPATIC COMA (H): ICD-10-CM

## 2018-03-08 DIAGNOSIS — B18.2 HEPATITIS C VIRUS CARRIER STATE (H): Primary | ICD-10-CM

## 2018-03-14 ENCOUNTER — THERAPY VISIT (OUTPATIENT)
Dept: PHYSICAL THERAPY | Facility: CLINIC | Age: 63
End: 2018-03-14
Payer: MEDICARE

## 2018-03-14 DIAGNOSIS — G89.29 CHRONIC PAIN OF BOTH SHOULDERS: Primary | ICD-10-CM

## 2018-03-14 DIAGNOSIS — M25.512 CHRONIC PAIN OF BOTH SHOULDERS: Primary | ICD-10-CM

## 2018-03-14 DIAGNOSIS — M25.511 CHRONIC PAIN OF BOTH SHOULDERS: Primary | ICD-10-CM

## 2018-03-14 PROCEDURE — 97161 PT EVAL LOW COMPLEX 20 MIN: CPT | Mod: GP | Performed by: PHYSICAL THERAPIST

## 2018-03-14 PROCEDURE — 97110 THERAPEUTIC EXERCISES: CPT | Mod: GP | Performed by: PHYSICAL THERAPIST

## 2018-03-14 PROCEDURE — G8981 BODY POS CURRENT STATUS: HCPCS | Mod: GP | Performed by: PHYSICAL THERAPIST

## 2018-03-14 PROCEDURE — G8982 BODY POS GOAL STATUS: HCPCS | Mod: GP | Performed by: PHYSICAL THERAPIST

## 2018-03-14 NOTE — MR AVS SNAPSHOT
"              After Visit Summary   3/14/2018    Russ Agee    MRN: [id number]           Patient Information     Date Of Birth          1955        Visit Information        Provider Department      3/14/2018 2:20 PM Phylicia Piedra, PT [facility]        Today's Diagnoses     Chronic pain of both shoulders    -  1       Follow-ups after your visit        Your next 10 appointments already scheduled     Mar 28, 2018  2:35 PM CDT   (Arrive by 2:20 PM)   Return Visit with Donnie Haque MD   [facility] ([facility])    [address] [facility]  [address] [facility] [id number] [phone]            Apr 06, 2018  1:20 PM CDT   ENRIQUE Extremity with Raina Goldstein PT   [facility] ([facility])    [address] [place] [address] [phone]              Who to contact     If you have questions or need follow up information about today's clinic visit or your schedule please contact [facility] directly at [phone].  Normal or non-critical lab and imaging results will be communicated to you by MyChart, letter or phone within 4 business days after the clinic has received the results. If you do not hear from us within 7 days, please contact the clinic through [employer] or phone. If you have a critical or abnormal lab result, we will notify you by phone as soon as possible.  Submit refill requests through [employer] or call your pharmacy and they will forward the refill request to us. Please allow 3 business days for your refill to be completed.          Additional Information About Your Visit        MyChart Information     FoodByNet lets you send messages to your doctor, view your test results, renew your prescriptions, schedule appointments and more. To sign up, go to [email or web address] . Click on \"Log in\" on the left side of the screen, which will take you to the Welcome " "page. Then click on \"Sign up Now\" on the right side of the page.     You will be asked to enter the access code listed below, as well as some personal information. Please follow the directions to create your username and password.     Your access code is: [email or web address]  Expires: 2018  6:30 AM     Your access code will  in 90 days. If you need help or a new code, please call your [place] clinic or [phone].        Care EveryWhere ID     This is your Care EveryWhere ID. This could be used by other organizations to access your [place] medical records  [id number]         Blood Pressure from Last 3 Encounters:   18 111/70   18 115/65   17 121/75    Weight from Last 3 Encounters:   18 77.7 kg (171 lb 4.8 oz)   18 78.9 kg (174 lb)   17 78.8 kg (173 lb 12.8 oz)              We Performed the Following     ENRIQUE CERT REPORT     ENRIQUE Inital Eval Report     PT Eval, Low Complexity ([id number])     Therapeutic Exercises        Primary Care Provider Office Phone # Fax #    Donnie RANI MD [place] [phone]       [address] [address]        Equal Access to Services     MEREDITH LEÓN : Hadii lamont ku hadasho Soomaali, waaxda luqadaha, qaybta kaalmada adeegyada, machelle herndon haymagalie stein . So [facility] [phone].    ATENCIÓN: Si habla español, tiene a french disposición servicios gratuitos de asistencia lingüística. Llame al [phone].    We comply with applicable federal civil rights laws and [place] laws. We do not discriminate on the basis of race, color, national origin, age, disability, sex, sexual orientation, or gender identity.            Thank you!     Thank you for choosing [facility]  for your care. Our goal is always to provide you with excellent care. Hearing back from our patients is one way we can continue to improve our services. Please take a few minutes to complete the written survey that you " may receive in the mail after your visit with us. Thank you!             Your Updated Medication List - Protect others around you: Learn how to safely use, store and throw away your medicines at www.disposemymeds.org.          This list is accurate as of 3/14/18  3:08 PM.  Always use your most recent med list.                   Brand Name Dispense Instructions for use Diagnosis    desonide 0.05 % ointment    DESOWEN    60 g    To affected areas of the face for itch and red bumps twice a day as needed.    Dermatitis, seborrheic       esomeprazole 40 MG CR capsule    nexIUM    90 capsule    Take 1 capsule (40 mg) by mouth every morning (before breakfast) Take 30-60 minutes before eating.    Esophageal reflux       gentamicin 0.008% in 1000ml 0.9% NaCl Soln nasal irrigation    GARAMYCIN    1000 mL    Spray 30 mLs in nostril 2 times daily Irrigate 30 mL between each nostril BID for a total of 60ml/day PLEASE MAIL TO PATIENT    Chronic pansinusitis       hydrocortisone 25 MG Suppository    ANUSOL-HC    28 suppository    Place 1 suppository (25 mg) rectally 2 times daily    Internal hemorrhoids       ketoconazole 2 % shampoo    NIZORAL    240 mL    To entire wet scalp and face in affected areas and then wash off after several minutes three times a week.    Dermatitis, seborrheic       lidocaine 2 % topical gel    XYLOCAINE     Apply 1 Tube topically        METAMUCIL PO      Take 3 tsp by mouth 2 times daily.        nicotine 14 MG/24HR 24 hr patch    NICODERM CQ    30 patch    Place 1 patch onto the skin every 24 hours    Encounter for smoking cessation counseling       nicotine polacrilex 2 MG gum    NICORETTE     Place 2 mg inside cheek as needed for smoking cessation        RA FIBER- MG tablet   Generic drug:  calcium polycarbophil      Take 3 tablets by mouth twice daily        REMERON PO      Take 15 mg by mouth At Bedtime        RISPERDAL PO      Take 3 mg by mouth At Bedtime        sildenafil 20 MG tablet     REVATIO    90 tablet    Take 1 to 5 tabs as needed for sexual activity.    Other male erectile dysfunction       traZODone 50 MG tablet    DESYREL     Take 50 mg by mouth At Bedtime        Urea 20 % Crea cream     60 g    To affected, ridged nails daily.    Dystrophic nail

## 2018-03-14 NOTE — LETTER
DEPARTMENT OF HEALTH AND HUMAN SERVICES  CENTERS FOR MEDICARE & MEDICAID SERVICES    PLAN/UPDATED PLAN OF PROGRESS FOR OUTPATIENT REHABILITATION    PATIENTS NAME:  Russ Agee     : 1955    PROVIDER NUMBER:    [id number]    [id number]     PROVIDER NAME: [facility]    MEDICAL RECORD NUMBER: [id number]     START OF CARE DATE:  SOC Date: 18   TYPE:  PT    PRIMARY/TREATMENT DIAGNOSIS: (Pertinent Medical Diagnosis)  Chronic pain of both shoulders    VISITS FROM START OF CARE:  Rxs Used: 1     [place] [facility] Initial Evaluation  Subjective:  Patient is a 62 year old male presenting with rehab right shoulder hpi. The history is provided by the patient. No  was used.   Russ Agee is a 62 year old male with a bilateral shoulders condition.  Condition occurred with:  Unknown cause.  Condition occurred: for unknown reasons.  This is a chronic condition  Insidious onset of B shoulder pain 2017.    Patient reports pain:  In the joint.  Radiates to: no radiation.  Pain is described as aching and is intermittent and reported as 7/10.   Pain is the same all the time.  Exacerbated by: reaching up, pulling pants up, reaching across his body, reaching behind his back, lifting/carrying. and relieved by rest.  Since onset symptoms are unchanged.  Special testing: none.  Previous treatment: none.    General health as reported by patient is good and fair.  Pertinent medical history includes:  Rheumatoid arthritis, chemical dependency, mental illness, depression, smoking and sleep disorder/apnea.  Medical allergies: no.  Other surgeries include:  None reported.  Current medications:  Sleep medication.  Current occupation is Retired .        Barriers include:  None as reported by the patient.    Red flags:  None as reported by the patient.                 Objective:  System    Shoulder Evaluation:  ROM:  AROM:    Flexion:  Left:  129     Right:  131    Abduction:  Left: 57   Right:  56      External Rotation:  Left:  55    Right:  45    Extension/Internal Rotation:  Left:  L5    Right:  L5    PATIENTS NAME:  Russ Agee   : 1955    PROM:    Flexion:  Left:  135    Right: 136      Abduction:  Left:  80    Right:  82    External Rotation:  Left:  60    Right:  58    Pain: repeated B shoulder extension by PT--decreases during, B after, increased ROM in abduction B; repeated ext B shoulders by pt with wand--increases during, NW, decreased ROM in abduction after; repeated B shoulder exension with hand on counter behind him--produces pain during, NW after, significant improvement B shoulder abduction after with less pain    Strength:    Flexion: Left:5-/5    Pain: +    Right: 5-/5      Pain:  +    Abduction:  Left: 4+/5   Pain:+    Right: 4+/5      Pain:+    Internal Rotation:  Left:5/5      Pain:-/+    Right: 5/5      Pain:-/+  External Rotation:   Left:4+/5      Pain:-/+   Right:4+/5      Pain:-/+      Assessment/Plan:    Patient is a 62 year old male with both sides shoulder complaints.  Provisional classification of B shoulder derangements with directional preference for extension.  He has restricted ROM and pain primarily in abduction, although, other motions are limited as well.   He had good improvement with abduction pain and ROM after repeated shoulder extension exercises.  He will try at home to assess further.  He should make good progress with treatment focusing on directional preference exercises to improve mechanics, decrease pain, and improve overall mobility and function.      Patient has the following significant findings with corresponding treatment plan.                Diagnosis 1:  B shoulder pain  Pain -  self management, education, directional preference exercise and home program  Decreased ROM/flexibility - therapeutic exercise and home program  Decreased strength - therapeutic exercise, therapeutic activities and home  program  Decreased function - therapeutic activities and home program  Impaired posture - neuro re-education and home program    Therapy Evaluation Codes:   1) History comprised of:   Personal factors that impact the plan of care:      None.    Comorbidity factors that impact the plan of care are:      Chemical Dependency, Depression, Mental illness and Smoking.     Medications impacting care: None.  2) Examination of Body Systems comprised of:   Body structures and functions that impact the plan of care:      Shoulder.   Activity limitations that impact the plan of care are:      Dressing, Lifting and reaching.  3) Clinical presentation characteristics are:   Stable/Uncomplicated.  4) Decision-Making    Low complexity using standardized patient assessment instrument and/or measureable assessment of functional outcome.  Cumulative Therapy Evaluation is: Low complexity.    PATIENTS NAME:  Russ Agee   : 1955    Previous and current functional limitations:  (See Goal Flow Sheet for this information)    Short term and Long term goals: (See Goal Flow Sheet for this information)     Communication ability:  Patient appears to be able to clearly communicate and understand verbal and written communication and follow directions correctly.  Treatment Explanation - The following has been discussed with the patient:   RX ordered/plan of care  Anticipated outcomes  Possible risks and side effects  This patient would benefit from PT intervention to resume normal activities.   Rehab potential is good.    Frequency:  2 X a month, once daily  Duration:  for 2 months  Discharge Plan:  Achieve all LTG.  Independent in home treatment program.  Reach maximal therapeutic benefit.    Please refer to the daily flowsheet for treatment today, total treatment time and time spent performing 1:1 timed codes.         Caregiver Signature/Credentials _____________________________ Date ________       Treating Provider: Phylicia Piedra PT   I  "have reviewed and certified the need for these services and plan of treatment while under my care.        PHYSICIAN'S SIGNATURE:   _____________________________________  Date___________     Donnie Haque MD    Certification period:  Beginning of Cert date period: 03/14/18 to End of Cert period date: 06/11/18     Functional Level Progress Report: Please see attached \"Goal Flow sheet for Functional level.\"    ____X____ Continue Services or       ________ DC Services                Service dates: From  SOC Date: 03/14/18 date to present                         "

## 2018-03-14 NOTE — PROGRESS NOTES
[facility] Initial Evaluation  Subjective:  Patient is a 62 year old male presenting with rehab right shoulder hpi. The history is provided by the patient. No  was used.   Russ Agee is a 62 year old male with a bilateral shoulders condition.  Condition occurred with:  Unknown cause.  Condition occurred: for unknown reasons.  This is a chronic condition  Insidious onset of B shoulder pain 09/14/2018.    Patient reports pain:  In the joint.  Radiates to: no radiation.  Pain is described as aching and is intermittent and reported as 7/10.   Pain is the same all the time.  Exacerbated by: reaching up, pulling pants up, reaching across his body, reaching behind his back, lifting/carrying. and relieved by rest.  Since onset symptoms are unchanged.  Special testing: none.  Previous treatment: none.    General health as reported by patient is good and fair.  Pertinent medical history includes:  Rheumatoid arthritis, chemical dependency, mental illness, depression, smoking and sleep disorder/apnea.  Medical allergies: no.  Other surgeries include:  None reported.  Current medications:  Sleep medication.  Current occupation is Retired .        Barriers include:  None as reported by the patient.    Red flags:  None as reported by the patient.                        Objective:  System                   Shoulder Evaluation:  ROM:  AROM:    Flexion:  Left:  129    Right:  131    Abduction:  Left: 57   Right:  56      External Rotation:  Left:  55    Right:  45            Extension/Internal Rotation:  Left:  L5    Right:  L5    PROM:    Flexion:  Left:  135    Right: 136      Abduction:  Left:  80    Right:  82      External Rotation:  Left:  60    Right:  58                Pain: repeated B shoulder extension by PT--decreases during, B after, increased ROM in abduction B; repeated ext B shoulders by pt with wand--increases during, NW, decreased ROM in abduction after; repeated B  shoulder exension with hand on counter behind him--produces pain during, NW after, significant improvement B shoulder abduction after with less pain    Strength:    Flexion: Left:5-/5    Pain: +    Right: 5-/5      Pain:  +    Abduction:  Left: 4+/5   Pain:+    Right: 4+/5      Pain:+    Internal Rotation:  Left:5/5      Pain:-/+    Right: 5/5      Pain:-/+  External Rotation:   Left:4+/5      Pain:-/+   Right:4+/5      Pain:-/+                                                     General     ROS    Assessment/Plan:    Patient is a 62 year old male with both sides shoulder complaints.  Provisional classification of B shoulder derangements with directional preference for extension.  He has restricted ROM and pain primarily in abduction, although, other motions are limited as well.   He had good improvement with abduction pain and ROM after repeated shoulder extension exercises.  He will try at home to assess further.  He should make good progress with treatment focusing on directional preference exercises to improve mechanics, decrease pain, and improve overall mobility and function.      Patient has the following significant findings with corresponding treatment plan.                Diagnosis 1:  B shoulder pain  Pain -  self management, education, directional preference exercise and home program  Decreased ROM/flexibility - therapeutic exercise and home program  Decreased strength - therapeutic exercise, therapeutic activities and home program  Decreased function - therapeutic activities and home program  Impaired posture - neuro re-education and home program    Therapy Evaluation Codes:   1) History comprised of:   Personal factors that impact the plan of care:      None.    Comorbidity factors that impact the plan of care are:      Chemical Dependency, Depression, Mental illness and Smoking.     Medications impacting care: None.  2) Examination of Body Systems comprised of:   Body structures and functions that  impact the plan of care:      Shoulder.   Activity limitations that impact the plan of care are:      Dressing, Lifting and reaching.  3) Clinical presentation characteristics are:   Stable/Uncomplicated.  4) Decision-Making    Low complexity using standardized patient assessment instrument and/or measureable assessment of functional outcome.  Cumulative Therapy Evaluation is: Low complexity.    Previous and current functional limitations:  (See Goal Flow Sheet for this information)    Short term and Long term goals: (See Goal Flow Sheet for this information)     Communication ability:  Patient appears to be able to clearly communicate and understand verbal and written communication and follow directions correctly.  Treatment Explanation - The following has been discussed with the patient:   RX ordered/plan of care  Anticipated outcomes  Possible risks and side effects  This patient would benefit from PT intervention to resume normal activities.   Rehab potential is good.    Frequency:  2 X a month, once daily  Duration:  for 2 months  Discharge Plan:  Achieve all LTG.  Independent in home treatment program.  Reach maximal therapeutic benefit.    Please refer to the daily flowsheet for treatment today, total treatment time and time spent performing 1:1 timed codes.

## 2018-03-28 ENCOUNTER — OFFICE VISIT (OUTPATIENT)
Dept: INTERNAL MEDICINE | Facility: CLINIC | Age: 63
End: 2018-03-28
Payer: MEDICARE

## 2018-03-28 VITALS
HEART RATE: 64 BPM | BODY MASS INDEX: 23.49 KG/M2 | WEIGHT: 168.4 LBS | SYSTOLIC BLOOD PRESSURE: 108 MMHG | DIASTOLIC BLOOD PRESSURE: 72 MMHG

## 2018-03-28 DIAGNOSIS — M25.512 BILATERAL SHOULDER PAIN, UNSPECIFIED CHRONICITY: Primary | ICD-10-CM

## 2018-03-28 DIAGNOSIS — M25.511 BILATERAL SHOULDER PAIN, UNSPECIFIED CHRONICITY: Primary | ICD-10-CM

## 2018-03-28 ASSESSMENT — PAIN SCALES - GENERAL: PAINLEVEL: NO PAIN (0)

## 2018-03-28 NOTE — MR AVS SNAPSHOT
After Visit Summary   3/28/2018    Russ Agee    MRN: [id number]           Patient Information     Date Of Birth          1955        Visit Information        Provider Department      3/28/2018 2:35 PM Donnie Haque MD [facility]         Follow-ups after your visit        Your next 10 appointments already scheduled     2018  1:20 PM CDT   ENRIQUE Extremity with Raina Goldstein PT   [facility] [place] ([facility] [place])    [address] [place] [address] [phone]            2018  2:35 PM CDT   (Arrive by 2:20 PM)   Return Visit with Donnie Haque MD   [facility] ([facility])    [address] [address] [phone]              Who to contact     Please call your clinic at [phone] to:    Ask questions about your health    Make or cancel appointments    Discuss your medicines    Learn about your test results    Speak to your doctor            Additional Information About Your Visit        [employer] Information     "[employer] is an electronic gateway that provides easy, online access to your medical records. With "[employer] you can request a clinic appointment, read your test results, renew a prescription or communicate with your care team.     To sign up for "[employer] visit the website at [email or web address]   You will be asked to enter the access code listed below, as well as some personal information. Please follow the directions to create your username and password.     Your access code is: [email or web address]  Expires: 2018  6:30 AM     Your access code will  in 90 days. If you need help or a new code, please contact your [facility] or call [phone] for assistance.        Care EveryWhere ID     This is your Care EveryWhere ID. This could be used by other organizations to access your [facility]  records  [id number]        Your Vitals Were     Pulse BMI (Body Mass Index)                64 23.49 kg/m2           Blood Pressure from Last 3 Encounters:   03/28/18 108/72   02/28/18 111/70   02/27/18 115/65    Weight from Last 3 Encounters:   03/28/18 76.4 kg (168 lb 6.4 oz)   02/28/18 77.7 kg (171 lb 4.8 oz)   02/27/18 78.9 kg (174 lb)              Today, you had the following     No orders found for display       Primary Care Provider Office Phone # Fax #    Donnie Haque [phone] [address] [address]        Equal Access to Services     MEREDITH LEÓN : Hadii lamont briscoeo Socaleb, waaxda luqadaha, qaybta kaalmada adeegyada, machelle stein . So [facility] [phone].    ATENCIÓN: Si habla español, tiene a french disposición servicios gratuitos de asistencia lingüística. [facility] [phone].    We comply with applicable federal civil rights laws and [place] laws. We do not discriminate on the basis of race, color, national origin, age, disability, sex, sexual orientation, or gender identity.            Thank you!     Thank you for choosing [facility]  for your care. Our goal is always to provide you with excellent care. Hearing back from our patients is one way we can continue to improve our services. Please take a few minutes to complete the written survey that you may receive in the mail after your visit with us. Thank you!             Your Updated Medication List - Protect others around you: Learn how to safely use, store and throw away your medicines at www.disposemymeds.org.          This list is accurate as of 3/28/18  2:46 PM.  Always use your most recent med list.                   Brand Name Dispense Instructions for use Diagnosis    desonide 0.05 % ointment    DESOWEN    60 g    To affected areas of the face for itch and red bumps twice a day as needed.    Dermatitis, seborrheic       esomeprazole 40 MG CR  capsule    nexIUM    90 capsule    Take 1 capsule (40 mg) by mouth every morning (before breakfast) Take 30-60 minutes before eating.    Esophageal reflux       gentamicin 0.008% in 1000ml 0.9% NaCl Soln nasal irrigation    GARAMYCIN    1000 mL    Spray 30 mLs in nostril 2 times daily Irrigate 30 mL between each nostril BID for a total of 60ml/day PLEASE MAIL TO PATIENT    Chronic pansinusitis       hydrocortisone 25 MG Suppository    ANUSOL-HC    28 suppository    Place 1 suppository (25 mg) rectally 2 times daily    Internal hemorrhoids       ketoconazole 2 % shampoo    NIZORAL    240 mL    To entire wet scalp and face in affected areas and then wash off after several minutes three times a week.    Dermatitis, seborrheic       lidocaine 2 % topical gel    XYLOCAINE     Apply 1 Tube topically        METAMUCIL PO      Take 3 tsp by mouth 2 times daily.        nicotine 14 MG/24HR 24 hr patch    NICODERM CQ    30 patch    Place 1 patch onto the skin every 24 hours    Encounter for smoking cessation counseling       nicotine polacrilex 2 MG gum    NICORETTE     Place 2 mg inside cheek as needed for smoking cessation        RA FIBER- MG tablet   Generic drug:  calcium polycarbophil      Take 3 tablets by mouth twice daily        REMERON PO      Take 15 mg by mouth At Bedtime        RISPERDAL PO      Take 3 mg by mouth At Bedtime        sildenafil 20 MG tablet    REVATIO    90 tablet    Take 1 to 5 tabs as needed for sexual activity.    Other male erectile dysfunction       traZODone 50 MG tablet    DESYREL     Take 50 mg by mouth At Bedtime        Urea 20 % Crea cream     60 g    To affected, ridged nails daily.    Dystrophic nail

## 2018-03-28 NOTE — NURSING NOTE
Chief Complaint   Patient presents with     Results     Patient here to go over results.     Mckenna Mckeon LPN at 2:27 PM on 3/28/2018.

## 2018-03-28 NOTE — PROGRESS NOTES
Subjective:  Russ is a pleasant 59 year-old man who came in today for follow up today.   He had colonoscopy 11/13/15 repeat in 5 years. He had upper EGD and EUS with Dr. Acuna, GI 12/16/15 with gastric polyp bx.  He had fairly unremarkable Holter Monitor 1/6/16.   He was 2/16/16 in Dermatology. He would like to stop smoking. He has tried before. Smoking for about 30 years.   Still some B shoulder pain     Objective:  General appearance: NAD, cooperative, alert.  HEENT; negative   Cardio: Normal rate and rhythm, no murmurs  Respiratory: Normal breath sounds  Abdomen: Non-tender  Neurological exam: B UE/LE/proximal/distal is normal and symmetric for sensation, reflexes, and strength.  Some slight tenderness to palpation R anterior shoulder.     Assessment and Plan:    (1) Seen by Ms. Ji, 1/5/2018 MT for smoking cessation. He states less smoking but has not stopped. He is using gum and patches.   (2) He will continue to follow up in GI for Hep C management. His last Hep C viral load 12/1/15 was undetectable. Will recheck labs Hep C viral load and Hep B and get abdominal U/S   (3) He was seen by Dr. Trotter Urology yesterday 2/27/2018  for elevated PSA and prostate cancer/BPH  (4) He was seen 10/30/17 by Dr. Delaney for neck complaints and sinus issues  (5) He was seen Dr. Naranjo Cardiology for ongoing palpitations 4/13/16.  (6) Reflux changed to Nexium;  EGD 10/15  (7) He states he sees his Psychiatry doctor once a  Month in [place].   (8) Thyroid nodules seen on Cervical MRI; thyroid U/S; seen in  Endocrine 9/7/17 bx. Was benign   (9) Immunizations done 9/26/17 for Td and influenza. New Zoster Vaccine when available.   (10) He got  PT for B shoulder pain 3/14/2018 and is doing better. He was seen by Dr. Melvin, ortho 6/2017 and had imaging.               Total time spent 15 minutes.  More than 50% of the time spent with Mr. Agee on counseling / coordinating his care

## 2018-04-06 ENCOUNTER — THERAPY VISIT (OUTPATIENT)
Dept: PHYSICAL THERAPY | Facility: CLINIC | Age: 63
End: 2018-04-06
Payer: MEDICARE

## 2018-04-06 DIAGNOSIS — G89.29 CHRONIC PAIN OF BOTH SHOULDERS: ICD-10-CM

## 2018-04-06 DIAGNOSIS — M25.512 CHRONIC PAIN OF BOTH SHOULDERS: ICD-10-CM

## 2018-04-06 DIAGNOSIS — M25.511 CHRONIC PAIN OF BOTH SHOULDERS: ICD-10-CM

## 2018-04-06 PROCEDURE — G8981 BODY POS CURRENT STATUS: HCPCS | Mod: GP | Performed by: PHYSICAL THERAPIST

## 2018-04-06 PROCEDURE — 97110 THERAPEUTIC EXERCISES: CPT | Mod: GP | Performed by: PHYSICAL THERAPIST

## 2018-04-06 PROCEDURE — 97112 NEUROMUSCULAR REEDUCATION: CPT | Mod: GP | Performed by: PHYSICAL THERAPIST

## 2018-04-06 PROCEDURE — G8982 BODY POS GOAL STATUS: HCPCS | Mod: GP | Performed by: PHYSICAL THERAPIST

## 2018-05-01 ENCOUNTER — THERAPY VISIT (OUTPATIENT)
Dept: PHYSICAL THERAPY | Facility: CLINIC | Age: 63
End: 2018-05-01
Payer: MEDICARE

## 2018-05-01 DIAGNOSIS — M25.512 CHRONIC PAIN OF BOTH SHOULDERS: ICD-10-CM

## 2018-05-01 DIAGNOSIS — G89.29 CHRONIC PAIN OF BOTH SHOULDERS: ICD-10-CM

## 2018-05-01 DIAGNOSIS — M25.511 CHRONIC PAIN OF BOTH SHOULDERS: ICD-10-CM

## 2018-05-01 PROCEDURE — 97112 NEUROMUSCULAR REEDUCATION: CPT | Mod: GP | Performed by: PHYSICAL THERAPIST

## 2018-05-01 PROCEDURE — 97110 THERAPEUTIC EXERCISES: CPT | Mod: GP | Performed by: PHYSICAL THERAPIST

## 2018-05-01 PROCEDURE — 97140 MANUAL THERAPY 1/> REGIONS: CPT | Mod: GP | Performed by: PHYSICAL THERAPIST

## 2018-05-24 ENCOUNTER — THERAPY VISIT (OUTPATIENT)
Dept: PHYSICAL THERAPY | Facility: CLINIC | Age: 63
End: 2018-05-24
Payer: MEDICARE

## 2018-05-24 DIAGNOSIS — M25.512 CHRONIC PAIN OF BOTH SHOULDERS: ICD-10-CM

## 2018-05-24 DIAGNOSIS — M25.511 CHRONIC PAIN OF BOTH SHOULDERS: ICD-10-CM

## 2018-05-24 DIAGNOSIS — G89.29 CHRONIC PAIN OF BOTH SHOULDERS: ICD-10-CM

## 2018-05-24 PROCEDURE — 97112 NEUROMUSCULAR REEDUCATION: CPT | Mod: GP | Performed by: PHYSICAL THERAPIST

## 2018-05-24 PROCEDURE — 97110 THERAPEUTIC EXERCISES: CPT | Mod: GP | Performed by: PHYSICAL THERAPIST

## 2018-05-24 PROCEDURE — G8982 BODY POS GOAL STATUS: HCPCS | Mod: GP | Performed by: PHYSICAL THERAPIST

## 2018-05-24 PROCEDURE — G8981 BODY POS CURRENT STATUS: HCPCS | Mod: GP | Performed by: PHYSICAL THERAPIST

## 2018-05-30 ENCOUNTER — MEDICAL CORRESPONDENCE (OUTPATIENT)
Dept: HEALTH INFORMATION MANAGEMENT | Facility: CLINIC | Age: 63
End: 2018-05-30

## 2018-06-08 DIAGNOSIS — C61 MALIGNANT NEOPLASM OF PROSTATE (H): Primary | ICD-10-CM

## 2018-06-21 ENCOUNTER — THERAPY VISIT (OUTPATIENT)
Dept: PHYSICAL THERAPY | Facility: CLINIC | Age: 63
End: 2018-06-21
Payer: MEDICARE

## 2018-06-21 DIAGNOSIS — G89.29 CHRONIC PAIN OF BOTH SHOULDERS: ICD-10-CM

## 2018-06-21 DIAGNOSIS — M25.511 CHRONIC PAIN OF BOTH SHOULDERS: ICD-10-CM

## 2018-06-21 DIAGNOSIS — M25.512 CHRONIC PAIN OF BOTH SHOULDERS: ICD-10-CM

## 2018-06-21 PROCEDURE — 97112 NEUROMUSCULAR REEDUCATION: CPT | Mod: GP | Performed by: PHYSICAL THERAPIST

## 2018-06-21 PROCEDURE — 97110 THERAPEUTIC EXERCISES: CPT | Mod: GP | Performed by: PHYSICAL THERAPIST

## 2018-06-21 NOTE — PROGRESS NOTES
Subjective:  HPI                    Objective:  System    Physical Exam    General     ROS    Assessment/Plan:    PROGRESS  REPORT    Progress reporting period is from IE to 6/21/2018.     SUBJECTIVE  Subjective: Able to lift arm to shoulder level s pain; better in am       Initial Pain level: 7/10 Current PL 6/10       ;   ,     The objective findings are from DOS 6/21/2018.    OBJECTIVE       Poor posture, good exercise compliance but questionable technique- requires sig cuing in clinic.  ERP; ER approx 80 deg ERP;does dem more ease in elevation. RTC strength 4/5. Slow gradual progression  ASSESSMENT/PLAN  Updated problem list and treatment plan: Diagnosis 1:  B shoulder pain  Pain -  manual therapy, self management, education and home program  Decreased ROM/flexibility - manual therapy and therapeutic exercise  Decreased strength - therapeutic exercise and therapeutic activities  Impaired muscle performance - neuro re-education  Decreased function - therapeutic activities  Impaired posture - neuro re-education  STG/LTGs have been met or progress has been made towards goals:  Yes (See Goal flow sheet completed today.)  Assessment of Progress: The patient's condition is improving.  Patient is meeting short term goals and is progressing towards long term goals.  Self Management Plans:  Patient has been instructed in a home treatment program.  I have re-evaluated this patient and find that the nature, scope, duration and intensity of the therapy is appropriate for the medical condition of the patient.  Ali continues to require the following intervention to meet STG and LTG's: PT      Recommendations:  This patient would benefit from continued therapy.     Frequency:  1 X a month, once daily  Duration:  for 3 months        Please refer to the daily flowsheet for treatment today, total treatment time and time spent performing 1:1 timed codes.

## 2018-06-21 NOTE — LETTER
[facility]  [address] [place] [address]  [phone]    2018    Re: Russ Agee   :   1955  MRN:  [id number]   REFERRING PHYSICIAN:   Donnie Haque    [facility]  Date of Initial Evaluation:  3/14/18  Visits:  Rxs Used: 5  Reason for Referral:  Chronic pain of both shoulders    PROGRESS  REPORT  Progress reporting period is from IE to 2018.     SUBJECTIVE  Subjective: Able to lift arm to shoulder level s pain; better in am       Initial Pain level: 7/10 Current PL 6/10      The objective findings are from DOS 2018.    OBJECTIVE  Poor posture, good exercise compliance but questionable technique- requires sig cuing in clinic.  ERP; ER approx 80 deg ERP;does dem more ease in elevation. RTC strength 4/5. Slow gradual progression    ASSESSMENT/PLAN  Updated problem list and treatment plan: Diagnosis 1:  B shoulder pain  Pain -  manual therapy, self management, education and home program  Decreased ROM/flexibility - manual therapy and therapeutic exercise  Decreased strength - therapeutic exercise and therapeutic activities  Impaired muscle performance - neuro re-education  Decreased function - therapeutic activities  Impaired posture - neuro re-education  STG/LTGs have been met or progress has been made towards goals:  Yes (See Goal flow sheet completed today.)  Assessment of Progress: The patient's condition is improving.  Patient is meeting short term goals and is progressing towards long term goals.  Self Management Plans:  Patient has been instructed in a home treatment program.  I have re-evaluated this patient and find that the nature, scope, duration and intensity of the therapy is appropriate for the medical condition of the patient.  Russ continues to require the following intervention to meet STG and LTG's: PT                      Re: Russ DURÁN Anuel   :   1955    Recommendations:  This patient  would benefit from continued therapy.     Frequency:  1 X a month, once daily  Duration:  for 3 months    Please refer to the daily flowsheet for treatment today, total treatment time and time spent performing 1:1 timed codes.    Thank you for your referral.    INQUIRIES  Therapist: Leatha Reich, PT, OCS, Cert. MDT   [facility] [place] [address] [address] [place] [address]  Phone: [phone]  Fax: [phone]

## 2018-06-21 NOTE — LETTER
DEPARTMENT OF HEALTH AND HUMAN SERVICES  CENTERS FOR MEDICARE & MEDICAID SERVICES    PLAN/UPDATED PLAN OF PROGRESS FOR OUTPATIENT REHABILITATION    PATIENTS NAME:  Russ Agee     : 1955    PROVIDER NUMBER:    [id number]    [id number]     PROVIDER NAME: [facility]    MEDICAL RECORD NUMBER: [id number]     START OF CARE DATE:  SOC Date: 18   TYPE:  PT    PRIMARY/TREATMENT DIAGNOSIS: (Pertinent Medical Diagnosis)  Chronic pain of both shoulders    VISITS FROM START OF CARE:  Rxs Used: 5     PROGRESS  REPORT  Progress reporting period is from IE to 2018.     SUBJECTIVE  Subjective: Able to lift arm to shoulder level s pain; better in am       Initial Pain level: 7/10 Current PL 6/10        The objective findings are from DOS 2018.    OBJECTIVE  Poor posture, good exercise compliance but questionable technique- requires sig cuing in clinic.  ERP; ER approx 80 deg ERP;does dem more ease in elevation. RTC strength 4/5. Slow gradual progression    ASSESSMENT/PLAN  Updated problem list and treatment plan: Diagnosis 1:  B shoulder pain  Pain -  manual therapy, self management, education and home program  Decreased ROM/flexibility - manual therapy and therapeutic exercise  Decreased strength - therapeutic exercise and therapeutic activities  Impaired muscle performance - neuro re-education  Decreased function - therapeutic activities  Impaired posture - neuro re-education  STG/LTGs have been met or progress has been made towards goals:  Yes (See Goal flow sheet completed today.)  Assessment of Progress: The patient's condition is improving.  Patient is meeting short term goals and is progressing towards long term goals.  Self Management Plans:  Patient has been instructed in a home treatment program.  I have re-evaluated this patient and find that the nature, scope, duration and intensity of the therapy is appropriate for the medical condition of the  "patientLuis Solano continues to require the following intervention to meet STG and LTG's: PT        PATIENTS NAME:  Russ Agee   : 1955    Recommendations:  This patient would benefit from continued therapy.     Frequency:  1 X a month, once daily  Duration:  for 3 months    Please refer to the daily flowsheet for treatment today, total treatment time and time spent performing 1:1 timed codes.        Caregiver Signature/Credentials _____________________________ Date ________       Treating Provider: Leatha Reich, PT   I have reviewed and certified the need for these services and plan of treatment while under my care.        PHYSICIAN'S SIGNATURE:   _____________________________________  Date___________     Donnie Haque MD    Certification period:  Beginning of Cert date period: 18 to End of Cert period date: 18     Functional Level Progress Report: Please see attached \"Goal Flow sheet for Functional level.\"    ____X____ Continue Services or       ________ DC Services                Service dates: From  SOC Date: 18 date to present                         "

## 2018-06-25 DIAGNOSIS — K21.9 ESOPHAGEAL REFLUX: ICD-10-CM

## 2018-06-27 RX ORDER — ESOMEPRAZOLE MAGNESIUM 40 MG/1
40 CAPSULE, DELAYED RELEASE ORAL
Qty: 90 CAPSULE | Refills: 2 | Status: SHIPPED | OUTPATIENT
Start: 2018-06-27 | End: 2019-03-20

## 2018-07-11 ENCOUNTER — OFFICE VISIT (OUTPATIENT)
Dept: INTERNAL MEDICINE | Facility: CLINIC | Age: 63
End: 2018-07-11
Payer: MEDICARE

## 2018-07-11 ENCOUNTER — PRE VISIT (OUTPATIENT)
Dept: UROLOGY | Facility: CLINIC | Age: 63
End: 2018-07-11

## 2018-07-11 VITALS
HEART RATE: 63 BPM | DIASTOLIC BLOOD PRESSURE: 66 MMHG | RESPIRATION RATE: 16 BRPM | BODY MASS INDEX: 22.59 KG/M2 | WEIGHT: 162 LBS | SYSTOLIC BLOOD PRESSURE: 106 MMHG

## 2018-07-11 DIAGNOSIS — R94.5 ABNORMAL RESULTS OF LIVER FUNCTION STUDIES: Primary | ICD-10-CM

## 2018-07-11 DIAGNOSIS — B18.2 HEPATITIS C VIRUS CARRIER STATE (H): ICD-10-CM

## 2018-07-11 DIAGNOSIS — C61 MALIGNANT NEOPLASM OF PROSTATE (H): ICD-10-CM

## 2018-07-11 DIAGNOSIS — R93.89 ABNORMAL FINDINGS ON DIAGNOSTIC IMAGING OF OTHER SPECIFIED BODY STRUCTURES: ICD-10-CM

## 2018-07-11 LAB
AFP SERPL-MCNC: 3.7 UG/L (ref 0–8)
PSA SERPL-MCNC: 2.92 UG/L (ref 0–4)

## 2018-07-11 PROCEDURE — 82105 ALPHA-FETOPROTEIN SERUM: CPT | Performed by: INTERNAL MEDICINE

## 2018-07-11 ASSESSMENT — PAIN SCALES - GENERAL: PAINLEVEL: NO PAIN (0)

## 2018-07-11 NOTE — PROGRESS NOTES
"Subjective:  Russ is a pleasant 59 year-old man who came in today for follow up today.   He had colonoscopy 11/13/15 repeat in 5 years. He had upper EGD and EUS with Dr. Acuna, GI 12/16/15 with gastric polyp bx.  He had fairly unremarkable Holter Monitor 1/6/16.   He was 2/16/16 in Dermatology.       Objective:  General appearance: NAD, cooperative, alert.  HEENT; negative   Cardio: Normal rate and rhythm, no murmurs  Respiratory: Normal breath sounds  Abdomen: Non-tender  Neurological exam: B UE/LE/proximal/distal is normal and symmetric for sensation, reflexes, and strength.       Assessment and Plan:    (1) Seen by Ms. Garrison, 1/5/2018 MT for smoking cessation. He  has not stopped smoking now and only using gum.   (2) He was seen in  GI 5/30/2017 for Hep C management and was \"cured\".  At that time no further GI  Clinic visits necessary.  His last Hep C viral load 2/28/18 was undetectable. Liver U/S 3/8/208 fairly unremarkable.   (3) He was seen by Dr. Trotter Urology  2/27/2018 and has follow up 7/23/2018  for elevated PSA and prostate cancer/BPH  (4) He was seen 10/30/17 by Dr. Delaney for neck complaints and sinus issues  (5) He was seen Dr. Naranjo Cardiology for ongoing palpitations 4/13/16.  (6) Reflux and remains on Nexium;  EGD 10/15  (7) He states he sees his Psychiatry doctor once a  Month in [place] and remains on the same medications.   (8) Thyroid nodules seen on Cervical MRI; thyroid U/S; seen in  Endocrine 9/7/17 bx. Was benign   (9) Immunizations done 9/26/17 for Td and influenza. New Zoster Vaccine when available.   (10) He got  PT for B shoulder pain 3/14/2018 and is doing better. He was seen by Dr. Melvin, ortho 6/2017 and had imaging.               Total time spent 15 minutes.  More than 50% of the time spent with Mr. Agee on counseling / coordinating his care      "

## 2018-07-11 NOTE — MR AVS SNAPSHOT
After Visit Summary   7/11/2018    Russ Agee    MRN: [id number]           Patient Information     Date Of Birth          1955        Visit Information        Provider Department      7/11/2018 2:35 PM Donnie Haque MD [facility]        Today's Diagnoses     Abnormal results of liver function studies    -  1    Abnormal findings on diagnostic imaging of other specified body structures            Follow-ups after your visit        Your next 10 appointments already scheduled     Jul 23, 2018  3:45 PM CDT   Telephone Call with Gerardo Trotter MD   [facility] ([facility])    [address]  4th Floor  [facility] [address] [phone]           Note: this is not an onsite visit; there is no need to come to the facility.  Thank you for choosing [facility] for your health care needs. Below is some information for patients who are interested in having their follow-up visit with a physician by telephone. In some cases, a telephone visit can be an effective and convenient way to manage your follow-up care. Choosing a telephone visit rather than a face to face visit for your follow-up care is a decision that you and your physician can make together to ensure it meets all of your needs.  A face to face visit is always an available option, if you choose to do so.  We want to make sure you have all of the information you need about the telephone visit option and answer all of your questions before you decide to schedule a telephone follow-up visit. If you have any questions, you may talk to a staff member or our financial counselor at [phone].  1. General overview Our clinic sees patients for a variety of conditions and concerns. A face to face visit with your doctor is required for any new concerns or for your initial visit. If you and your doctor decide that a  follow up visit by telephone is appropriate, you may decide to opt for a telephone visit.   2. Billing and insurance coverage There is a charge for telephone visits, similar to the charge for an in-person visit. Your bill is based on the amount of time you and your physician are on the phone. We will bill each visit to your insurance company (just like your other medical visits), and you will be responsible for any costs not paid by your insurance company. The charge may be denied by your insurance company, in which case you will be responsible for the entire amount billed. The decision to cover the cost is determined by your insurance company. If you want to know what your insurance company will cover, we encourage you to contact them to determine your coverage. The codes below are the codes we use when billing for telephone visits and the associated charges. This may help you work with your insurance company to determine your benefits.   Billing CPT codes for Telephone visits  85602 ($30), 47544 ($35), 51775 ($40)            Jul 25, 2018  2:10 PM CDT   ENRIQUE Extremity with Leatha Reich PT   [facility] ([facility] [place])    [address] [place] [address] [phone]            Aug 24, 2018  1:20 PM CDT   ENRIQUE Extremity with Leatha Reich PT   [facility] [place] ([facility] [place])    [address] [place] [address] [phone]            Sep 20, 2018  2:40 PM CDT   ENRIQUE Extremity with Leatha Reich PT   [facility] [place] ([facility] [place])    [address] [place] [address] [phone]              Who to contact     Please call your clinic at [phone] to:    Ask questions about your health    Make or cancel appointments    Discuss your medicines    Learn about your test results    Speak to your doctor            Additional Information About Your Visit        Care EveryWhere ID     This is your Care  EveryWhere ID. This could be used by other organizations to access your [place] medical records  [id number]        Your Vitals Were     Pulse Respirations BMI (Body Mass Index)             63 16 22.59 kg/m2          Blood Pressure from Last 3 Encounters:   07/11/18 106/66   03/28/18 108/72   02/28/18 111/70    Weight from Last 3 Encounters:   07/11/18 73.5 kg (162 lb)   03/28/18 76.4 kg (168 lb 6.4 oz)   02/28/18 77.7 kg (171 lb 4.8 oz)              Today, you had the following     No orders found for display       Primary Care Provider Office Phone # Fax #    Donnie Haque -[phone]       [address] [address]        Equal Access to Services     [facility]: Hadii aad ku hadasho Socaleb, waaxda luqadaha, qaybta kaalmada adenellyyada, machelle stein . So [facility] [phone].    ATENCIÓN: Si habla español, tiene a french disposición servicios gratuitos de asistencia lingüística. [facility] [phone].    We comply with applicable federal civil rights laws and [place] laws. We do not discriminate on the basis of race, color, national origin, age, disability, sex, sexual orientation, or gender identity.            Thank you!     Thank you for choosing [facility]  for your care. Our goal is always to provide you with excellent care. Hearing back from our patients is one way we can continue to improve our services. Please take a few minutes to complete the written survey that you may receive in the mail after your visit with us. Thank you!             Your Updated Medication List - Protect others around you: Learn how to safely use, store and throw away your medicines at www.disposemymeds.org.          This list is accurate as of 7/11/18  3:39 PM.  Always use your most recent med list.                   Brand Name Dispense Instructions for use Diagnosis    desonide 0.05 % ointment    DESOWEN    60 g    To affected areas of the face  for itch and red bumps twice a day as needed.    Dermatitis, seborrheic       esomeprazole 40 MG CR capsule    nexIUM    90 capsule    Take 1 capsule (40 mg) by mouth every morning (before breakfast)    Esophageal reflux       gentamicin 0.008% in 1000ml 0.9% NaCl Soln nasal irrigation    GARAMYCIN    1000 mL    Spray 30 mLs in nostril 2 times daily Irrigate 30 mL between each nostril BID for a total of 60ml/day PLEASE MAIL TO PATIENT    Chronic pansinusitis       hydrocortisone 25 MG Suppository    ANUSOL-HC    28 suppository    Place 1 suppository (25 mg) rectally 2 times daily    Internal hemorrhoids       ketoconazole 2 % shampoo    NIZORAL    240 mL    To entire wet scalp and face in affected areas and then wash off after several minutes three times a week.    Dermatitis, seborrheic       lidocaine 2 % topical gel    XYLOCAINE     Apply 1 Tube topically        METAMUCIL PO      Take 3 tsp by mouth 2 times daily.        nicotine 14 MG/24HR 24 hr patch    NICODERM CQ    30 patch    Place 1 patch onto the skin every 24 hours    Encounter for smoking cessation counseling       nicotine polacrilex 2 MG gum    NICORETTE     Place 2 mg inside cheek as needed for smoking cessation        RA FIBER- MG tablet   Generic drug:  calcium polycarbophil      Take 3 tablets by mouth twice daily        REMERON PO      Take 15 mg by mouth At Bedtime        RISPERDAL PO      Take 3 mg by mouth At Bedtime        sildenafil 20 MG tablet    REVATIO    90 tablet    Take 1 to 5 tabs as needed for sexual activity.    Other male erectile dysfunction       traZODone 50 MG tablet    DESYREL     Take 50 mg by mouth At Bedtime        Urea 20 % Crea cream     60 g    To affected, ridged nails daily.    Dystrophic nail

## 2018-07-11 NOTE — NURSING NOTE
Chief Complaint   Patient presents with     Recheck Medication     Patient is here to follow up with medication     Christina Grove CMA 3:13 PM on 7/11/2018.

## 2018-07-23 ENCOUNTER — VIRTUAL VISIT (OUTPATIENT)
Dept: UROLOGY | Facility: CLINIC | Age: 63
End: 2018-07-23
Payer: MEDICARE

## 2018-07-23 DIAGNOSIS — C61 MALIGNANT NEOPLASM OF PROSTATE (H): Primary | ICD-10-CM

## 2018-07-23 NOTE — PROGRESS NOTES
REASON FOR CALL: LUTS and prostate cancer    HISTORY OF PRESENT ILLNESS:  Mr. Agee is a 62-year-old gentleman followed in our clinic for history of lower urinary tract symptoms.  The patient underwent a Rezum water vapor treatment on 11/27/2017.  He has had subjective improvement of his symptoms, though his AUA Sx score did not reflect this.Of note, the patient was also diagnosed with 1 core of [place] 3+3 equals 6 prostate cancer back in 2009 for which he is on active surveillance.  He had negative surveillance biopsies in 2010 and an MRI guided surveillance biopsy in 2014, also negative.  His MRI of the prostate on 9/12/16 showed a prostate volume of 59 ml and PI-RADS 2 lesion only.  The patient continues to complain of dry ejaculation with orgasm.     OBJECTIVE: PSA from 7/11/18 is 2.92 ng/mL    ASSESSMENT AND PLAN:  Over half of today's 15 minute phone visit was spent counseling the patient regarding his LUTS and prostate cancer.  I suggested to the patient that we are pleased to see that his PSA has come down significantly to under 3.0 ng/mL.  We will continue to observe the patient for his prostate cancer.  We will see him in clinic in December which will be about 1 year from his ReZume procedure.  We will re-check a PSA at that time as well.  I counseled the patient to observe his urine the first time he urinates after sexual activity to see if it is cloudy suggesting retrograde ejaculation.

## 2018-07-23 NOTE — MR AVS SNAPSHOT
After Visit Summary   7/23/2018    Russ Agee    MRN: [id number]           Patient Information     Date Of Birth          1955        Visit Information        Provider Department      7/23/2018 3:45 PM Gerardo Trotter MD [facility]        Today's Diagnoses     Malignant neoplasm of prostate (H)    -  1       Follow-ups after your visit        Your next 10 appointments already scheduled     Jul 25, 2018  2:10 PM CDT   ENRIQUE Extremity with Leatha Reich PT   [facility] [place] ([facility] [place])    [address] [place] [address] [phone]            Aug 24, 2018  1:20 PM CDT   ENRIQUE Extremity with Leatha Reich PT   [facility] [place] ([facility] [place])    [address] [place] [address] [phone]            Sep 20, 2018  2:40 PM CDT   ENRIQUE Extremity with Leatha Reich PT   [facility] [place] ([facility] [place])    [address] [place] [address] [phone]            Oct 15, 2018  3:35 PM CDT   (Arrive by 3:20 PM)   Return Visit with Donnie Haque MD   [facility] ([facility])    [address] [address] [phone]              Future tests that were ordered for you today     Open Future Orders        Priority Expected Expires Ordered    PSA tumor marker Routine 12/1/2018 7/23/2019 7/23/2018            Who to contact     Please call your clinic at [phone] to:    Ask questions about your health    Make or cancel appointments    Discuss your medicines    Learn about your test results    Speak to your doctor            Additional Information About Your Visit        Care EveryWhere ID     This is your Care EveryWhere ID. This could be used by other organizations to access your [place] medical records  [id number]         Blood Pressure from Last  3 Encounters:   07/11/18 106/66   03/28/18 108/72   02/28/18 111/70    Weight from Last 3 Encounters:   07/11/18 73.5 kg (162 lb)   03/28/18 76.4 kg (168 lb 6.4 oz)   02/28/18 77.7 kg (171 lb 4.8 oz)               Primary Care Provider Office Phone # Fax #    Donnie Haque [phone]       [address] [address]        Equal Access to Services     [facility]: Hadii aad ku hadasho Soomaali, waaxda luqadaha, qaybta kaalmada adeegyada, waxpoornima herndon haymagalie stein . So [facility] [phone].    ATENCIÓN: Si habla español, tiene a french disposición servicios gratuitos de asistencia lingüística. [facility] [phone].    We comply with applicable federal civil rights laws and [place] laws. We do not discriminate on the basis of race, color, national origin, age, disability, sex, sexual orientation, or gender identity.            Thank you!     Thank you for choosing [facility] UROLOGY AND [facility]OLOGIC CANCERS  for your care. Our goal is always to provide you with excellent care. Hearing back from our patients is one way we can continue to improve our services. Please take a few minutes to complete the written survey that you may receive in the mail after your visit with us. Thank you!             Your Updated Medication List - Protect others around you: Learn how to safely use, store and throw away your medicines at www.disposemymeds.org.          This list is accurate as of 7/23/18  4:01 PM.  Always use your most recent med list.                   Brand Name Dispense Instructions for use Diagnosis    desonide 0.05 % ointment    DESOWEN    60 g    To affected areas of the face for itch and red bumps twice a day as needed.    Dermatitis, seborrheic       esomeprazole 40 MG CR capsule    nexIUM    90 capsule    Take 1 capsule (40 mg) by mouth every morning (before breakfast)    Esophageal reflux       gentamicin 0.008% in 1000ml 0.9% NaCl Soln nasal  irrigation    GARAMYCIN    1000 mL    Spray 30 mLs in nostril 2 times daily Irrigate 30 mL between each nostril BID for a total of 60ml/day PLEASE MAIL TO PATIENT    Chronic pansinusitis       hydrocortisone 25 MG Suppository    ANUSOL-HC    28 suppository    Place 1 suppository (25 mg) rectally 2 times daily    Internal hemorrhoids       ketoconazole 2 % shampoo    NIZORAL    240 mL    To entire wet scalp and face in affected areas and then wash off after several minutes three times a week.    Dermatitis, seborrheic       lidocaine 2 % topical gel    XYLOCAINE     Apply 1 Tube topically        METAMUCIL PO      Take 3 tsp by mouth 2 times daily.        nicotine 14 MG/24HR 24 hr patch    NICODERM CQ    30 patch    Place 1 patch onto the skin every 24 hours    Encounter for smoking cessation counseling       nicotine polacrilex 2 MG gum    NICORETTE     Place 2 mg inside cheek as needed for smoking cessation        RA FIBER- MG tablet   Generic drug:  calcium polycarbophil      Take 3 tablets by mouth twice daily        REMERON PO      Take 15 mg by mouth At Bedtime        RISPERDAL PO      Take 3 mg by mouth At Bedtime        sildenafil 20 MG tablet    REVATIO    90 tablet    Take 1 to 5 tabs as needed for sexual activity.    Other male erectile dysfunction       traZODone 50 MG tablet    DESYREL     Take 50 mg by mouth At Bedtime        Urea 20 % Crea cream     60 g    To affected, ridged nails daily.    Dystrophic nail

## 2018-07-25 ENCOUNTER — THERAPY VISIT (OUTPATIENT)
Dept: PHYSICAL THERAPY | Facility: CLINIC | Age: 63
End: 2018-07-25
Payer: MEDICARE

## 2018-07-25 DIAGNOSIS — G89.29 CHRONIC PAIN OF BOTH SHOULDERS: ICD-10-CM

## 2018-07-25 DIAGNOSIS — M25.511 CHRONIC PAIN OF BOTH SHOULDERS: ICD-10-CM

## 2018-07-25 DIAGNOSIS — M25.512 CHRONIC PAIN OF BOTH SHOULDERS: ICD-10-CM

## 2018-07-25 PROCEDURE — G8982 BODY POS GOAL STATUS: HCPCS | Mod: GP | Performed by: PHYSICAL THERAPIST

## 2018-07-25 PROCEDURE — 97110 THERAPEUTIC EXERCISES: CPT | Mod: GP | Performed by: PHYSICAL THERAPIST

## 2018-07-25 PROCEDURE — G8981 BODY POS CURRENT STATUS: HCPCS | Mod: GP | Performed by: PHYSICAL THERAPIST

## 2018-07-25 PROCEDURE — 97112 NEUROMUSCULAR REEDUCATION: CPT | Mod: GP | Performed by: PHYSICAL THERAPIST

## 2018-07-29 ENCOUNTER — MEDICAL CORRESPONDENCE (OUTPATIENT)
Dept: HEALTH INFORMATION MANAGEMENT | Facility: CLINIC | Age: 63
End: 2018-07-29

## 2018-08-21 ENCOUNTER — OFFICE VISIT (OUTPATIENT)
Dept: INTERNAL MEDICINE | Facility: CLINIC | Age: 63
End: 2018-08-21
Payer: MEDICARE

## 2018-08-21 VITALS
BODY MASS INDEX: 22.76 KG/M2 | OXYGEN SATURATION: 98 % | WEIGHT: 163.2 LBS | DIASTOLIC BLOOD PRESSURE: 68 MMHG | HEART RATE: 60 BPM | SYSTOLIC BLOOD PRESSURE: 113 MMHG

## 2018-08-21 DIAGNOSIS — Z00.00 HEALTHCARE MAINTENANCE: ICD-10-CM

## 2018-08-21 DIAGNOSIS — R60.0 LOWER EXTREMITY EDEMA: ICD-10-CM

## 2018-08-21 DIAGNOSIS — Z00.00 HEALTHCARE MAINTENANCE: Primary | ICD-10-CM

## 2018-08-21 LAB
ALBUMIN SERPL-MCNC: 3.6 G/DL (ref 3.4–5)
ALP SERPL-CCNC: 69 U/L (ref 40–150)
ALT SERPL W P-5'-P-CCNC: 16 U/L (ref 0–70)
ANION GAP SERPL CALCULATED.3IONS-SCNC: 6 MMOL/L (ref 3–14)
AST SERPL W P-5'-P-CCNC: 14 U/L (ref 0–45)
BILIRUB SERPL-MCNC: 0.3 MG/DL (ref 0.2–1.3)
BUN SERPL-MCNC: 9 MG/DL (ref 7–30)
CALCIUM SERPL-MCNC: 9.1 MG/DL (ref 8.5–10.1)
CHLORIDE SERPL-SCNC: 106 MMOL/L (ref 94–109)
CO2 SERPL-SCNC: 30 MMOL/L (ref 20–32)
CREAT SERPL-MCNC: 0.92 MG/DL (ref 0.66–1.25)
GFR SERPL CREATININE-BSD FRML MDRD: 83 ML/MIN/1.7M2
GLUCOSE SERPL-MCNC: 86 MG/DL (ref 70–99)
POTASSIUM SERPL-SCNC: 3.9 MMOL/L (ref 3.4–5.3)
PROT SERPL-MCNC: 7.3 G/DL (ref 6.8–8.8)
SODIUM SERPL-SCNC: 142 MMOL/L (ref 133–144)

## 2018-08-21 RX ORDER — MIRABEGRON 25 MG/1
1 TABLET, FILM COATED, EXTENDED RELEASE ORAL DAILY
Refills: 11 | COMMUNITY
Start: 2018-08-12 | End: 2020-06-23

## 2018-08-21 ASSESSMENT — PAIN SCALES - GENERAL: PAINLEVEL: NO PAIN (0)

## 2018-08-21 NOTE — PROGRESS NOTES
PRIMARY CARE CENTER       SUBJECTIVE:  Russ Agee is a 62 year old male with a PMHx notable for HBV, HCV s/p treatment x2 (pegylated interferon/ribavirin then Harvoni/ribavirin), depression, bipolar disorder, prostate cancer and GERD  who comes in for bilateral lower extremity swelling. Pt first noted lower extremity edema 15 days ago. Per chart review it appears that he had a transient history of similar symptoms in the past. He notes that swelling is painful with deep palpation. Swelling has improved since onset, but remains present. Patient states that he often either sits or lays flat a large portion of the day, but does report walking around 1/2 mile per day. He has not yet tried elevating his legs. He does have a past history of hepatitis C with negative viral load in February 2018. Liver bx in 2006 was without evidence of fibrosis. Past cardiac history notable for palpitations with unremarkable holter and has been evaluated by cardiology in the past. Echocardiogram from 2015 was unremarkable. 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 He does note appreciate any changes in urinary frequency, urgency, or stream. Denies any fevers, chills, palpitations, abdominal pain, upper extremity swelling, or weight changes.     Medications and allergies reviewed by me today.     ROS:   Constitutional, neuro, ENT, endocrine, pulmonary, cardiac, gastrointestinal, genitourinary, musculoskeletal, integument and psychiatric systems are negative, except as otherwise noted.    OBJECTIVE:    /68  Pulse 60  Wt 74 kg (163 lb 3.2 oz)  SpO2 98%  BMI 22.76 kg/m2   Wt Readings from Last 1 Encounters:   08/21/18 74 kg (163 lb 3.2 oz)       GENERAL APPEARANCE: healthy, alert and no distress     EYES: EOMI,  PERRL     HENT: ear canals and TM's normal and nose and mouth without ulcers or lesions     NECK: no adenopathy, no asymmetry, masses, or  scars, no JVD     RESP: lungs clear to auscultation - no rales, rhonchi or wheezes     CV: regular rates and rhythm, normal S1 S2, no S3 or S4 and no murmur, click or rub     ABDOMEN:  soft, nontender, no HSM or masses and bowel sounds normal     MS: extremities normal- no gross deformities noted, no evidence of inflammation in joints, FROM in all extremities.     EXT: 2+ pitting BLE edema to mid shin, distal pulses intact, warm extremities      SKIN: no suspicious lesions or rashes, no jaundice     NEURO: Normal strength and tone, sensory exam grossly normal, mentation intact and speech normal     PSYCH: mentation appears normal. and affect normal/bright     LYMPHATICS: No cervical adenopathy     ASSESSMENT/PLAN:    Ali was seen today for swelling.    Diagnoses and all orders for this visit:    #. Bilateral lower extremity edema  Lower extremity swelling is likely secondary to venous insufficiency. Differential also includes hepatic stasis, cardiac etiology or renal etiology, although these seem less likely given recent unremarkable imaging and laboratory studies. Will plan to manage conservatively at this point as edema appears to be improving and will incorporate practices like lower extremity elevation and compression stockings.   - CMP  - Compression stockings  - Instructions provided on leg elevation       Pt should return to clinic for f/u with Dr. Haque at scheduled appointment     Delta Puckett DO  Aug 21, 2018    Pt was seen and plan of care discussed with Dr. Faith  While the patient was in clinic, I reviewed the pertinent medical history and results.  I discussed the current findings on physical examination, as well as the patient s diagnosis and treatment plan with the resident and agree with the information as documented with the following exceptions: none.      Brady Faith

## 2018-08-21 NOTE — NURSING NOTE
Chief Complaint   Patient presents with     Swelling     Pitting edema in bilateral lower extremities.

## 2018-08-21 NOTE — MR AVS SNAPSHOT
After Visit Summary   8/21/2018    Russ Agee    MRN: [id number]           Patient Information     Date Of Birth          1955        Visit Information        Provider Department      8/21/2018 2:05 PM Delta Puckett DO [facility]        Today's Diagnoses     Healthcare maintenance    -  1       Follow-ups after your visit        Follow-up notes from your care team     Return in about 3 months (around 11/21/2018).      Your next 10 appointments already scheduled     Aug 23, 2018  1:20 PM CDT   ENRIQUE Extremity with Leatha Reich PT   [facility] ([facility] [place])    [address] [place] [id number] [phone]            Sep 20, 2018  2:40 PM CDT   ENRIQUE Extremity with Leatha Reich PT   [facility] ([facility] [place])    [address] [place] [id number] [phone]            Oct 15, 2018  3:35 PM CDT   (Arrive by 3:20 PM)   Return Visit with Donnie Haque MD   [facility] ([facility])    [address] [place] [address] [id number] [phone]            Dec 03, 2018  2:30 PM CST   LAB with  LAB   [facility] ([facility])    [address] [place] [address] [id number] [phone]           Please do not eat 10-12 hours before your appointment if you are coming in fasting for labs on lipids, cholesterol, or glucose (sugar). This does not apply to pregnant women. Water, hot tea and black coffee (with nothing added) are okay. Do not drink other fluids, diet soda or chew gum.            Dec 11, 2018 12:30 PM CST   (Arrive by 12:15 PM)   Return Visit with Gerardo Trotter MD   [facility] ([facility])    [address] [place] [address] [phone] [phone]              Future tests that  were ordered for you today     Open Future Orders        Priority Expected Expires Ordered    Comprehensive metabolic panel Routine 8/21/2018 9/4/2018 8/21/2018            Who to contact     Please call your clinic at [phone] to:    Ask questions about your health    Make or cancel appointments    Discuss your medicines    Learn about your test results    Speak to your doctor            Additional Information About Your Visit        Care EveryWhere ID     This is your Care EveryWhere ID. This could be used by other organizations to access your [place] medical records  [id number]        Your Vitals Were     Pulse Pulse Oximetry BMI (Body Mass Index)             60 98% 22.76 kg/m2          Blood Pressure from Last 3 Encounters:   08/21/18 113/68   07/11/18 106/66   03/28/18 108/72    Weight from Last 3 Encounters:   08/21/18 74 kg (163 lb 3.2 oz)   07/11/18 73.5 kg (162 lb)   03/28/18 76.4 kg (168 lb 6.4 oz)              We Performed the Following     Compression stockings        Primary Care Provider Office Phone # Fax #    Donnie Hqaue [phone] [address] [address]        Equal Access to Services     [facility] Center[facility] : Hadii aad ku hadasho Soomaali, waaxda luqadaha, qaybta kaalmada adeegyada, machelle herndon hayisin juanis paz. So [facility] [phone].    ATENCIÓN: Si habla español, tiene a french disposición servicios gratuitos de asistencia lingüística. Llame al [phone].    We comply with applicable federal civil rights laws and [place] laws. We do not discriminate on the basis of race, color, national origin, age, disability, sex, sexual orientation, or gender identity.            Thank you!     Thank you for choosing [facility]  for your care. Our goal is always to provide you with excellent care. Hearing back from our patients is one way we can continue to improve our services. Please take a few minutes to complete the written  survey that you may receive in the mail after your visit with us. Thank you!             Your Updated Medication List - Protect others around you: Learn how to safely use, store and throw away your medicines at www.disposemymeds.org.          This list is accurate as of 8/21/18  3:17 PM.  Always use your most recent med list.                   Brand Name Dispense Instructions for use Diagnosis    desonide 0.05 % ointment    DESOWEN    60 g    To affected areas of the face for itch and red bumps twice a day as needed.    Dermatitis, seborrheic       esomeprazole 40 MG CR capsule    nexIUM    90 capsule    Take 1 capsule (40 mg) by mouth every morning (before breakfast)    Esophageal reflux       gentamicin 0.008% in 1000ml 0.9% NaCl Soln nasal irrigation    GARAMYCIN    1000 mL    Spray 30 mLs in nostril 2 times daily Irrigate 30 mL between each nostril BID for a total of 60ml/day PLEASE MAIL TO PATIENT    Chronic pansinusitis       hydrocortisone 25 MG Suppository    ANUSOL-HC    28 suppository    Place 1 suppository (25 mg) rectally 2 times daily    Internal hemorrhoids       ketoconazole 2 % shampoo    NIZORAL    240 mL    To entire wet scalp and face in affected areas and then wash off after several minutes three times a week.    Dermatitis, seborrheic       lidocaine 2 % topical gel    XYLOCAINE     Apply 1 Tube topically        METAMUCIL PO      Take 3 tsp by mouth 2 times daily.        MYRBETRIQ 25 MG 24 hr tablet   Generic drug:  mirabegron      Take 1 tablet by mouth daily        nicotine 14 MG/24HR 24 hr patch    NICODERM CQ    30 patch    Place 1 patch onto the skin every 24 hours    Encounter for smoking cessation counseling       nicotine polacrilex 2 MG gum    NICORETTE     Place 2 mg inside cheek as needed for smoking cessation        RA FIBER- MG tablet   Generic drug:  calcium polycarbophil      Take 3 tablets by mouth twice daily        REMERON PO      Take 15 mg by mouth At Bedtime         RISPERDAL PO      Take 3 mg by mouth At Bedtime        sildenafil 20 MG tablet    REVATIO    90 tablet    Take 1 to 5 tabs as needed for sexual activity.    Other male erectile dysfunction       traZODone 50 MG tablet    DESYREL     Take 50 mg by mouth At Bedtime        Urea 20 % Crea cream     60 g    To affected, ridged nails daily.    Dystrophic nail

## 2018-08-23 ENCOUNTER — THERAPY VISIT (OUTPATIENT)
Dept: PHYSICAL THERAPY | Facility: CLINIC | Age: 63
End: 2018-08-23
Payer: MEDICARE

## 2018-08-23 DIAGNOSIS — G89.29 CHRONIC PAIN OF BOTH SHOULDERS: ICD-10-CM

## 2018-08-23 DIAGNOSIS — M25.512 CHRONIC PAIN OF BOTH SHOULDERS: ICD-10-CM

## 2018-08-23 DIAGNOSIS — M25.511 CHRONIC PAIN OF BOTH SHOULDERS: ICD-10-CM

## 2018-08-23 PROCEDURE — 97112 NEUROMUSCULAR REEDUCATION: CPT | Mod: GP | Performed by: PHYSICAL THERAPIST

## 2018-08-23 PROCEDURE — 97110 THERAPEUTIC EXERCISES: CPT | Mod: GP | Performed by: PHYSICAL THERAPIST

## 2018-08-23 NOTE — PROGRESS NOTES
Subjective:  HPI                    Objective:  System    Physical Exam    General     ROS    Assessment/Plan:    PROGRESS  REPORT    Progress reporting period is from IE to 8/23/2018.     SUBJECTIVE  Subjective: Overhead reaching, sleeping on right side painful   Current Pain level: 5/10   Initial Pain level: 7/10        ;   ,     The objective findings are from DOS 8/23/2018.    OBJECTIVE  Objective: Req max postural cuing for reaching act; AROM shoulder IR/ext T12; Flex left 145 right 150; scaption w postural correction 155; abd 95 imp w postural cuing.  PROM ER <90 lynette w capsular end feel  All stretches/exercises are prod/NW; resting pain only w direct lying right shld.  Obj signs, slow progression consistent with adhesive capsulitis      ASSESSMENT/PLAN  Updated problem list and treatment plan: Diagnosis 1:  B shld pain  Pain -  manual therapy, self management, education and home program  Decreased ROM/flexibility - manual therapy and therapeutic exercise  Decreased joint mobility - manual therapy and therapeutic exercise  Impaired muscle performance - neuro re-education  Decreased function - therapeutic activities  Impaired posture - neuro re-education  STG/LTGs have been met or progress has been made towards goals:  Yes (See Goal flow sheet completed today.)  Assessment of Progress: The patient's condition has potential to improve.  Patient is meeting short term goals and is progressing towards long term goals.  Self Management Plans:  Patient has been instructed in a home treatment program.  Patient  has been instructed in self management of symptoms.  I have re-evaluated this patient and find that the nature, scope, duration and intensity of the therapy is appropriate for the medical condition of the patient.  Ali continues to require the following intervention to meet STG and LTG's: PT      Recommendations:  This patient would benefit from continued therapy.     Frequency:  1 X a month, once  daily  Duration:  for 3 months        Please refer to the daily flowsheet for treatment today, total treatment time and time spent performing 1:1 timed codes.

## 2018-08-27 ENCOUNTER — MEDICAL CORRESPONDENCE (OUTPATIENT)
Dept: HEALTH INFORMATION MANAGEMENT | Facility: CLINIC | Age: 63
End: 2018-08-27

## 2018-09-02 ENCOUNTER — MEDICAL CORRESPONDENCE (OUTPATIENT)
Dept: HEALTH INFORMATION MANAGEMENT | Facility: CLINIC | Age: 63
End: 2018-09-02

## 2018-09-19 ENCOUNTER — OFFICE VISIT (OUTPATIENT)
Dept: ENDOCRINOLOGY | Facility: CLINIC | Age: 63
End: 2018-09-19
Payer: MEDICARE

## 2018-09-19 VITALS
SYSTOLIC BLOOD PRESSURE: 110 MMHG | BODY MASS INDEX: 23.49 KG/M2 | HEART RATE: 58 BPM | WEIGHT: 164.1 LBS | DIASTOLIC BLOOD PRESSURE: 68 MMHG | HEIGHT: 70 IN

## 2018-09-19 DIAGNOSIS — E04.1 THYROID NODULE: Primary | ICD-10-CM

## 2018-09-19 ASSESSMENT — PAIN SCALES - GENERAL: PAINLEVEL: NO PAIN (0)

## 2018-09-19 NOTE — MR AVS SNAPSHOT
After Visit Summary   9/19/2018    Russ Agee    MRN: [id number]           Patient Information     Date Of Birth          1955        Visit Information        Provider Department      9/19/2018 3:30 PM Jayden Dan MD [facility]        Today's Diagnoses     Thyroid nodule    -  1      Care Instructions    We will schedule an ultrasound today.     Once we have the ultrasound report we will contact you with the results.           Follow-ups after your visit        Follow-up notes from your care team     Return in about 1 year (around 9/19/2019).      Your next 10 appointments already scheduled     Sep 19, 2018  3:30 PM CDT   (Arrive by 3:15 PM)   RETURN ENDOCRINE with MD ISIAH Sexton [facility] ([facility])    [address]  3rd Floor  [facility] [phone]            Sep 20, 2018  2:40 PM CDT   ENRIQUE Extremity with Leatha Reich PT   [facility] [place]    [address] [place] [phone]            Oct 15, 2018  2:30 PM CDT   US THYROID with UCUS1   [facility] ([email or web address])    [address]  1st Floor  [facility] [phone]           How do I prepare for my exam? (Food and drink instructions) No Food and Drink Restrictions.  How do I prepare for my exam? (Other instructions) You do not need to do anything special to prepare for your exam.  What should I wear: Wear comfortable clothes.  How long does the exam take: Most ultrasounds take 30 to 60 minutes.  What should I bring: Bring a list of your medicines, including vitamins, minerals and over-the-counter drugs. It is safest to leave personal items at home.  Do I need a :  No  is needed.  What do I need to tell my doctor: Tell your doctor about any allergies you may have.  What should I do after the exam: No  restrictions, You may resume normal activities.  What is this test: An ultrasound uses sound waves to make pictures of the body. Sound waves do not cause pain. The only discomfort may be the pressure of the wand against your skin or full bladder.  Who should I call with questions: If you have any questions, please call the Imaging Department where you will have your exam. Directions, parking instructions, and other information is available on our website, [email or web address].            Oct 15, 2018  3:35 PM CDT   (Arrive by 3:20 PM)   Return Visit with Donnie Haque MD   [facility] ([facility])    [address] [place] [phone]            Dec 03, 2018  2:30 PM CST   LAB with  LAB   [facility] ([facility])    [address] [place] [address] [phone]           Please do not eat 10-12 hours before your appointment if you are coming in fasting for labs on lipids, cholesterol, or glucose (sugar). This does not apply to pregnant women. Water, hot tea and black coffee (with nothing added) are okay. Do not drink other fluids, diet soda or chew gum.            Dec 11, 2018 12:30 PM CST   (Arrive by 12:15 PM)   Return Visit with Gerardo Trotter MD   [facility] ([facility])    [address] [place] [address] [phone]              Future tests that were ordered for you today     Open Future Orders        Priority Expected Expires Ordered    US Thyroid Routine  4/17/2019 9/19/2018            Who to contact     Please call your clinic at [phone] to:    Ask questions about your health    Make or cancel appointments    Discuss your medicines    Learn about your test results    Speak to your doctor            Additional Information About Your Visit       "  MyChart Information     [employer] is an electronic gateway that provides easy, online access to your medical records. With [employer], you can request a clinic appointment, read your test results, renew a prescription or communicate with your care team.     To sign up for [employer] visit the website at [email or web address]   You will be asked to enter the access code listed below, as well as some personal information. Please follow the directions to create your username and password.     Your access code is: [email or web address]  Expires: 2018  6:31 AM     Your access code will  in 90 days. If you need help or a new code, please contact your [facility] or call [phone] for assistance.        Care EveryWhere ID     This is your Care EveryWhere ID. This could be used by other organizations to access your [place] medical records  [id number]        Your Vitals Were     Pulse Height BMI (Body Mass Index)             58 1.778 m (5' 10\") 23.55 kg/m2          Blood Pressure from Last 3 Encounters:   18 110/68   18 113/68   18 106/66    Weight from Last 3 Encounters:   18 74.4 kg (164 lb 1.6 oz)   18 74 kg (163 lb 3.2 oz)   18 73.5 kg (162 lb)               Primary Care Provider Office Phone # Fax #    Donnie Haque [phone]       [address] [address]        Equal Access to Services     ELENA LEÓN : Hadii lamont ku hadasho Soomaali, waaxda luqadaha, qaybta kaalmada jonatanegyakory, machelle stein . So [facility] [phone].    ATENCIÓN: Si habla español, tiene a french disposición servicios gratuitos de asistencia lingüística. Llame al [phone].    We comply with applicable federal civil rights laws and [place] laws. We do not discriminate on the basis of race, color, national origin, age, disability, sex, sexual orientation, or gender identity.            Thank you!     Thank you " for choosing [facility]  for your care. Our goal is always to provide you with excellent care. Hearing back from our patients is one way we can continue to improve our services. Please take a few minutes to complete the written survey that you may receive in the mail after your visit with us. Thank you!             Your Updated Medication List - Protect others around you: Learn how to safely use, store and throw away your medicines at www.disposemymeds.org.          This list is accurate as of 9/19/18  3:26 PM.  Always use your most recent med list.                   Brand Name Dispense Instructions for use Diagnosis    desonide 0.05 % ointment    DESOWEN    60 g    To affected areas of the face for itch and red bumps twice a day as needed.    Dermatitis, seborrheic       esomeprazole 40 MG CR capsule    nexIUM    90 capsule    Take 1 capsule (40 mg) by mouth every morning (before breakfast)    Esophageal reflux       gentamicin 0.008% in 1000ml 0.9% NaCl Soln nasal irrigation    GARAMYCIN    1000 mL    Spray 30 mLs in nostril 2 times daily Irrigate 30 mL between each nostril BID for a total of 60ml/day PLEASE MAIL TO PATIENT    Chronic pansinusitis       hydrocortisone 25 MG Suppository    ANUSOL-HC    28 suppository    Place 1 suppository (25 mg) rectally 2 times daily    Internal hemorrhoids       ketoconazole 2 % shampoo    NIZORAL    240 mL    To entire wet scalp and face in affected areas and then wash off after several minutes three times a week.    Dermatitis, seborrheic       lidocaine 2 % topical gel    XYLOCAINE     Apply 1 Tube topically        METAMUCIL PO      Take 3 tsp by mouth 2 times daily.        MYRBETRIQ 25 MG 24 hr tablet   Generic drug:  mirabegron      Take 1 tablet by mouth daily        nicotine 14 MG/24HR 24 hr patch    NICODERM CQ    30 patch    Place 1 patch onto the skin every 24 hours    Encounter for smoking cessation counseling       nicotine polacrilex 2 MG gum     NICORETTE     Place 2 mg inside cheek as needed for smoking cessation        order for DME     2 Device    Equipment being ordered: Compression stockings (pair)    Lower extremity edema       RA FIBER- MG tablet   Generic drug:  calcium polycarbophil      Take 3 tablets by mouth twice daily        REMERON PO      Take 15 mg by mouth At Bedtime        RISPERDAL PO      Take 3 mg by mouth At Bedtime        sildenafil 20 MG tablet    REVATIO    90 tablet    Take 1 to 5 tabs as needed for sexual activity.    Other male erectile dysfunction       traZODone 50 MG tablet    DESYREL     Take 50 mg by mouth At Bedtime        Urea 20 % Crea cream     60 g    To affected, ridged nails daily.    Dystrophic nail

## 2018-09-19 NOTE — PATIENT INSTRUCTIONS
We will schedule an ultrasound today.     Once we have the ultrasound report we will contact you with the results.

## 2018-09-19 NOTE — PROGRESS NOTES
Endocrinology Clinic Visit    Chief Complaint: RECHECK (thyroid nodule )     Information obtained from:Patient    Subjective:         HPI: Russ Agee is a 62 year old year old male with history of thyroid nodule who is seen today for a follow up.     61 yo male patient who was noted to have an incidental finding of a thyroid nodule done on CT of the chest. This was followed up by ultrasound of the thyroid (report cc below). In summary he has two sub-centimeter  Nodules on the right and left and one left sided nodule which is 1.6 cm in the largest diameter.  Isoechoic nodule.     He doesn't report any family history of thyroid cancer or neck and head radiation treatment.  He reports long-standing heat and cold intolerance otherwise no other hyper or hypothyroid symptoms. Denies compressive symptoms including problem with swallowing, breathing or pain involving the neck.    Last TSH checked in September 2017 was within normal limits.-  Right lobe:  Nodule 1:  Nodule measurement: 0.7 x 0.5 x 0.8 cm.  Echogenicity: Anechoic  Consistency: cystic  Calcifications: None  Hypervascular: Peripheral vascularity, without discrete solid  component     Isthmus: Unremarkable     Left Lobe:   Nodule 1:  Nodule measurement: 1.2 x 1.0 x 1.6 cm   Echogenicity: Isoechoic  Consistency: Predominantly solid with minimal cystic component.  Calcifications: None  Hypervascular: yes     Nodule 2:  Nodule measurement: 0.4 x 0.3 x 0.5 cm   Echogenicity: Anechoic  Consistency: cystic  Calcifications: None  Hypervascular: no     Left-sided nodule which was measuring 1.2 x 1 x 1.6 cm was biopsied in 9/2017 hepatic and the results were benign.Thyroid, left, nodule #1, ultrasound guided fine needle aspiration:   Benign   Consistent with a benign nodule (includes adenomatoid nodule, colloid   nodule, etc.)     He denies any new symptoms today.  Allergies   Allergen Reactions     Nkda [No Known Drug Allergies]        Current Outpatient Prescriptions    Medication Sig Dispense Refill     calcium polycarbophil (RA FIBER-CAP) 625 MG tablet Take 3 tablets by mouth twice daily       desonide (DESOWEN) 0.05 % ointment To affected areas of the face for itch and red bumps twice a day as needed. 60 g 11     esomeprazole (NEXIUM) 40 MG CR capsule Take 1 capsule (40 mg) by mouth every morning (before breakfast) 90 capsule 2     gentamicin 0.008% in 1000ml 0.9% NaCl (GARAMYCIN) SOLN nasal irrigation Spray 30 mLs in nostril 2 times daily Irrigate 30 mL between each nostril BID for a total of 60ml/day PLEASE MAIL TO PATIENT 1000 mL 3     hydrocortisone (ANUSOL-HC) 25 MG suppository Place 1 suppository (25 mg) rectally 2 times daily 28 suppository 3     ketoconazole (NIZORAL) 2 % shampoo To entire wet scalp and face in affected areas and then wash off after several minutes three times a week. 240 mL 11     lidocaine (XYLOCAINE) 2 % jelly Apply 1 Tube topically       Mirtazapine (REMERON PO) Take 15 mg by mouth At Bedtime       MYRBETRIQ 25 MG 24 hr tablet Take 1 tablet by mouth daily  11     nicotine (NICODERM CQ) 14 MG/24HR 24 hr patch Place 1 patch onto the skin every 24 hours 30 patch 2     nicotine polacrilex (NICORETTE) 2 MG gum Place 2 mg inside cheek as needed for smoking cessation       order for DME Equipment being ordered: Compression stockings (pair) 2 Device 0     Psyllium (METAMUCIL PO) Take 3 tsp by mouth 2 times daily.       RisperiDONE (RISPERDAL PO) Take 3 mg by mouth At Bedtime       sildenafil (REVATIO/VIAGRA) 20 MG tablet Take 1 to 5 tabs as needed for sexual activity. 90 tablet 11     traZODone (DESYREL) 50 MG tablet Take 50 mg by mouth At Bedtime        Urea 20 % CREA To affected, ridged nails daily. 60 g 5       Review of Systems     11 point review system (Constitutional, HENT, Eyes, Respiratory, Cardiovascular, Gastrointestinal, Genitourinary, Musculoskeletal,Neurological, Psychiatric/Behavioural, Endocrine) is negative or is as per HPI above except  known problem with urination related to his known prostate issue.     Past Medical History:   Diagnosis Date     Anxiety      Chronic hepatitis C (H)     treated.  Undetectable     Depressive disorder     followed by Dr. Mccormick     ETOH abuse      GERD (gastroesophageal reflux disease)      Malignant neoplasm of prostate (H)      Mucous cyst of joint      Palpitations        Past Surgical History:   Procedure Laterality Date     CYSTOSCOPY N/A 11/27/2017    Procedure: CYSTOSCOPY;  Cystoscopy, [facility] Procedure;  Surgeon: Gerardo Trotter MD;  Location: [facility]     ENDOSCOPIC ULTRASOUND, ESOPHAGOSCOPY, GASTROSCOPY, DUODENOSCOPY (EGD), COMBINED  12/16/15     ESOPHAGOSCOPY, GASTROSCOPY, DUODENOSCOPY (EGD), COMBINED N/A 4/9/2015    Procedure: COMBINED ESOPHAGOSCOPY, GASTROSCOPY, DUODENOSCOPY (EGD);  Surgeon: Raina Romo MD;  Location: [facility]     EXCISE MASS FINGER  2/19/2013    Procedure: EXCISE MASS FINGER;  Mass Excision Left Index Finger     (local anesthesia);  Surgeon: Raina Dorsey MD;  [facility]      [facility] ENDOSCOPY W EUS N/A 12/16/2015    Procedure: COMBINED ENDOSCOPIC ULTRASOUND, ESOPHAGOSCOPY, GASTROSCOPY, DUODENOSCOPY (EGD);  Surgeon: Brady Acuna MD;  [facility]     PROSTATE SURGERY       TRANSURETHERAL DESTRUCTION OF PROSTATE BY THERMOTHERAPY N/A 11/27/2017    Procedure: TRANSURETHERAL DESTRUCTION OF PROSTATE BY THERMOTHERAPY;;  Surgeon: Gerardo Trtoter MD;  Location: [facility]       Family History   Problem Relation Age of Onset     Diabetes Sister      Diabetes Son      Alcohol/Drug Other      Arthritis Other      Circulatory Other      Eye Disorder Other      Depression Other      Genetic Disorder Other      Psychotic Disorder Other      Cancer No family hx of      no skin cancer     Skin Cancer No family hx of      Thyroid Disease No family hx of      Thyroid Cancer No family hx of        Social History     Social History     Marital status: Single  "    Spouse name: N/A     Number of children: N/A     Years of education: N/A     Social History Main Topics     Smoking status: Former Smoker     Packs/day: 0.50     Years: 6.00     Types: Cigarettes     Quit date: 9/16/2012     Smokeless tobacco: Never Used     Alcohol use No      Comment: stopped 10/2014     Drug use: No     Sexual activity: Not Asked     Other Topics Concern     None     Social History Narrative       Objective:   /68  Pulse 58  Ht 1.778 m (5' 10\")  Wt 74.4 kg (164 lb 1.6 oz)  BMI 23.55 kg/m2  Constitutional: Pleasant no acute cardiopulmonary distress.   EYES: anicteric, normal extra-ocular movements, no lid lag or retraction   HEENT: Mouth/Throat: Mucous membrane is moist. Oropharynx is clear. No adenopathy. Thyroid slightly enlarged; right sided more palpable than the left side.  There is a palpable left-sided nodule.  Cardiovascular: RRR, S1, S2 normal.   Pulmonary/Chest: CTAB. No wheezing or rales   Abdominal: +BS. Non tender to palpation.   Neurological: Alert and oriented.   Extremities: No edema.   Psychological: appropriate mood and affect   TSH   Date Value Ref Range Status   09/06/2017 0.70 0.40 - 4.00 mU/L Final         Assessment/Treatment Plan:      Russ Agee is a 62 year old year old male with Thyroid nodule who is here for consultation of the same.     Thyroid nodule: we previously reviewed thyroid ultrasound and chest CT/2017 and neck CT/2016 with respect to thyroid.  He has two sub-centimeter  Nodules on the right and left and one left sided nodule which is 1.6 cm in the largest diameter.  This nodule met criteria for a biopsy per guideline and was biopsied.  Results were benign.  No change in his clinical status at this time.  We will repeat thyroid ultrasound today.  Further plan based on the results.    Patient Instructions   We will schedule an ultrasound today.     Once we have the ultrasound report we will contact you with the results.       I will contact the " patient with the test results.  Return to clinic in 12 months.    Test and/or medications prescribed today:  Orders Placed This Encounter   Procedures     US Thyroid       Jayden Dan MD  Staff endocrinologist   [phone]  Division of Endocrinology and Diabetes

## 2018-09-19 NOTE — LETTER
9/19/2018       RE: Russ Agee  [address]  [facility] [address]     Dear Colleague,    Thank you for referring your patient, Russ Agee, to the [facility] ENDOCRINOLOGY at [facility]. Please see a copy of my visit note below.    Endocrinology Clinic Visit    Chief Complaint: RECHECK (thyroid nodule )     Information obtained from:Patient    Subjective:         HPI: Russ Agee is a 62 year old year old male with history of thyroid nodule who is seen today for a follow up.     63 yo male patient who was noted to have an incidental finding of a thyroid nodule done on CT of the chest. This was followed up by ultrasound of the thyroid (report cc below). In summary he has two sub-centimeter  Nodules on the right and left and one left sided nodule which is 1.6 cm in the largest diameter.  Isoechoic nodule.     He doesn't report any family history of thyroid cancer or neck and head radiation treatment.  He reports long-standing heat and cold intolerance otherwise no other hyper or hypothyroid symptoms. Denies compressive symptoms including problem with swallowing, breathing or pain involving the neck.    Last TSH checked in September 2017 was within normal limits.-  Right lobe:  Nodule 1:  Nodule measurement: 0.7 x 0.5 x 0.8 cm.  Echogenicity: Anechoic  Consistency: cystic  Calcifications: None  Hypervascular: Peripheral vascularity, without discrete solid  component     Isthmus: Unremarkable     Left Lobe:   Nodule 1:  Nodule measurement: 1.2 x 1.0 x 1.6 cm   Echogenicity: Isoechoic  Consistency: Predominantly solid with minimal cystic component.  Calcifications: None  Hypervascular: yes     Nodule 2:  Nodule measurement: 0.4 x 0.3 x 0.5 cm   Echogenicity: Anechoic  Consistency: cystic  Calcifications: None  Hypervascular: no     Left-sided nodule which was measuring 1.2 x 1 x 1.6 cm was biopsied in 9/2017 hepatic and the results were benign.Thyroid, left, nodule #1,  ultrasound guided fine needle aspiration:   Benign   Consistent with a benign nodule (includes adenomatoid nodule, colloid   nodule, etc.)     He denies any new symptoms today.  Allergies   Allergen Reactions     Nkda [No Known Drug Allergies]        Current Outpatient Prescriptions   Medication Sig Dispense Refill     calcium polycarbophil (RA FIBER-CAP) 625 MG tablet Take 3 tablets by mouth twice daily       desonide (DESOWEN) 0.05 % ointment To affected areas of the face for itch and red bumps twice a day as needed. 60 g 11     esomeprazole (NEXIUM) 40 MG CR capsule Take 1 capsule (40 mg) by mouth every morning (before breakfast) 90 capsule 2     gentamicin 0.008% in 1000ml 0.9% NaCl (GARAMYCIN) SOLN nasal irrigation Spray 30 mLs in nostril 2 times daily Irrigate 30 mL between each nostril BID for a total of 60ml/day PLEASE MAIL TO PATIENT 1000 mL 3     hydrocortisone (ANUSOL-HC) 25 MG suppository Place 1 suppository (25 mg) rectally 2 times daily 28 suppository 3     ketoconazole (NIZORAL) 2 % shampoo To entire wet scalp and face in affected areas and then wash off after several minutes three times a week. 240 mL 11     lidocaine (XYLOCAINE) 2 % jelly Apply 1 Tube topically       Mirtazapine (REMERON PO) Take 15 mg by mouth At Bedtime       MYRBETRIQ 25 MG 24 hr tablet Take 1 tablet by mouth daily  11     nicotine (NICODERM CQ) 14 MG/24HR 24 hr patch Place 1 patch onto the skin every 24 hours 30 patch 2     nicotine polacrilex (NICORETTE) 2 MG gum Place 2 mg inside cheek as needed for smoking cessation       order for DME Equipment being ordered: Compression stockings (pair) 2 Device 0     Psyllium (METAMUCIL PO) Take 3 tsp by mouth 2 times daily.       RisperiDONE (RISPERDAL PO) Take 3 mg by mouth At Bedtime       sildenafil (REVATIO/VIAGRA) 20 MG tablet Take 1 to 5 tabs as needed for sexual activity. 90 tablet 11     traZODone (DESYREL) 50 MG tablet Take 50 mg by mouth At Bedtime        Urea 20 % CREA To  affected, ridged nails daily. 60 g 5       Review of Systems     11 point review system (Constitutional, HENT, Eyes, Respiratory, Cardiovascular, Gastrointestinal, Genitourinary, Musculoskeletal,Neurological, Psychiatric/Behavioural, Endocrine) is negative or is as per HPI above except known problem with urination related to his known prostate issue.     Past Medical History:   Diagnosis Date     Anxiety      Chronic hepatitis C (H)     treated.  Undetectable     Depressive disorder     followed by Dr. Mccormick     ETOH abuse      GERD (gastroesophageal reflux disease)      Malignant neoplasm of prostate (H)      Mucous cyst of joint      Palpitations        Past Surgical History:   Procedure Laterality Date     CYSTOSCOPY N/A 11/27/2017    Procedure: CYSTOSCOPY;  Cystoscopy, [facility] Procedure;  Surgeon: Gerardo Trotter MD;  Location:  OR     ENDOSCOPIC ULTRASOUND, ESOPHAGOSCOPY, GASTROSCOPY, DUODENOSCOPY (EGD), COMBINED  12/16/15     ESOPHAGOSCOPY, GASTROSCOPY, DUODENOSCOPY (EGD), COMBINED N/A 4/9/2015    Procedure: COMBINED ESOPHAGOSCOPY, GASTROSCOPY, DUODENOSCOPY (EGD);  Surgeon: Raina Romo MD;  Location:  GI     EXCISE MASS FINGER  2/19/2013    Procedure: EXCISE MASS FINGER;  Mass Excision Left Index Finger     (local anesthesia);  Surgeon: Raina Dorsey MD;  Location:  OR      UGI ENDOSCOPY W EUS N/A 12/16/2015    Procedure: COMBINED ENDOSCOPIC ULTRASOUND, ESOPHAGOSCOPY, GASTROSCOPY, DUODENOSCOPY (EGD);  Surgeon: Brady Acuna MD;  Location:  GI     PROSTATE SURGERY       TRANSURETHERAL DESTRUCTION OF PROSTATE BY THERMOTHERAPY N/A 11/27/2017    Procedure: TRANSURETHERAL DESTRUCTION OF PROSTATE BY THERMOTHERAPY;;  Surgeon: Gerardo Trotter MD;  [facility]       Family History   Problem Relation Age of Onset     Diabetes Sister      Diabetes Son      Alcohol/Drug Other      Arthritis Other      Circulatory Other      Eye Disorder Other      Depression  "Other      Genetic Disorder Other      Psychotic Disorder Other      Cancer No family hx of      no skin cancer     Skin Cancer No family hx of      Thyroid Disease No family hx of      Thyroid Cancer No family hx of        Social History     Social History     Marital status: Single     Spouse name: N/A     Number of children: N/A     Years of education: N/A     Social History Main Topics     Smoking status: Former Smoker     Packs/day: 0.50     Years: 6.00     Types: Cigarettes     Quit date: 9/16/2012     Smokeless tobacco: Never Used     Alcohol use No      Comment: stopped 10/2014     Drug use: No     Sexual activity: Not Asked     Other Topics Concern     None     Social History Narrative       Objective:   /68  Pulse 58  Ht 1.778 m (5' 10\")  Wt 74.4 kg (164 lb 1.6 oz)  BMI 23.55 kg/m2  Constitutional: Pleasant no acute cardiopulmonary distress.   EYES: anicteric, normal extra-ocular movements, no lid lag or retraction   HEENT: Mouth/Throat: Mucous membrane is moist. Oropharynx is clear. No adenopathy. Thyroid slightly enlarged; right sided more palpable than the left side.  There is a palpable left-sided nodule.  Cardiovascular: RRR, S1, S2 normal.   Pulmonary/Chest: CTAB. No wheezing or rales   Abdominal: +BS. Non tender to palpation.   Neurological: Alert and oriented.   Extremities: No edema.   Psychological: appropriate mood and affect   TSH   Date Value Ref Range Status   09/06/2017 0.70 0.40 - 4.00 mU/L Final         Assessment/Treatment Plan:      Russ Agee is a 62 year old year old male with Thyroid nodule who is here for consultation of the same.     Thyroid nodule: we previously reviewed thyroid ultrasound and chest CT/2017 and neck CT/2016 with respect to thyroid.  He has two sub-centimeter  Nodules on the right and left and one left sided nodule which is 1.6 cm in the largest diameter.  This nodule met criteria for a biopsy per guideline and was biopsied.  Results were benign.  No " change in his clinical status at this time.  We will repeat thyroid ultrasound today.  Further plan based on the results.    Patient Instructions   We will schedule an ultrasound today.     Once we have the ultrasound report we will contact you with the results.     I will contact the patient with the test results.  Return to clinic in 12 months.    Test and/or medications prescribed today:  Orders Placed This Encounter   Procedures     US Thyroid     Jayden Dan MD  Staff endocrinologist   [phone]  Division of Endocrinology and Diabetes

## 2018-09-20 ENCOUNTER — THERAPY VISIT (OUTPATIENT)
Dept: PHYSICAL THERAPY | Facility: CLINIC | Age: 63
End: 2018-09-20
Payer: MEDICARE

## 2018-09-20 DIAGNOSIS — M25.512 CHRONIC PAIN OF BOTH SHOULDERS: ICD-10-CM

## 2018-09-20 DIAGNOSIS — M25.511 CHRONIC PAIN OF BOTH SHOULDERS: ICD-10-CM

## 2018-09-20 DIAGNOSIS — G89.29 CHRONIC PAIN OF BOTH SHOULDERS: ICD-10-CM

## 2018-09-20 PROCEDURE — G8982 BODY POS GOAL STATUS: HCPCS | Mod: GP | Performed by: PHYSICAL THERAPIST

## 2018-09-20 PROCEDURE — 97110 THERAPEUTIC EXERCISES: CPT | Mod: GP | Performed by: PHYSICAL THERAPIST

## 2018-09-20 PROCEDURE — G8981 BODY POS CURRENT STATUS: HCPCS | Mod: GP | Performed by: PHYSICAL THERAPIST

## 2018-09-20 PROCEDURE — 97112 NEUROMUSCULAR REEDUCATION: CPT | Mod: GP | Performed by: PHYSICAL THERAPIST

## 2018-09-20 NOTE — PROGRESS NOTES
Subjective:  HPI                    Objective:  System    Physical Exam    General     ROS    Assessment/Plan:    DISCHARGE REPORT    Progress reporting period is from 8/23/2018 to 9/20/2018.       SUBJECTIVE  Subjective changes noted by patient:  yes.  Subjective: Able to reach and sleep, but pain is still present.  Stretches going fine.    Current pain level is 4/10 Current Pain level: 4/10.     Previous pain level was  7/10 Initial Pain level: 7/10.   Changes in function:  Yes (See Goal flowsheet attached for changes in current functional level)  Adverse reaction to treatment or activity: None    OBJECTIVE  Changes noted in objective findings:  Yes  Objective: AROM  lynette; IR/Ext L1.  Forward head protrusion corrected w cuing, increasing functional UE elevation.  Pt shows trending improvement that is slow, indicative of  adhesive capsulitis     ASSESSMENT/PLAN  Updated problem list and treatment plan: Diagnosis 1:  B shoulder pain  Pain -  self management and home program  Decreased ROM/flexibility - Decreased joint mobility - hep  Decreased strength - home program  Impaired muscle performance - home program  Decreased function - home program  STG/LTGs have been met or progress has been made towards goals:  Yes (See Goal flow sheet completed today.)  Assessment of Progress: The patient has met all of their long term goals.  Self Management Plans:  Patient is independent in a home treatment program.  I have re-evaluated this patient and find that the nature, scope, duration and intensity of the therapy is appropriate for the medical condition of the patient.  Ali continues to require the following intervention to meet STG and LTG's:  PT intervention is no longer required to meet STG/LTG.    Recommendations:  This patient is ready to be discharged from therapy and continue their home treatment program.    Please refer to the daily flowsheet for treatment today, total treatment time and time spent performing  1:1 timed codes.

## 2018-09-20 NOTE — LETTER
DEPARTMENT OF HEALTH AND HUMAN SERVICES  [facility]    PLAN/UPDATED PLAN OF PROGRESS FOR OUTPATIENT REHABILITATION    PATIENTS NAME:  Russ Agee     : 1955    PROVIDER NUMBER:    [id number]    HICN:  [id number]     PROVIDER NAME: [facility]    MEDICAL RECORD NUMBER: [id number]     START OF CARE DATE:  SOC Date: 18   TYPE:  PT    PRIMARY/TREATMENT DIAGNOSIS: (Pertinent Medical Diagnosis)  Chronic pain of both shoulders    VISITS FROM START OF CARE:  Rxs Used: 8     DISCHARGE REPORT  Progress reporting period is from 2018 to 2018.       SUBJECTIVE  Subjective changes noted by patient:  yes.  Subjective: Able to reach and sleep, but pain is still present.  Stretches going fine.    Current pain level is 4/10 Current Pain level: 4/10.     Previous pain level was  7/10 Initial Pain level: 7/10.   Changes in function:  Yes (See Goal flowsheet attached for changes in current functional level)  Adverse reaction to treatment or activity: None    OBJECTIVE  Changes noted in objective findings:  Yes  Objective: AROM  lynette; IR/Ext L1.  Forward head protrusion corrected w cuing, increasing functional UE elevation.  Pt shows trending improvement that is slow, indicative of  adhesive capsulitis     ASSESSMENT/PLAN  Updated problem list and treatment plan: Diagnosis 1:  B shoulder pain  Pain -  self management and home program  Decreased ROM/flexibility - Decreased joint mobility - hep  Decreased strength - home program  Impaired muscle performance - home program  Decreased function - home program  STG/LTGs have been met or progress has been made towards goals:  Yes (See Goal flow sheet completed today.)  Assessment of Progress: The patient has met all of their long term goals.  Self Management Plans:  Patient is independent in a home treatment program.  I have re-evaluated this patient and find that the nature, scope, duration and intensity  "of the therapy is appropriate for the medical condition of the patient.  Russ continues to require the following intervention to meet STG and LTG's:  PT intervention is no longer required to meet STG/LTG.      PATIENTS NAME:  Russ Agee   : 1955    Recommendations:  This patient is ready to be discharged from therapy and continue their home treatment program.    Please refer to the daily flowsheet for treatment today, total treatment time and time spent performing 1:1 timed codes.              Caregiver Signature/Credentials _____________________________ Date ________       Treating Provider: Leatha Reich, PT   I have reviewed and certified the need for these services and plan of treatment while under my care.        PHYSICIAN'S SIGNATURE:   _____________________________________  Date___________     Donnie Haque MD    Certification period:  Beginning of Cert date period: 18 to End of Cert period date: 18     Functional Level Progress Report: Please see attached \"Goal Flow sheet for Functional level.\"    ________ Continue Services or       ____X____ DC Services                Service dates: From  SOC Date: 18 date to present                         "

## 2018-09-27 ENCOUNTER — MEDICAL CORRESPONDENCE (OUTPATIENT)
Dept: HEALTH INFORMATION MANAGEMENT | Facility: CLINIC | Age: 63
End: 2018-09-27

## 2018-10-02 DIAGNOSIS — R39.9 LOWER URINARY TRACT SYMPTOMS (LUTS): ICD-10-CM

## 2018-10-03 RX ORDER — MIRABEGRON 25 MG/1
TABLET, FILM COATED, EXTENDED RELEASE ORAL
Qty: 30 TABLET | Refills: 10 | OUTPATIENT
Start: 2018-10-03

## 2018-10-15 ENCOUNTER — OFFICE VISIT (OUTPATIENT)
Dept: INTERNAL MEDICINE | Facility: CLINIC | Age: 63
End: 2018-10-15
Payer: MEDICARE

## 2018-10-15 ENCOUNTER — RADIANT APPOINTMENT (OUTPATIENT)
Dept: ULTRASOUND IMAGING | Facility: CLINIC | Age: 63
End: 2018-10-15
Attending: INTERNAL MEDICINE
Payer: MEDICARE

## 2018-10-15 VITALS
WEIGHT: 159 LBS | DIASTOLIC BLOOD PRESSURE: 71 MMHG | BODY MASS INDEX: 22.81 KG/M2 | SYSTOLIC BLOOD PRESSURE: 120 MMHG | HEART RATE: 56 BPM | OXYGEN SATURATION: 99 %

## 2018-10-15 DIAGNOSIS — E04.1 THYROID NODULE: ICD-10-CM

## 2018-10-15 DIAGNOSIS — Z23 NEED FOR PROPHYLACTIC VACCINATION AND INOCULATION AGAINST INFLUENZA: Primary | ICD-10-CM

## 2018-10-15 DIAGNOSIS — M79.89 LEG SWELLING: ICD-10-CM

## 2018-10-15 ASSESSMENT — PAIN SCALES - GENERAL: PAINLEVEL: MODERATE PAIN (4)

## 2018-10-15 NOTE — MR AVS SNAPSHOT
After Visit Summary   10/15/2018    Russ Agee    MRN: [id number]           Patient Information     Date Of Birth          1955        Visit Information        Provider Department      10/15/2018 3:35 PM Donnie Haque MD [facility]        Today's Diagnoses     Need for prophylactic vaccination and inoculation against influenza    -  1    Leg swelling          Care Instructions    Primary Care Center Medication Refill Request Information:  * Please contact your pharmacy regarding ANY request for medication refills.  ** [facility] Prescription Fax = [phone]  * Please allow 3 business days for routine medication refills.  * Please allow 5 business days for controlled substance medication refills.     Primary Care Center Test Result notification information:  *You will be notified with in 7-10 days of your appointment day regarding the results of your test.  If you are on MyChart you will be notified as soon as the provider has reviewed the results and signed off on them.    [facility]: [phone]           Follow-ups after your visit        Your next 10 appointments already scheduled     Oct 15, 2018  5:00 PM CDT   US LOWER EXTREMITY VENOUS DUPLEX BILATERAL with [id number]   [facility] ([facility])    [address] [place] [id number] [phone]           How do I prepare for my exam? (Food and drink instructions) No Food and Drink Restrictions.  How do I prepare for my exam? (Other instructions) You do not need to do anything special to prepare for your exam.  What should I wear: Wear comfortable clothes.  How long does the exam take: Most ultrasounds take 30 to 60 minutes.  What should I bring: Bring a list of your medicines, including vitamins, minerals and over-the-counter drugs. It is safest to leave personal items at home.  Do I need a :  No  is needed.  What do I need to tell my  doctor: Tell your doctor about any allergies you may have.  What should I do after the exam: No restrictions, You may resume normal activities.  What is this test: An ultrasound uses sound waves to make pictures of the body. Sound waves do not cause pain. The only discomfort may be the pressure of the wand against your skin or full bladder.  Who should I call with questions: If you have any questions, please call the Imaging Department where you will have your exam. Directions, parking instructions, and other information is available on our website, [email or web address].            Nov 05, 2018  2:35 PM CST   (Arrive by 2:20 PM)   Return Visit with Donnie Haque MD   [facility] ([facility])    [address] [place] [id number] [phone]            Dec 03, 2018  2:30 PM CST   LAB with  LAB   [facility] ([facility])    [address] [place] [id number] [phone]           Please do not eat 10-12 hours before your appointment if you are coming in fasting for labs on lipids, cholesterol, or glucose (sugar). This does not apply to pregnant women. Water, hot tea and black coffee (with nothing added) are okay. Do not drink other fluids, diet soda or chew gum.            Dec 11, 2018 12:30 PM CST   (Arrive by 12:15 PM)   Return Visit with Gerardo Trotter MD   [facility] ([facility])    [address] [place] [id number] [phone]              Who to contact     Please call your clinic at [phone] to:    Ask questions about your health    Make or cancel appointments    Discuss your medicines    Learn about your test results    Speak to your doctor            Additional Information About Your Visit        [employer] Information     [email or web address] is an electronic gateway that provides  easy, online access to your medical records. With [employer], you can request a clinic appointment, read your test results, renew a prescription or communicate with your care team.     To sign up for [employer] visit the website at [email or web address]   You will be asked to enter the access code listed below, as well as some personal information. Please follow the directions to create your username and password.     Your access code is: [email or web address]  Expires: 2018  6:31 AM     Your access code will  in 90 days. If you need help or a new code, please contact your [facility] or call [phone] for assistance.        Care EveryWhere ID     This is your Care EveryWhere ID. This could be used by other organizations to access your [facility] medical records  [id number]        Your Vitals Were     Pulse Pulse Oximetry BMI (Body Mass Index)             56 99% 22.81 kg/m2          Blood Pressure from Last 3 Encounters:   10/15/18 120/71   18 110/68   18 113/68    Weight from Last 3 Encounters:   10/15/18 72.1 kg (159 lb)   18 74.4 kg (164 lb 1.6 oz)   18 74 kg (163 lb 3.2 oz)              We Performed the Following     HC FLU VAC PRESRV FREE QUAD SPLIT VIR 3+YRS IM     US Lower Extremity Venous Duplex Bilateral        Primary Care Provider Office Phone # Fax #    Donnie Haque [phone]       0 [address] [address]        Equal Access to Services     MEREDITH LEÓN : Hadii lamont ku hadasho Soomaali, waaxda luqadaha, qaybta kaalmada juanisyakory, machelle stein . So [facility] [phone].    ATENCIÓN: Si habla español, tiene a french disposición servicios gratuitos de asistencia lingüística. Llame al [phone].    We comply with applicable federal civil rights laws and [place] laws. We do not discriminate on the basis of race, color, national origin, age, disability, sex, sexual orientation, or  gender identity.            Thank you!     Thank you for choosing [facility]  for your care. Our goal is always to provide you with excellent care. Hearing back from our patients is one way we can continue to improve our services. Please take a few minutes to complete the written survey that you may receive in the mail after your visit with us. Thank you!             Your Updated Medication List - Protect others around you: Learn how to safely use, store and throw away your medicines at www.disposemymeds.org.          This list is accurate as of 10/15/18  4:14 PM.  Always use your most recent med list.                   Brand Name Dispense Instructions for use Diagnosis    desonide 0.05 % ointment    DESOWEN    60 g    To affected areas of the face for itch and red bumps twice a day as needed.    Dermatitis, seborrheic       esomeprazole 40 MG CR capsule    nexIUM    90 capsule    Take 1 capsule (40 mg) by mouth every morning (before breakfast)    Esophageal reflux       gentamicin 0.008% in 1000ml 0.9% NaCl Soln nasal irrigation    GARAMYCIN    1000 mL    Spray 30 mLs in nostril 2 times daily Irrigate 30 mL between each nostril BID for a total of 60ml/day PLEASE MAIL TO PATIENT    Chronic pansinusitis       hydrocortisone 25 MG Suppository    ANUSOL-HC    28 suppository    Place 1 suppository (25 mg) rectally 2 times daily    Internal hemorrhoids       ketoconazole 2 % shampoo    NIZORAL    240 mL    To entire wet scalp and face in affected areas and then wash off after several minutes three times a week.    Dermatitis, seborrheic       lidocaine 2 % topical gel    XYLOCAINE     Apply 1 Tube topically        METAMUCIL PO      Take 3 tsp by mouth 2 times daily.        MYRBETRIQ 25 MG 24 hr tablet   Generic drug:  mirabegron      Take 1 tablet by mouth daily        nicotine 14 MG/24HR 24 hr patch    NICODERM CQ    30 patch    Place 1 patch onto the skin every 24 hours    Encounter for smoking  cessation counseling       nicotine polacrilex 2 MG gum    NICORETTE     Place 2 mg inside cheek as needed for smoking cessation        order for DME     2 Device    Equipment being ordered: Compression stockings (pair)    Lower extremity edema       RA FIBER- MG tablet   Generic drug:  calcium polycarbophil      Take 3 tablets by mouth twice daily        REMERON PO      Take 15 mg by mouth At Bedtime        RISPERDAL PO      Take 3 mg by mouth At Bedtime        sildenafil 20 MG tablet    REVATIO    90 tablet    Take 1 to 5 tabs as needed for sexual activity.    Other male erectile dysfunction       traZODone 50 MG tablet    DESYREL     Take 50 mg by mouth At Bedtime        Urea 20 % Crea cream     60 g    To affected, ridged nails daily.    Dystrophic nail

## 2018-10-15 NOTE — PROGRESS NOTES
"Subjective:  Russ is a pleasant 59 year-old man who came in today for follow up today.   He had colonoscopy 11/13/15 repeat in 5 years. He had upper EGD and EUS with Dr. Acuna, GI 12/16/15 with gastric polyp bx.  He had fairly unremarkable Holter Monitor 1/6/16.   He has been followed in Endocrinology for thyroid nodules. He has been seen in Urology for elevated PSA. He states several months of B \"cold feet\". He denies any claudication sxs. No skin changes. He states cold feet worse at night. He states B LE swelling to mid calf.       Objective:  General appearance: NAD, cooperative, alert.  HEENT; negative   Cardio: Normal rate and rhythm, no murmurs  Respiratory: Normal breath sounds  Abdomen: Non-tender  Neurological exam: B UE/LE/proximal/distal is normal and symmetric for sensation, reflexes, and strength.  B LE swelling to mid calf. He has palpable B dorsalis pedis pulses. Feet are warm. No skin breakdown. He has adequate sensation.        Assessment and Plan:    (1) Seen by Ms. Ji, 1/5/2018 [facility] for smoking cessation. He  has not stopped smoking now and only using gum.   (2) He was seen in  GI 5/30/2017 for Hep C management and was \"cured\".  At that time no further GI  Clinic visits necessary.  His last Hep C viral load 2/28/18 was undetectable. Liver U/S 3/8/208 fairly unremarkable.   (3) He was seen by Dr. Trotter Urology 7/23/2018  for elevated PSA and prostate cancer/BPH. He has clinic appt. 12/11/2018  (4) He was seen 10/30/17 by Dr. Delaney for neck complaints and sinus issues  (5) He was seen Dr. Naranjo Cardiology for ongoing palpitations 4/13/16.  (6) Reflux and remains on Nexium;  EGD 10/15  (7) He states he sees his Psychiatry doctor once a  Month in [place] and remains on the same medications.   (8) Thyroid nodules seen on Cervical MRI; thyroid U/S; seen in  Endocrine 9/19/2018 and stable thyroid U/S today 10/15/2018   (9) Immunizations done 9/26/17 for Td.  New Zoster Vaccine when available. " Influenza vaccination today 10/15/2018  (10)  B LE U/S today for B LE swelling and may consider B LE U/S arterial for cold feet. He had TSH checked 9/2017.                Total time spent 40 minutes.  More than 50% of the time spent with Mr. Agee on counseling / coordinating his care

## 2018-10-15 NOTE — NURSING NOTE
Chief Complaint   Patient presents with     Swelling     Pt is here is here to discuss bilateral feet/ ankle swelling. Pt states feet are always cold.      Frida Messer LPN at 3:48 PM on 10/15/2018.

## 2018-10-15 NOTE — NURSING NOTE
Russ Agee      1.  Has the patient received the information for the influenza vaccine? YES    2.  Does the patient have any of the following contraindications?     Allergy to eggs? No     Allergic reaction to previous influenza vaccines? No     Any other problems to previous influenza vaccines? No     Paralyzed by Guillain-Roscoe syndrome? No     Currently pregnant? NO     Current moderate or severe illness? No     Allergy to contact lens solution? No    3.  The vaccine has been administered in the usual fashion and the patient was instructed to wait 20 minutes before leaving the building in the event of an allergic reaction: YES    Vaccination given by Frida Messer LPN at 4:07 PM on 10/15/2018.  .  Recorded by Frida Messer

## 2018-10-15 NOTE — PATIENT INSTRUCTIONS
[facility] Medication Refill Request Information:  * Please contact your pharmacy regarding ANY request for medication refills.  ** [facility] Prescription Fax = [phone]  * Please allow 3 business days for routine medication refills.  * Please allow 5 business days for controlled substance medication refills.     [facility] Test Result notification information:  *You will be notified with in 7-10 days of your appointment day regarding the results of your test.  If you are on MyChart you will be notified as soon as the provider has reviewed the results and signed off on them.    [facility]: [phone]

## 2018-10-15 NOTE — LETTER
Patient:  Russ Agee  :   1955  MRN:     [id number]        Mr. Russ Agee  [address]  [place] [address]        2018    Dear Mr. Agee,    Thank you for choosing the [facility] for your healthcare needs.  We appreciate the opportunity to serve you.    The following are your recent test results.     Your lower extremity ultrasound study looks fine. I recommend you keep your 2018 follow up appointment with me to go over all the results    Results for orders placed or performed in visit on 10/15/18   US Lower Extremity Venous Duplex Bilateral    Narrative    BILATERAL LOWER EXTREMITY DUPLEX VENOUS ULTRASOUND 10/15/2018    CLINICAL HISTORY: Bilateral leg swelling.    COMPARISONS: None available.    TECHNIQUE: Grayscale, color Doppler, and Doppler waveform ultrasound  evaluation was evaluated through the bilateral femoral, popliteal, and  popliteal veins. Bilateral posterior tibial and peroneal veins were  evaluated with grayscale ultrasound imaging.    FINDINGS: Bilateral common femoral, femoral, and popliteal veins are  fully compressible, patent, and demonstrate normal phasic Doppler  waveforms.    Bilateral posterior tibial veins are fully compressible. Bilateral  peroneal veins are fully compressible.      Impression    IMPRESSION: No deep venous thrombosis demonstrated in either leg.    JESE SPANN MD     Please contact your provider if you have any questions or concerns.  We look forward to serving your needs in the future.      Sincerely,    LUTHER Haque

## 2018-10-17 DIAGNOSIS — R39.9 LOWER URINARY TRACT SYMPTOMS (LUTS): ICD-10-CM

## 2018-10-18 DIAGNOSIS — N32.81 OVERACTIVE BLADDER: Primary | ICD-10-CM

## 2018-10-18 RX ORDER — MIRABEGRON 25 MG/1
25 TABLET, FILM COATED, EXTENDED RELEASE ORAL DAILY
Qty: 30 TABLET | Refills: 3 | Status: SHIPPED | OUTPATIENT
Start: 2018-10-18 | End: 2019-02-21

## 2018-10-18 RX ORDER — MIRABEGRON 25 MG/1
25 TABLET, FILM COATED, EXTENDED RELEASE ORAL DAILY
Qty: 30 TABLET | Refills: 3 | Status: SHIPPED | OUTPATIENT
Start: 2018-10-18 | End: 2020-06-23

## 2018-10-18 NOTE — TELEPHONE ENCOUNTER
MYRBETRIQ 25 MG    Last Written Prescription Date:  UNK  Last Fill Quantity: UNK,   # refills: UNK  Last Office Visit : 7/23/18  Future Office visit: 12/11/18  Routing refill request to provider for review/approval because:   Medication is reported/historical

## 2018-10-19 ENCOUNTER — TELEPHONE (OUTPATIENT)
Dept: ENDOCRINOLOGY | Facility: CLINIC | Age: 63
End: 2018-10-19

## 2018-10-19 NOTE — TELEPHONE ENCOUNTER
I spoke with Russ by phone to  Inform him that the U/S  was stable  So he is to f/u in one year with another U/S. Nichol RN  Please inform patient that the ultrasound findings are stable.  Please advise follow-up ultrasound in 1 year.   Jayden Dan

## 2018-11-05 ENCOUNTER — OFFICE VISIT (OUTPATIENT)
Dept: INTERNAL MEDICINE | Facility: CLINIC | Age: 63
End: 2018-11-05
Payer: MEDICARE

## 2018-11-05 VITALS
WEIGHT: 163.9 LBS | BODY MASS INDEX: 23.52 KG/M2 | DIASTOLIC BLOOD PRESSURE: 76 MMHG | OXYGEN SATURATION: 98 % | SYSTOLIC BLOOD PRESSURE: 117 MMHG | HEART RATE: 58 BPM

## 2018-11-05 DIAGNOSIS — E04.1 THYROID NODULE: Primary | ICD-10-CM

## 2018-11-05 ASSESSMENT — PAIN SCALES - GENERAL: PAINLEVEL: NO PAIN (0)

## 2018-11-05 NOTE — PROGRESS NOTES
"Subjective:  Russ is a pleasant 59 year-old man who came in today for follow up today.   He had colonoscopy 11/13/15 repeat in 5 years. He had upper EGD and EUS with Dr. Acuna, GI 12/16/15 with gastric polyp bx.  He had fairly unremarkable Holter Monitor 1/6/16.   He has been followed in Endocrinology for thyroid nodules. He has been seen in Urology for elevated PSA.   He denies any claudication sxs. No skin changes.            Objective:  General appearance: NAD, cooperative, alert.  HEENT; negative   Cardio: Normal rate and rhythm, no murmurs  Respiratory: Normal breath sounds  Abdomen: Non-tender  Neurological exam: B UE/LE/proximal/distal is normal and symmetric for sensation, reflexes, and strength.  B LE swelling to mid calf. He has palpable B dorsalis pedis pulses. Feet are warm. No skin breakdown. He has adequate sensation.        Assessment and Plan:    (1) Seen by Ms. Ji, 1/5/2018 [place] for smoking cessation. He  has not stopped smoking now and only using gum.   (2) He was seen in  GI 5/30/2017 for Hep C management and was \"cured\".  At that time no further GI  Clinic visits necessary.  His last Hep C viral load 2/28/18 was undetectable. Liver U/S 3/8/208 fairly unremarkable.   (3) He was seen by Dr. Trotter Urology 7/23/2018  for elevated PSA and prostate cancer/BPH. He has clinic appt. 12/11/2018  (4) He was seen 10/30/17 by Dr. Delaney for neck complaints and sinus issues  (5) He was seen Dr. Naranjo Cardiology for ongoing palpitations 4/13/16.  (6) Reflux and remains on Nexium;  EGD 10/15  (7) He states he follows with a Psychiatry doctor once a  Month in [place] and remains on the same medications.   (8) Thyroid nodules seen on Cervical MRI; thyroid U/S; seen in  Endocrine 9/19/2018 and stable thyroid U/S  10/15/2018 and stable  (9) Immunizations done 9/26/17 for Td.  New Zoster Vaccine when available. Influenza vaccination  10/15/2018  (10)  B LE U/S done 10/15/2018; no DVE for B LE swelling and may " consider B LE U/S arterial for cold feet. He had TSH checked 9/2017.                Total time spent 15 minutes.  More than 50% of the time spent with Mr. Agee on counseling / coordinating his care

## 2018-11-05 NOTE — NURSING NOTE
Chief Complaint   Patient presents with     Swelling     Swelling in bilateral feet and cold sensation.        Radha Mullen LPN at 2:24 PM on November 5, 2018

## 2018-11-05 NOTE — MR AVS SNAPSHOT
After Visit Summary   11/5/2018    Russ Agee    MRN: [id number]           Patient Information     Date Of Birth          1955        Visit Information        Provider Department      11/5/2018 2:35 PM Donnie Haque MD [facility]         Follow-ups after your visit        Follow-up notes from your care team     Return in about 2 months (around 1/5/2019).      Your next 10 appointments already scheduled     Nov 05, 2018  2:35 PM CST   (Arrive by 2:20 PM)   Return Visit with Donnie Haque MD   [facility] ([facility])    [address] [place] [id number] [phone]            Dec 03, 2018  2:30 PM CST   LAB with  LAB   [facility] ([facility])    [address] [place] [id number] [phone]           Please do not eat 10-12 hours before your appointment if you are coming in fasting for labs on lipids, cholesterol, or glucose (sugar). This does not apply to pregnant women. Water, hot tea and black coffee (with nothing added) are okay. Do not drink other fluids, diet soda or chew gum.            Dec 11, 2018 12:30 PM CST   (Arrive by 12:15 PM)   Return Visit with Gerardo Trotter MD   [facility] for Prostate and Urologic Cancers ([facility])    [address] [place] [id number] [phone]              Who to contact     Please call your clinic at [phone] to:    Ask questions about your health    Make or cancel appointments    Discuss your medicines    Learn about your test results    Speak to your doctor            Additional Information About Your Visit        Care EveryWhere ID     This is your Care EveryWhere ID. This could be used by other organizations to access your [facility] medical records  [id number]        Your Vitals Were     Pulse Pulse Oximetry BMI  (Body Mass Index)             58 98% 23.52 kg/m2          Blood Pressure from Last 3 Encounters:   11/05/18 117/76   10/15/18 120/71   09/19/18 110/68    Weight from Last 3 Encounters:   11/05/18 74.3 kg (163 lb 14.4 oz)   10/15/18 72.1 kg (159 lb)   09/19/18 74.4 kg (164 lb 1.6 oz)              Today, you had the following     No orders found for display       Primary Care Provider Office Phone # Fax #    Donnie Haque [phone] [address] [address]        Equal Access to Services     ELENA [facility] Hospital[facility]UEL : Hadseamus Bourne, watatiana vanegas, qabrayden kaalmada darek, machelle paz. So [facility] [phone].    ATENCIÓN: Si habla español, tiene a french disposición servicios gratuitos de asistencia lingüística. Llame al [phone].    We comply with applicable federal civil rights laws and [place] laws. We do not discriminate on the basis of race, color, national origin, age, disability, sex, sexual orientation, or gender identity.            Thank you!     Thank you for choosing [facility]  for your care. Our goal is always to provide you with excellent care. Hearing back from our patients is one way we can continue to improve our services. Please take a few minutes to complete the written survey that you may receive in the mail after your visit with us. Thank you!             Your Updated Medication List - Protect others around you: Learn how to safely use, store and throw away your medicines at www.disposemymeds.org.          This list is accurate as of 11/5/18  2:34 PM.  Always use your most recent med list.                   Brand Name Dispense Instructions for use Diagnosis    desonide 0.05 % ointment    DESOWEN    60 g    To affected areas of the face for itch and red bumps twice a day as needed.    Dermatitis, seborrheic       esomeprazole 40 MG CR capsule    nexIUM    90 capsule    Take 1 capsule (40 mg) by  mouth every morning (before breakfast)    Esophageal reflux       gentamicin 0.008% in 1000ml 0.9% NaCl Soln nasal irrigation    GARAMYCIN    1000 mL    Spray 30 mLs in nostril 2 times daily Irrigate 30 mL between each nostril BID for a total of 60ml/day PLEASE MAIL TO PATIENT    Chronic pansinusitis       hydrocortisone 25 MG Suppository    ANUSOL-HC    28 suppository    Place 1 suppository (25 mg) rectally 2 times daily    Internal hemorrhoids       ketoconazole 2 % shampoo    NIZORAL    240 mL    To entire wet scalp and face in affected areas and then wash off after several minutes three times a week.    Dermatitis, seborrheic       lidocaine 2 % topical gel    XYLOCAINE     Apply 1 Tube topically        METAMUCIL PO      Take 3 tsp by mouth 2 times daily.        * MYRBETRIQ 25 MG 24 hr tablet   Generic drug:  mirabegron      Take 1 tablet by mouth daily        * mirabegron 25 MG 24 hr tablet    MYRBETRIQ    30 tablet    Take 1 tablet (25 mg) by mouth daily    Lower urinary tract symptoms (LUTS)       * mirabegron 25 MG 24 hr tablet    MYRBETRIQ    30 tablet    Take 1 tablet (25 mg) by mouth daily    Overactive bladder       nicotine 14 MG/24HR 24 hr patch    NICODERM CQ    30 patch    Place 1 patch onto the skin every 24 hours    Encounter for smoking cessation counseling       nicotine polacrilex 2 MG gum    NICORETTE     Place 2 mg inside cheek as needed for smoking cessation        order for DME     2 Device    Equipment being ordered: Compression stockings (pair)    Lower extremity edema       RA FIBER- MG tablet   Generic drug:  calcium polycarbophil      Take 3 tablets by mouth twice daily        REMERON PO      Take 15 mg by mouth At Bedtime        RISPERDAL PO      Take 3 mg by mouth At Bedtime        sildenafil 20 MG tablet    REVATIO    90 tablet    Take 1 to 5 tabs as needed for sexual activity.    Other male erectile dysfunction       traZODone 50 MG tablet    DESYREL     Take 50 mg by mouth  At Bedtime        Urea 20 % Crea cream     60 g    To affected, ridged nails daily.    Dystrophic nail       * Notice:  This list has 3 medication(s) that are the same as other medications prescribed for you. Read the directions carefully, and ask your doctor or other care provider to review them with you.

## 2018-11-23 DIAGNOSIS — Z71.6 ENCOUNTER FOR SMOKING CESSATION COUNSELING: ICD-10-CM

## 2018-11-26 ENCOUNTER — MEDICAL CORRESPONDENCE (OUTPATIENT)
Dept: HEALTH INFORMATION MANAGEMENT | Facility: CLINIC | Age: 63
End: 2018-11-26

## 2018-11-27 RX ORDER — NICOTINE 21 MG/24HR
1 PATCH, TRANSDERMAL 24 HOURS TRANSDERMAL EVERY 24 HOURS
Qty: 30 PATCH | Refills: 5 | Status: SHIPPED | OUTPATIENT
Start: 2018-11-27 | End: 2020-06-23

## 2018-12-03 DIAGNOSIS — C61 MALIGNANT NEOPLASM OF PROSTATE (H): ICD-10-CM

## 2018-12-03 LAB — PSA SERPL-MCNC: 3.29 UG/L (ref 0–4)

## 2018-12-12 ENCOUNTER — VIRTUAL VISIT (OUTPATIENT)
Dept: UROLOGY | Facility: CLINIC | Age: 63
End: 2018-12-12
Payer: MEDICARE

## 2018-12-12 DIAGNOSIS — C61 PROSTATE CANCER (H): Primary | ICD-10-CM

## 2018-12-12 NOTE — PROGRESS NOTES
REASON FOR CALL: LUTS and prostate cancer.      HISTORY OF PRESENT ILLNESS:  Mr. Agee is a 62-year-old gentleman followed in our clinic for history of lower urinary tract symptoms.  The patient underwent a Rezum water vapor treatment on 11/27/2017.  He has had subjective improvement of his symptoms, though his AUA Sx score did not reflect this.Of note, the patient was also diagnosed with 1 core of [place] 3+3 equals 6 prostate cancer back in 2009 for which he is on active surveillance.  He had negative surveillance biopsies in 2010 and an MRI guided surveillance biopsy in 2014, also negative.  His MRI of the prostate on 9/12/16 showed a prostate volume of 59 ml and PI-RADS 2 lesion only.  The patient continues to complain of dry ejaculation with orgasm.  He also notes that his urine stream has slowed and that he needs to push to get his bladder to empty.    OBJECTIVE: PSA from 12/3/18 is 3.29    ASSESSMENT AND PLAN:  Over half of today's 15-minute visit was spent counseling the patient regarding his LUTS and prostate cancer.  I suggested it is time to reassess the patient's prostate cancer since it has been 4 years since his last biopsy and he might need further surgical intervention for his urinary symptoms.  We will obtain an MRI of the prostate with a biopsy to follow in the next few months.  We will make further decisions about how to manage his urinary symptoms after the results of his biopsy.

## 2018-12-28 ENCOUNTER — MEDICAL CORRESPONDENCE (OUTPATIENT)
Dept: HEALTH INFORMATION MANAGEMENT | Facility: CLINIC | Age: 63
End: 2018-12-28

## 2019-01-05 ENCOUNTER — MEDICAL CORRESPONDENCE (OUTPATIENT)
Dept: HEALTH INFORMATION MANAGEMENT | Facility: CLINIC | Age: 64
End: 2019-01-05

## 2019-01-14 ENCOUNTER — OFFICE VISIT (OUTPATIENT)
Dept: INTERNAL MEDICINE | Facility: CLINIC | Age: 64
End: 2019-01-14
Payer: MEDICARE

## 2019-01-14 VITALS
WEIGHT: 167.7 LBS | SYSTOLIC BLOOD PRESSURE: 111 MMHG | DIASTOLIC BLOOD PRESSURE: 71 MMHG | HEART RATE: 67 BPM | BODY MASS INDEX: 24.06 KG/M2

## 2019-01-14 DIAGNOSIS — B19.20 HEPATITIS C VIRUS INFECTION WITHOUT HEPATIC COMA, UNSPECIFIED CHRONICITY: Primary | ICD-10-CM

## 2019-01-14 DIAGNOSIS — B19.20 HEPATITIS C VIRUS INFECTION WITHOUT HEPATIC COMA, UNSPECIFIED CHRONICITY: ICD-10-CM

## 2019-01-14 DIAGNOSIS — R93.2 ABNORMAL FINDINGS ON DIAGNOSTIC IMAGING OF LIVER AND BILIARY TRACT: ICD-10-CM

## 2019-01-14 LAB
AFP SERPL-MCNC: 2.3 UG/L (ref 0–8)
ALBUMIN SERPL-MCNC: 3.5 G/DL (ref 3.4–5)
ALP SERPL-CCNC: 66 U/L (ref 40–150)
ALT SERPL W P-5'-P-CCNC: 15 U/L (ref 0–70)
ANION GAP SERPL CALCULATED.3IONS-SCNC: 4 MMOL/L (ref 3–14)
AST SERPL W P-5'-P-CCNC: 14 U/L (ref 0–45)
BASOPHILS # BLD AUTO: 0 10E9/L (ref 0–0.2)
BASOPHILS NFR BLD AUTO: 0.6 %
BILIRUB SERPL-MCNC: 0.2 MG/DL (ref 0.2–1.3)
BUN SERPL-MCNC: 13 MG/DL (ref 7–30)
CALCIUM SERPL-MCNC: 8.9 MG/DL (ref 8.5–10.1)
CHLORIDE SERPL-SCNC: 106 MMOL/L (ref 94–109)
CO2 SERPL-SCNC: 28 MMOL/L (ref 20–32)
CREAT SERPL-MCNC: 0.97 MG/DL (ref 0.66–1.25)
DIFFERENTIAL METHOD BLD: ABNORMAL
EOSINOPHIL # BLD AUTO: 0.2 10E9/L (ref 0–0.7)
EOSINOPHIL NFR BLD AUTO: 3.1 %
ERYTHROCYTE [DISTWIDTH] IN BLOOD BY AUTOMATED COUNT: 13.2 % (ref 10–15)
GFR SERPL CREATININE-BSD FRML MDRD: 83 ML/MIN/{1.73_M2}
GLUCOSE SERPL-MCNC: 83 MG/DL (ref 70–99)
HCT VFR BLD AUTO: 41.3 % (ref 40–53)
HGB BLD-MCNC: 13.1 G/DL (ref 13.3–17.7)
IMM GRANULOCYTES # BLD: 0 10E9/L (ref 0–0.4)
IMM GRANULOCYTES NFR BLD: 0.2 %
LYMPHOCYTES # BLD AUTO: 1.1 10E9/L (ref 0.8–5.3)
LYMPHOCYTES NFR BLD AUTO: 22.2 %
MCH RBC QN AUTO: 30.6 PG (ref 26.5–33)
MCHC RBC AUTO-ENTMCNC: 31.7 G/DL (ref 31.5–36.5)
MCV RBC AUTO: 97 FL (ref 78–100)
MONOCYTES # BLD AUTO: 0.6 10E9/L (ref 0–1.3)
MONOCYTES NFR BLD AUTO: 11.4 %
NEUTROPHILS # BLD AUTO: 3.1 10E9/L (ref 1.6–8.3)
NEUTROPHILS NFR BLD AUTO: 62.5 %
NRBC # BLD AUTO: 0 10*3/UL
NRBC BLD AUTO-RTO: 0 /100
PLATELET # BLD AUTO: 155 10E9/L (ref 150–450)
POTASSIUM SERPL-SCNC: 4.3 MMOL/L (ref 3.4–5.3)
PROT SERPL-MCNC: 7.4 G/DL (ref 6.8–8.8)
RBC # BLD AUTO: 4.28 10E12/L (ref 4.4–5.9)
SODIUM SERPL-SCNC: 138 MMOL/L (ref 133–144)
WBC # BLD AUTO: 4.9 10E9/L (ref 4–11)

## 2019-01-14 PROCEDURE — 82105 ALPHA-FETOPROTEIN SERUM: CPT | Performed by: INTERNAL MEDICINE

## 2019-01-14 NOTE — NURSING NOTE
Chief Complaint   Patient presents with     Mouth Problem     Presents with concerns of bad breath.        Radha Mullen LPN at 1:43 PM on January 14, 2019

## 2019-01-14 NOTE — PROGRESS NOTES
"Subjective:  Russ is a pleasant 59 year-old man who came in today for follow up today.   He had colonoscopy 11/13/15 repeat in 5 years. He had upper EGD and EUS with Dr. Acuna, GI 12/16/15 with gastric polyp bx.  He had fairly unremarkable Holter Monitor 1/6/16.   He has been followed in Endocrinology for thyroid nodules. He has been seen in Urology for elevated PSA.   He denies any claudication sxs. No skin changes.            Objective:  General appearance: NAD, cooperative, alert.  HEENT; oropharynx clear no exudate, neck supple, nl rom, no adenopathy, no B carotid bruits, no obvious dental abnormalities.   Cardio: Normal rate and rhythm, no murmurs  Respiratory: Normal breath sounds  Abdomen: Non-tender  Neurological exam: B UE/LE/proximal/distal is normal and symmetric for sensation, reflexes, and strength.   Feet are warm. No skin breakdown. He has adequate sensation.        Assessment and Plan:    (1) Seen by Ms. Ji, 1/5/2018 [facility] for smoking cessation. He  has not stopped smoking now and only using gum. He states he has stopped smoking for about one year now.   (2) He was seen in  GI 5/30/2017 for Hep C management and was \"cured\".  At that time no further GI  Clinic visits necessary.  His last Hep C viral load 2/28/18 was undetectable. Liver U/S 3/8/208 fairly unremarkable.   (3) He was seen by Dr. Trotter Urology 12/12/2018  for elevated PSA and prostate cancer/BPH. He has clinic appt. 4/18/2019 with MRI imaging.   (4) He was seen 10/30/17 by Dr. Delaney for neck complaints and sinus issues  (5) He was seen Dr. Naranjo Cardiology for ongoing palpitations 4/13/16.  (6) Reflux and remains on Nexium;  EGD 10/15  (7) He states he follows with a Psychiatry doctor once a  Month in [place] and remains on the same medications.   (8) Thyroid nodules seen on Cervical MRI; thyroid U/S; seen in  Endocrine 9/19/2018 and stable thyroid U/S  10/15/2018 and stable  (9) Immunizations done 9/26/17 for Td.  New Zoster Vaccine " when available. Influenza vaccination  10/15/2018  (10)  B LE U/S done 10/15/2018; no DVE for B LE swelling and may consider B LE U/S arterial for cold feet. He had TSH checked 9/2017.                Total time spent 25 minutes.  More than 50% of the time spent with Mr. Agee on counseling / coordinating his care

## 2019-01-25 ENCOUNTER — MEDICAL CORRESPONDENCE (OUTPATIENT)
Dept: HEALTH INFORMATION MANAGEMENT | Facility: CLINIC | Age: 64
End: 2019-01-25

## 2019-02-04 ENCOUNTER — OFFICE VISIT (OUTPATIENT)
Dept: INTERNAL MEDICINE | Facility: CLINIC | Age: 64
End: 2019-02-04
Payer: MEDICARE

## 2019-02-04 VITALS
WEIGHT: 164.8 LBS | OXYGEN SATURATION: 99 % | DIASTOLIC BLOOD PRESSURE: 73 MMHG | HEART RATE: 65 BPM | SYSTOLIC BLOOD PRESSURE: 118 MMHG | BODY MASS INDEX: 23.65 KG/M2

## 2019-02-04 DIAGNOSIS — B19.20 HEPATITIS C VIRUS INFECTION, UNSPECIFIED CHRONICITY: Primary | ICD-10-CM

## 2019-02-04 ASSESSMENT — PAIN SCALES - GENERAL: PAINLEVEL: NO PAIN (0)

## 2019-02-04 NOTE — NURSING NOTE
Chief Complaint   Patient presents with     Recheck Medication     here to go over results       Rell Callahan, EMT 1:25 PM on 2/4/2019.

## 2019-02-04 NOTE — PATIENT INSTRUCTIONS
[facility] Medication Refill Request Information:  * Please contact your pharmacy regarding ANY request for medication refills.  ** [facility] Prescription Fax = [phone]  * Please allow 3 business days for routine medication refills.  * Please allow 5 business days for controlled substance medication refills.     [facility] Test Result notification information:  *You will be notified with in 7-10 days of your appointment day regarding the results of your test.  If you are on MyChart you will be notified as soon as the provider has reviewed the results and signed off on them.    [facility]: [phone]

## 2019-02-04 NOTE — PROGRESS NOTES
"hSubjective:  Russ is a pleasant 59 year-old man who came in today for follow up today.   He had colonoscopy 11/13/15 repeat in 5 years. He had upper EGD and EUS with Dr. Acuna, GI 12/16/15 with gastric polyp bx.  He had fairly unremarkable Holter Monitor 1/6/16.   He has been followed in Endocrinology for thyroid nodules. He has been seen in Urology for elevated PSA.   He denies any claudication sxs. No skin changes.            Objective:  General appearance: NAD, cooperative, alert.  HEENT; oropharynx clear no exudate, neck supple, nl rom, no adenopathy, no B carotid bruits, no obvious dental abnormalities.   Cardio: Normal rate and rhythm, no murmurs  Respiratory: Normal breath sounds  Abdomen: Non-tender  Neurological exam: B UE/LE/proximal/distal is normal and symmetric for sensation, reflexes, and strength.   Feet are warm. No skin breakdown. He has adequate sensation.        Assessment and Plan:    (1) Seen by Ms. Ji, 1/5/2018 [facility] for smoking cessation. He  has not stopped smoking now and only using gum. He states he has stopped smoking for about one year now.   (2) He was seen in  GI 5/30/2017 for Hep C management and was \"cured\".  At that time no further GI  Clinic visits necessary.  His last Hep C viral load 2/28/18 was undetectable. Liver U/S 3/8/208 fairly unremarkable. He had lab testing 1/14/2019 including AFP that are stable. Will repeat Hep C viral load and abdominal U/S in 4/2019 and see him back.   (3) He was seen by Dr. Trotter Urology 12/12/2018  for elevated PSA and prostate cancer/BPH. He has clinic appt. 4/18/2019 with MRI imaging on 4/1/2019  (4) He was seen 10/30/17 by Dr. Delaney for neck complaints and sinus issues  (5) He was seen Dr. Naranjo Cardiology for ongoing palpitations 4/13/16.  (6) Reflux and remains on Nexium;  EGD 10/15  (7) He states he follows with a Psychiatry doctor once a  Month in [place] and remains on the same medications.   (8) Thyroid nodules seen on Cervical MRI; " thyroid U/S; seen in  Endocrine 9/19/2018 and stable thyroid U/S  10/15/2018 and stable  (9) Immunizations done 9/26/17 for Td.  New Zoster Vaccine when available. Influenza vaccination  10/15/2018  (10)  B LE U/S done 10/15/2018; no DVE for B LE swelling and may consider B LE U/S arterial for cold feet. He had TSH checked 9/2017.                Total time spent 15 minutes.  More than 50% of the time spent with Mr. Agee on counseling / coordinating his care

## 2019-02-19 DIAGNOSIS — R39.9 LOWER URINARY TRACT SYMPTOMS (LUTS): ICD-10-CM

## 2019-02-20 NOTE — TELEPHONE ENCOUNTER
Kraig Oden,    Is it ok for me to refill this medication with the eGfr alert? Please advise.      Thanks, Elio Drummond MA

## 2019-02-21 RX ORDER — MIRABEGRON 25 MG/1
25 TABLET, FILM COATED, EXTENDED RELEASE ORAL DAILY
Qty: 30 TABLET | Refills: 3 | Status: SHIPPED | OUTPATIENT
Start: 2019-02-21 | End: 2019-07-24

## 2019-03-20 DIAGNOSIS — K21.9 ESOPHAGEAL REFLUX: ICD-10-CM

## 2019-03-20 RX ORDER — ESOMEPRAZOLE MAGNESIUM 40 MG/1
40 CAPSULE, DELAYED RELEASE ORAL
Qty: 90 CAPSULE | Refills: 1 | Status: CANCELLED | OUTPATIENT
Start: 2019-03-20

## 2019-03-20 RX ORDER — ESOMEPRAZOLE MAGNESIUM 40 MG/1
40 CAPSULE, DELAYED RELEASE ORAL
Qty: 90 CAPSULE | Refills: 3 | Status: SHIPPED | OUTPATIENT
Start: 2019-03-20 | End: 2020-02-13

## 2019-03-20 NOTE — TELEPHONE ENCOUNTER
Health Call Center    Phone Message    May a detailed message be left on voicemail: yes    Reason for Call: Medication Refill Request    Has the patient contacted the pharmacy for the refill? Yes   Name of medication being requested: esomeprazole (NEXIUM) 40 MG CR capsule    Provider who prescribed the medication: Donnie Haque   Pharmacy:    [id number] - [address]    Date medication is needed: ASAP/ completely out          Action Taken: Message routed to:  [facility] ([facility]): HONORIO BHANDARI

## 2019-03-20 NOTE — TELEPHONE ENCOUNTER
Last Clinic Visit: 2/4/2019  [facility]       PROCEDURE: SACROILIAC JOINT INJECTION  LATERALITY: right  DIAGNOSIS: Sacroiliitis not otherwise specified  OPERATORS: Fred Herring MD  ANESTHESIA: Local   POSITION: Prone    DESCRIPTION:  The patient gave an informed and written consent after discussing risks, benefits, indications, and alternatives. The patient was placed in the prone position, monitors were applied, and vital signs were assessed.     A fluoroscopic image was obtained using the A-P view to assess anatomical landmarks and identify the sacroiliac joint. A full sterile skin prep and draping was performed. Skin and subcutaneous tissue over the sacroiliac joint was anesthetized using lidocaine 1% via a 27-gauge, 1.5 inch needle. A 22-gauge 3.5-inch Quincke-type spinal needle was advanced towards the sacroiliac joint under fluoroscopic guidance. Once the needle was seated within the joint, 0.3 mL of contrast was then incrementally and uneventfully injected after negative aspiration, demonstrating an excellent spread of contrast within the joint. Next, 2 mL of 2% lidocaine mixed with 40 mg triamcinolone was injected. The needle was cleared and withdrawn and a sterile dressing was applied over the injection site.    The patient tolerated the procedure well with no immediate side effects or complications being noted. After an observation period, the patient was discharged home in satisfactory condition with written instructions.    Patient has been reassessed and is ready for discharge.

## 2019-03-26 ENCOUNTER — MEDICAL CORRESPONDENCE (OUTPATIENT)
Dept: HEALTH INFORMATION MANAGEMENT | Facility: CLINIC | Age: 64
End: 2019-03-26

## 2019-04-01 ENCOUNTER — ANCILLARY PROCEDURE (OUTPATIENT)
Dept: ULTRASOUND IMAGING | Facility: CLINIC | Age: 64
End: 2019-04-01
Payer: MEDICARE

## 2019-04-01 ENCOUNTER — PRE VISIT (OUTPATIENT)
Dept: UROLOGY | Facility: CLINIC | Age: 64
End: 2019-04-01

## 2019-04-01 ENCOUNTER — ANCILLARY PROCEDURE (OUTPATIENT)
Dept: MRI IMAGING | Facility: CLINIC | Age: 64
End: 2019-04-01
Attending: UROLOGY
Payer: MEDICARE

## 2019-04-01 DIAGNOSIS — C61 PROSTATE CANCER (H): ICD-10-CM

## 2019-04-01 DIAGNOSIS — B19.20 HEPATITIS C VIRUS INFECTION, UNSPECIFIED CHRONICITY: ICD-10-CM

## 2019-04-01 DIAGNOSIS — C61 PROSTATE CANCER (H): Primary | ICD-10-CM

## 2019-04-01 PROCEDURE — 87522 HEPATITIS C REVRS TRNSCRPJ: CPT | Performed by: INTERNAL MEDICINE

## 2019-04-01 PROCEDURE — G0472 HEP C SCREEN HIGH RISK/OTHER: HCPCS | Performed by: INTERNAL MEDICINE

## 2019-04-01 RX ORDER — CIPROFLOXACIN 500 MG/1
500 TABLET, FILM COATED ORAL 2 TIMES DAILY
Qty: 6 TABLET | Refills: 0 | Status: SHIPPED | OUTPATIENT
Start: 2019-04-01 | End: 2019-06-10

## 2019-04-01 RX ORDER — GADOBUTROL 604.72 MG/ML
7.5 INJECTION INTRAVENOUS ONCE
Status: COMPLETED | OUTPATIENT
Start: 2019-04-01 | End: 2019-04-01

## 2019-04-01 RX ADMIN — GADOBUTROL 7.5 ML: 604.72 INJECTION INTRAVENOUS at 14:48

## 2019-04-01 NOTE — DISCHARGE INSTRUCTIONS
MRI Contrast Discharge Instructions    The IV contrast you received today will pass out of your body in your  urine. This will happen in the next 24 hours. You will not feel this process.  Your urine will not change color.    Drink at least 4 extra glasses of water or juice today (unless your doctor  has restricted your fluids). This reduces the stress on your kidneys.  You may take your regular medicines.    If you are on dialysis: It is best to have dialysis today.    If you have a reaction: Most reactions happen right away. If you have  any new symptoms after leaving the hospital (such as hives or swelling),  call your hospital at the correct number below. Or call your family doctor.  If you have breathing distress or wheezing, call 911.    Special instructions: ***    I have read and understand the above information.    Signature:______________________________________ Date:___________    Staff:__________________________________________ Date:___________     Time:__________    [facility] Departments:    ___[place]: [phone]  ___[facility]: [phone]  ___[place]: [phone] ___[facility]: [phone]  ___[facility]: [phone]  ___[facility]: [phone]  ___[phone]  ___[facility]: [phone]  ___[phone]

## 2019-04-01 NOTE — TELEPHONE ENCOUNTER
Patient coming in for prostate biopsy. Patient contacted regarding prep needed to be done prior to biopsy. This includes stopping all blood thinners for 1 week prior to biopsy, obtaining and administering fleets enema 2 hours prior to biopsy. Patient will also receive Cipro 500 mg antibiotic. Patient stated understanding and had no further questions    CALLED  SERVICES WITH PATIENT

## 2019-04-03 LAB
HCV AB SERPL QL IA: REACTIVE
HCV RNA SERPL NAA+PROBE-ACNC: NORMAL [IU]/ML
HCV RNA SERPL NAA+PROBE-LOG IU: NORMAL LOG IU/ML

## 2019-04-04 ENCOUNTER — TELEPHONE (OUTPATIENT)
Dept: INTERNAL MEDICINE | Facility: CLINIC | Age: 64
End: 2019-04-04

## 2019-04-04 DIAGNOSIS — B18.2 CHRONIC HEPATITIS C WITHOUT HEPATIC COMA (H): Primary | ICD-10-CM

## 2019-04-08 ENCOUNTER — TELEPHONE (OUTPATIENT)
Dept: UROLOGY | Facility: CLINIC | Age: 64
End: 2019-04-08

## 2019-04-08 ENCOUNTER — OFFICE VISIT (OUTPATIENT)
Dept: INTERNAL MEDICINE | Facility: CLINIC | Age: 64
End: 2019-04-08
Payer: MEDICARE

## 2019-04-08 ENCOUNTER — OFFICE VISIT (OUTPATIENT)
Dept: UROLOGY | Facility: CLINIC | Age: 64
End: 2019-04-08
Payer: MEDICARE

## 2019-04-08 VITALS
HEIGHT: 71 IN | BODY MASS INDEX: 22.96 KG/M2 | DIASTOLIC BLOOD PRESSURE: 78 MMHG | WEIGHT: 164 LBS | SYSTOLIC BLOOD PRESSURE: 125 MMHG | HEART RATE: 73 BPM

## 2019-04-08 VITALS
DIASTOLIC BLOOD PRESSURE: 84 MMHG | SYSTOLIC BLOOD PRESSURE: 138 MMHG | WEIGHT: 164 LBS | HEART RATE: 59 BPM | RESPIRATION RATE: 20 BRPM | OXYGEN SATURATION: 99 % | BODY MASS INDEX: 22.87 KG/M2

## 2019-04-08 DIAGNOSIS — C61 MALIGNANT NEOPLASM OF PROSTATE (H): Primary | ICD-10-CM

## 2019-04-08 DIAGNOSIS — E78.00 HIGH BLOOD CHOLESTEROL: Primary | ICD-10-CM

## 2019-04-08 PROCEDURE — 88305 TISSUE EXAM BY PATHOLOGIST: CPT | Performed by: UROLOGY

## 2019-04-08 RX ORDER — CIPROFLOXACIN 500 MG/1
500 TABLET, FILM COATED ORAL ONCE
Status: COMPLETED | OUTPATIENT
Start: 2019-04-08 | End: 2019-04-08

## 2019-04-08 RX ORDER — CIPROFLOXACIN 500 MG/1
500 TABLET, FILM COATED ORAL 2 TIMES DAILY
Qty: 5 TABLET | Refills: 0 | Status: SHIPPED | OUTPATIENT
Start: 2019-04-08 | End: 2019-06-10

## 2019-04-08 RX ADMIN — CIPROFLOXACIN 500 MG: 500 TABLET, FILM COATED ORAL at 14:13

## 2019-04-08 ASSESSMENT — PAIN SCALES - GENERAL
PAINLEVEL: NO PAIN (0)

## 2019-04-08 ASSESSMENT — MIFFLIN-ST. JEOR: SCORE: 1561.03

## 2019-04-08 NOTE — PATIENT INSTRUCTIONS
[facility] [facility]  Precautions Following a Prostate Biopsy    There are four conditions that you should watch for after a prostate biopsy:    1. Excessive pain  2. Bleeding irregularities (passing numerous  dime sized  clots or if your urine looks like cranberry juice)  3. Fever of 100 degrees or more  4. If you are unable to urinate        If any of these occur, call the Urology Clinic during normal business hours (M-F, 8:00-4:30) at [phone].  If you experience a problem after normal business hours, call our 24-hour phone number at [phone] and ask for the Urology Resident on call to be paged.      If you experience any discomfort following the biopsy, you may take Tylenol.  DO NOT TAKE ASPIRIN unless specified by your physician.   If the discomfort becomes severe or uncontrolled by medication, contact the Urology Clinic or Urology Resident (after normal business hours).      Do not be alarmed if you have some blood in your stool, in your urine, or ejaculate (semen).  This occurrence is normal and may last up to three (3) or four (4) days, usually intermittently.  Blood in the ejaculate (semen) may last several weeks, up to about a dozen ejaculations.  The blood in your ejaculate may appear as brown streaks, blood tinged, and immediately following a biopsy, it may appear bright red.      If you run a fever above 100 degrees, call the Urology Clinic or Urology Resident (after normal business hours) immediately.  If you are unable to reach your physician or the Resident on call, go to the nearest emergency room.  Explain that you have had a transrectal biopsy of your prostate and what problems you are experiencing.        You should attempt to urinate following your biopsy before you leave the clinic.  If you are unable to urinate four (4) to six (6) hours after you leave the clinic, you will need to contact the Urology Clinic or the Resident on call.  If you are unable to reach  your physician or the Resident on call, go to the nearest emergency room.            If you have any questions or concerns after your biopsy, feel free to contact the Urology Clinic at [phone] during M-F, 8:00-5pm business hours.  If you need to speak with someone after normal business hours, call [phone] and ask for the Resident on call to be paged.

## 2019-04-08 NOTE — TELEPHONE ENCOUNTER
[facility] Call Center    Phone Message    May a detailed message be left on voicemail: yes    Reason for Call: Other: Pt is coming in for a biopsy today and is wondering if he is supposed to be fasting prior, or if it is ok to eat breakfast. Please give him a call back.     Action Taken: Message routed to:  [facility]): Urology

## 2019-04-08 NOTE — NURSING NOTE
Chief Complaint   Patient presents with     RECHECK     Biopsy        Emely Way MA      Invasive Procedure Safety Checklist:    Procedure:     Action: Complete sections and checkboxes as appropriate.    Pre-procedure:  1. Patient ID Verified with 2 identifiers (Julia and  or MRN) : YES    2. Procedure and site verified with patient/designee (when able) : YES    3. Accurate consent documentation in medical record : YES    4. H&P (or appropriate assessment) documented in medical record : NO  H&P must be up to 30 days prior to procedure an updated within 24 hours of                 Procedure as applicable.     5. Relevant diagnostic and radiology test results appropriately labeled and displayed as applicable : NO    6. Blood products, implants, devices, and/or special equipment available for the procedure as applicable : NO    7. Procedure site(s) marked with provider initials [Exclusions: none] : NO    8. Marking not required. Reason : Yes  Procedure does not require site marking    Time Out:     Time-Out performed immediately prior to starting procedure, including verbal and active participation of all team members addressing: NO    1. Correct patient identity.  2. Confirmed that the correct side and site are marked.  3. An accurate procedure to be done.  4. Agreement on the procedure to be done.  5. Correct patient position.  6. Relevant images and results are properly labeled and appropriately displayed.  7. The need to administer antibiotics or fluids for irrigation purposes during the procedure as applicable.  8. Safety precautions based on patient history or medication use.    During Procedure: Verification of correct person, site, and procedure occurs any time the responsibility for care of the patient is transferred to another member of the care team.

## 2019-04-08 NOTE — NURSING NOTE
"The following medication was given:     MEDICATION:  Cipro  ROUTE: PO  SITE: Medication was given orally   DOSE: 500  LOT #: [id number]  : [employer]  EXPIRATION DATE: 10/20  NDC#: [id number]   Was there drug waste? No      Emely Way CMA  April 8, 2019        When I was checking Patent in  I asked him if he has been taking Cipro 500 patient stated, \" yes.\" Then I asked patient if he had taken the two Cipro yesterday and one this morning and he had one for tonight and two for tomorrow and he stated, \" yes.\" When  came in, he asked patient if he was taking Cipro and he said,  \"yes.\" After we performed the biopsy, when writer was giving patient AVS he asked about the Cipro tablets, and that he has not been taking it. He said that he did not know he needed the Cipro, but when I called the patient to go over biopsy prep on 4/1/19, he stated understanding and had no further questions after talking to him. Patient take Cipro today in clinic and send 5 more to pharmacy.        "

## 2019-04-08 NOTE — LETTER
RE: Russ Agee  [address]  [place] [address]     Dear Colleague,    Thank you for referring your patient, Russ Agee, to the [facility] UROLOGY AND [facility] FOR PROSTATE AND UROLOGIC CANCERS at [facility]. Please see a copy of my visit note below.    Progress Notes   Gerardo Trotter MD (Physician)     Urology     Service Date: 04/08/2019      REASON FOR VISIT TODAY:  Prostate biopsy.      HISTORY OF PRESENT ILLNESS:  Mr. Agee is a 63-year-old gentleman followed in our clinic for a history of prostate cancer diagnosed in 2009, for which he is on active surveillance.  He is here for a surveillance biopsy.      PROCEDURE NOTE:  After informed consent was obtained and after confirming the patient has been off aspirin or aspirin-like products for a week, did his enema and took his antibiotics, he was placed left side down.  Digital rectal exam revealed a normal-feeling prostate.  Next, the ultrasound probe was lubricated and placed in the patient's rectum.  No hypoechoic lesions were noted; 5 mL of lidocaine was injected at the junction of the prostate and seminal vesicles bilaterally.  Measurements obtained a volume of 41 cc.  Next, 12 cores were taken in a sextant distribution and 1 core each from the left and right transition zones for a total of 14 cores obtained.  The patient tolerated the procedure without difficulty.  There were no immediate complications noted.  The patient was counseled that he can expect to see blood in his urine, semen and stool for the next several days and to contact us should he develop fevers, chills or sweats.      Of note, following the procedure, the patient clarified that despite having been asked by myself and the nurse prior to the procedure, he in actuality had not taken his antibiotics prior to the biopsy.  He was given a Cipro immediately in the clinic today and a prescription for 5 more tablets to take over the next  3 days. The patient will monitor closely for signs of infection.       ERICKA PARNELL MD      D: 04/08/2019   T: 04/08/2019   MT: alphonso

## 2019-04-08 NOTE — NURSING NOTE
The following medication was given:     MEDICATION:  Lidocaine   ROUTE: MD dose  SITE: MD dose  DOSE: 30ml  LOT #: [id number]  : [employer]  EXPIRATION DATE: 07/22  NDC#: [id number]   Was there drug waste? Yes  Amount of drug waste (mL): 20ml.  Reason for waste:  As per MD Emely Way, [facility]  April 8, 2019

## 2019-04-08 NOTE — PROGRESS NOTES
"hSubjective:  Russ is a pleasant 59 year-old man who came in today for follow up today.   He had colonoscopy 11/13/15 repeat in 5 years. He had upper EGD and EUS with Dr. Acuna, GI 12/16/15 with gastric polyp bx.  He had fairly unremarkable Holter Monitor 1/6/16.   He has been followed in Endocrinology for thyroid nodules. He has been seen in Urology for elevated PSA. He had prostate bx. With Dr. Trotter today 4/8/2019. He had unremarkable abdominal U/S 4/1/2019. Undetectable Hep C viral load 4/1/2019.    He denies any claudication sxs. No skin changes.            Objective:  General appearance: NAD, cooperative, alert.  HEENT; oropharynx clear no exudate, neck supple, nl rom, no adenopathy, no B carotid bruits, no obvious dental abnormalities.   Cardio: Normal rate and rhythm, no murmurs  Respiratory: Normal breath sounds  Abdomen: Non-tender  Neurological exam: B UE/LE/proximal/distal is normal and symmetric for sensation, reflexes, and strength.   Feet are warm. No skin breakdown. He has adequate sensation.        Assessment and Plan:    (1) Seen by Ms. Herlouise, 1/5/2018 [facility] for smoking cessation. He  has not stopped smoking now and only using gum. He states he has stopped smoking for about one year now.   (2) He was seen in  GI 5/30/2017 for Hep C management and was \"cured\".  At that time no further GI  Clinic visits necessary.  His last Hep C viral load 2/28/18 and 4/1/2019  was undetectable. Liver U/S 3/8/208 fairly unremarkable. He had lab testing 1/14/2019 including AFP that are stable.  Abdominal U/s from 4/1/2019 did not show any worrisome findings. Ordered U/S for next year 4/2020.    (3) He was seen by Dr. Trotter Urology 12/12/2018  for elevated PSA and prostate cancer/BPH. He had biopsy today 4/18/2019 with MRI imaging on 4/1/2019 that was not suspicious   (4) He was seen 10/30/17 by Dr. Delaney for neck complaints and sinus issues  (5) He was seen Dr. Naranjo Cardiology for ongoing palpitations " 4/13/16.  (6) Reflux and remains on Nexium;  EGD 10/15  (7) He states he follows with a Psychiatry doctor once a  Month in [place] and remains on the same medications.   (8) Thyroid nodules seen on Cervical MRI; thyroid U/S; seen in  Endocrine 9/19/2018 and stable thyroid U/S  10/15/2018 and stable  (9) Immunizations done 9/26/17 for Td.  New Zoster Vaccine when available. Influenza vaccination  10/15/2018  (10)  B LE U/S done 10/15/2018; no DVE for B LE swelling and may consider B LE U/S arterial for cold feet. He had TSH checked 9/2017.            Total time spent 15 minutes.  More than 50% of the time spent with Mr. Agee on counseling / coordinating his care

## 2019-04-08 NOTE — NURSING NOTE
Chief Complaint   Patient presents with     Results     go over results of ultra sound and labs   Ree Maki LPN 2:24 PM on 4/8/2019    Rooming Note  Blood Pressure   BP Readings from Last 1 Encounters:   04/08/19 162/87    Single BP recheck started, 2:25 PM (4 minutes)    Ree Maki LPN 2:26 PM on 4/8/2019

## 2019-04-08 NOTE — PROGRESS NOTES
Service Date: 2019      REASON FOR VISIT TODAY:  Prostate biopsy.      HISTORY OF PRESENT ILLNESS:  Mr. Mc is a 63-year-old gentleman followed in our clinic for a history of prostate cancer diagnosed in , for which he is on active surveillance.  He is here for a surveillance biopsy.      PROCEDURE NOTE:  After informed consent was obtained and after confirming the patient has been off aspirin or aspirin-like products for a week, did his enema and took his antibiotics, he was placed left side down.  Digital rectal exam revealed a normal-feeling prostate.  Next, the ultrasound probe was lubricated and placed in the patient's rectum.  No hypoechoic lesions were noted; 5 mL of lidocaine was injected at the junction of the prostate and seminal vesicles bilaterally.  Measurements obtained a volume of 41 cc.  Next, 12 cores were taken in a sextant distribution and 1 core each from the left and right transition zones for a total of 14 cores obtained.  The patient tolerated the procedure without difficulty.  There were no immediate complications noted.  The patient was counseled that he can expect to see blood in his urine, semen and stool for the next several days and to contact us should he develop fevers, chills or sweats.      Of note, following the procedure, the patient clarified that despite having been asked by myself and the nurse prior to the procedure, he in actuality had not taken his antibiotics prior to the biopsy.  He was given a Cipro immediately in the clinic today and a prescription for 5 more tablets to take over the next 3 days.  The patient will monitor closely for signs of infection.         ERICKA PARNELL MD             D: 2019   T: 2019   MT: alphonso      Name:     TONIA MC   MRN:      [id number]        Account:      [id number]   :      1955           Service Date: 2019      Document: [id number]

## 2019-04-09 ENCOUNTER — PRE VISIT (OUTPATIENT)
Dept: UROLOGY | Facility: CLINIC | Age: 64
End: 2019-04-09

## 2019-04-09 ENCOUNTER — TELEPHONE (OUTPATIENT)
Dept: UROLOGY | Facility: CLINIC | Age: 64
End: 2019-04-09

## 2019-04-09 NOTE — TELEPHONE ENCOUNTER
Called patient to make sure he was taking Cipro 500. He had one last night and this morning and he has one for tonight and two for tomorrow

## 2019-04-09 NOTE — TELEPHONE ENCOUNTER
Chief Complaint : Biopsy F/u     New Hx/Sx: surveillance biopsy     Records/Orders/Proced: path    Pt Contacted: no    At Rooming: normal

## 2019-04-11 LAB — COPATH REPORT: NORMAL

## 2019-04-22 ENCOUNTER — OFFICE VISIT (OUTPATIENT)
Dept: UROLOGY | Facility: CLINIC | Age: 64
End: 2019-04-22
Payer: MEDICARE

## 2019-04-22 VITALS
WEIGHT: 164 LBS | DIASTOLIC BLOOD PRESSURE: 79 MMHG | HEIGHT: 71 IN | BODY MASS INDEX: 22.96 KG/M2 | HEART RATE: 65 BPM | SYSTOLIC BLOOD PRESSURE: 128 MMHG

## 2019-04-22 DIAGNOSIS — C61 MALIGNANT NEOPLASM OF PROSTATE (H): Primary | ICD-10-CM

## 2019-04-22 ASSESSMENT — MIFFLIN-ST. JEOR: SCORE: 1561.03

## 2019-04-22 ASSESSMENT — PAIN SCALES - GENERAL: PAINLEVEL: NO PAIN (0)

## 2019-04-22 NOTE — LETTER
2019       RE: Russ Mc  [address]  [facility] [address]     Dear Colleague,    Thank you for referring your patient, Russ Mc, to the [facility] AND [facility] FOR PROSTATE AND UROLOGIC CANCERS at [facility]. Please see a copy of my visit note below.    Service Date: 2019      REASON FOR VISIT TODAY:  Prostate cancer.      HISTORY OF PRESENT ILLNESS:  Mr. Mc a 63-year-old gentleman from [facility] who is followed in our clinic for history of prostate cancer.  The patient was originally diagnosed with prostate cancer in  for which he is on active surveillance.  He recently underwent another surveillance biopsy and presents today in followup.  He notes he has done well from the biopsy without any trouble.      PHYSICAL EXAMINATION:  On exam today, his blood pressure is 128/79, pulse 65.  He is in no acute distress.      The patient's prostate biopsy from 2019 shows no evidence of malignancy.      ASSESSMENT AND PLAN:  Over half of today's 15-minute visit was spent counseling the patient regarding his prostate cancer.  I suggested to Mr. Mc we are pleased to see there is no evidence of cancer on his latest biopsy.  We will ask the patient to obtain a PSA sometime before the end of the year.         ERICKA PARNELL MD             D: 2019   T: 2019   MT: cecil      Name:     RUSS MC   MRN:      [id number]        Account:      [id number]   :      1955           Service Date: 2019      Document: [id number]

## 2019-04-22 NOTE — PATIENT INSTRUCTIONS
Please get PSA before the end of the year     It was a pleasure meeting with you today.  Thank you for allowing me and my team the privilege of caring for you today.  YOU are the reason we are here, and I truly hope we provided you with the excellent service you deserve.  Please let us know if there is anything else we can do for you so that we can be sure you are leaving completely satisfied with your care experience.

## 2019-04-23 NOTE — PROGRESS NOTES
Service Date: 2019      REASON FOR VISIT TODAY:  Prostate cancer.      HISTORY OF PRESENT ILLNESS:  Mr. Mc a 63-year-old gentleman from [facility] who is followed in our clinic for history of prostate cancer.  The patient was originally diagnosed with prostate cancer in  for which he is on active surveillance.  He recently underwent another surveillance biopsy and presents today in followup.  He notes he has done well from the biopsy without any trouble.      PHYSICAL EXAMINATION:  On exam today, his blood pressure is 128/79, pulse 65.  He is in no acute distress.      The patient's prostate biopsy from 2019 shows no evidence of malignancy.      ASSESSMENT AND PLAN:  Over half of today's 15-minute visit was spent counseling the patient regarding his prostate cancer.  I suggested to Mr. Mc we are pleased to see there is no evidence of cancer on his latest biopsy.  We will ask the patient to obtain a PSA sometime before the end of the year.         ERICKA PARNELL MD             D: 2019   T: 2019   MT: cecil      Name:     TONIA MC   MRN:      [id number]        Account:      [id number]   :      1955           Service Date: 2019      Document: [id number]

## 2019-05-07 ENCOUNTER — MEDICAL CORRESPONDENCE (OUTPATIENT)
Dept: HEALTH INFORMATION MANAGEMENT | Facility: CLINIC | Age: 64
End: 2019-05-07

## 2019-05-15 NOTE — PROGRESS NOTES
Progress Notes   Ericka Trotter MD (Physician)     Urology      Service Date: 04/08/2019      REASON FOR VISIT TODAY:  Prostate biopsy.      HISTORY OF PRESENT ILLNESS:  Mr. Agee is a 63-year-old gentleman followed in our clinic for a history of prostate cancer diagnosed in 2009, for which he is on active surveillance.  He is here for a surveillance biopsy.      PROCEDURE NOTE:  After informed consent was obtained and after confirming the patient has been off aspirin or aspirin-like products for a week, did his enema and took his antibiotics, he was placed left side down.  Digital rectal exam revealed a normal-feeling prostate.  Next, the ultrasound probe was lubricated and placed in the patient's rectum.  No hypoechoic lesions were noted; 5 mL of lidocaine was injected at the junction of the prostate and seminal vesicles bilaterally.  Measurements obtained a volume of 41 cc.  Next, 12 cores were taken in a sextant distribution and 1 core each from the left and right transition zones for a total of 14 cores obtained.  The patient tolerated the procedure without difficulty.  There were no immediate complications noted.  The patient was counseled that he can expect to see blood in his urine, semen and stool for the next several days and to contact us should he develop fevers, chills or sweats.      Of note, following the procedure, the patient clarified that despite having been asked by myself and the nurse prior to the procedure, he in actuality had not taken his antibiotics prior to the biopsy.  He was given a Cipro immediately in the clinic today and a prescription for 5 more tablets to take over the next 3 days.  The patient will monitor closely for signs of infection.         ERICKA TROTTER MD             D: 04/08/2019   T: 04/08/2019   MT: alphonso

## 2019-05-25 ENCOUNTER — MEDICAL CORRESPONDENCE (OUTPATIENT)
Dept: HEALTH INFORMATION MANAGEMENT | Facility: CLINIC | Age: 64
End: 2019-05-25

## 2019-06-10 ENCOUNTER — OFFICE VISIT (OUTPATIENT)
Dept: INTERNAL MEDICINE | Facility: CLINIC | Age: 64
End: 2019-06-10
Payer: MEDICARE

## 2019-06-10 VITALS
SYSTOLIC BLOOD PRESSURE: 110 MMHG | DIASTOLIC BLOOD PRESSURE: 70 MMHG | WEIGHT: 164.8 LBS | HEART RATE: 86 BPM | RESPIRATION RATE: 16 BRPM | BODY MASS INDEX: 22.98 KG/M2

## 2019-06-10 DIAGNOSIS — R97.20 ELEVATED PROSTATE SPECIFIC ANTIGEN (PSA): Primary | ICD-10-CM

## 2019-06-10 ASSESSMENT — PAIN SCALES - GENERAL: PAINLEVEL: NO PAIN (0)

## 2019-06-10 ASSESSMENT — PATIENT HEALTH QUESTIONNAIRE - PHQ9
SUM OF ALL RESPONSES TO PHQ QUESTIONS 1-9: 6
10. IF YOU CHECKED OFF ANY PROBLEMS, HOW DIFFICULT HAVE THESE PROBLEMS MADE IT FOR YOU TO DO YOUR WORK, TAKE CARE OF THINGS AT HOME, OR GET ALONG WITH OTHER PEOPLE: SOMEWHAT DIFFICULT
SUM OF ALL RESPONSES TO PHQ QUESTIONS 1-9: 6

## 2019-06-10 NOTE — PROGRESS NOTES
"Subjective:  Russ is a pleasant 59 year-old man who came in today for follow up today.   He had colonoscopy 11/13/15 repeat in 5 years. He had upper EGD and EUS with Dr. Acuna, GI 12/16/15 with gastric polyp bx.  He had fairly unremarkable Holter Monitor 1/6/16.   He has been followed in Endocrinology for thyroid nodules. He has been seen in Urology for elevated PSA. He had prostate bx. With Dr. Trotter 4/8/2019. He had unremarkable abdominal U/S 4/1/2019. Undetectable Hep C viral load 4/1/2019.    He denies any claudication sxs. No skin changes.            Objective:  General appearance: NAD, cooperative, alert.  HEENT; oropharynx clear no exudate, neck supple, nl rom, no adenopathy, no B carotid bruits, no obvious dental abnormalities.   Cardio: Normal rate and rhythm, no murmurs  Respiratory: Normal breath sounds  Abdomen: Non-tender  Neurological exam: B UE/LE/proximal/distal is normal and symmetric for sensation, reflexes, and strength.   Feet are warm. No skin breakdown. He has adequate sensation.        Assessment and Plan:    (1) Seen by Ms. Ji, 1/5/2018 [facility] for smoking cessation. He  has not stopped smoking now and only using gum. He states he has stopped smoking for for more than one year now.   (2) He was seen in  GI 5/30/2017 for Hep C management and was \"cured\".  At that time no further GI  Clinic visits necessary.  His last Hep C viral load 2/28/18 and 4/1/2019  was undetectable. Liver U/S 3/8/208 fairly unremarkable. He had lab testing 1/14/2019 including AFP that are stable.  Abdominal U/s from 4/1/2019 did not show any worrisome findings. Ordered U/S for next year 4/2020.    (3) He was seen by Dr. Trotter Urology 12/12/2018  for elevated PSA and prostate cancer/BPH. He had biopsy  4/18/2019 with MRI imaging on 4/1/2019 that was not suspicious. He followed up 4/22/2019 with Dr. Trotter and negative bx. Results for prostate cancer  (4) He was seen 10/30/17 by Dr. Delaney for neck complaints and " sinus issues  (5) He was seen Dr. Naranjo Cardiology for ongoing palpitations 4/13/16.  (6) Reflux and remains on Nexium;  EGD 10/15  (7) He states he follows with a Psychiatry doctor once a  Month in [place] and remains on the same medications.   (8) Thyroid nodules seen on Cervical MRI; thyroid U/S; seen in  Endocrine 9/19/2018 and stable thyroid U/S  10/15/2018 and stable  (9) Immunizations done 9/26/17 for Td.  New Zoster Vaccine and check with insurance.              Total time spent 15 minutes.  More than 50% of the time spent with Mr. Agee on counseling / coordinating his care

## 2019-06-10 NOTE — NURSING NOTE
Chief Complaint   Patient presents with     Results     Patient is here for follow up       Roderick Livingston CMA (AAMA) at 2:01 PM on 6/10/2019

## 2019-07-24 ENCOUNTER — MEDICAL CORRESPONDENCE (OUTPATIENT)
Dept: HEALTH INFORMATION MANAGEMENT | Facility: CLINIC | Age: 64
End: 2019-07-24

## 2019-07-24 DIAGNOSIS — R39.9 LOWER URINARY TRACT SYMPTOMS (LUTS): ICD-10-CM

## 2019-07-28 NOTE — TELEPHONE ENCOUNTER
MYRBETRIQ 25 MG 24 hr tablet     Last Written Prescription Date:  unknown  Last Fill Quantity: unknown,   # refills:   unknown  Last Office Visit : 4/22/19  Future Office visit:  12/9/20      Routing refill request to provider for review/approval because:  historical

## 2019-07-29 RX ORDER — MIRABEGRON 25 MG/1
TABLET, FILM COATED, EXTENDED RELEASE ORAL
Qty: 30 TABLET | Refills: 2 | Status: SHIPPED | OUTPATIENT
Start: 2019-07-29 | End: 2019-10-30

## 2019-08-02 ENCOUNTER — MEDICAL CORRESPONDENCE (OUTPATIENT)
Dept: HEALTH INFORMATION MANAGEMENT | Facility: CLINIC | Age: 64
End: 2019-08-02

## 2019-09-22 ENCOUNTER — MEDICAL CORRESPONDENCE (OUTPATIENT)
Dept: HEALTH INFORMATION MANAGEMENT | Facility: CLINIC | Age: 64
End: 2019-09-22

## 2019-10-04 ENCOUNTER — OFFICE VISIT (OUTPATIENT)
Dept: INTERNAL MEDICINE | Facility: CLINIC | Age: 64
End: 2019-10-04
Payer: MEDICARE

## 2019-10-04 VITALS
OXYGEN SATURATION: 97 % | HEART RATE: 60 BPM | WEIGHT: 169 LBS | SYSTOLIC BLOOD PRESSURE: 119 MMHG | DIASTOLIC BLOOD PRESSURE: 70 MMHG | BODY MASS INDEX: 23.66 KG/M2 | HEIGHT: 71 IN

## 2019-10-04 DIAGNOSIS — Z23 NEED FOR INFLUENZA VACCINATION: Primary | ICD-10-CM

## 2019-10-04 ASSESSMENT — ANXIETY QUESTIONNAIRES
1. FEELING NERVOUS, ANXIOUS, OR ON EDGE: NOT AT ALL
7. FEELING AFRAID AS IF SOMETHING AWFUL MIGHT HAPPEN: NOT AT ALL
6. BECOMING EASILY ANNOYED OR IRRITABLE: NOT AT ALL
5. BEING SO RESTLESS THAT IT IS HARD TO SIT STILL: NOT AT ALL
4. TROUBLE RELAXING: NOT AT ALL
2. NOT BEING ABLE TO STOP OR CONTROL WORRYING: NOT AT ALL
GAD7 TOTAL SCORE: 1
3. WORRYING TOO MUCH ABOUT DIFFERENT THINGS: SEVERAL DAYS
GAD7 TOTAL SCORE: 1
GAD7 TOTAL SCORE: 1
7. FEELING AFRAID AS IF SOMETHING AWFUL MIGHT HAPPEN: NOT AT ALL

## 2019-10-04 ASSESSMENT — PAIN SCALES - GENERAL: PAINLEVEL: NO PAIN (0)

## 2019-10-04 ASSESSMENT — MIFFLIN-ST. JEOR: SCORE: 1583.45

## 2019-10-04 NOTE — NURSING NOTE
Chief Complaint   Patient presents with     Recheck Medication     3 month follow up per pt        Akiko Hdez LPN, EMT at 1:39 PM on 10/4/2019.

## 2019-10-04 NOTE — NURSING NOTE
Russ Agee comes into clinic today at the request of Dr. Madhav Haque, Ordering Provider for an immunization/s.    I gave the FLU immunization/s while the patient was in clinic. They received an informational sheet and I explained the common side effects of the injection. The patient tolerated the procedure well and had no complications while here in clinic.     This service provided today was under the supervising provider of the day Dr. Madhav Haque  , who was available if needed.    Akiko Hdez LPN, EMT at 2:04 PM on 10/4/2019.

## 2019-10-04 NOTE — PROGRESS NOTES
"Subjective:  Russ is a pleasant 59 year-old man who came in today for follow up today.   He had colonoscopy 11/13/15 repeat in 5 years. He had upper EGD and EUS with Dr. Acuna, GI 12/16/15 with gastric polyp bx.  He had fairly unremarkable Holter Monitor 1/6/16.   He has been followed in Endocrinology for thyroid nodules. He has been seen in Urology for elevated PSA. He had prostate bx. With Dr. Trotter 4/8/2019. He had unremarkable abdominal U/S 4/1/2019. Undetectable Hep C viral load 4/1/2019.    He denies any claudication sxs. No skin changes. He continues to follow with outside Psychiatry and remains on the same medications.            Objective:  General appearance: NAD, cooperative, alert.  HEENT; oropharynx clear no exudate, neck supple, nl rom, no adenopathy, no B carotid bruits, no obvious dental abnormalities.   Cardio: Normal rate and rhythm, no murmurs  Respiratory: Normal breath sounds  Abdomen: Non-tender  Neurological exam: B UE/LE/proximal/distal is normal and symmetric for sensation, reflexes, and strength.       Assessment and Plan:    (1) Seen by Ms. Ji, 1/5/2018 [facility] for smoking cessation. He  has not stopped smoking now and only using gum. He states he has stopped smoking for for more than one year now coming up on two years.  (2) He was seen in  GI 5/30/2017 for Hep C management and was \"cured\".  At that time no further GI  Clinic visits necessary.  His last Hep C viral load 2/28/18 and 4/1/2019  was undetectable. Liver U/S 3/8/208 fairly unremarkable. He had lab testing 1/14/2019 including AFP that are stable.  Abdominal U/s from 4/1/2019 did not show any worrisome findings. Ordered U/S for next year 4/2020.    (3) He was seen by Dr. Trotter Urology 12/12/2018  for elevated PSA and prostate cancer/BPH. He had biopsy  4/18/2019 with MRI imaging on 4/1/2019 that was not suspicious. He followed up 4/22/2019 with Dr. Trotter and negative bx. Results for prostate cancer  (4) He was seen " 10/30/17 by Dr. Delaney for neck complaints and sinus issues  (5) He was seen Dr. Naranjo Cardiology for ongoing palpitations 4/13/16.  (6) Reflux and remains on Nexium;  EGD 10/15  (7) He states he follows with a Psychiatry doctor once a  Month in [place] and remains on the same medications.   (8) Thyroid nodules seen on Cervical MRI; thyroid U/S; seen in  Endocrine 9/19/2018 and stable thyroid U/S  10/15/2018 and stable  (9) Immunizations done 9/26/17 for Td.  New Zoster Vaccine and check with insurance. Influenza vaccination today (10/4/2019).               Total time spent 15 minutes.  More than 50% of the time spent with Mr. Agee on counseling / coordinating his care

## 2019-10-04 NOTE — PATIENT INSTRUCTIONS
[facility] Medication Refill Request Information:  * Please contact your pharmacy regarding ANY request for medication refills.  ** [facility] Prescription Fax = [phone]  * Please allow 3 business days for routine medication refills.  * Please allow 5 business days for controlled substance medication refills.     [facility] Test Result notification information:  *You will be notified with in 7-10 days of your appointment day regarding the results of your test.  If you are on MyChart you will be notified as soon as the provider has reviewed the results and signed off on them.    [facility]: [phone]

## 2019-10-05 ASSESSMENT — ANXIETY QUESTIONNAIRES: GAD7 TOTAL SCORE: 1

## 2019-10-30 ENCOUNTER — TELEPHONE (OUTPATIENT)
Dept: UROLOGY | Facility: CLINIC | Age: 64
End: 2019-10-30

## 2019-10-30 DIAGNOSIS — R39.9 LOWER URINARY TRACT SYMPTOMS (LUTS): ICD-10-CM

## 2019-10-30 RX ORDER — MIRABEGRON 25 MG/1
25 TABLET, FILM COATED, EXTENDED RELEASE ORAL DAILY
Qty: 90 TABLET | Refills: 1 | Status: SHIPPED | OUTPATIENT
Start: 2019-10-30 | End: 2020-04-16

## 2019-10-30 NOTE — TELEPHONE ENCOUNTER
Last Clinic Visit: 4/22/2019  [facility] Urology and [facility] for Prostate and Urologic Cancers

## 2019-11-01 NOTE — TELEPHONE ENCOUNTER
Nurse  called to Follow-up on Rx because Pharmacy said they didn't have it.  No one has asked pharmacy since early this week so I confirmed for the nurse that we show the pharmacy receiving the order on 10/20 at 4:24 PM.  Pt will call pharmacy again.

## 2019-11-11 ENCOUNTER — OFFICE VISIT (OUTPATIENT)
Dept: INTERNAL MEDICINE | Facility: CLINIC | Age: 64
End: 2019-11-11
Payer: MEDICARE

## 2019-11-11 VITALS
BODY MASS INDEX: 24.42 KG/M2 | WEIGHT: 175 LBS | HEART RATE: 61 BPM | DIASTOLIC BLOOD PRESSURE: 73 MMHG | OXYGEN SATURATION: 98 % | SYSTOLIC BLOOD PRESSURE: 106 MMHG

## 2019-11-11 DIAGNOSIS — Z02.0 DAY CARE PHYSICAL, ENCOUNTER FOR: Primary | ICD-10-CM

## 2019-11-11 ASSESSMENT — PAIN SCALES - GENERAL: PAINLEVEL: NO PAIN (0)

## 2019-11-11 NOTE — PROGRESS NOTES
"Subjective:  Russ is a pleasant 59 year-old man who came in today for follow up today. He comes in mainly to get senior day care forms completed.   He had colonoscopy 11/13/15 repeat in 5 years. He had upper EGD and EUS with Dr. Acuna, GI 12/16/15 with gastric polyp bx.  He had fairly unremarkable Holter Monitor 1/6/16.   He has been followed in Endocrinology for thyroid nodules. He has been seen in Urology for elevated PSA. He had prostate bx. With Dr. Trotter 4/8/2019. He had unremarkable abdominal U/S 4/1/2019. Undetectable Hep C viral load 4/1/2019.    He denies any claudication sxs. No skin changes. He continues to follow with outside Psychiatry and remains on the same medications.            Objective:  General appearance: NAD, cooperative, alert.  HEENT; oropharynx clear no exudate, neck supple, nl rom, no adenopathy, no B carotid bruits, no obvious dental abnormalities.   Cardio: Normal rate and rhythm, no murmurs  Respiratory: Normal breath sounds  Abdomen: Non-tender  Neurological exam: B UE/LE/proximal/distal is normal and symmetric for sensation, reflexes, and strength.       Assessment and Plan:    (1) Seen by Ms. Ji, 1/5/2018 [facility] for smoking cessation. He  has not stopped smoking now and only using gum. He states he has stopped smoking for for more than one year now coming up on two years.  (2) He was seen in  GI 5/30/2017 for Hep C management and was \"cured\".  At that time no further GI  Clinic visits necessary.  His last Hep C viral load 2/28/18 and 4/1/2019  was undetectable. Liver U/S 3/8/208 fairly unremarkable. He had lab testing 1/14/2019 including AFP that are stable.  Abdominal U/s from 4/1/2019 did not show any worrisome findings. Ordered U/S for next year 4/2020.    (3) He was seen by Dr. Trotter Urology 12/12/2018  for elevated PSA and prostate cancer/BPH. He had biopsy  4/18/2019 with MRI imaging on 4/1/2019 that was not suspicious. He followed up 4/22/2019 with Dr. Trotter and " negative bx. Results for prostate cancer. He has follow up 12/9/2019  (4) He was seen 10/30/17 by Dr. Delaney for neck complaints and sinus issues  (5) He was seen Dr. Naranjo Cardiology for ongoing palpitations 4/13/16.  (6) Reflux and remains on Nexium;  EGD 10/15  (7) He states he follows with a Psychiatry doctor once a  Month in [place] and remains on the same medications.   (8) Thyroid nodules seen on Cervical MRI; thyroid U/S; seen in  Endocrine 9/19/2018 and stable thyroid U/S  10/15/2018 and stable  (9) Immunizations done 9/26/17 for Td.  New Zoster Vaccine and check with insurance. Influenza vaccination (10/4/2019).   (10) Filled out senior day care forms today              Total time spent 15 minutes.  More than 50% of the time spent with Mr. Agee on counseling / coordinating his care

## 2019-11-11 NOTE — PATIENT INSTRUCTIONS
[facility] Medication Refill Request Information:  * Please contact your pharmacy regarding ANY request for medication refills.  ** [facility] Prescription Fax = [phone]  * Please allow 3 business days for routine medication refills.  * Please allow 5 business days for controlled substance medication refills.     [facility] Test Result notification information:  *You will be notified with in 7-10 days of your appointment day regarding the results of your test.  If you are on MyChart you will be notified as soon as the provider has reviewed the results and signed off on them.    [facility]: [phone]

## 2019-11-11 NOTE — NURSING NOTE
Chief Complaint   Patient presents with     Forms     pt her to have forms filled out       Mariana Cartwright CMA, EMT at 4:30 PM on 11/11/2019.

## 2019-11-21 ENCOUNTER — MEDICAL CORRESPONDENCE (OUTPATIENT)
Dept: HEALTH INFORMATION MANAGEMENT | Facility: CLINIC | Age: 64
End: 2019-11-21

## 2019-11-23 ENCOUNTER — MEDICAL CORRESPONDENCE (OUTPATIENT)
Dept: HEALTH INFORMATION MANAGEMENT | Facility: CLINIC | Age: 64
End: 2019-11-23

## 2019-11-25 ENCOUNTER — MEDICAL CORRESPONDENCE (OUTPATIENT)
Dept: HEALTH INFORMATION MANAGEMENT | Facility: CLINIC | Age: 64
End: 2019-11-25

## 2019-11-27 ENCOUNTER — PRE VISIT (OUTPATIENT)
Dept: UROLOGY | Facility: CLINIC | Age: 64
End: 2019-11-27

## 2019-11-27 NOTE — TELEPHONE ENCOUNTER
Chief Complaint : PSA check     Records/Orders: PSA    Pt Contacted: called patient to please get PSA done 1 week before appointment     At Rooming: normal

## 2019-11-29 ENCOUNTER — OFFICE VISIT (OUTPATIENT)
Dept: INTERNAL MEDICINE | Facility: CLINIC | Age: 64
End: 2019-11-29
Payer: MEDICARE

## 2019-11-29 VITALS
BODY MASS INDEX: 23.72 KG/M2 | DIASTOLIC BLOOD PRESSURE: 79 MMHG | SYSTOLIC BLOOD PRESSURE: 125 MMHG | WEIGHT: 170 LBS | HEART RATE: 60 BPM | OXYGEN SATURATION: 98 %

## 2019-11-29 DIAGNOSIS — C61 MALIGNANT NEOPLASM OF PROSTATE (H): ICD-10-CM

## 2019-11-29 DIAGNOSIS — J34.89 SINUS DRAINAGE: Primary | ICD-10-CM

## 2019-11-29 DIAGNOSIS — R10.84 ABDOMINAL PAIN, GENERALIZED: ICD-10-CM

## 2019-11-29 LAB — PSA SERPL-MCNC: 3.7 UG/L (ref 0–4)

## 2019-11-29 RX ORDER — FLUTICASONE PROPIONATE 50 MCG
1 SPRAY, SUSPENSION (ML) NASAL DAILY
Qty: 1 ML | Refills: 3 | Status: SHIPPED | OUTPATIENT
Start: 2019-11-29 | End: 2020-06-23

## 2019-11-29 ASSESSMENT — PAIN SCALES - GENERAL: PAINLEVEL: NO PAIN (0)

## 2019-11-29 NOTE — NURSING NOTE
"Chief Complaint   Patient presents with     Sinus Problem     pt reports being congested for \"a long time\" and when asked reports that they haven't tried any medications to relieve symptoms     Kimberly Nissen, EMT at 1:41 PM on 11/29/2019    "

## 2019-11-29 NOTE — PROGRESS NOTES
HPI:    Pt. Comes in with several months of B nasal drainage. He had sinus surgery several years ago. He had sinus CT scan 12/3/2013 for a similar problem. Clear sinus drainage. No significant sinus pressure. No sore throat or post nasal drip down his throat. No F/C/NS. No neck complaints. He has used nasal steroids in the past with some relief. He states he had an accident in [place] many years ago and has several months of B lower abdominal discomfort and increased urinary frequency w/o dysuria. He has h/o prostate cancer and was seen by Dr. Trotter Urology 4/22/2019. He had a prostate MRI imaging 4/1/2019. He o/w denies additional HEENT, cardiopulmonary, abdominal, , neurological, systemic, psychiatric complaints.     Past Medical History:   Diagnosis Date     Anxiety      Chronic hepatitis C (H)     treated.  Undetectable     Depressive disorder     followed by Dr. Mccormick     ETOH abuse      GERD (gastroesophageal reflux disease)      Malignant neoplasm of prostate (H)      Mucous cyst of joint      Palpitations      Past Surgical History:   Procedure Laterality Date     CYSTOSCOPY N/A 11/27/2017    Procedure: CYSTOSCOPY;  Cystoscopy, Rezum Procedure;  Surgeon: Gerardo Trotter MD;  Location:  OR     ENDOSCOPIC ULTRASOUND, ESOPHAGOSCOPY, GASTROSCOPY, DUODENOSCOPY (EGD), COMBINED  12/16/15     ESOPHAGOSCOPY, GASTROSCOPY, DUODENOSCOPY (EGD), COMBINED N/A 4/9/2015    Procedure: COMBINED ESOPHAGOSCOPY, GASTROSCOPY, DUODENOSCOPY (EGD);  Surgeon: Raina Romo MD;  Location:  GI     EXCISE MASS FINGER  2/19/2013    Procedure: EXCISE MASS FINGER;  Mass Excision Left Index Finger     (local anesthesia);  Surgeon: Raina Dorsey MD;  Location:  OR      UGI ENDOSCOPY W EUS N/A 12/16/2015    Procedure: COMBINED ENDOSCOPIC ULTRASOUND, ESOPHAGOSCOPY, GASTROSCOPY, DUODENOSCOPY (EGD);  Surgeon: Brady Acuna MD;  Location:  GI     PROSTATE SURGERY       TRANSURETHERAL DESTRUCTION  OF PROSTATE BY THERMOTHERAPY N/A 11/27/2017    Procedure: TRANSURETHERAL DESTRUCTION OF PROSTATE BY THERMOTHERAPY;;  Surgeon: Gerardo Trotter MD;  [facility]     PE:    Vitals noted, gen nad cooperative alert, HEENT, oropharynx clear no tenderness to maxillary or frontal sinus areas, no nasal drainage, neck supple nl rom, no adenopathy, lungs with good air movement, RRR, S1, S2, no MRG, abdomen, positive BS's no masses, and no tenderness, grossly normal neurological exam    A/P:    1. He got the influenza vaccination 10/4/201  2. Ordered sinus CT imaging (from previous surgery), ENT referral and sent in Rx. For flonase  3. Abdominal complaints. Labs, UA and abdominal/pelvic CT imaging.  4. He remains on his psychiatric medications and states he continues to follow with outside psychiatry.   5. Nicotine for smoking cessation.   6. H/o hep C:  His last Hep C viral load 2/28/18 and 4/1/2019  was undetectable. Liver U/S 3/8/208 fairly unremarkable. He had lab testing 1/14/2019 including AFP that are stable.  Abdominal U/s from 4/1/2019 did not show any worrisome findings. Ordered U/S for next year 4/2020.     I will see him back in 2-3 weeks follow up tests    Total time spent 25 minutes.  More than 50% of the time spent with Mr. Agee on counseling / coordinating his care

## 2019-12-02 NOTE — TELEPHONE ENCOUNTER
FUTURE VISIT INFORMATION      FUTURE VISIT INFORMATION:    Date: 12/20/19    Time: 1:30 PM    Location: [facility]  REFERRAL INFORMATION:    Referring provider:  Dr. Haque    Referring providers clinic:  [facility]    Reason for visit/diagnosis  Sinus Drainage    RECORDS REQUESTED FROM:       Clinic name Comments Records Status Imaging Status   [facility] 11/29/19 - OV with Dr. Haque  10/30/17 - OV with Dr. Delaney  9/18/17 - OV with Dr. Yumiko Rojas    [facility] - Imaging 12/6/19 - CT Sinus WO  7/26/17 - MRI Brain W/WO [facility] PACs                             * Unable to locate Sinus Op Note prior to 2011 (12/22/11 OV with Dr. Ma), please ask patient for more details if OP Note is needed.

## 2019-12-06 ENCOUNTER — ANCILLARY PROCEDURE (OUTPATIENT)
Dept: CT IMAGING | Facility: CLINIC | Age: 64
End: 2019-12-06
Attending: INTERNAL MEDICINE
Payer: MEDICARE

## 2019-12-06 DIAGNOSIS — J34.89 SINUS DRAINAGE: ICD-10-CM

## 2019-12-06 DIAGNOSIS — R10.84 ABDOMINAL PAIN, GENERALIZED: ICD-10-CM

## 2019-12-06 LAB — RADIOLOGIST FLAGS: NORMAL

## 2019-12-06 RX ORDER — IOPAMIDOL 755 MG/ML
104 INJECTION, SOLUTION INTRAVASCULAR ONCE
Status: COMPLETED | OUTPATIENT
Start: 2019-12-06 | End: 2019-12-06

## 2019-12-06 RX ADMIN — IOPAMIDOL 104 ML: 755 INJECTION, SOLUTION INTRAVASCULAR at 15:18

## 2019-12-08 ENCOUNTER — TELEPHONE (OUTPATIENT)
Dept: INTERNAL MEDICINE | Facility: CLINIC | Age: 64
End: 2019-12-08

## 2019-12-08 DIAGNOSIS — R93.5 ABNORMAL ABDOMINAL CT SCAN: Primary | ICD-10-CM

## 2019-12-08 NOTE — TELEPHONE ENCOUNTER
Dear Chinyere;    Please see results letter I sent to Mr. Agee    (1) Ordered abdominal MRI this encounter    (2) He needs labs BEFORE MRI and order this encounter    ThanksLUTHER    Dear Mr. Agee;    The results of your CT scan are attached and I recommend we get an MRI to follow up. I have placed an order today and my nurse Chinyere will help coordinate. Also, I recommend you keep your 12/23/2019 follow up appointment and we can discuss all the results.    LUTHER Haque MD      Pt called and message left to call back concerning a MRI to be scheduled before his appt on the Dec 23rd with Dr Haque.  Chinyere Lyman RN 11:30 AM on 12/9/2019.

## 2019-12-09 ENCOUNTER — DOCUMENTATION ONLY (OUTPATIENT)
Dept: CARE COORDINATION | Facility: CLINIC | Age: 64
End: 2019-12-09

## 2019-12-13 DIAGNOSIS — R93.5 ABNORMAL ABDOMINAL CT SCAN: ICD-10-CM

## 2019-12-13 DIAGNOSIS — R10.84 ABDOMINAL PAIN, GENERALIZED: ICD-10-CM

## 2019-12-13 DIAGNOSIS — J34.89 SINUS DRAINAGE: ICD-10-CM

## 2019-12-13 LAB
ALBUMIN SERPL-MCNC: 3.3 G/DL (ref 3.4–5)
ALBUMIN UR-MCNC: NEGATIVE MG/DL
ALP SERPL-CCNC: 81 U/L (ref 40–150)
ALT SERPL W P-5'-P-CCNC: 18 U/L (ref 0–70)
ANION GAP SERPL CALCULATED.3IONS-SCNC: 3 MMOL/L (ref 3–14)
APPEARANCE UR: CLEAR
AST SERPL W P-5'-P-CCNC: 12 U/L (ref 0–45)
BASOPHILS # BLD AUTO: 0 10E9/L (ref 0–0.2)
BASOPHILS NFR BLD AUTO: 0.4 %
BILIRUB SERPL-MCNC: 0.3 MG/DL (ref 0.2–1.3)
BILIRUB UR QL STRIP: NEGATIVE
BUN SERPL-MCNC: 13 MG/DL (ref 7–30)
CALCIUM SERPL-MCNC: 8.6 MG/DL (ref 8.5–10.1)
CHLORIDE SERPL-SCNC: 110 MMOL/L (ref 94–109)
CO2 SERPL-SCNC: 28 MMOL/L (ref 20–32)
COLOR UR AUTO: YELLOW
CREAT SERPL-MCNC: 0.98 MG/DL (ref 0.66–1.25)
DIFFERENTIAL METHOD BLD: ABNORMAL
EOSINOPHIL # BLD AUTO: 0.2 10E9/L (ref 0–0.7)
EOSINOPHIL NFR BLD AUTO: 2.9 %
ERYTHROCYTE [DISTWIDTH] IN BLOOD BY AUTOMATED COUNT: 13.8 % (ref 10–15)
GFR SERPL CREATININE-BSD FRML MDRD: 82 ML/MIN/{1.73_M2}
GLUCOSE SERPL-MCNC: 109 MG/DL (ref 70–99)
GLUCOSE UR STRIP-MCNC: NEGATIVE MG/DL
HCT VFR BLD AUTO: 37.3 % (ref 40–53)
HGB BLD-MCNC: 12.1 G/DL (ref 13.3–17.7)
HGB UR QL STRIP: NEGATIVE
IMM GRANULOCYTES # BLD: 0 10E9/L (ref 0–0.4)
IMM GRANULOCYTES NFR BLD: 0.2 %
KETONES UR STRIP-MCNC: NEGATIVE MG/DL
LEUKOCYTE ESTERASE UR QL STRIP: NEGATIVE
LYMPHOCYTES # BLD AUTO: 1.4 10E9/L (ref 0.8–5.3)
LYMPHOCYTES NFR BLD AUTO: 25.6 %
MCH RBC QN AUTO: 30.9 PG (ref 26.5–33)
MCHC RBC AUTO-ENTMCNC: 32.4 G/DL (ref 31.5–36.5)
MCV RBC AUTO: 95 FL (ref 78–100)
MONOCYTES # BLD AUTO: 0.7 10E9/L (ref 0–1.3)
MONOCYTES NFR BLD AUTO: 12.4 %
MUCOUS THREADS #/AREA URNS LPF: PRESENT /LPF
NEUTROPHILS # BLD AUTO: 3.2 10E9/L (ref 1.6–8.3)
NEUTROPHILS NFR BLD AUTO: 58.5 %
NITRATE UR QL: NEGATIVE
NRBC # BLD AUTO: 0 10*3/UL
NRBC BLD AUTO-RTO: 0 /100
PH UR STRIP: 5 PH (ref 5–7)
PLATELET # BLD AUTO: 176 10E9/L (ref 150–450)
POTASSIUM SERPL-SCNC: 3.7 MMOL/L (ref 3.4–5.3)
PROT SERPL-MCNC: 6.9 G/DL (ref 6.8–8.8)
RBC # BLD AUTO: 3.91 10E12/L (ref 4.4–5.9)
RBC #/AREA URNS AUTO: <1 /HPF (ref 0–2)
SODIUM SERPL-SCNC: 141 MMOL/L (ref 133–144)
SOURCE: ABNORMAL
SP GR UR STRIP: 1.02 (ref 1–1.03)
SQUAMOUS #/AREA URNS AUTO: <1 /HPF (ref 0–1)
UROBILINOGEN UR STRIP-MCNC: 0 MG/DL (ref 0–2)
WBC # BLD AUTO: 5.5 10E9/L (ref 4–11)
WBC #/AREA URNS AUTO: 1 /HPF (ref 0–5)

## 2019-12-20 ENCOUNTER — OFFICE VISIT (OUTPATIENT)
Dept: OTOLARYNGOLOGY | Facility: CLINIC | Age: 64
End: 2019-12-20
Attending: INTERNAL MEDICINE
Payer: MEDICARE

## 2019-12-20 ENCOUNTER — PRE VISIT (OUTPATIENT)
Dept: OTOLARYNGOLOGY | Facility: CLINIC | Age: 64
End: 2019-12-20

## 2019-12-20 VITALS — HEIGHT: 71 IN | BODY MASS INDEX: 23.98 KG/M2 | WEIGHT: 171.3 LBS

## 2019-12-20 DIAGNOSIS — J32.0 CHRONIC MAXILLARY SINUSITIS: ICD-10-CM

## 2019-12-20 DIAGNOSIS — R19.6 HALITOSIS: ICD-10-CM

## 2019-12-20 DIAGNOSIS — R09.81 CONGESTION OF PARANASAL SINUS: Primary | ICD-10-CM

## 2019-12-20 ASSESSMENT — MIFFLIN-ST. JEOR: SCORE: 1589.14

## 2019-12-20 ASSESSMENT — PAIN SCALES - GENERAL: PAINLEVEL: NO PAIN (0)

## 2019-12-20 NOTE — LETTER
RE: Russ Agee  [address]  [facility] [address]     Dear Colleague,    Thank you for referring your patient, Russ Agee, to the [facility] at [facility]. Please see a copy of my visit note below.               [facility]            New Patient Visit      Encounter date: December 20, 2019    Referring Provider:   Donnie Hauqe MD  [address] [facility] [place] [address]    Chief Complaint: sinusitis, halitosis    History of Present Illness: Russ Agee is a 64 year old gentleman who I am being consulted on by Dr. Haque for sinusitis and halitosis. The patient has had several years of moderate halitosis that has not improved significantly. He has a history of limited ESS performed for sinusitis. He is wondering if there is sinonasal inflammatory/infectious factor related to his halitosis. He reports no recent exacerbation of sinus symptoms including nasal congestion, purulent drainage, facial pain/pressure, need for antibiotics for recurrent sinus infections.     Review of systems: A 14-point review of systems has been conducted and was negative for any notable symptoms, except as dictated in the history of present illness.     Past Medical History:   Diagnosis Date     Anxiety      Chronic hepatitis C (H)     treated.  Undetectable     Depressive disorder     followed by Dr. Mccormick     ETOH abuse      GERD (gastroesophageal reflux disease)      Malignant neoplasm of prostate (H)      Mucous cyst of joint      Palpitations         Past Surgical History:   Procedure Laterality Date     CYSTOSCOPY N/A 11/27/2017    Procedure: CYSTOSCOPY;  Cystoscopy, Rezum Procedure;  Surgeon: Gerardo Trotter MD;  [facility]     ENDOSCOPIC ULTRASOUND, ESOPHAGOSCOPY, GASTROSCOPY, DUODENOSCOPY (EGD), COMBINED  12/16/15     ESOPHAGOSCOPY, GASTROSCOPY, DUODENOSCOPY (EGD), COMBINED N/A 4/9/2015    Procedure: COMBINED  ESOPHAGOSCOPY, GASTROSCOPY, DUODENOSCOPY (EGD);  Surgeon: Raina Romo MD;  Location:  GI     EXCISE MASS FINGER  2013    Procedure: EXCISE MASS FINGER;  Mass Excision Left Index Finger     (local anesthesia);  Surgeon: Raina Dorsey MD;  [facility]      UGI ENDOSCOPY W EUS N/A 2015    Procedure: COMBINED ENDOSCOPIC ULTRASOUND, ESOPHAGOSCOPY, GASTROSCOPY, DUODENOSCOPY (EGD);  Surgeon: Brady Acuna MD;  Location:  GI     PROSTATE SURGERY       TRANSURETHERAL DESTRUCTION OF PROSTATE BY THERMOTHERAPY N/A 2017    Procedure: TRANSURETHERAL DESTRUCTION OF PROSTATE BY THERMOTHERAPY;;  Surgeon: Gerardo Trotter MD;  [facility]        Family History   Problem Relation Age of Onset     Diabetes Sister      Diabetes Son      Alcohol/Drug Other      Arthritis Other      Circulatory Other      Eye Disorder Other      Depression Other      Genetic Disorder Other      Psychotic Disorder Other      Cancer No family hx of         no skin cancer     Skin Cancer No family hx of      Thyroid Disease No family hx of      Thyroid Cancer No family hx of         Social History     Socioeconomic History     Marital status: Single     Spouse name: None     Number of children: None     Years of education: None     Highest education level: None   Occupational History     None   Social Needs     Financial resource strain: None     Food insecurity:     Worry: None     Inability: None     Transportation needs:     Medical: None     Non-medical: None   Tobacco Use     Smoking status: Former Smoker     Packs/day: 0.50     Years: 6.00     Pack years: 3.00     Types: Cigarettes     Last attempt to quit: 2017     Years since quittin.9     Smokeless tobacco: Never Used     Tobacco comment: nicotine gum   Substance and Sexual Activity     Alcohol use: No     Alcohol/week: 0.0 standard drinks     Comment: stopped 10/2014.  Resumed 3 mo ago. 4 drinks once per week currently.   "    Drug use: No     Sexual activity: None   Lifestyle     Physical activity:     Days per week: None     Minutes per session: None     Stress: None   Relationships     Social connections:     Talks on phone: None     Gets together: None     Attends Faith service: None     Active member of club or organization: None     Attends meetings of clubs or organizations: None     Relationship status: None     Intimate partner violence:     Fear of current or ex partner: None     Emotionally abused: None     Physically abused: None     Forced sexual activity: None   Other Topics Concern     Parent/sibling w/ CABG, MI or angioplasty before 65F 55M? Not Asked   Social History Narrative    Single.  [place] American.     Retired.    10 children - healthy    9 siblings - 2 alive.  ? History.     No family history of bleeding, clotting disorders or complications with anesthesia.         Physical Exam:  Vital signs: Ht 1.803 m (5' 11\")   Wt 77.7 kg (171 lb 4.8 oz)   BMI 23.89 kg/m      General Appearance: No acute distress, appropriate demeanor, conversant  Eyes: moist conjunctivae; EOMI; pupils symmetric; visual acuity grossly intact; no proptosis  Head: normocephalic; overall symmetric appearance without deformity  Face: overall symmetric without deformity; HB I/VI  Ears: Normal appearance of external ear; external meatus normal in appearance; TMs intact without perforation bilaterally;   Nose: No external deformity; septum relatively midline; inferior turbinates without significant hypertrophy  Oral Cavity/oropharynx: Normal appearance of mucosa; tongue midline; no mass or lesions; tonsils; oropharynx without obvious mucosal abnormality  Neck: no palpable lymphadenopathy; thyroid without palpable nodules  Lungs: symmetric chest rise; no wheezing  CV: Good distal perfusion; normal hear rate  Extremities: No deformity  Neurologic Exam: Cranial nerves II-XII are grossly intact; no focal deficit      Procedure Note  Procedure " performed: Rigid nasal endoscopy  Indication: To evaluate for sinonasal pathology not visualized on routine anterior rhinoscopy  Anesthesia: 4% topical lidocaine with 0.05% oxymetazoline  Description of procedure: A 30 degree, 3 mm rigid endoscope was inserted into bilateral nasal cavities and the nasal valves, nasal cavity, middle meatus, sphenoethmoid recess, and nasopharynx were thoroughly evaluated for evidence of obstruction, edema, purulence, polyps and/or mass/lesion.     [place]-Dave Endoscopic Scoring System  Endoscopic observation Right Left   Polyps in middle meatus (0 = absent, 1 = restricted to middle meatus, 2 = Beyond middle meatus) 0 0   Discharge (0 = absent, 1 = thin and clear, 2 = thick, purulent) 0 0   Edema (0 = absent, 1 = mild-moderate, 2 = moderate-severe) 0 0   Crusting (0 = absent, 1 = mild-moderate, 2 = moderate-severe) 0 0   Scarring (0= absent, 1 = mild-moderate, 2 = moderate-severe) 0 0   Total 0 0     Findings  RT: evid of prior surgery; MM and SER clear  LT: evid of prior surgery; MM and SER clear  Nasopharynx clear    The patient tolerated the procedure well without complication.     Laboratory Review:  n/a    Imaging Review:  Reviewed CT sinus from 12/6/2019: clear paranasal sinuses with evid of prior limited ESS    Pathology Review:  n/a    Assessment/Medical Decision Making/Plan:  Chronic halitosis and is evaluated to see if there is a sinonasal contributor including sinusitis.  I see no evidence of sinusitis on endoscopy and a CT scan is clear, so I do not think sinuses are playing any role. He can continue Flonase and nasal saline irrigations as needed.  He can follow with me as needed.      Vitaliy Ryan MD    [facility]  Rhinology  Endoscopic Skull Base Surgery  [facility]  Department of Otolaryngology - Head & Neck Surgery

## 2019-12-20 NOTE — PROGRESS NOTES
[facility]                   New Patient Visit      Encounter date: December 20, 2019    Referring Provider:   Donnie Haque MD  [address] [address]    Chief Complaint: sinusitis, halitosis    History of Present Illness: Russ Agee is a 64 year old gentleman who I am being consulted on by Dr. Haque for sinusitis and halitosis. The patient has had several years of moderate halitosis that has not improved significantly. He has a history of limited ESS performed for sinusitis. He is wondering if there is sinonasal inflammatory/infectious factor related to his halitosis. He reports no recent exacerbation of sinus symptoms including nasal congestion, purulent drainage, facial pain/pressure, need for antibiotics for recurrent sinus infections.     Review of systems: A 14-point review of systems has been conducted and was negative for any notable symptoms, except as dictated in the history of present illness.     Past Medical History:   Diagnosis Date     Anxiety      Chronic hepatitis C (H)     treated.  Undetectable     Depressive disorder     followed by Dr. Mccormick     ETOH abuse      GERD (gastroesophageal reflux disease)      Malignant neoplasm of prostate (H)      Mucous cyst of joint      Palpitations         Past Surgical History:   Procedure Laterality Date     CYSTOSCOPY N/A 11/27/2017    Procedure: CYSTOSCOPY;  Cystoscopy, Rezum Procedure;  Surgeon: Gerardo Trotter MD;  [facility]     ENDOSCOPIC ULTRASOUND, ESOPHAGOSCOPY, GASTROSCOPY, DUODENOSCOPY (EGD), COMBINED  12/16/15     ESOPHAGOSCOPY, GASTROSCOPY, DUODENOSCOPY (EGD), COMBINED N/A 4/9/2015    Procedure: COMBINED ESOPHAGOSCOPY, GASTROSCOPY, DUODENOSCOPY (EGD);  Surgeon: Raina Romo MD;  [facility]     EXCISE MASS FINGER  2/19/2013    Procedure: EXCISE MASS FINGER;  Mass Excision Left Index Finger     (local anesthesia);  Surgeon: Raina Dorsey MD;   [facility]      UGI ENDOSCOPY W EUS N/A 2015    Procedure: COMBINED ENDOSCOPIC ULTRASOUND, ESOPHAGOSCOPY, GASTROSCOPY, DUODENOSCOPY (EGD);  Surgeon: Brady Acuna MD;  [facility]     PROSTATE SURGERY       TRANSURETHERAL DESTRUCTION OF PROSTATE BY THERMOTHERAPY N/A 2017    Procedure: TRANSURETHERAL DESTRUCTION OF PROSTATE BY THERMOTHERAPY;;  Surgeon: Gerardo Trotter MD;  [facility]        Family History   Problem Relation Age of Onset     Diabetes Sister      Diabetes Son      Alcohol/Drug Other      Arthritis Other      Circulatory Other      Eye Disorder Other      Depression Other      Genetic Disorder Other      Psychotic Disorder Other      Cancer No family hx of         no skin cancer     Skin Cancer No family hx of      Thyroid Disease No family hx of      Thyroid Cancer No family hx of         Social History     Socioeconomic History     Marital status: Single     Spouse name: None     Number of children: None     Years of education: None     Highest education level: None   Occupational History     None   Social Needs     Financial resource strain: None     Food insecurity:     Worry: None     Inability: None     Transportation needs:     Medical: None     Non-medical: None   Tobacco Use     Smoking status: Former Smoker     Packs/day: 0.50     Years: 6.00     Pack years: 3.00     Types: Cigarettes     Last attempt to quit: 2017     Years since quittin.9     Smokeless tobacco: Never Used     Tobacco comment: nicotine gum   Substance and Sexual Activity     Alcohol use: No     Alcohol/week: 0.0 standard drinks     Comment: stopped 10/2014.  Resumed 3 mo ago. 4 drinks once per week currently.      Drug use: No     Sexual activity: None   Lifestyle     Physical activity:     Days per week: None     Minutes per session: None     Stress: None   Relationships     Social connections:     Talks on phone: None     Gets together: None     Attends Religion  "service: None     Active member of club or organization: None     Attends meetings of clubs or organizations: None     Relationship status: None     Intimate partner violence:     Fear of current or ex partner: None     Emotionally abused: None     Physically abused: None     Forced sexual activity: None   Other Topics Concern     Parent/sibling w/ CABG, MI or angioplasty before 65F 55M? Not Asked   Social History Narrative    Single.  [place] American.     Retired.    10 children - healthy    9 siblings - 2 alive.  ? History.     No family history of bleeding, clotting disorders or complications with anesthesia.            Physical Exam:  Vital signs: Ht 1.803 m (5' 11\")   Wt 77.7 kg (171 lb 4.8 oz)   BMI 23.89 kg/m     General Appearance: No acute distress, appropriate demeanor, conversant  Eyes: moist conjunctivae; EOMI; pupils symmetric; visual acuity grossly intact; no proptosis  Head: normocephalic; overall symmetric appearance without deformity  Face: overall symmetric without deformity; HB I/VI  Ears: Normal appearance of external ear; external meatus normal in appearance; TMs intact without perforation bilaterally;   Nose: No external deformity; septum relatively midline; inferior turbinates without significant hypertrophy  Oral Cavity/oropharynx: Normal appearance of mucosa; tongue midline; no mass or lesions; tonsils; oropharynx without obvious mucosal abnormality  Neck: no palpable lymphadenopathy; thyroid without palpable nodules  Lungs: symmetric chest rise; no wheezing  CV: Good distal perfusion; normal hear rate  Extremities: No deformity  Neurologic Exam: Cranial nerves II-XII are grossly intact; no focal deficit      Procedure Note  Procedure performed: Rigid nasal endoscopy  Indication: To evaluate for sinonasal pathology not visualized on routine anterior rhinoscopy  Anesthesia: 4% topical lidocaine with 0.05% oxymetazoline  Description of procedure: A 30 degree, 3 mm rigid endoscope was " inserted into bilateral nasal cavities and the nasal valves, nasal cavity, middle meatus, sphenoethmoid recess, and nasopharynx were thoroughly evaluated for evidence of obstruction, edema, purulence, polyps and/or mass/lesion.     [place]-Dave Endoscopic Scoring System  Endoscopic observation Right Left   Polyps in middle meatus (0 = absent, 1 = restricted to middle meatus, 2 = Beyond middle meatus) 0 0   Discharge (0 = absent, 1 = thin and clear, 2 = thick, purulent) 0 0   Edema (0 = absent, 1 = mild-moderate, 2 = moderate-severe) 0 0   Crusting (0 = absent, 1 = mild-moderate, 2 = moderate-severe) 0 0   Scarring (0= absent, 1 = mild-moderate, 2 = moderate-severe) 0 0   Total 0 0     Findings  RT: evid of prior surgery; MM and SER clear  LT: evid of prior surgery; MM and SER clear  Nasopharynx clear    The patient tolerated the procedure well without complication.     Laboratory Review:  n/a    Imaging Review:  Reviewed CT sinus from 12/6/2019: clear paranasal sinuses with evid of prior limited ESS    Pathology Review:  n/a    Assessment/Medical Decision Making/Plan:  Chronic halitosis and is evaluated to see if there is a sinonasal contributor including sinusitis.  I see no evidence of sinusitis on endoscopy and a CT scan is clear, so I do not think sinuses are playing any role. He can continue Flonase and nasal saline irrigations as needed.  He can follow with me as needed.      Vitaliy Ryan MD    [facility]  Rhinology  Endoscopic Skull Base Surgery  [facility]  Department of Otolaryngology - Head & Neck Surgery

## 2019-12-23 ENCOUNTER — OFFICE VISIT (OUTPATIENT)
Dept: INTERNAL MEDICINE | Facility: CLINIC | Age: 64
End: 2019-12-23
Payer: MEDICARE

## 2019-12-23 VITALS
SYSTOLIC BLOOD PRESSURE: 120 MMHG | BODY MASS INDEX: 23.57 KG/M2 | OXYGEN SATURATION: 98 % | HEART RATE: 61 BPM | DIASTOLIC BLOOD PRESSURE: 69 MMHG | WEIGHT: 169 LBS

## 2019-12-23 DIAGNOSIS — N39.43 POST-VOID DRIBBLING: Primary | ICD-10-CM

## 2019-12-23 LAB
ALBUMIN UR-MCNC: NEGATIVE MG/DL
APPEARANCE UR: CLEAR
BILIRUB UR QL STRIP: NEGATIVE
COLOR UR AUTO: YELLOW
GLUCOSE UR STRIP-MCNC: NEGATIVE MG/DL
HGB UR QL STRIP: NEGATIVE
HYALINE CASTS #/AREA URNS LPF: 3 /LPF (ref 0–2)
KETONES UR STRIP-MCNC: NEGATIVE MG/DL
LEUKOCYTE ESTERASE UR QL STRIP: NEGATIVE
MUCOUS THREADS #/AREA URNS LPF: PRESENT /LPF
NITRATE UR QL: NEGATIVE
PH UR STRIP: 6 PH (ref 5–7)
RBC #/AREA URNS AUTO: 1 /HPF (ref 0–2)
SOURCE: ABNORMAL
SP GR UR STRIP: 1.02 (ref 1–1.03)
UROBILINOGEN UR STRIP-MCNC: 0 MG/DL (ref 0–2)
WBC #/AREA URNS AUTO: 2 /HPF (ref 0–5)

## 2019-12-23 ASSESSMENT — PAIN SCALES - GENERAL: PAINLEVEL: NO PAIN (0)

## 2019-12-23 NOTE — PATIENT INSTRUCTIONS
[facility] Medication Refill Request Information:  * Please contact your pharmacy regarding ANY request for medication refills.  ** [facility] Prescription Fax = [phone]  * Please allow 3 business days for routine medication refills.  * Please allow 5 business days for controlled substance medication refills.     [facility] Test Result notification information:  *You will be notified with in 7-10 days of your appointment day regarding the results of your test.  If you are on MyChart you will be notified as soon as the provider has reviewed the results and signed off on them.    [facility]: [phone]

## 2019-12-23 NOTE — NURSING NOTE
Chief Complaint   Patient presents with     Recheck Medication     pt here for follow up       Mariana Cartwright CMA, EMT at 2:36 PM on 12/23/2019.

## 2019-12-27 ENCOUNTER — ANCILLARY PROCEDURE (OUTPATIENT)
Dept: MRI IMAGING | Facility: CLINIC | Age: 64
End: 2019-12-27
Attending: INTERNAL MEDICINE
Payer: MEDICARE

## 2019-12-27 DIAGNOSIS — R93.5 ABNORMAL ABDOMINAL CT SCAN: ICD-10-CM

## 2019-12-27 RX ORDER — GADOBUTROL 604.72 MG/ML
7.5 INJECTION INTRAVENOUS ONCE
Status: COMPLETED | OUTPATIENT
Start: 2019-12-27 | End: 2019-12-27

## 2019-12-27 RX ADMIN — GADOBUTROL 7.5 ML: 604.72 INJECTION INTRAVENOUS at 15:54

## 2019-12-27 NOTE — LETTER
Patient:  Russ Agee  :   1955  MRN:     [id number]        Mr. Russ Agee  [address]  [facility] [address]        2019    Dear Mr. Agee,    Thank you for choosing the [facility] for your healthcare needs.  We appreciate the opportunity to serve you.    The following is a message from Dr. Haque regarding your recent test results.     Dear  Anuel;     Your abdominal MRI study looks fine. I recommend you keep your 1/10/2020 follow up appointment with me to go over all the results.     LUTHER Haque

## 2019-12-27 NOTE — DISCHARGE INSTRUCTIONS
MRI Contrast Discharge Instructions    The IV contrast you received today will pass out of your body in your  urine. This will happen in the next 24 hours. You will not feel this process.  Your urine will not change color.    Drink at least 4 extra glasses of water or juice today (unless your doctor  has restricted your fluids). This reduces the stress on your kidneys.  You may take your regular medicines.    If you are on dialysis: It is best to have dialysis today.    If you have a reaction: Most reactions happen right away. If you have  any new symptoms after leaving the hospital (such as hives or swelling),  call your hospital at the correct number below. Or call your family doctor.  If you have breathing distress or wheezing, call 911.    Special instructions: ***    I have read and understand the above information.    Signature:______________________________________ Date:___________    Staff:__________________________________________ Date:___________     Time:__________    [place] [facility] Departments:    ___[place]: [phone]  ___[facility]: [phone]  ___[place]: [phone] ___[facility]: [phone]  ___[facility]: [phone]  ___[facility]: [phone]  ___[phone]  ___[facility]: [phone]  ___[phone]

## 2020-01-10 ENCOUNTER — OFFICE VISIT (OUTPATIENT)
Dept: INTERNAL MEDICINE | Facility: CLINIC | Age: 65
End: 2020-01-10
Payer: MEDICARE

## 2020-01-10 VITALS
WEIGHT: 170.7 LBS | DIASTOLIC BLOOD PRESSURE: 70 MMHG | RESPIRATION RATE: 22 BRPM | BODY MASS INDEX: 23.81 KG/M2 | HEART RATE: 58 BPM | SYSTOLIC BLOOD PRESSURE: 118 MMHG

## 2020-01-10 DIAGNOSIS — R82.90 ABNORMAL URINE: Primary | ICD-10-CM

## 2020-01-10 DIAGNOSIS — R82.90 ABNORMAL URINE: ICD-10-CM

## 2020-01-10 LAB
ALBUMIN UR-MCNC: NEGATIVE MG/DL
APPEARANCE UR: CLEAR
BILIRUB UR QL STRIP: NEGATIVE
COLOR UR AUTO: YELLOW
GLUCOSE UR STRIP-MCNC: NEGATIVE MG/DL
HGB UR QL STRIP: NEGATIVE
HYALINE CASTS #/AREA URNS LPF: 1 /LPF (ref 0–2)
KETONES UR STRIP-MCNC: NEGATIVE MG/DL
LEUKOCYTE ESTERASE UR QL STRIP: NEGATIVE
MUCOUS THREADS #/AREA URNS LPF: PRESENT /LPF
NITRATE UR QL: NEGATIVE
PH UR STRIP: 5 PH (ref 5–7)
RBC #/AREA URNS AUTO: <1 /HPF (ref 0–2)
SOURCE: ABNORMAL
SP GR UR STRIP: 1.01 (ref 1–1.03)
UROBILINOGEN UR STRIP-MCNC: 0 MG/DL (ref 0–2)
WBC #/AREA URNS AUTO: 1 /HPF (ref 0–5)

## 2020-01-10 PROCEDURE — 87491 CHLMYD TRACH DNA AMP PROBE: CPT | Performed by: INTERNAL MEDICINE

## 2020-01-10 PROCEDURE — 87591 N.GONORRHOEAE DNA AMP PROB: CPT | Performed by: INTERNAL MEDICINE

## 2020-01-10 PROCEDURE — 87086 URINE CULTURE/COLONY COUNT: CPT

## 2020-01-10 PROCEDURE — 87109 MYCOPLASMA: CPT

## 2020-01-10 ASSESSMENT — PAIN SCALES - GENERAL: PAINLEVEL: NO PAIN (0)

## 2020-01-10 NOTE — LETTER
Patient:  Russ Agee  :   1955  MRN:     [id number]        Mr. Russ Agee  [address]  [facility] [address]        2020    Dear Mr. Agee,    Thank you for choosing the [facility] for your healthcare needs.  We appreciate the opportunity to serve you.    The following are your recent test results.     Results for orders placed or performed in visit on 01/10/20   UA with Microscopic reflex to Culture     Status: Abnormal   Result Value Ref Range    Color Urine Yellow     Appearance Urine Clear     Glucose Urine Negative NEG^Negative mg/dL    Bilirubin Urine Negative NEG^Negative    Ketones Urine Negative NEG^Negative mg/dL    Specific Gravity Urine 1.011 1.003 - 1.035    Blood Urine Negative NEG^Negative    pH Urine 5.0 5.0 - 7.0 pH    Protein Albumin Urine Negative NEG^Negative mg/dL    Urobilinogen mg/dL 0.0 0.0 - 2.0 mg/dL    Nitrite Urine Negative NEG^Negative    Leukocyte Esterase Urine Negative NEG^Negative    Source Unspecified Urine     WBC Urine 1 0 - 5 /HPF    RBC Urine <1 0 - 2 /HPF    Mucous Urine Present (A) NEG^Negative /LPF    Hyaline Casts 1 0 - 2 /LPF   Ureaplasma culture     Status: None (Preliminary result)   Result Value Ref Range    Specimen Description Unspecified Urine     Culture Micro Culture negative monitoring continues    Urine Culture     Status: None   Result Value Ref Range    Specimen Description Unspecified Urine     Special Requests Specimen received in preservative     Culture Micro No growth    Results for orders placed or performed in visit on 01/10/20   C. trachomatis PCR - Urine, Clinic Collect     Status: None   Result Value Ref Range    Specimen Description Unspecified Urine     Chlamydia Trachomatis PCR Negative NEG^Negative   N. gonorrhea PCR - Urine, Clinic Collect     Status: None   Result Value Ref Range    Specimen Descrip Unspecified Urine     N Gonorrhea PCR Negative NEG^Negative       Your  lab testing is negative.    Please contact us if you have any questions or concerns.  We look forward to serving your needs in the future.      Sincerely,    Donnie Haque MD
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
## 2020-01-10 NOTE — NURSING NOTE
Chief Complaint   Patient presents with     Recheck Medication     Patient is here for routine follow up       Roderick Livingston CMA ([facility]) at 1:01 PM on 1/10/2020

## 2020-01-11 LAB
BACTERIA SPEC CULT: NO GROWTH
Lab: NORMAL
SPECIMEN SOURCE: NORMAL

## 2020-01-12 LAB
C TRACH DNA SPEC QL NAA+PROBE: NEGATIVE
N GONORRHOEA DNA SPEC QL NAA+PROBE: NEGATIVE
SPECIMEN SOURCE: NORMAL
SPECIMEN SOURCE: NORMAL

## 2020-01-17 LAB
BACTERIA SPEC CULT: NORMAL
SPECIMEN SOURCE: NORMAL

## 2020-01-20 ENCOUNTER — MEDICAL CORRESPONDENCE (OUTPATIENT)
Dept: HEALTH INFORMATION MANAGEMENT | Facility: CLINIC | Age: 65
End: 2020-01-20

## 2020-01-27 ENCOUNTER — OFFICE VISIT (OUTPATIENT)
Dept: INTERNAL MEDICINE | Facility: CLINIC | Age: 65
End: 2020-01-27
Payer: MEDICARE

## 2020-01-27 VITALS
DIASTOLIC BLOOD PRESSURE: 75 MMHG | SYSTOLIC BLOOD PRESSURE: 115 MMHG | HEART RATE: 66 BPM | BODY MASS INDEX: 23.71 KG/M2 | WEIGHT: 170 LBS | OXYGEN SATURATION: 98 %

## 2020-01-27 DIAGNOSIS — Z01.89 LABORATORY TEST: Primary | ICD-10-CM

## 2020-01-27 ASSESSMENT — PATIENT HEALTH QUESTIONNAIRE - PHQ9
SUM OF ALL RESPONSES TO PHQ QUESTIONS 1-9: 1
10. IF YOU CHECKED OFF ANY PROBLEMS, HOW DIFFICULT HAVE THESE PROBLEMS MADE IT FOR YOU TO DO YOUR WORK, TAKE CARE OF THINGS AT HOME, OR GET ALONG WITH OTHER PEOPLE: NOT DIFFICULT AT ALL
SUM OF ALL RESPONSES TO PHQ QUESTIONS 1-9: 1

## 2020-01-27 ASSESSMENT — PAIN SCALES - GENERAL: PAINLEVEL: NO PAIN (0)

## 2020-01-27 NOTE — NURSING NOTE
Chief Complaint   Patient presents with     Recheck Medication     pt here for follow up       Mariana Cartwright CMA, EMT at 3:22 PM on 1/27/2020.

## 2020-01-27 NOTE — PATIENT INSTRUCTIONS
[facility] Medication Refill Request Information:  * Please contact your pharmacy regarding ANY request for medication refills.  ** [facility] Prescription Fax = [phone]  * Please allow 3 business days for routine medication refills.  * Please allow 5 business days for controlled substance medication refills.     [facility] Test Result notification information:  *You will be notified with in 7-10 days of your appointment day regarding the results of your test.  If you are on MyChart you will be notified as soon as the provider has reviewed the results and signed off on them.    [facility]: [phone]
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
## 2020-02-06 ENCOUNTER — ANCILLARY PROCEDURE (OUTPATIENT)
Dept: GENERAL RADIOLOGY | Facility: CLINIC | Age: 65
End: 2020-02-06
Attending: INTERNAL MEDICINE
Payer: MEDICARE

## 2020-02-06 ENCOUNTER — OFFICE VISIT (OUTPATIENT)
Dept: INTERNAL MEDICINE | Facility: CLINIC | Age: 65
End: 2020-02-06
Payer: MEDICARE

## 2020-02-06 VITALS
OXYGEN SATURATION: 96 % | DIASTOLIC BLOOD PRESSURE: 78 MMHG | BODY MASS INDEX: 23.71 KG/M2 | HEART RATE: 60 BPM | WEIGHT: 170 LBS | SYSTOLIC BLOOD PRESSURE: 135 MMHG

## 2020-02-06 DIAGNOSIS — R05.9 COUGH: Primary | ICD-10-CM

## 2020-02-06 RX ORDER — DOXYCYCLINE 100 MG/1
100 CAPSULE ORAL 2 TIMES DAILY
Qty: 10 CAPSULE | Refills: 0 | Status: SHIPPED | OUTPATIENT
Start: 2020-02-06 | End: 2020-06-23

## 2020-02-06 ASSESSMENT — PAIN SCALES - GENERAL: PAINLEVEL: NO PAIN (0)

## 2020-02-06 NOTE — LETTER
Patient:  Russ Agee  :   1955  MRN:     [id number]        Mr. Russ Agee  [address]  [place] [address]        2020    Dear Mr. Agee,    Thank you for choosing the [facility] for your healthcare needs.  We appreciate the opportunity to serve you.    The following are your recent test results.     Dear Mr. Agee;     Your chest X-ray does not show any acute findings     LUTHER Haque     Resulted Orders   XR Chest 2 Views    Narrative    EXAMINATION:  XR CHEST 2 VW 2020 3:07 PM.    COMPARISON: CT chest 2017.    HISTORY:  cough assess for pneumonia; Cough    FINDINGS: Upright PA and lateral radiographs of the chest. Trachea is  midline. Cardiomediastinal silhouette and pulmonary vasculature are  within normal limits. No pleural effusion or pneumothorax. No focal  pulmonary opacity. Lung volumes are high. Visualized upper abdomen and  bones are unremarkable.      Impression    IMPRESSION: No focal pulmonary opacity.    I have personally reviewed the examination and initial interpretation  and I agree with the findings.    WILLEM ROJAS MD         Please contact your provider if you have any questions or concerns.  We look forward to serving your needs in the future.      Sincerely,        [facility] Staff
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
## 2020-02-06 NOTE — NURSING NOTE
Chief Complaint   Patient presents with     Recheck Medication     pt here for follow up     Kimberly Nissen, EMT at 2:43 PM on 2/6/2020

## 2020-02-12 DIAGNOSIS — K21.9 ESOPHAGEAL REFLUX: ICD-10-CM

## 2020-02-13 RX ORDER — ESOMEPRAZOLE MAGNESIUM 40 MG/1
40 CAPSULE, DELAYED RELEASE ORAL
Qty: 90 CAPSULE | Refills: 3 | Status: SHIPPED | OUTPATIENT
Start: 2020-02-13 | End: 2020-06-23

## 2020-03-06 ENCOUNTER — ANCILLARY PROCEDURE (OUTPATIENT)
Dept: ULTRASOUND IMAGING | Facility: CLINIC | Age: 65
End: 2020-03-06
Attending: INTERNAL MEDICINE
Payer: MEDICARE

## 2020-03-06 DIAGNOSIS — B18.2 CHRONIC HEPATITIS C WITHOUT HEPATIC COMA (H): ICD-10-CM

## 2020-03-19 ENCOUNTER — MEDICAL CORRESPONDENCE (OUTPATIENT)
Dept: HEALTH INFORMATION MANAGEMENT | Facility: CLINIC | Age: 65
End: 2020-03-19

## 2020-03-20 ENCOUNTER — MEDICAL CORRESPONDENCE (OUTPATIENT)
Dept: HEALTH INFORMATION MANAGEMENT | Facility: CLINIC | Age: 65
End: 2020-03-20

## 2020-04-03 ENCOUNTER — PRE VISIT (OUTPATIENT)
Dept: UROLOGY | Facility: CLINIC | Age: 65
End: 2020-04-03

## 2020-04-03 DIAGNOSIS — C61 PROSTATE CANCER (H): Primary | ICD-10-CM

## 2020-04-03 NOTE — TELEPHONE ENCOUNTER
Chief Complaint : Follow up - 1 Year    Hx/Sx: H/o Prostate cancer (active surveillance)    Records/Orders: Available, PSA to be completed    Pt Contacted: Called, explained virtual visit. Patient agreed to change appt to virtual visit.    At Rooming: Virtual rooming    Osorio Rand, EMT

## 2020-04-09 DIAGNOSIS — C61 PROSTATE CANCER (H): ICD-10-CM

## 2020-04-09 LAB — PSA SERPL-MCNC: 4.01 UG/L (ref 0–4)

## 2020-04-13 ENCOUNTER — TELEPHONE (OUTPATIENT)
Dept: UROLOGY | Facility: CLINIC | Age: 65
End: 2020-04-13

## 2020-04-13 ENCOUNTER — VIRTUAL VISIT (OUTPATIENT)
Dept: UROLOGY | Facility: CLINIC | Age: 65
End: 2020-04-13
Payer: MEDICARE

## 2020-04-13 DIAGNOSIS — N40.1 BENIGN PROSTATIC HYPERPLASIA WITH NOCTURIA: Primary | ICD-10-CM

## 2020-04-13 DIAGNOSIS — R35.1 BENIGN PROSTATIC HYPERPLASIA WITH NOCTURIA: Primary | ICD-10-CM

## 2020-04-13 RX ORDER — CEFAZOLIN SODIUM 1 G/50ML
1 INJECTION, SOLUTION INTRAVENOUS SEE ADMIN INSTRUCTIONS
Status: CANCELLED | OUTPATIENT
Start: 2020-04-13

## 2020-04-13 RX ORDER — CEFAZOLIN SODIUM 2 G/50ML
2 SOLUTION INTRAVENOUS
Status: CANCELLED | OUTPATIENT
Start: 2020-04-13

## 2020-04-13 NOTE — PROGRESS NOTES
REASON FOR CALL TODAY:  Prostate cancer  and LUTS     HISTORY OF PRESENT ILLNESS:  Mr. Agee a 63-year-old gentleman from [place] [facility] who is followed in our clinic for history of prostate cancer.  The patient was originally diagnosed with prostate cancer in 2009 for which he is on active surveillance.  He recently underwent another surveillance biopsy on 4/8/19 that was negative for cancer.  He recently had another PSA drawn.  The patient also c/o nocturia 2-3 times/night and slow stream.  He is currently Myrtbetriq.  The patient also underwent Rezume therapy on 11/27/17.  He notes this greatly helped his symptoms initially, but that they have slowly worsened over time.     OBJECTIVE: PSA from 4/9/20 was 4.01 ng/mL    ASSESSMENT AND PLAN: Over half of today's 25-minute visit was spent counseling the patient regarding his prostate cancer.  PSA remains largely stable though perhaps rising very slowly.  Repeat PSA in 6 months.  In the meantime, the patient wishes to go forward with some sort of surgical intervention for his urination.  We discussed options and the patient would like to repeat Rezume.  We will make plans for this to happen sometime this summer.  He is in agreement with the plan.

## 2020-04-13 NOTE — TELEPHONE ENCOUNTER
Called patient and left a message to please call clinic to get checked in for today appointment.

## 2020-04-15 DIAGNOSIS — R39.9 LOWER URINARY TRACT SYMPTOMS (LUTS): ICD-10-CM

## 2020-04-16 RX ORDER — MIRABEGRON 25 MG/1
25 TABLET, FILM COATED, EXTENDED RELEASE ORAL DAILY
Qty: 90 TABLET | Refills: 3 | Status: SHIPPED | OUTPATIENT
Start: 2020-04-16 | End: 2023-06-15

## 2020-04-16 NOTE — TELEPHONE ENCOUNTER
Last Clinic Visit: 4/13/2020  [facility]

## 2020-05-18 ENCOUNTER — MEDICAL CORRESPONDENCE (OUTPATIENT)
Dept: HEALTH INFORMATION MANAGEMENT | Facility: CLINIC | Age: 65
End: 2020-05-18

## 2020-06-11 DIAGNOSIS — Z11.59 ENCOUNTER FOR SCREENING FOR OTHER VIRAL DISEASES: Primary | ICD-10-CM

## 2020-06-11 PROBLEM — N40.1 BENIGN PROSTATIC HYPERPLASIA WITH NOCTURIA: Status: ACTIVE | Noted: 2020-06-11

## 2020-06-11 PROBLEM — R35.1 BENIGN PROSTATIC HYPERPLASIA WITH NOCTURIA: Status: ACTIVE | Noted: 2020-06-11

## 2020-06-18 ENCOUNTER — TELEPHONE (OUTPATIENT)
Dept: UROLOGY | Facility: CLINIC | Age: 65
End: 2020-06-18

## 2020-06-18 NOTE — TELEPHONE ENCOUNTER
Patient is scheduled for surgery with Dr. Trotter      Spoke or left message with: Called to inform patient, there was no answer and mailbox was full    Date of Surgery: 6/26/2020    Location: [place] [facility]    Informed patient they will need an adult  yes    Pre-op with surgeon (if applicable): n/a    H&P: Scheduled with PAC on 6/23/2020    Additional imaging/appointments: n/a    Surgery packet: Mailed via [facility] on 6/18/2020     Additional comments: n/a

## 2020-06-19 ENCOUNTER — PATIENT OUTREACH (OUTPATIENT)
Dept: UROLOGY | Facility: CLINIC | Age: 65
End: 2020-06-19

## 2020-06-19 DIAGNOSIS — R35.1 BENIGN PROSTATIC HYPERPLASIA WITH NOCTURIA: Primary | ICD-10-CM

## 2020-06-19 DIAGNOSIS — N40.1 BENIGN PROSTATIC HYPERPLASIA WITH NOCTURIA: Primary | ICD-10-CM

## 2020-06-19 NOTE — TELEPHONE ENCOUNTER
Called and left message for patient. Calling to discuss upcoming procedure.  Scheduled to have H&P and UA.

## 2020-06-19 NOTE — TELEPHONE ENCOUNTER
FUTURE VISIT INFORMATION      SURGERY INFORMATION:    Date: 20    Location: [facility] or    Surgeon:  Gerardo Trotter MD     Anesthesia Type:  MAC    Procedure: CYSTOSCOPY and Rezume Procedure    Consult: virtual visit     RECORDS REQUESTED FROM:       Primary Care Provider: Donnie Haque MD- [facility]    Most recent EKG+ Tracin17    Most recent ECHO: 2/18/15    Most recent Cardiac Stress Test: 3/19/15    Most recent Coronary Angiogram: 2/18/15    Most recent PFT's: 2/20/15

## 2020-06-22 PROBLEM — M25.551 PAIN OF BOTH HIP JOINTS: Status: ACTIVE | Noted: 2020-02-21

## 2020-06-22 PROBLEM — M25.552 PAIN OF BOTH HIP JOINTS: Status: ACTIVE | Noted: 2020-02-21

## 2020-06-23 ENCOUNTER — PRE VISIT (OUTPATIENT)
Dept: SURGERY | Facility: CLINIC | Age: 65
End: 2020-06-23

## 2020-06-23 ENCOUNTER — OFFICE VISIT (OUTPATIENT)
Dept: SURGERY | Facility: CLINIC | Age: 65
End: 2020-06-23
Payer: COMMERCIAL

## 2020-06-23 ENCOUNTER — ANESTHESIA EVENT (OUTPATIENT)
Dept: SURGERY | Facility: CLINIC | Age: 65
End: 2020-06-23
Payer: MEDICARE

## 2020-06-23 VITALS
BODY MASS INDEX: 23.8 KG/M2 | HEART RATE: 60 BPM | DIASTOLIC BLOOD PRESSURE: 75 MMHG | TEMPERATURE: 98 F | SYSTOLIC BLOOD PRESSURE: 122 MMHG | HEIGHT: 71 IN | OXYGEN SATURATION: 99 % | RESPIRATION RATE: 14 BRPM | WEIGHT: 170 LBS

## 2020-06-23 DIAGNOSIS — Z01.818 PREOP EXAMINATION: ICD-10-CM

## 2020-06-23 DIAGNOSIS — R35.1 BENIGN PROSTATIC HYPERPLASIA WITH NOCTURIA: ICD-10-CM

## 2020-06-23 DIAGNOSIS — Z01.818 PREOP EXAMINATION: Primary | ICD-10-CM

## 2020-06-23 DIAGNOSIS — N40.1 BENIGN PROSTATIC HYPERPLASIA WITH NOCTURIA: ICD-10-CM

## 2020-06-23 LAB
ALBUMIN UR-MCNC: NEGATIVE MG/DL
ANION GAP SERPL CALCULATED.3IONS-SCNC: 7 MMOL/L (ref 3–14)
APPEARANCE UR: CLEAR
BILIRUB UR QL STRIP: NEGATIVE
BUN SERPL-MCNC: 7 MG/DL (ref 7–30)
CALCIUM SERPL-MCNC: 8.3 MG/DL (ref 8.5–10.1)
CHLORIDE SERPL-SCNC: 112 MMOL/L (ref 94–109)
CO2 SERPL-SCNC: 26 MMOL/L (ref 20–32)
COLOR UR AUTO: YELLOW
CREAT SERPL-MCNC: 0.92 MG/DL (ref 0.66–1.25)
ERYTHROCYTE [DISTWIDTH] IN BLOOD BY AUTOMATED COUNT: 14.1 % (ref 10–15)
GFR SERPL CREATININE-BSD FRML MDRD: 87 ML/MIN/{1.73_M2}
GLUCOSE SERPL-MCNC: 89 MG/DL (ref 70–99)
GLUCOSE UR STRIP-MCNC: NEGATIVE MG/DL
HCT VFR BLD AUTO: 36.3 % (ref 40–53)
HGB BLD-MCNC: 11.7 G/DL (ref 13.3–17.7)
HGB UR QL STRIP: NEGATIVE
KETONES UR STRIP-MCNC: NEGATIVE MG/DL
LEUKOCYTE ESTERASE UR QL STRIP: NEGATIVE
MCH RBC QN AUTO: 30.9 PG (ref 26.5–33)
MCHC RBC AUTO-ENTMCNC: 32.2 G/DL (ref 31.5–36.5)
MCV RBC AUTO: 96 FL (ref 78–100)
MUCOUS THREADS #/AREA URNS LPF: PRESENT /LPF
NITRATE UR QL: NEGATIVE
PH UR STRIP: 5 PH (ref 5–7)
PLATELET # BLD AUTO: 179 10E9/L (ref 150–450)
POTASSIUM SERPL-SCNC: 3.6 MMOL/L (ref 3.4–5.3)
RBC # BLD AUTO: 3.79 10E12/L (ref 4.4–5.9)
RBC #/AREA URNS AUTO: <1 /HPF (ref 0–2)
SODIUM SERPL-SCNC: 144 MMOL/L (ref 133–144)
SOURCE: ABNORMAL
SP GR UR STRIP: 1.01 (ref 1–1.03)
SQUAMOUS #/AREA URNS AUTO: <1 /HPF (ref 0–1)
UROBILINOGEN UR STRIP-MCNC: 0 MG/DL (ref 0–2)
WBC # BLD AUTO: 5.1 10E9/L (ref 4–11)
WBC #/AREA URNS AUTO: <1 /HPF (ref 0–5)

## 2020-06-23 ASSESSMENT — COPD QUESTIONNAIRES: COPD: 0

## 2020-06-23 ASSESSMENT — MIFFLIN-ST. JEOR: SCORE: 1583.24

## 2020-06-23 ASSESSMENT — LIFESTYLE VARIABLES: TOBACCO_USE: 1

## 2020-06-23 ASSESSMENT — PAIN SCALES - GENERAL: PAINLEVEL: NO PAIN (0)

## 2020-06-23 NOTE — ANESTHESIA PREPROCEDURE EVALUATION
"Anesthesia Pre-Procedure Evaluation    Patient: Russ Agee   MRN:     [id number] Gender:   male   Age:    64 year old :      1955        Preoperative Diagnosis: Benign prostatic hyperplasia with nocturia [N40.1, R35.1]   Procedure(s):  CYSTOSCOPY and REZUM Procedure     LABS:  CBC:   Lab Results   Component Value Date    WBC 5.5 2019    WBC 4.9 2019    HGB 12.1 (L) 2019    HGB 13.1 (L) 2019    HCT 37.3 (L) 2019    HCT 41.3 2019     2019     2019     BMP:   Lab Results   Component Value Date     2019     2019    POTASSIUM 3.7 2019    POTASSIUM 4.3 2019    CHLORIDE 110 (H) 2019    CHLORIDE 106 2019    CO2 28 2019    CO2 28 2019    BUN 13 2019    BUN 13 2019    CR 0.98 2019    CR 0.97 2019     (H) 2019    GLC 83 2019     COAGS:   Lab Results   Component Value Date    PTT 30 2008    INR 1.14 2017     POC: No results found for: BGM, HCG, HCGS  OTHER:   Lab Results   Component Value Date    A1C 5.0 2012    JUNE 8.6 2019    ALBUMIN 3.3 (L) 2019    PROTTOTAL 6.9 2019    ALT 18 2019    AST 12 2019    ALKPHOS 81 2019    BILITOTAL 0.3 2019    LIPASE 80 10/20/2016    AMYLASE 33 10/20/2016    TSH 0.70 2017    T4 1.25 10/05/2005    CRP 7.4 2017    SED 18 2017        Preop Vitals    BP Readings from Last 3 Encounters:   20 135/78   20 115/75   01/10/20 118/70    Pulse Readings from Last 3 Encounters:   20 60   20 66   01/10/20 58      Resp Readings from Last 3 Encounters:   01/10/20 22   06/10/19 16   19 20    SpO2 Readings from Last 3 Encounters:   20 96%   20 98%   19 98%      Temp Readings from Last 1 Encounters:   17 98  F (36.7  C) (Oral)    Ht Readings from Last 1 Encounters:   19 1.803 m (5' 11\")      Wt Readings from " "Last 1 Encounters:   02/06/20 77.1 kg (170 lb)    Estimated body mass index is 23.71 kg/m  as calculated from the following:    Height as of 12/20/19: 1.803 m (5' 11\").    Weight as of 2/6/20: 77.1 kg (170 lb).     LDA:  Urethral Catheter Non-latex 16 fr (Active)   Number of days: 939        Past Medical History:   Diagnosis Date     Anxiety      Chronic hepatitis C (H)     treated.  Undetectable     Depressive disorder     followed by Dr. Mccormick     ETOH abuse      GERD (gastroesophageal reflux disease)      Malignant neoplasm of prostate (H)      Mucous cyst of joint      Palpitations       Past Surgical History:   Procedure Laterality Date     CYSTOSCOPY N/A 11/27/2017    Procedure: CYSTOSCOPY;  Cystoscopy, Rezum Procedure;  Surgeon: Gerardo Trotter MD;  Location:  OR     ENDOSCOPIC ULTRASOUND, ESOPHAGOSCOPY, GASTROSCOPY, DUODENOSCOPY (EGD), COMBINED  12/16/15     ESOPHAGOSCOPY, GASTROSCOPY, DUODENOSCOPY (EGD), COMBINED N/A 4/9/2015    Procedure: COMBINED ESOPHAGOSCOPY, GASTROSCOPY, DUODENOSCOPY (EGD);  Surgeon: Raina Romo MD;  Location:  GI     EXCISE MASS FINGER  2/19/2013    Procedure: EXCISE MASS FINGER;  Mass Excision Left Index Finger     (local anesthesia);  Surgeon: Raina Dorsey MD;  Location:  OR      UGI ENDOSCOPY W EUS N/A 12/16/2015    Procedure: COMBINED ENDOSCOPIC ULTRASOUND, ESOPHAGOSCOPY, GASTROSCOPY, DUODENOSCOPY (EGD);  Surgeon: Brady Acuna MD;  Location:  GI     PROSTATE SURGERY       TRANSURETHERAL DESTRUCTION OF PROSTATE BY THERMOTHERAPY N/A 11/27/2017    Procedure: TRANSURETHERAL DESTRUCTION OF PROSTATE BY THERMOTHERAPY;;  Surgeon: Gerardo Trotter MD;  [facility]      Allergies   Allergen Reactions     Nkda [No Known Drug Allergies]         Anesthesia Evaluation     . Pt has had prior anesthetic. Type: General    No history of anesthetic complications          ROS/MED HX    ENT/Pulmonary:     (+)tobacco use, Past use , . " .   (-) asthma, COPD, ANNAMARIA risk factors and recent URI   Neurologic:  - neg neurologic ROS     Cardiovascular:  - neg cardiovascular ROS   (+) ----. : . . . :. . Previous cardiac testing Echodate:2015results:Interpretation Summary  Global and regional left ventricular function is normal with an EF of 60-65%.   Right ventricular function, chamber size, wall motion, and thickness are   normal. Pulmonary artery systolic pressure is normal. The inferior vena cava    is normal. No pericardial effusion is present.Stress Testdate:2014 results:IMPRESSION  Impression:  1. Normal myocardial SPECT study with a summed stress score of 0.    2. No evidence of ischemia or scar.   3. Normal wall motion.  Left ventricular ejection fraction is 74%.   date: results: date: results:          METS/Exercise Tolerance:  >4 METS   Hematologic:  - neg hematologic  ROS       Musculoskeletal:  - neg musculoskeletal ROS       GI/Hepatic:     (+) hepatitis type C,      (-) GERD   Renal/Genitourinary:     (+) BPH, Other Renal/ Genitourinary, nocturia, urinary hesitancy, weak stream      Endo:  - neg endo ROS       Psychiatric:     (+) psychiatric history anxiety and depression (alcohol dependence in remission since 2017)      Infectious Disease:  - neg infectious disease ROS      (-) Recent Fever   Malignancy:   (+) Malignancy History of Prostate  Prostate CA Active status post,         Other:    (+) no H/O Chronic Pain,                       PHYSICAL EXAM:   Mental Status/Neuro: A/A/O   Airway: Facies: Feasible  Mallampati: I  Mouth/Opening: Full  TM distance: > 6 cm  Neck ROM: Full   Respiratory: Auscultation: CTAB     Resp. Rate: Normal     Resp. Effort: Normal      CV: Rhythm: Regular  Rate: Age appropriate  Heart: Normal Sounds  Edema: None   Comments:      Dental: Normal Dentition                Assessment:   ASA SCORE: 2    H&P: History and physical reviewed and following examination; no interval change.    NPO Status: NPO Appropriate      Plan:   Anes. Type:  MAC   Pre-Medication: None   Induction:  IV (Standard)   Airway: Native Airway   Access/Monitoring: PIV   Maintenance: Propofol Sedation     Postop Plan:   Postop Pain: None  Postop Sedation/Airway: Not planned  Disposition: Outpatient     PONV Management:    Prevention: Ondansetron, Propofol     CONSENT: Direct conversation   Plan and risks discussed with: Patient   Blood Products: Consented (ALL Blood Products)                PAC Discussion and Assessment    ASA Classification: 2  Case is suitable for: [place]  Anesthetic techniques and relevant risks discussed: MAC with GA as backup  Invasive monitoring and risk discussed:   Types:   Possibility and Risk of blood transfusion discussed:   NPO instructions given:   Additional anesthetic preparation and risks discussed:   Needs early admission to pre-op area:   Other:     PAC Resident/NP Anesthesia Assessment:  Russ Agee is a 64 year old male scheduled for a CYSTOSCOPY and REZUM Procedure on 6/26/20 by Dr. Trotter in treatment of BPH with nocturia.  PAC referral for risk assessment and optimization for anesthesia with comorbid conditions of: history of smoking, GERD, history of hepatitis C, depression, alcohol dependence and anxiety.      Pre-operative considerations:  1.  Cardiac:  Functional status- METS >4.  He walks 35 minutes regularly for exercise.  He had a negative stress test in 2014, an echocardiogram in 2015 that showed an EF of 60-65% and no wall motion abnormalities and a CT coronary in 2015 that gave a calcium score of 0.  Low risk surgery with 0.4% risk of major adverse cardiac event.   2.  Pulm:  Airway feasible.  ANNAMARIA risk: low.  He quit smoking in 2017.   3.  GI:  Risk of PONV score = 2.  If > 2, anti-emetic intervention recommended.  He used to be on nexium for GERD, but reports he stopped taking it and no longer has GERD symptoms.  He was treated for hepatitis C in 2017.    4. Psych:  Alcohol dependence in remission since  2017.      VTE risk: 1.8%    Patient is optimized and is acceptable candidate for the proposed procedure.  No further diagnostic evaluation is needed.     **For further details of assessment, testing, and physical exam please see H and P completed on same date.          Raquel Maki DNP, RN, APRN      Reviewed and Signed by PAC Mid-Level Provider/Resident  Mid-Level Provider/Resident: Raquel Maki DNP, RN, APRN  Date: 6/23/20  Time: 1545    Attending Anesthesiologist Anesthesia Assessment:        Anesthesiologist:   Date:   Time:   Pass/Fail:   Disposition:     PAC Pharmacist Assessment:        Pharmacist:   Date:   Time:    Raquel Maki, APRN CNP

## 2020-06-23 NOTE — PATIENT INSTRUCTIONS
Preparing for Your Surgery      Name:  Russ Agee   MRN:  [id number]   :  1955   Today's Date:  2020     Arriving for surgery:  Surgery date:  2020  Arrival time:  9:00 am    Restrictions due to COVID 19:  Only 1 visitor per patient in the pre-op period  All visitors must wear a mask  No visitors under 18  No ill visitors   parking is not available       Please come to:     [facility] [address]  [address] [place] [address]. [place]  [address]    -Proceed to the 3rd floor, check in at the Adult Surgery Waiting Lounge. [phone]    If an escort is needed stop at the Information Desk in the lobby.        What can I eat or drink?  -  You may have solid food or milk products until 8 hours prior to your surgery.(3 am)  -  You may have water, apple juice or 7up/Sprite until 2 hours prior to your surgery.(until 9 am arrival time)    Which medicines can I take?         Hold Aspirin, vitamins and supplements one week prior to surgery.       Hold Ibuprofen for 24 hours and/or Naproxen for 48 hours prior to surgery.        Hold Viagra (sildenafil) for 24 hours before surgery     -  Do NOT take these medications in the morning, the day of surgery:  Fiber supplement   Psyllium (metamucil)      -  Please take these medications the day of surgery:  Mirabegron (myrbetriq)      How do I prepare myself?  -  Take two showers: one the night before surgery; and one the morning of surgery.         Use Scrubcare or Hibiclens to wash from neck down, leave soap on your skin for up to one minute.  Do not get soap in your eyes or ears.  You may use your own shampoo and conditioner; no other hair products.   -  Do NOT use lotion, powder, deodorant, or antiperspirant the day of your surgery.  -  Do NOT wear any makeup, fingernail polish or jewelry.  - Do not bring your own medications to the hospital, except for inhalers and eye   drops.  -  Bring your ID and insurance card.      -If you are  scheduled to go home the Same Day as surgery you must have a responsible adult as a  and to stay with you overnight the first 24 hours after surgery.     Questions or Concerns:    -Please contact the Pre Admission Nursing Office at [phone].         -If you have health changes between today and your surgery please call your surgeon.       For questions after surgery please call your surgeons office.

## 2020-06-23 NOTE — H&P
Pre-Operative H & P     CC:  Preoperative exam to assess for increased cardiopulmonary risk while undergoing surgery and anesthesia.    Date of Encounter: 6/23/2020  Primary Care Physician:  Donnie Haque   Associated diagnosis: BPH with nocturia    HPI  Russ Agee is a 64 year old male who presents for pre-operative H & P in preparation for a CYSTOSCOPY and REZUM Procedure on 6/26/20 by Dr. Trotter at [facility].     Russ Agee is a 64 year old male with history of smoking, GERD, history of hepatitis C, depression, alcohol dependence and anxiety that has BPH with nocturia in the setting of a history of prostate cancer.  He was originally diagnosed with prostate cancer in 2009.  He is under active surveillance with the urology department for that.  He had a surveillance biopsy on 4/8/2019 that was negative for cancer.  His most recent PSA was 4.01.  He followed up with Dr. Trotter recently with complaints of slow stream and nocturia.  The above listed surgery has been recommended for management.      History is obtained from the patient and the medical record.     Past Medical History  Past Medical History:   Diagnosis Date     Anxiety      Chronic hepatitis C (H)     treated.  Undetectable     Depressive disorder     followed by Dr. Mccormick     ETOH abuse      GERD (gastroesophageal reflux disease)      Malignant neoplasm of prostate (H)      Mucous cyst of joint      Palpitations        Past Surgical History  Past Surgical History:   Procedure Laterality Date     CYSTOSCOPY N/A 11/27/2017    Procedure: CYSTOSCOPY;  Cystoscopy, Rezum Procedure;  Surgeon: Gerardo Trotter MD;  Location:  OR     ENDOSCOPIC ULTRASOUND, ESOPHAGOSCOPY, GASTROSCOPY, DUODENOSCOPY (EGD), COMBINED  12/16/15     ESOPHAGOSCOPY, GASTROSCOPY, DUODENOSCOPY (EGD), COMBINED N/A 4/9/2015    Procedure: COMBINED ESOPHAGOSCOPY, GASTROSCOPY, DUODENOSCOPY (EGD);  Surgeon: Raina Romo  MD Valeriy;  Location:  GI     EXCISE MASS FINGER  2/19/2013    Procedure: EXCISE MASS FINGER;  Mass Excision Left Index Finger     (local anesthesia);  Surgeon: Raina Dorsey MD;  [facility]      UGI ENDOSCOPY W EUS N/A 12/16/2015    Procedure: COMBINED ENDOSCOPIC ULTRASOUND, ESOPHAGOSCOPY, GASTROSCOPY, DUODENOSCOPY (EGD);  Surgeon: Brady Acuna MD;  Location:  GI     PROSTATE SURGERY       TRANSURETHERAL DESTRUCTION OF PROSTATE BY THERMOTHERAPY N/A 11/27/2017    Procedure: TRANSURETHERAL DESTRUCTION OF PROSTATE BY THERMOTHERAPY;;  Surgeon: Gerardo Trotter MD;  [facility]       Hx of Blood transfusions/reactions: none    Hx of abnormal bleeding or anti-platelet use: none      Steroid use in the last year: none    Personal or FH with difficulty with Anesthesia:  none    Prior to Admission Medications  Current Outpatient Medications   Medication Sig Dispense Refill     calcium polycarbophil (RA FIBER-CAP) 625 MG tablet Take 3 tablets by mouth twice daily       mirabegron (MYRBETRIQ) 25 MG 24 hr tablet Take 1 tablet (25 mg) by mouth daily (Patient taking differently: Take 25 mg by mouth every morning ) 90 tablet 3     Mirtazapine (REMERON PO) Take 15 mg by mouth At Bedtime       Psyllium (METAMUCIL PO) Take 3 tsp by mouth 2 times daily.       RisperiDONE (RISPERDAL PO) Take 3 mg by mouth At Bedtime       traZODone (DESYREL) 50 MG tablet Take 50 mg by mouth At Bedtime        desonide (DESOWEN) 0.05 % ointment To affected areas of the face for itch and red bumps twice a day as needed. 60 g 11     gentamicin 0.008% in 1000ml 0.9% NaCl (GARAMYCIN) SOLN nasal irrigation Spray 30 mLs in nostril 2 times daily Irrigate 30 mL between each nostril BID for a total of 60ml/day PLEASE MAIL TO PATIENT 1000 mL 3     ketoconazole (NIZORAL) 2 % shampoo To entire wet scalp and face in affected areas and then wash off after several minutes three times a week. 240 mL 11     lidocaine (XYLOCAINE) 2  % jelly Apply 1 Tube topically       order for DME Equipment being ordered: Compression stockings (pair) 2 Device 0     sildenafil (REVATIO/VIAGRA) 20 MG tablet Take 1 to 5 tabs as needed for sexual activity. 90 tablet 11     Urea 20 % CREA To affected, ridged nails daily. 60 g 5       Allergies  Allergies   Allergen Reactions     Nkda [No Known Drug Allergies]        Social History  Social History     Socioeconomic History     Marital status:      Spouse name: Not on file     Number of children: 10     Years of education: Not on file     Highest education level: Not on file   Occupational History     Occupation: retired   Social Needs     Financial resource strain: Not on file     Food insecurity     Worry: Not on file     Inability: Not on file     Transportation needs     Medical: Not on file     Non-medical: Not on file   Tobacco Use     Smoking status: Former Smoker     Packs/day: 0.50     Years: 6.00     Pack years: 3.00     Types: Cigarettes     Last attempt to quit: 2017     Years since quittin.4     Smokeless tobacco: Never Used   Substance and Sexual Activity     Alcohol use: No     Alcohol/week: 0.0 standard drinks     Comment:       Drug use: No     Sexual activity: Not on file   Lifestyle     Physical activity     Days per week: Not on file     Minutes per session: Not on file     Stress: Not on file   Relationships     Social connections     Talks on phone: Not on file     Gets together: Not on file     Attends Rastafari service: Not on file     Active member of club or organization: Not on file     Attends meetings of clubs or organizations: Not on file     Relationship status: Not on file     Intimate partner violence     Fear of current or ex partner: Not on file     Emotionally abused: Not on file     Physically abused: Not on file     Forced sexual activity: Not on file   Other Topics Concern     Parent/sibling w/ CABG, MI or angioplasty before 65F 55M? Not Asked   Social History  Narrative    Single.  Italian American.     Retired.    10 children - healthy    9 siblings - 2 alive.  ? History.     No family history of bleeding, clotting disorders or complications with anesthesia.           Family History  Family History   Problem Relation Age of Onset     Diabetes Sister      Diabetes Son      Alcohol/Drug Other      Arthritis Other      Circulatory Other      Eye Disorder Other      Depression Other      Genetic Disorder Other      Psychotic Disorder Other      Unknown/Adopted Mother      Unknown/Adopted Father      No Known Problems Sister      Cancer No family hx of         no skin cancer     Skin Cancer No family hx of      Thyroid Disease No family hx of      Thyroid Cancer No family hx of              ROS/MED HX  The complete review of systems is negative other than noted in the HPI or here.   ENT/Pulmonary:     (+)tobacco use, Past use , . .   (-) asthma, COPD, ANNAMARIA risk factors and recent URI   Neurologic:  - neg neurologic ROS     Cardiovascular:  - neg cardiovascular ROS     METS/Exercise Tolerance:  >4 METS   Hematologic:  - neg hematologic  ROS       Musculoskeletal:  - neg musculoskeletal ROS       GI/Hepatic:     (+) hepatitis type C,      (-) GERD   Renal/Genitourinary:     (+) BPH, Other Renal/ Genitourinary, nocturia, urinary hesitancy, weak stream      Endo:  - neg endo ROS       Psychiatric:     (+) psychiatric history anxiety and depression (alcohol dependence in remission since 2017)      Infectious Disease:  - neg infectious disease ROS      (-) Recent Fever   Malignancy:   (+) Malignancy History of Prostate  Prostate CA Active status post,         Other:    (+) no H/O Chronic Pain,                       PHYSICAL EXAM:   Mental Status/Neuro: A/A/O   Airway: Facies: Feasible  Mallampati: I  Mouth/Opening: Full  TM distance: > 6 cm  Neck ROM: Full   Respiratory: Auscultation: CTAB     Resp. Rate: Normal     Resp. Effort: Normal      CV: Rhythm: Regular  Rate: Age  "appropriate  Heart: Normal Sounds  Edema: None   Comments:      Dental: Normal Dentition                Temp: 98  F (36.7  C) Temp src: Oral BP: 122/75 Pulse: 60   Resp: 14 SpO2: 99 %         170 lbs 0 oz  5' 11\"   Body mass index is 23.71 kg/m .       Physical Exam  Constitutional: Awake, alert, cooperative, no apparent distress, and appears stated age.  Eyes: Pupils equal, round and reactive to light, extra ocular muscles intact, sclera clear, conjunctiva normal.  HENT: Normocephalic, oral pharynx with moist mucus membranes, good dentition. No goiter appreciated.   Respiratory: Clear to auscultation bilaterally, no crackles or wheezing.  Cardiovascular: Regular rate and rhythm, normal S1 and S2, and no murmur noted.  Carotids +2, no bruits. No edema. Palpable pulses to radial  DP and PT arteries.   GI: Normal bowel sounds, soft, non-distended, non-tender, no masses palpated, no hepatosplenomegaly.    Lymph/Hematologic: No cervical lymphadenopathy and no supraclavicular lymphadenopathy.  Genitourinary:  deferred  Skin: Warm and dry.   Musculoskeletal: Full ROM of neck. There is no redness, warmth, or swelling of the exposed joints. Gross motor strength is normal.    Neurologic: Awake, alert, oriented to name, place and time. Cranial nerves II-XII are grossly intact. Gait is normal.   Neuropsychiatric: Calm, cooperative. Normal affect.     Labs: (personally reviewed)   Component      Latest Ref Rng & Units 6/23/2020   Sodium      133 - 144 mmol/L 144   Potassium      3.4 - 5.3 mmol/L 3.6   Chloride      94 - 109 mmol/L 112 (H)   Carbon Dioxide      20 - 32 mmol/L 26   Anion Gap      3 - 14 mmol/L 7   Glucose      70 - 99 mg/dL 89   Urea Nitrogen      7 - 30 mg/dL 7   Creatinine      0.66 - 1.25 mg/dL 0.92   GFR Estimate      >60 mL/min/1.73:m2 87   GFR Estimate If Black      >60 mL/min/1.73:m2 >90   Calcium      8.5 - 10.1 mg/dL 8.3 (L)   WBC      4.0 - 11.0 10e9/L 5.1   RBC Count      4.4 - 5.9 10e12/L 3.79 (L) "   Hemoglobin      13.3 - 17.7 g/dL 11.7 (L)   Hematocrit      40.0 - 53.0 % 36.3 (L)   MCV      78 - 100 fl 96   MCH      26.5 - 33.0 pg 30.9   MCHC      31.5 - 36.5 g/dL 32.2   RDW      10.0 - 15.0 % 14.1   Platelet Count      150 - 450 10e9/L 179         EK  Sinus bradycardia      CT coronary     FINDINGS:      CORONARY CALCIUM SCORE: The total Agatston calcium score is 0, Left  main: 0, left anterior descendin,  circumflex: 0, right coronary  artery: 0. This places the patient in the     lowest percentile when  compared to age and gender matched control group.     CORONARY ANGIOGRAPHY : Trivial proximal left anterior descending  artery disease. Normal circumflex and right coronary arteries.    Echocardiogram    Interpretation Summary  Global and regional left ventricular function is normal with an EF of 60-65%.   Right ventricular function, chamber size, wall motion, and thickness are   normal. Pulmonary artery systolic pressure is normal. The inferior vena cava    is normal. No pericardial effusion is present.    Stress test    IMPRESSION  Impression:  1. Normal myocardial SPECT study with a summed stress score of 0.    2. No evidence of ischemia or scar.   3. Normal wall motion.  Left ventricular ejection fraction is 74%.        ASSESSMENT and PLAN  Russ Agee is a 64 year old male scheduled for a CYSTOSCOPY and REZUM Procedure on 20 by Dr. Trotter in treatment of BPH with nocturia.  PAC referral for risk assessment and optimization for anesthesia with comorbid conditions of: history of smoking, GERD, history of hepatitis C, depression, alcohol dependence and anxiety.      Pre-operative considerations:  1.  Cardiac:  Functional status- METS >4.  He walks 35 minutes regularly for exercise.  He had a negative stress test in , an echocardiogram in  that showed an EF of 60-65% and no wall motion abnormalities and a CT coronary in  that gave a calcium score of 0.  Low risk  surgery with 0.4% risk of major adverse cardiac event.   2.  Pulm:  Airway feasible.  ANNAMARIA risk: low.  He quit smoking in 2017.   3.  GI:  Risk of PONV score = 2.  If > 2, anti-emetic intervention recommended.  He used to be on nexium for GERD, but reports he stopped taking it and no longer has GERD symptoms.  He was treated for hepatitis C in 2017.    4. Psych:  Alcohol dependence in remission since 2017.      VTE risk: 1.8%    Patient is optimized and is acceptable candidate for the proposed procedure.  No further diagnostic evaluation is needed.       Raquel Maki DNP, RN, APRN  Preoperative Assessment Center  [facility]  [facility]  Phone: [phone]  Fax: [phone]

## 2020-06-24 DIAGNOSIS — Z11.59 ENCOUNTER FOR SCREENING FOR OTHER VIRAL DISEASES: ICD-10-CM

## 2020-06-24 PROCEDURE — U0003 INFECTIOUS AGENT DETECTION BY NUCLEIC ACID (DNA OR RNA); SEVERE ACUTE RESPIRATORY SYNDROME CORONAVIRUS 2 (SARS-COV-2) (CORONAVIRUS DISEASE [COVID-19]), AMPLIFIED PROBE TECHNIQUE, MAKING USE OF HIGH THROUGHPUT TECHNOLOGIES AS DESCRIBED BY CMS-2020-01-R: HCPCS | Performed by: UROLOGY

## 2020-06-25 LAB
SARS-COV-2 RNA SPEC QL NAA+PROBE: NOT DETECTED
SPECIMEN SOURCE: NORMAL

## 2020-06-26 ENCOUNTER — HOSPITAL ENCOUNTER (OUTPATIENT)
Facility: CLINIC | Age: 65
Discharge: HOME | End: 2020-06-26
Attending: UROLOGY | Admitting: UROLOGY
Payer: MEDICARE

## 2020-06-26 ENCOUNTER — ANESTHESIA (OUTPATIENT)
Dept: SURGERY | Facility: CLINIC | Age: 65
End: 2020-06-26
Payer: MEDICARE

## 2020-06-26 ENCOUNTER — TELEPHONE (OUTPATIENT)
Dept: UROLOGY | Facility: CLINIC | Age: 65
End: 2020-06-26

## 2020-06-26 VITALS
OXYGEN SATURATION: 99 % | DIASTOLIC BLOOD PRESSURE: 75 MMHG | HEART RATE: 66 BPM | TEMPERATURE: 98.2 F | SYSTOLIC BLOOD PRESSURE: 133 MMHG | BODY MASS INDEX: 23.64 KG/M2 | WEIGHT: 168.87 LBS | RESPIRATION RATE: 17 BRPM | HEIGHT: 71 IN

## 2020-06-26 DIAGNOSIS — R35.1 BENIGN PROSTATIC HYPERPLASIA WITH NOCTURIA: ICD-10-CM

## 2020-06-26 DIAGNOSIS — N40.1 BENIGN PROSTATIC HYPERPLASIA WITH NOCTURIA: ICD-10-CM

## 2020-06-26 LAB — GLUCOSE BLDC GLUCOMTR-MCNC: 118 MG/DL (ref 70–99)

## 2020-06-26 PROCEDURE — 25000128 H RX IP 250 OP 636: Performed by: NURSE ANESTHETIST, CERTIFIED REGISTERED

## 2020-06-26 PROCEDURE — 82962 GLUCOSE BLOOD TEST: CPT

## 2020-06-26 PROCEDURE — 25000128 H RX IP 250 OP 636: Performed by: UROLOGY

## 2020-06-26 PROCEDURE — 37000009 ZZH ANESTHESIA TECHNICAL FEE, EACH ADDTL 15 MIN: Performed by: UROLOGY

## 2020-06-26 PROCEDURE — 36000076 ZZH SURGERY LEVEL 6 EA 15 ADDTL MIN - UMMC: Performed by: UROLOGY

## 2020-06-26 PROCEDURE — 36000074 ZZH SURGERY LEVEL 6 1ST 30 MIN - UMMC: Performed by: UROLOGY

## 2020-06-26 PROCEDURE — 40000170 ZZH STATISTIC PRE-PROCEDURE ASSESSMENT II: Performed by: UROLOGY

## 2020-06-26 PROCEDURE — 27210794 ZZH OR GENERAL SUPPLY STERILE: Performed by: UROLOGY

## 2020-06-26 PROCEDURE — 25800030 ZZH RX IP 258 OP 636: Performed by: NURSE ANESTHETIST, CERTIFIED REGISTERED

## 2020-06-26 PROCEDURE — 37000008 ZZH ANESTHESIA TECHNICAL FEE, 1ST 30 MIN: Performed by: UROLOGY

## 2020-06-26 PROCEDURE — 71000027 ZZH RECOVERY PHASE 2 EACH 15 MINS: Performed by: UROLOGY

## 2020-06-26 PROCEDURE — 25000125 ZZHC RX 250: Performed by: NURSE ANESTHETIST, CERTIFIED REGISTERED

## 2020-06-26 RX ORDER — OXYCODONE HYDROCHLORIDE 5 MG/1
5 TABLET ORAL EVERY 4 HOURS PRN
Status: DISCONTINUED | OUTPATIENT
Start: 2020-06-26 | End: 2020-06-26 | Stop reason: HOSPADM

## 2020-06-26 RX ORDER — ONDANSETRON 4 MG/1
4 TABLET, ORALLY DISINTEGRATING ORAL EVERY 30 MIN PRN
Status: DISCONTINUED | OUTPATIENT
Start: 2020-06-26 | End: 2020-06-26 | Stop reason: HOSPADM

## 2020-06-26 RX ORDER — CEFAZOLIN SODIUM 1 G/3ML
1 INJECTION, POWDER, FOR SOLUTION INTRAMUSCULAR; INTRAVENOUS SEE ADMIN INSTRUCTIONS
Status: DISCONTINUED | OUTPATIENT
Start: 2020-06-26 | End: 2020-06-26 | Stop reason: HOSPADM

## 2020-06-26 RX ORDER — SODIUM CHLORIDE, SODIUM LACTATE, POTASSIUM CHLORIDE, CALCIUM CHLORIDE 600; 310; 30; 20 MG/100ML; MG/100ML; MG/100ML; MG/100ML
INJECTION, SOLUTION INTRAVENOUS CONTINUOUS
Status: DISCONTINUED | OUTPATIENT
Start: 2020-06-26 | End: 2020-06-26 | Stop reason: HOSPADM

## 2020-06-26 RX ORDER — NALOXONE HYDROCHLORIDE 0.4 MG/ML
.1-.4 INJECTION, SOLUTION INTRAMUSCULAR; INTRAVENOUS; SUBCUTANEOUS
Status: DISCONTINUED | OUTPATIENT
Start: 2020-06-26 | End: 2020-06-26 | Stop reason: HOSPADM

## 2020-06-26 RX ORDER — SODIUM CHLORIDE, SODIUM LACTATE, POTASSIUM CHLORIDE, CALCIUM CHLORIDE 600; 310; 30; 20 MG/100ML; MG/100ML; MG/100ML; MG/100ML
INJECTION, SOLUTION INTRAVENOUS CONTINUOUS PRN
Status: DISCONTINUED | OUTPATIENT
Start: 2020-06-26 | End: 2020-06-26

## 2020-06-26 RX ORDER — CEFAZOLIN SODIUM 2 G/100ML
2 INJECTION, SOLUTION INTRAVENOUS
Status: COMPLETED | OUTPATIENT
Start: 2020-06-26 | End: 2020-06-26

## 2020-06-26 RX ORDER — MEPERIDINE HYDROCHLORIDE 25 MG/ML
12.5 INJECTION INTRAMUSCULAR; INTRAVENOUS; SUBCUTANEOUS
Status: DISCONTINUED | OUTPATIENT
Start: 2020-06-26 | End: 2020-06-26 | Stop reason: HOSPADM

## 2020-06-26 RX ORDER — ONDANSETRON 2 MG/ML
INJECTION INTRAMUSCULAR; INTRAVENOUS PRN
Status: DISCONTINUED | OUTPATIENT
Start: 2020-06-26 | End: 2020-06-26

## 2020-06-26 RX ORDER — FENTANYL CITRATE 50 UG/ML
25-50 INJECTION, SOLUTION INTRAMUSCULAR; INTRAVENOUS
Status: DISCONTINUED | OUTPATIENT
Start: 2020-06-26 | End: 2020-06-26 | Stop reason: HOSPADM

## 2020-06-26 RX ORDER — PROPOFOL 10 MG/ML
INJECTION, EMULSION INTRAVENOUS CONTINUOUS PRN
Status: DISCONTINUED | OUTPATIENT
Start: 2020-06-26 | End: 2020-06-26

## 2020-06-26 RX ORDER — FENTANYL CITRATE 50 UG/ML
INJECTION, SOLUTION INTRAMUSCULAR; INTRAVENOUS PRN
Status: DISCONTINUED | OUTPATIENT
Start: 2020-06-26 | End: 2020-06-26

## 2020-06-26 RX ORDER — LIDOCAINE HYDROCHLORIDE 20 MG/ML
INJECTION, SOLUTION INFILTRATION; PERINEURAL PRN
Status: DISCONTINUED | OUTPATIENT
Start: 2020-06-26 | End: 2020-06-26

## 2020-06-26 RX ORDER — ACETAMINOPHEN 325 MG/1
650 TABLET ORAL EVERY 4 HOURS PRN
Qty: 50 TABLET | Refills: 0 | Status: SHIPPED | OUTPATIENT
Start: 2020-06-26

## 2020-06-26 RX ORDER — PROPOFOL 10 MG/ML
INJECTION, EMULSION INTRAVENOUS PRN
Status: DISCONTINUED | OUTPATIENT
Start: 2020-06-26 | End: 2020-06-26

## 2020-06-26 RX ORDER — ONDANSETRON 2 MG/ML
4 INJECTION INTRAMUSCULAR; INTRAVENOUS EVERY 30 MIN PRN
Status: DISCONTINUED | OUTPATIENT
Start: 2020-06-26 | End: 2020-06-26 | Stop reason: HOSPADM

## 2020-06-26 RX ADMIN — PROPOFOL 125 MCG/KG/MIN: 10 INJECTION, EMULSION INTRAVENOUS at 12:44

## 2020-06-26 RX ADMIN — ONDANSETRON 4 MG: 2 INJECTION INTRAMUSCULAR; INTRAVENOUS at 13:28

## 2020-06-26 RX ADMIN — LIDOCAINE HYDROCHLORIDE 60 MG: 20 INJECTION, SOLUTION INFILTRATION; PERINEURAL at 12:44

## 2020-06-26 RX ADMIN — SODIUM CHLORIDE, POTASSIUM CHLORIDE, SODIUM LACTATE AND CALCIUM CHLORIDE: 600; 310; 30; 20 INJECTION, SOLUTION INTRAVENOUS at 12:37

## 2020-06-26 RX ADMIN — PROPOFOL 60 MG: 10 INJECTION, EMULSION INTRAVENOUS at 12:44

## 2020-06-26 RX ADMIN — CEFAZOLIN 2 G: 10 INJECTION, POWDER, FOR SOLUTION INTRAVENOUS at 12:55

## 2020-06-26 RX ADMIN — FENTANYL CITRATE 50 MCG: 50 INJECTION, SOLUTION INTRAMUSCULAR; INTRAVENOUS at 13:05

## 2020-06-26 ASSESSMENT — MIFFLIN-ST. JEOR: SCORE: 1577.87

## 2020-06-26 NOTE — DISCHARGE INSTRUCTIONS
Same-Day Surgery   Adult Discharge Orders & Instructions     For 24 hours after surgery:  1. Get plenty of rest.  A responsible adult must stay with you for at least 24 hours after you leave the hospital.   2. Pain medication can slow your reflexes. Do not drive or use heavy equipment.  If you have weakness or tingling, don't drive or use heavy equipment until this feeling goes away.  3. Mixing alcohol and pain medication can cause dizziness and slow your breathing. It can even be fatal. Do not drink alcohol while taking pain medication.  4. Avoid strenuous or risky activities.  Ask for help when climbing stairs.   5. You may feel lightheaded.  If so, sit for a few minutes before standing.  Have someone help you get up.   6. If you have nausea (feel sick to your stomach), drink only clear liquids such as apple juice, ginger ale, broth or 7-Up.  Rest may also help.  Be sure to drink enough fluids.  Move to a regular diet as you feel able. Take pain medications with a small amount of solid food, such as toast or crackers, to avoid nausea.   7. A slight fever is normal. Call the doctor if your fever is over 100 F (37.7 C) (taken under the tongue) or lasts longer than 24 hours.  8. You may have a dry mouth, muscle aches, trouble sleeping or a sore throat.  These symptoms should go away after 24 hours.  9. Do not make important or legal decisions.   Pain Management:      1. Take pain medication (if prescribed) for pain as directed by your physician.        2. WARNING: If the pain medication you have been prescribed contains Tylenol  (acetaminophen), DO NOT take additional doses of Tylenol (acetaminophen).     Call your doctor for any of the followin.  Signs of infection (fever, growing tenderness at the surgery site, severe pain, a large amount of drainage or bleeding, foul-smelling drainage, redness, swelling).    2.  It has been over 8 to 10 hours since surgery and you are still not able to urinate (pee).    3.   Headache for over 24 hours.    4.  Numbness, tingling or weakness the day after surgery (if you had spinal anesthesia).  To contact a doctor, call _____________________________________ or:      [phone] and ask for the Resident On Call for:          __________________________________________ (answered 24 hours a day)      Emergency Department:  [place] [facility]: [phone]  [place] [facility]: [phone]               Rev. 10/2014

## 2020-06-26 NOTE — OR NURSING
Patient last ate a peanut butter sandwich at 0600 this am.   Called and updated Dr Rasmussen, can not go to the OR until at least noon.   Updated Dr Trotter.     Patient aware he will have to wait due to not being NPO, and also he may get cancelled depending on Dr Trotter.

## 2020-06-26 NOTE — OP NOTE
PREOPERATIVE DIAGNOSES:     1.  BPH.        POSTOPERATIVE DIAGNOSES:     1.  BPH.        PROCEDURE:  Cystoscopy and REZUM procedure.         INDICATION: Russ Agee is a 64 year old-year-old gentleman who is followed in our clinic for a history of BPH with lower urinary tract symptoms. Symptoms have worsened after past REZUM treatment.  After discussion of risks, benefits and alternatives, the patient presented today for a cystoscopy and the REZUM procedure to treat his BPH.     Pre-operatively, AUA symptom score: 31; QOL: Terrible      DESCRIPTION OF PROCEDURE:  After informed consent was obtained, the patient was brought to the operating room where he was administered MAC anesthesia.  After a suitable level of anesthesia was attained, he was placed in the lithotomy position with all pressure points padded.  He was administered preoperative antibiotics.  He was prepped and draped in a standard sterile fashion.  Next, the REZUM device was placed into the patient's urethra without difficulty.  We then proceeded to perform 3 treatments in the left lateral lobe, 3 treatments in the right lateral lobe, and 4 treatments in the median lobe for a total of 10 treatments.  Next, a Macedonian catheter was placed in the patient's bladder and the balloon inflated with 10 mL of water.  The patient was then awakened and brought to the recovery room in stable condition.     SURGEON:  Gerardo Trotter MD   ASSISTANT: Ismael Huerta MD     ESTIMATED BLOOD LOSS:  1 mL       COMPLICATIONS:  There were no immediate complications noted.       DRAINS:  A 16 Macedonian Glover catheter.         SPECIMENS:  None.       PLAN:   1) Discharge home with catheter, TOV in clinic on Monday 6/29  2) Follow up as scheduled in 3 months with Dr. Trotter

## 2020-06-26 NOTE — TELEPHONE ENCOUNTER
----- Message from Ismael Huerta MD sent at 6/26/2020  1:56 PM CDT -----  Regarding: TOV  Hi,    Can we please schedule Mr. Agee for TOV this coming Monday 6/29? He had Rezum today.    Thank you,  Delta

## 2020-06-26 NOTE — OR NURSING
Discharge instructions were left behind, emailed to patient. Pt called and message left to that effect on cell phone.

## 2020-06-26 NOTE — BRIEF OP NOTE
[facility], [place]    Brief Operative Note    Pre-operative diagnosis: Benign prostatic hyperplasia with nocturia [N40.1, R35.1]  Post-operative diagnosis Same as pre-operative diagnosis    Procedure: Procedure(s):  CYSTOSCOPY and REZUM Procedure  Surgeon: Surgeon(s) and Role:     * Gerardo Trotter MD - Primary     * Ismael Huerta MD - Resident - Assisting  Anesthesia: Monitor Anesthesia Care   Estimated blood loss: Minimal  Drains:  16F Glover catheter  Specimens: * No specimens in log *  Findings:   10 Rezum treatments, 2 on each lateral lobe, 4 in median lobe.  Complications: None.  Implants: * No implants in log *    Plan:  - Follow up for TOV Monday 6/29  - F/u in 3 mo

## 2020-06-26 NOTE — ANESTHESIA CARE TRANSFER NOTE
Patient: Russ Agee    Procedure(s):  CYSTOSCOPY and REZUM Procedure    Diagnosis: Benign prostatic hyperplasia with nocturia [N40.1, R35.1]  Diagnosis Additional Information: No value filed.    Anesthesia Type:   MAC     Note:  Airway :Room Air  Patient transferred to:Phase II  Comments: Anesthesia Care Transfer Note      Transfer to:  PACU    Patient Vital Signs:  Stable    Airway:  None    Patient transported to PACU with supplemental O2.  Patient alert and breathing comfortably.  VSS.  Care transferred with report to PACU RN.    Adrian Davenport CRNAHandoff Report: Identifed the Patient, Identified the Reponsible Provider, Reviewed the pertinent medical history, Discussed the surgical course, Reviewed Intra-OP anesthesia mangement and issues during anesthesia, Set expectations for post-procedure period and Allowed opportunity for questions and acknowledgement of understanding      Vitals: (Last set prior to Anesthesia Care Transfer)    CRNA VITALS  6/26/2020 1305 - 6/26/2020 1339      6/26/2020             Pulse:  65    SpO2:  100 %    Resp Rate (observed):  (!) 0                Electronically Signed By: HAWA Gunn CRNA  June 26, 2020  1:39 PM

## 2020-06-26 NOTE — TELEPHONE ENCOUNTER
Patient was notified that he is schedule on 6/29/2020 @ 10:20 am for his TOV. Patient agreed with the plan.      Elio Drummond MA

## 2020-06-29 ENCOUNTER — OFFICE VISIT (OUTPATIENT)
Dept: UROLOGY | Facility: CLINIC | Age: 65
End: 2020-06-29
Payer: COMMERCIAL

## 2020-06-29 DIAGNOSIS — R39.9 LOWER URINARY TRACT SYMPTOMS (LUTS): ICD-10-CM

## 2020-06-29 DIAGNOSIS — R35.1 BENIGN PROSTATIC HYPERPLASIA WITH NOCTURIA: Primary | ICD-10-CM

## 2020-06-29 DIAGNOSIS — C61 PROSTATE CANCER (H): ICD-10-CM

## 2020-06-29 DIAGNOSIS — N40.1 BENIGN PROSTATIC HYPERPLASIA WITH NOCTURIA: Primary | ICD-10-CM

## 2020-06-29 RX ORDER — CIPROFLOXACIN 500 MG/1
500 TABLET, FILM COATED ORAL ONCE
Status: COMPLETED | OUTPATIENT
Start: 2020-06-29 | End: 2020-06-29

## 2020-06-29 RX ADMIN — CIPROFLOXACIN 500 MG: 500 TABLET, FILM COATED ORAL at 10:50

## 2020-06-29 NOTE — PATIENT INSTRUCTIONS
Follow up in 3 Mo Virtually with an AUA done prior with Dr. Sergo Maki, EMT     It was a pleasure meeting with you today.  Thank you for allowing me and my team the privilege of caring for you today.  YOU are the reason we are here, and I truly hope we provided you with the excellent service you deserve.  Please let us know if there is anything else we can do for you so that we can be sure you are leaving completely satisfied with your care experience.

## 2020-06-29 NOTE — PROGRESS NOTES
Chief Complaint   Patient presents with     Allied Health Visit     Post Op TOV       Patient Active Problem List   Diagnosis     CARDIOVASCULAR SCREENING; LDL GOAL LESS THAN 160     Alcohol addiction (H)     Malignant neoplasm of prostate (H)     Mucous cyst of joint     Depressive disorder     Skin lesion     Patellofemoral disorder of left knee     Right knee DJD     Oral lesion     Candidiasis of mouth     SVT (supraventricular tachycardia) (H)     Other chest pain     Dermatitis, seborrheic     History of hepatitis C     Palpitations     Hepatitis B core antibody positive     Benign prostatic hyperplasia with nocturia     Pain of both hip joints       Allergies   Allergen Reactions     Nkda [No Known Drug Allergies]        Current Outpatient Medications   Medication Sig Dispense Refill     acetaminophen (TYLENOL) 325 MG tablet Take 2 tablets (650 mg) by mouth every 4 hours as needed for mild pain 50 tablet 0     calcium polycarbophil (RA FIBER-CAP) 625 MG tablet Take 3 tablets by mouth twice daily       desonide (DESOWEN) 0.05 % ointment To affected areas of the face for itch and red bumps twice a day as needed. 60 g 11     gentamicin 0.008% in 1000ml 0.9% NaCl (GARAMYCIN) SOLN nasal irrigation Spray 30 mLs in nostril 2 times daily Irrigate 30 mL between each nostril BID for a total of 60ml/day PLEASE MAIL TO PATIENT 1000 mL 3     ketoconazole (NIZORAL) 2 % shampoo To entire wet scalp and face in affected areas and then wash off after several minutes three times a week. 240 mL 11     lidocaine (XYLOCAINE) 2 % jelly Apply 1 Tube topically       mirabegron (MYRBETRIQ) 25 MG 24 hr tablet Take 1 tablet (25 mg) by mouth daily (Patient taking differently: Take 25 mg by mouth every morning ) 90 tablet 3     Mirtazapine (REMERON PO) Take 15 mg by mouth At Bedtime       order for DME Equipment being ordered: Compression stockings (pair) 2 Device 0     Psyllium (METAMUCIL PO) Take 3 tsp by mouth 2 times daily.        RisperiDONE (RISPERDAL PO) Take 3 mg by mouth At Bedtime       sildenafil (REVATIO/VIAGRA) 20 MG tablet Take 1 to 5 tabs as needed for sexual activity. 90 tablet 11     traZODone (DESYREL) 50 MG tablet Take 50 mg by mouth At Bedtime        Urea 20 % CREA To affected, ridged nails daily. 60 g 5       Social History     Tobacco Use     Smoking status: Former Smoker     Packs/day: 0.50     Years: 6.00     Pack years: 3.00     Types: Cigarettes     Last attempt to quit: 2017     Years since quittin.5     Smokeless tobacco: Never Used   Substance Use Topics     Alcohol use: No     Alcohol/week: 0.0 standard drinks     Comment:       Drug use: No       Russ Agee comes into clinic today at the request of Referred Self for TOV / catheter removal.    This service provided today was under the direct supervision of Dr. Trotter, who was available if needed.    Russ Agee presented today for a trial of void.  Approximately 150 mL of normal saline instilled into bladder via catheter.  Patient stated he had urge to urinate and an 16Fr Straight tip  catheter was removed without difficulty.  Patient was given a graduated cylinder to measure urine output.  Patient voided approximately 150 mL of pink urine.  PVR 25 mL.    Cipro 500 given per protocol: Yes  The following medication was given:     MEDICATION:  Ciprofloxacin    ROUTE: PO  SITE: Medication was given orally   DOSE: 500mg  LOT #: [id number]  : Loaded Pocket  EXPIRATION DATE:   NDC#: [address]-24   Was there drug waste? No    Prior to medication administration, verified patient identity using patient's name and date of birth.  Due to medication administration, patient instructed to remain in clinic for 15 minutes  afterwards, and to report any adverse reaction to me immediately.    Drug Amount Wasted:  None.  Vial/Syringe: Single dose vial    Patient did tolerate procedure well.    Teaching done with patient verbally as where to call or  go if pain, fever, or unable to urinate post catheter removal.    Shorty Maki, EMT,  6/29/2020  11:17 AM

## 2020-07-06 NOTE — ANESTHESIA POSTPROCEDURE EVALUATION
Anesthesia POST Procedure Evaluation    Patient: Russ Agee   MRN:     [id number] Gender:   male   Age:    64 year old :      1955        Preoperative Diagnosis: Benign prostatic hyperplasia with nocturia [N40.1, R35.1]   Procedure(s):  CYSTOSCOPY and REZUM Procedure   Postop Comments: No value filed.     Anesthesia Type: MAC       Disposition: Outpatient   Postop Pain Control: Uneventful            Sign Out: Well controlled pain   PONV: No   Neuro/Psych: Uneventful            Sign Out: Acceptable/Baseline neuro status   Airway/Respiratory: Uneventful            Sign Out: Acceptable/Baseline resp. status   CV/Hemodynamics: Uneventful            Sign Out: Acceptable CV status   Other NRE: NONE   DID A NON-ROUTINE EVENT OCCUR? No         Last Anesthesia Record Vitals:  CRNA VITALS  2020 1305 - 2020 1405      2020             EKG:  Sinus rhythm          Last PACU Vitals:  Vitals Value Taken Time   /53 2020  1:45 PM   Temp 36.7  C (98.1  F) 2020  1:45 PM   Pulse     Resp 16 2020  1:45 PM   SpO2 100 % 2020  1:46 PM   Temp src     NIBP 114/68 2020  1:31 PM   Pulse 65 2020  1:32 PM   SpO2 100 % 2020  1:32 PM   Resp     Temp     Ht Rate     Temp 2           Electronically Signed By: Donnie Rasmussen MD, 2020, 6:55 AM

## 2020-07-18 ENCOUNTER — MEDICAL CORRESPONDENCE (OUTPATIENT)
Dept: HEALTH INFORMATION MANAGEMENT | Facility: CLINIC | Age: 65
End: 2020-07-18

## 2020-09-16 ENCOUNTER — MEDICAL CORRESPONDENCE (OUTPATIENT)
Dept: HEALTH INFORMATION MANAGEMENT | Facility: CLINIC | Age: 65
End: 2020-09-16

## 2020-09-21 ENCOUNTER — MEDICAL CORRESPONDENCE (OUTPATIENT)
Dept: HEALTH INFORMATION MANAGEMENT | Facility: CLINIC | Age: 65
End: 2020-09-21

## 2020-10-06 ENCOUNTER — PRE VISIT (OUTPATIENT)
Dept: UROLOGY | Facility: CLINIC | Age: 65
End: 2020-10-06

## 2020-10-06 NOTE — TELEPHONE ENCOUNTER
Visit Type : post op rezum     Records/Orders: in epic   Pt Contacted:Yes to talk to him about appointment     At Rooming:phone

## 2020-10-10 ENCOUNTER — MEDICAL CORRESPONDENCE (OUTPATIENT)
Dept: HEALTH INFORMATION MANAGEMENT | Facility: CLINIC | Age: 65
End: 2020-10-10

## 2020-10-19 ENCOUNTER — VIRTUAL VISIT (OUTPATIENT)
Dept: UROLOGY | Facility: CLINIC | Age: 65
End: 2020-10-19
Payer: MEDICARE

## 2020-10-19 DIAGNOSIS — R39.9 LOWER URINARY TRACT SYMPTOMS (LUTS): Primary | ICD-10-CM

## 2020-10-19 PROCEDURE — 99441 PR PHYSICIAN TELEPHONE EVALUATION 5-10 MIN: CPT | Mod: 95 | Performed by: UROLOGY

## 2020-10-19 RX ORDER — BUPROPION HYDROCHLORIDE 150 MG/1
TABLET ORAL
COMMUNITY
Start: 2020-09-27 | End: 2023-06-15

## 2020-10-19 ASSESSMENT — PATIENT HEALTH QUESTIONNAIRE - PHQ9: SUM OF ALL RESPONSES TO PHQ QUESTIONS 1-9: 11

## 2020-10-19 NOTE — NURSING NOTE
Chief Complaint   Patient presents with     Surgical Followup     post op mariozu       Patient Active Problem List   Diagnosis     CARDIOVASCULAR SCREENING; LDL GOAL LESS THAN 160     Alcohol addiction (H)     Malignant neoplasm of prostate (H)     Mucous cyst of joint     Depressive disorder     Skin lesion     Patellofemoral disorder of left knee     Right knee DJD     Oral lesion     Candidiasis of mouth     SVT (supraventricular tachycardia) (H)     Other chest pain     Dermatitis, seborrheic     History of hepatitis C     Palpitations     Hepatitis B core antibody positive     Benign prostatic hyperplasia with nocturia     Pain of both hip joints       Allergies   Allergen Reactions     Nkda [No Known Drug Allergies]        Current Outpatient Medications   Medication Sig Dispense Refill     mirabegron (MYRBETRIQ) 25 MG 24 hr tablet Take 1 tablet (25 mg) by mouth daily (Patient taking differently: Take 25 mg by mouth every morning ) 90 tablet 3     Mirtazapine (REMERON PO) Take 15 mg by mouth At Bedtime       Psyllium (METAMUCIL PO) Take 3 tsp by mouth 2 times daily.       RisperiDONE (RISPERDAL PO) Take 3 mg by mouth At Bedtime       sildenafil (REVATIO/VIAGRA) 20 MG tablet Take 1 to 5 tabs as needed for sexual activity. 90 tablet 11     traZODone (DESYREL) 50 MG tablet Take 50 mg by mouth At Bedtime        acetaminophen (TYLENOL) 325 MG tablet Take 2 tablets (650 mg) by mouth every 4 hours as needed for mild pain 50 tablet 0     buPROPion (WELLBUTRIN XL) 150 MG 24 hr tablet Take 1 tablet by mouth 1 time per day for 30 days       calcium polycarbophil (RA FIBER-CAP) 625 MG tablet Take 3 tablets by mouth twice daily       desonide (DESOWEN) 0.05 % ointment To affected areas of the face for itch and red bumps twice a day as needed. 60 g 11     gentamicin 0.008% in 1000ml 0.9% NaCl (GARAMYCIN) SOLN nasal irrigation Spray 30 mLs in nostril 2 times daily Irrigate 30 mL between each nostril BID for a total of 60ml/day  PLEASE MAIL TO PATIENT 1000 mL 3     ketoconazole (NIZORAL) 2 % shampoo To entire wet scalp and face in affected areas and then wash off after several minutes three times a week. 240 mL 11     lidocaine (XYLOCAINE) 2 % jelly Apply 1 Tube topically       order for DME Equipment being ordered: Compression stockings (pair) 2 Device 0     Urea 20 % CREA To affected, ridged nails daily. 60 g 5       Social History     Tobacco Use     Smoking status: Former Smoker     Packs/day: 0.50     Years: 6.00     Pack years: 3.00     Types: Cigarettes     Quit date: 2017     Years since quittin.8     Smokeless tobacco: Never Used   Substance Use Topics     Alcohol use: No     Alcohol/week: 0.0 standard drinks     Comment:       Drug use: No     Depression Response    Patient completed the PHQ-9 assessment for depression and scored >9? Yes  Question 9 on the PHQ-9 was positive for suicidality? No  Does patient have current mental health provider? Yes    Is this a virtual visit? Yes   Does patient have suicidal ideation (positive question 9)? No - offer to place Mental Health Referral.  Patient declined referral/not needed, patient currently seen by psychiatry per patient.    I personally notified the following: visit provider    Pari Hernandez LPN  10/19/2020  2:05 PM

## 2020-10-19 NOTE — PROGRESS NOTES
"Russ Agee is a 64 year old male who is being evaluated via a billable telephone visit.      The patient has been notified of following:     \"This telephone visit will be conducted via a call between you and your physician/provider. We have found that certain health care needs can be provided without the need for a physical exam.  This service lets us provide the care you need with a short phone conversation.  If a prescription is necessary we can send it directly to your pharmacy.  If lab work is needed we can place an order for that and you can then stop by our lab to have the test done at a later time.    Telephone visits are billed at different rates depending on your insurance coverage. During this emergency period, for some insurers they may be billed the same as an in-person visit.  Please reach out to your insurance provider with any questions.    If during the course of the call the physician/provider feels a telephone visit is not appropriate, you will not be charged for this service.\"    Patient has given verbal consent for Telephone visit?  Yes    What phone number would you like to be contacted at? [phone]    How would you like to obtain your AVS? Mail a copy     ADDITIONAL PROVIDER NOTES:  63 yo male with history of LUTS s/p Rezume several years ago with recurrence of symptoms who underwent a repeat Rezume in the end of July.  Reports today that the stream while improved from before the procedure is still somewhat slow but if he squeezes, he can get the urine out.  The patient also complains of minimal ejaculatory volume.  He notes this was present before the procedure.    ASSESSMENT AND PLAN:   Over half of today's 8-minute visit was spent counseling the patient regarding his LUTS and sexual function.  I counseled thepoatient that things may still be healing yet and we will follow for a couple more months.  We will bring the patient in for a cysto in a couple of months if things have not improved " with urination.  Otherwise, will refer the patient to Dr. Schmitz for workup of his ejaculatory dysfunction.    Phone call duration: 8 minutes

## 2020-10-19 NOTE — LETTER
"10/19/2020       RE: Russ Agee  [address]  [facility] [address]     Dear Colleague,    Thank you for referring your patient, Russ Agee, to the [facility] [place] at [facility]. Please see a copy of my visit note below.    Russ Agee is a 64 year old male who is being evaluated via a billable telephone visit.      The patient has been notified of following:     \"This telephone visit will be conducted via a call between you and your physician/provider. We have found that certain health care needs can be provided without the need for a physical exam.  This service lets us provide the care you need with a short phone conversation.  If a prescription is necessary we can send it directly to your pharmacy.  If lab work is needed we can place an order for that and you can then stop by our lab to have the test done at a later time.    Telephone visits are billed at different rates depending on your insurance coverage. During this emergency period, for some insurers they may be billed the same as an in-person visit.  Please reach out to your insurance provider with any questions.    If during the course of the call the physician/provider feels a telephone visit is not appropriate, you will not be charged for this service.\"    Patient has given verbal consent for Telephone visit?  Yes    What phone number would you like to be contacted at? [phone]    How would you like to obtain your AVS? Mail a copy     ADDITIONAL PROVIDER NOTES:  63 yo male with history of LUTS s/p Rezume several years ago with recurrence of symptoms who underwent a repeat Rezume in the end of July.  Reports today that the stream while improved from before the procedure is still somewhat slow but if he squeezes, he can get the urine out.  The patient also complains of minimal ejaculatory volume.  He notes this was present before the procedure.    ASSESSMENT AND PLAN:   Over " half of today's 8-minute visit was spent counseling the patient regarding his LUTS and sexual function.  I counseled thepoatient that things may still be healing yet and we will follow for a couple more months.  We will bring the patient in for a cysto in a couple of months if things have not improved with urination.  Otherwise, will refer the patient to Dr. Schmitz for workup of his ejaculatory dysfunction.    Phone call duration: 8 minutes    Again, thank you for allowing me to participate in the care of your patient.      Sincerely,    Gerardo Trotter MD

## 2020-11-02 PROCEDURE — G0179 MD RECERTIFICATION HHA PT: HCPCS | Performed by: INTERNAL MEDICINE

## 2020-11-15 ENCOUNTER — MEDICAL CORRESPONDENCE (OUTPATIENT)
Dept: HEALTH INFORMATION MANAGEMENT | Facility: CLINIC | Age: 65
End: 2020-11-15

## 2020-12-01 ENCOUNTER — PRE VISIT (OUTPATIENT)
Dept: UROLOGY | Facility: CLINIC | Age: 65
End: 2020-12-01

## 2020-12-01 NOTE — TELEPHONE ENCOUNTER
Reason for Visit: Consult    Diagnosis: Erectile Dysfunction    Orders/Procedures/Records: in system    Contact Patient: n/a    Rooming Requirements: normal      Pari Hernandez LPN  12/01/20  10:03 AM

## 2020-12-22 ENCOUNTER — PRE VISIT (OUTPATIENT)
Dept: UROLOGY | Facility: CLINIC | Age: 65
End: 2020-12-22

## 2020-12-31 ENCOUNTER — OFFICE VISIT (OUTPATIENT)
Dept: UROLOGY | Facility: CLINIC | Age: 65
End: 2020-12-31
Payer: MEDICARE

## 2020-12-31 VITALS — HEART RATE: 70 BPM | DIASTOLIC BLOOD PRESSURE: 75 MMHG | SYSTOLIC BLOOD PRESSURE: 129 MMHG

## 2020-12-31 DIAGNOSIS — I47.10 SVT (SUPRAVENTRICULAR TACHYCARDIA) (H): ICD-10-CM

## 2020-12-31 DIAGNOSIS — C61 MALIGNANT NEOPLASM OF PROSTATE (H): ICD-10-CM

## 2020-12-31 DIAGNOSIS — B18.2 CHRONIC HEPATITIS C WITHOUT HEPATIC COMA (H): ICD-10-CM

## 2020-12-31 PROCEDURE — 99213 OFFICE O/P EST LOW 20 MIN: CPT | Mod: GC | Performed by: UROLOGY

## 2020-12-31 ASSESSMENT — PAIN SCALES - GENERAL: PAINLEVEL: NO PAIN (0)

## 2020-12-31 NOTE — NURSING NOTE
No chief complaint on file.      There were no vitals taken for this visit. There is no height or weight on file to calculate BMI.    Patient Active Problem List   Diagnosis     CARDIOVASCULAR SCREENING; LDL GOAL LESS THAN 160     Alcohol addiction (H)     Malignant neoplasm of prostate (H)     Mucous cyst of joint     Depressive disorder     Skin lesion     Patellofemoral disorder of left knee     Right knee DJD     Oral lesion     Candidiasis of mouth     SVT (supraventricular tachycardia) (H)     Other chest pain     Dermatitis, seborrheic     History of hepatitis C     Palpitations     Hepatitis B core antibody positive     Benign prostatic hyperplasia with nocturia     Pain of both hip joints       Allergies   Allergen Reactions     Nkda [No Known Drug Allergies]        Current Outpatient Medications   Medication Sig Dispense Refill     acetaminophen (TYLENOL) 325 MG tablet Take 2 tablets (650 mg) by mouth every 4 hours as needed for mild pain 50 tablet 0     buPROPion (WELLBUTRIN XL) 150 MG 24 hr tablet Take 1 tablet by mouth 1 time per day for 30 days       calcium polycarbophil (RA FIBER-CAP) 625 MG tablet Take 3 tablets by mouth twice daily       desonide (DESOWEN) 0.05 % ointment To affected areas of the face for itch and red bumps twice a day as needed. 60 g 11     gentamicin 0.008% in 1000ml 0.9% NaCl (GARAMYCIN) SOLN nasal irrigation Spray 30 mLs in nostril 2 times daily Irrigate 30 mL between each nostril BID for a total of 60ml/day PLEASE MAIL TO PATIENT 1000 mL 3     ketoconazole (NIZORAL) 2 % shampoo To entire wet scalp and face in affected areas and then wash off after several minutes three times a week. 240 mL 11     lidocaine (XYLOCAINE) 2 % jelly Apply 1 Tube topically       mirabegron (MYRBETRIQ) 25 MG 24 hr tablet Take 1 tablet (25 mg) by mouth daily (Patient taking differently: Take 25 mg by mouth every morning ) 90 tablet 3     Mirtazapine (REMERON PO) Take 15 mg by mouth At Bedtime        order for DME Equipment being ordered: Compression stockings (pair) 2 Device 0     Psyllium (METAMUCIL PO) Take 3 tsp by mouth 2 times daily.       RisperiDONE (RISPERDAL PO) Take 3 mg by mouth At Bedtime       sildenafil (REVATIO/VIAGRA) 20 MG tablet Take 1 to 5 tabs as needed for sexual activity. 90 tablet 11     traZODone (DESYREL) 50 MG tablet Take 50 mg by mouth At Bedtime        Urea 20 % CREA To affected, ridged nails daily. 60 g 5       Social History     Tobacco Use     Smoking status: Former Smoker     Packs/day: 0.50     Years: 6.00     Pack years: 3.00     Types: Cigarettes     Quit date: 12/29/2017     Years since quitting: 3.0     Smokeless tobacco: Never Used   Substance Use Topics     Alcohol use: No     Alcohol/week: 0.0 standard drinks     Comment:       Drug use: No       JOE Johnston  12/31/2020  1:51 PM

## 2020-12-31 NOTE — PROGRESS NOTES
"Urology Consult    Name: Russ Agee    MRN: [id number]   YOB: 1955               Chief Complaint:   Ejaculatory dysfunction    History is obtained from the patient and chart review          History of Present Illness:   Russ Agee is a 65 year old-year-old gentleman who is followed in urology clinic for a history of BPH with lower urinary tract symptoms. Symptoms have worsened after past REZUM treatment, he is now status post repeat cystoscopy, REZUM procedure on 6/26/20. He is referred to Dr. Schmitz for ejaculatory dysfunction.    After his most recent surgery 06/26/20, he is not able to ejaculate. He does have orgasm. No fluid or anything comes out. Does not want to have more children. He feels he is not \"enjoying\" if he does not ejaculate. Otherwise he is able to get erections. He also notes he has some increased frequency of urination.           Past Medical History:     Past Medical History:   Diagnosis Date     Anemia      Anxiety      Chronic hepatitis C (H)     treated.  Undetectable     Depressive disorder     followed by Dr. Mccormick     ETOH abuse      GERD (gastroesophageal reflux disease)      Malignant neoplasm of prostate (H)      Mucous cyst of joint      Palpitations             Past Surgical History:     Past Surgical History:   Procedure Laterality Date     CYSTOSCOPY N/A 11/27/2017    Procedure: CYSTOSCOPY;  Cystoscopy, Rezum Procedure;  Surgeon: Gerardo Trotter MD;  Location:  OR     ENDOSCOPIC ULTRASOUND, ESOPHAGOSCOPY, GASTROSCOPY, DUODENOSCOPY (EGD), COMBINED  12/16/15     ESOPHAGOSCOPY, GASTROSCOPY, DUODENOSCOPY (EGD), COMBINED N/A 4/9/2015    Procedure: COMBINED ESOPHAGOSCOPY, GASTROSCOPY, DUODENOSCOPY (EGD);  Surgeon: Raina Romo MD;  Location: [facility]     EXCISE MASS FINGER  2/19/2013    Procedure: EXCISE MASS FINGER;  Mass Excision Left Index Finger     (local anesthesia);  Surgeon: Raina Dorsey MD;  Location:  OR      UGI ENDOSCOPY W EUS " N/A 12/16/2015    Procedure: COMBINED ENDOSCOPIC ULTRASOUND, ESOPHAGOSCOPY, GASTROSCOPY, DUODENOSCOPY (EGD);  Surgeon: Brady Acuna MD;  Location: [facility]     PROSTATE SURGERY       TRANSURETHERAL DESTRUCTION OF PROSTATE BY THERMOTHERAPY N/A 11/27/2017    Procedure: TRANSURETHERAL DESTRUCTION OF PROSTATE BY THERMOTHERAPY;;  Surgeon: Gerardo Trotter MD;  [facility]     TRANSURETHERAL DESTRUCTION OF PROSTATE BY THERMOTHERAPY N/A 6/26/2020    Procedure: CYSTOSCOPY and REZUM Procedure;  Surgeon: Gerardo Trotter MD;  Location: [facility]            Social History:     Social History     Tobacco Use     Smoking status: Former Smoker     Packs/day: 0.50     Years: 6.00     Pack years: 3.00     Types: Cigarettes     Quit date: 12/29/2017     Years since quitting: 3.0     Smokeless tobacco: Never Used   Substance Use Topics     Alcohol use: No     Alcohol/week: 0.0 standard drinks     Comment:              Family History:     Family History   Problem Relation Age of Onset     Diabetes Sister      Diabetes Son      Alcohol/Drug Other      Arthritis Other      Circulatory Other      Eye Disorder Other      Depression Other      Genetic Disorder Other      Psychotic Disorder Other      Unknown/Adopted Mother      Unknown/Adopted Father      No Known Problems Sister      Cancer No family hx of         no skin cancer     Skin Cancer No family hx of      Thyroid Disease No family hx of      Thyroid Cancer No family hx of             Allergies:     Allergies   Allergen Reactions     Nkda [No Known Drug Allergies]             Medications:     Current Outpatient Medications   Medication Sig     acetaminophen (TYLENOL) 325 MG tablet Take 2 tablets (650 mg) by mouth every 4 hours as needed for mild pain     buPROPion (WELLBUTRIN XL) 150 MG 24 hr tablet Take 1 tablet by mouth 1 time per day for 30 days     calcium polycarbophil (RA FIBER-CAP) 625 MG tablet Take 3 tablets by mouth twice daily     desonide  (DESOWEN) 0.05 % ointment To affected areas of the face for itch and red bumps twice a day as needed.     gentamicin 0.008% in 1000ml 0.9% NaCl (GARAMYCIN) SOLN nasal irrigation Spray 30 mLs in nostril 2 times daily Irrigate 30 mL between each nostril BID for a total of 60ml/day PLEASE MAIL TO PATIENT     ketoconazole (NIZORAL) 2 % shampoo To entire wet scalp and face in affected areas and then wash off after several minutes three times a week.     lidocaine (XYLOCAINE) 2 % jelly Apply 1 Tube topically     mirabegron (MYRBETRIQ) 25 MG 24 hr tablet Take 1 tablet (25 mg) by mouth daily (Patient taking differently: Take 25 mg by mouth every morning )     Mirtazapine (REMERON PO) Take 15 mg by mouth At Bedtime     order for DME Equipment being ordered: Compression stockings (pair)     Psyllium (METAMUCIL PO) Take 3 tsp by mouth 2 times daily.     RisperiDONE (RISPERDAL PO) Take 3 mg by mouth At Bedtime     sildenafil (REVATIO/VIAGRA) 20 MG tablet Take 1 to 5 tabs as needed for sexual activity.     traZODone (DESYREL) 50 MG tablet Take 50 mg by mouth At Bedtime      Urea 20 % CREA To affected, ridged nails daily.     No current facility-administered medications for this visit.              Review of Systems:    ROS: 10 point ROS neg other than the symptoms noted above in the HPI           Physical Exam:   VS:  T: Data Unavailable    HR: 70    BP: 129/75    RR: Data Unavailable   GEN:  AOx3.  NAD.    CV:  RRR  LUNGS: Non-labored breathing.   BACK:  No midline or CVA tenderness.  ABD:  Soft.  NT.  ND.  No rebound or guarding.  No masses.  : deferred   OLENA:  deferred  EXT:  Warm, well perfused.  SKIN:  Warm.  Dry.  No rashes.  NEURO:  CN grossly intact.            Data:   All laboratory data reviewed:    All pertinent imaging reviewed:         Impression and Plan:   Impression:   Russ Agee is a 65 year old male status post Rezume treatment 06/2020 for LUTS presenting with ejaculatory dysfunction since surgery.       Plan:     Discussed that anejaculation is likely secondary to scar from Rezum causing ejaculatory duct obstruction.  He had perfectly normal erectile function prior to the procedure.  Treatments to the floor of the prostatic channel can result in blockage / scar of the ejaculatory ducts, in theory.    Discussed that trans-rectal ultrasound could be done to see if there is ejaculatory duct remnant that could be unroofed with transurethral resection, he is not interested in pursuing this workup right now.    PRN follow-up with      This patient's exam findings, labs, and imaging discussed with urology staff surgeon Dr. Schmitz, who developed the treatment plan.    Heidi Jones MD  PGY-2 Urology       I saw and examined the patient with the resident today.  I agree with the resident note and plan of care as above.     Andrew Schmitz MD  Urology Staff

## 2020-12-31 NOTE — LETTER
"12/31/2020       RE: Russ Agee  [address]  [facility] [address]     Dear Colleague,    Thank you for referring your patient, Russ Agee, to the [facility] [place] at [facility]. Please see a copy of my visit note below.    Urology Consult    Name: Russ Agee    MRN: [id number]   YOB: 1955               Chief Complaint:   Ejaculatory dysfunction    History is obtained from the patient and chart review          History of Present Illness:   Russ Agee is a 65 year old-year-old gentleman who is followed in urology clinic for a history of BPH with lower urinary tract symptoms. Symptoms have worsened after past REZUM treatment, he is now status post repeat cystoscopy, REZUM procedure on 6/26/20. He is referred to Dr. Schmitz for ejaculatory dysfunction.    After his most recent surgery 06/26/20, he is not able to ejaculate. He does have orgasm. No fluid or anything comes out. Does not want to have more children. He feels he is not \"enjoying\" if he does not ejaculate. Otherwise he is able to get erections. He also notes he has some increased frequency of urination.           Past Medical History:     Past Medical History:   Diagnosis Date     Anemia      Anxiety      Chronic hepatitis C (H)     treated.  Undetectable     Depressive disorder     followed by Dr. Mccormick     ETOH abuse      GERD (gastroesophageal reflux disease)      Malignant neoplasm of prostate (H)      Mucous cyst of joint      Palpitations             Past Surgical History:     Past Surgical History:   Procedure Laterality Date     CYSTOSCOPY N/A 11/27/2017    Procedure: CYSTOSCOPY;  Cystoscopy, Rezum Procedure;  Surgeon: Gerardo Trotter MD;  Location:  OR     ENDOSCOPIC ULTRASOUND, ESOPHAGOSCOPY, GASTROSCOPY, DUODENOSCOPY (EGD), COMBINED  12/16/15     ESOPHAGOSCOPY, GASTROSCOPY, DUODENOSCOPY (EGD), COMBINED N/A 4/9/2015    Procedure: COMBINED " ESOPHAGOSCOPY, GASTROSCOPY, DUODENOSCOPY (EGD);  Surgeon: Raina Romo MD;  [facility]     EXCISE MASS FINGER  2/19/2013    Procedure: EXCISE MASS FINGER;  Mass Excision Left Index Finger     (local anesthesia);  Surgeon: Raina Dorsey MD;  [facility]      UG[facility] ENDOSCOPY W EUS N/A 12/16/2015    Procedure: COMBINED ENDOSCOPIC ULTRASOUND, ESOPHAGOSCOPY, GASTROSCOPY, DUODENOSCOPY (EGD);  Surgeon: Brady Acuna MD;  Location:  GI     PROSTATE SURGERY       TRANSURETHERAL DESTRUCTION OF PROSTATE BY THERMOTHERAPY N/A 11/27/2017    Procedure: TRANSURETHERAL DESTRUCTION OF PROSTATE BY THERMOTHERAPY;;  Surgeon: Gerardo Trotter MD;  [facility]     TRANSURETHERAL DESTRUCTION OF PROSTATE BY THERMOTHERAPY N/A 6/26/2020    Procedure: CYSTOSCOPY and REZUM Procedure;  Surgeon: Gerardo Trotter MD;  [facility]            Social History:     Social History     Tobacco Use     Smoking status: Former Smoker     Packs/day: 0.50     Years: 6.00     Pack years: 3.00     Types: Cigarettes     Quit date: 12/29/2017     Years since quitting: 3.0     Smokeless tobacco: Never Used   Substance Use Topics     Alcohol use: No     Alcohol/week: 0.0 standard drinks     Comment:              Family History:     Family History   Problem Relation Age of Onset     Diabetes Sister      Diabetes Son      Alcohol/Drug Other      Arthritis Other      Circulatory Other      Eye Disorder Other      Depression Other      Genetic Disorder Other      Psychotic Disorder Other      Unknown/Adopted Mother      Unknown/Adopted Father      No Known Problems Sister      Cancer No family hx of         no skin cancer     Skin Cancer No family hx of      Thyroid Disease No family hx of      Thyroid Cancer No family hx of             Allergies:     Allergies   Allergen Reactions     Nkda [No Known Drug Allergies]             Medications:     Current Outpatient Medications   Medication Sig      acetaminophen (TYLENOL) 325 MG tablet Take 2 tablets (650 mg) by mouth every 4 hours as needed for mild pain     buPROPion (WELLBUTRIN XL) 150 MG 24 hr tablet Take 1 tablet by mouth 1 time per day for 30 days     calcium polycarbophil (RA FIBER-CAP) 625 MG tablet Take 3 tablets by mouth twice daily     desonide (DESOWEN) 0.05 % ointment To affected areas of the face for itch and red bumps twice a day as needed.     gentamicin 0.008% in 1000ml 0.9% NaCl (GARAMYCIN) SOLN nasal irrigation Spray 30 mLs in nostril 2 times daily Irrigate 30 mL between each nostril BID for a total of 60ml/day PLEASE MAIL TO PATIENT     ketoconazole (NIZORAL) 2 % shampoo To entire wet scalp and face in affected areas and then wash off after several minutes three times a week.     lidocaine (XYLOCAINE) 2 % jelly Apply 1 Tube topically     mirabegron (MYRBETRIQ) 25 MG 24 hr tablet Take 1 tablet (25 mg) by mouth daily (Patient taking differently: Take 25 mg by mouth every morning )     Mirtazapine (REMERON PO) Take 15 mg by mouth At Bedtime     order for DME Equipment being ordered: Compression stockings (pair)     Psyllium (METAMUCIL PO) Take 3 tsp by mouth 2 times daily.     RisperiDONE (RISPERDAL PO) Take 3 mg by mouth At Bedtime     sildenafil (REVATIO/VIAGRA) 20 MG tablet Take 1 to 5 tabs as needed for sexual activity.     traZODone (DESYREL) 50 MG tablet Take 50 mg by mouth At Bedtime      Urea 20 % CREA To affected, ridged nails daily.     No current facility-administered medications for this visit.              Review of Systems:    ROS: 10 point ROS neg other than the symptoms noted above in the HPI           Physical Exam:   VS:  T: Data Unavailable    HR: 70    BP: 129/75    RR: Data Unavailable   GEN:  AOx3.  NAD.    CV:  RRR  LUNGS: Non-labored breathing.   BACK:  No midline or CVA tenderness.  ABD:  Soft.  NT.  ND.  No rebound or guarding.  No masses.  : deferred   OLENA:  deferred  EXT:  Warm, well perfused.  SKIN:  Warm.   Dry.  No rashes.  NEURO:  CN grossly intact.            Data:   All laboratory data reviewed:    All pertinent imaging reviewed:         Impression and Plan:   Impression:   Russ Agee is a 65 year old male status post Rezume treatment 06/2020 for LUTS presenting with ejaculatory dysfunction since surgery.      Plan:     Discussed that anejaculation is likely secondary to scar from Rezum causing ejaculatory duct obstruction.  He had perfectly normal erectile function prior to the procedure.  Treatments to the floor of the prostatic channel can result in blockage / scar of the ejaculatory ducts, in theory.    Discussed that trans-rectal ultrasound could be done to see if there is ejaculatory duct remnant that could be unroofed with transurethral resection, he is not interested in pursuing this workup right now.    PRN follow-up with      This patient's exam findings, labs, and imaging discussed with urology staff surgeon Dr. Schmitz, who developed the treatment plan.    Heidi Jones MD  PGY-2 Urology       I saw and examined the patient with the resident today.  I agree with the resident note and plan of care as above.     Andrew Schmitz MD  Urology Staff

## 2021-01-05 ASSESSMENT — PATIENT HEALTH QUESTIONNAIRE - PHQ9: SUM OF ALL RESPONSES TO PHQ QUESTIONS 1-9: 8

## 2021-01-26 DIAGNOSIS — K21.9 ESOPHAGEAL REFLUX: Primary | ICD-10-CM

## 2021-01-29 RX ORDER — ESOMEPRAZOLE MAGNESIUM 40 MG/1
40 CAPSULE, DELAYED RELEASE ORAL
Qty: 90 CAPSULE | Refills: 0 | Status: SHIPPED | OUTPATIENT
Start: 2021-01-29 | End: 2023-06-15

## 2021-01-29 NOTE — TELEPHONE ENCOUNTER
Esomeprazole Magnesium Oral Capsule Delayed Release 40 MG      Last Written Prescription Date: not on med list     previously order  By PCP  Removed by other provider  Discontinued Medication Reconciliation Clean Up Case Dalal 6/23/20 1450     Last Office Visit : 2-6-20  Future Office visit:  none    Routing refill request to provider for review/approval because:  Drug not active on patient's medication list

## 2021-03-03 NOTE — PATIENT INSTRUCTIONS
VASCULAR NOTE         Patient: Kelli Bolton Jr Date of Service: 3/3/2021   : 1949 MRN: [id number]     SUBJECTIVE:   HISTORY OF PRESENT ILLNESS:  Kelli Bolton Jr is a 69-year-old male with severe peripheral arterial disease history, the left leg ulceration healed post bypass, the right leg ulcerations did not heal and the ended up with a right above-knee amputation  - 10/30/18 L CFA to PT bypass w reverse cadaveric GSV  - 1/15/2019 right diagnostic angiogram   - 2019 right AKA  - 2019 left leg angiogram with angioplasty of left PT anastomosis   - 3/13/2020 graft duplex 3/3/20   -2020 graft duplex, patent, no stenosis  -10/2020 graft duplex, patent, no stenosis    PAST MEDICAL HISTORY:  Past Medical History:   Diagnosis Date   • Essential (primary) hypertension    • HLD (hyperlipidemia)    • Laryngeal cancer (CMS/HCC)    • Osteoarthritis of right hip    • Peripheral vascular disease (CMS/HCC)    • Prostate cancer (CMS/HCC)    • Rheumatic fever    • Throat cancer (CMS/HCC)        MEDICATIONS:  Current Outpatient Medications   Medication Sig   • enzalutamide (XTANDI) 40 MG capsule Take 4 capsules by mouth daily.   • atorvastatin (LIPITOR) 40 MG tablet Take 40 mg by mouth.   • rivaroxaban (Xarelto) 20 MG Tab Take 1 tablet by mouth daily.   • gabapentin (NEURONTIN) 300 MG capsule Take 1 capsule at bedtime daily, may increase to 1 capsule twice a day AFTER 1-2 weeks   • triamterene-hydrochlorothiazide (MAXZIDE) 75-50 MG per tablet 1/2 tab daily   • cloNIDine (CATAPRES) 0.1 MG tablet Take 0.1 mg by mouth.   • enzalutamide (XTANDI) 40 MG capsule    • clopidogrel (PLAVIX) 75 MG tablet Take 75 mg by mouth daily.   • colchicine/probenecid (COLBENEMID) 0.5-500 MG per tablet TAKE 1 TABLET TWICE A DAY AS DIRECTED   • benazepril (LOTENSIN) 40 MG tablet TAKE 1 TABLET DAILY   • metoPROLOL succinate (TOPROL-XL) 100 MG 24 hr tablet TAKE 1 TABLET DAILY   • amLODIPine (NORVASC) 10 MG tablet TAKE 1 TABLET  [facility] [phone] ([facility] [place], [address]th [address]) Follow up in one  month.      DAILY     No current facility-administered medications for this visit.        ALLERGIES:  ALLERGIES:  No Known Allergies    PAST SURGICAL HISTORY:  Past Surgical History:   Procedure Laterality Date   • Femoral bypass Left 10/30/2018    left common femoral artery to posterior tibial artery bypass   • Hip arthroscopy, dx Right    • Leg amputation through knee     • Prostate biopsy     • Total laryngectomy         FAMILY HISTORY:  Family History   Problem Relation Age of Onset   • Patient is unaware of any medical problems Mother    • Patient is unaware of any medical problems Father    • Cancer, Lung Paternal Aunt        SOCIAL HISTORY:  Social History     Tobacco Use   • Smoking status: Former Smoker     Packs/day: 2.00     Years: 20.00     Pack years: 40.00     Quit date:      Years since quittin.1   • Smokeless tobacco: Never Used   Substance Use Topics   • Alcohol use: Not Currently   • Drug use: Not Currently           OBJECTIVE:     Physical Exam     Video visit:       Right above-knee amputation well-healed    Left lower extremity previous wound overall healed, pink scar     , trace edema only at the ankle  strong biphasic left posterior tibial   No dp audible   No ulcers        DIAGNOSTIC STUDIES:       Assessment AND PLAN:   Peripheral Arterial Disease ( PAD):           of note, s/p laryngectomy   - counseled on atherosclerosis of the extremity arteries  - risk factors include Diabetes, hypercholesterolemia, smoking, overweight, sedentary lifestyle, hypertension, etc  - we discussed claudication, calf pain with walking relieved by rest, is most common symptom.   - counseled on exercise walking regimen, 4-5 days per week, for minimum 30-45 minutes of walking, and walk through pain as much as possible       RIGHT AKA 19  -HEALED WELL   He had a history of C. difficile and is lost weight, he is working on regaining muscle mass to have more strength to use his prosthesis    CLI w 10/30/18 L CFA  to PT bypass w reverse cadaveric GSV   -8/12/19 Duplex shows increased velocity of 290 cm/s at the distal anastomosis.  There was some excoriations at the distal ankle but those have been healed, over the past week since the daughter yuli photos, I recommend he go to wound clinic but now he has no wounds, he has freshly healed small areas at the ankle with pink healthy skin, so no follow-up in clinic is needed    - 8/22/2019 left leg angiogram with angioplasty of left PT anastomosis   Did Plavix for 6 weeks, xarelto lifelong for bypass    - 3/13/2020 graft duplex 3/3/20 : No stenosis, patent bypass, and posterior tibial artery  6/10/2020 graft duplex shows no hemodynamically significant stenosis, bypass is patent, triphasic flow  10/2020 graft duplex shows no stenosis, patent bypass, triphasic flow with PT runoff  2/12/21   graft duplex shows no stenosis, patent bypass, triphasic flow with PT runoff     Patient counseled that if the scab does not improve over the next few weeks to call me and I can always consider a sharp debridement in the office and start wound care, however he notes that this is happened a few times when he bumps it and it does heal on its own so I will hold off on debridement of the superficial scab      Follow-up in 4 months again  with a graft duplex of the left leg cadaveric bypass,   Will  need to do close surveillance on this lifelong      This visit is being performed via video to discuss Video Visit    Clinician Location: [facility] [address] is in [place] and his identity has been established.   He was informed that consent to treat includes permission to submit charges to the applicable insurance on file. Kelli was advised regarding the potential risk inherent in video visits, as the assessment may be limited due to what can be seen on the screen which potentially results in an incomplete assessment; as well as either of us may discontinue the  video visit if it is felt that the videoconferencing connections are not adequate for his/her situation.   10-20  minutes were spent in this encounter.      cc PCP

## 2021-03-19 ENCOUNTER — TRANSFERRED RECORDS (OUTPATIENT)
Dept: HEALTH INFORMATION MANAGEMENT | Facility: CLINIC | Age: 66
End: 2021-03-19

## 2021-05-16 DIAGNOSIS — J34.89 SINUS DRAINAGE: Primary | ICD-10-CM

## 2021-05-18 NOTE — TELEPHONE ENCOUNTER
Patient prefer telephone visit so schedule for June 2 at 2:30 PM confirmed by patient.    Марина Oshea, Clinic Coordinator, May 18, 2021 at 2:18 PM    RX sent to pt pharmacy.  Chinyere Lyman RN 3:17 PM on 5/19/2021.

## 2021-05-18 NOTE — TELEPHONE ENCOUNTER
fluticasone (FLONASE) 50 MCG/ACT nasal spray      Last Written Prescription Date:  11/29/2019  Last Fill Quantity: 1 ml,   # refills: 3  Last Office Visit : 2/6/2020  Future Office visit:  none    Routing refill request to provider for review/approval because:  Drug not active on patient's medication list  - > 12 months since last visit  - message sent to P[facility] scheduling

## 2021-05-19 RX ORDER — FLUTICASONE PROPIONATE 50 MCG
1 SPRAY, SUSPENSION (ML) NASAL DAILY
Qty: 16 G | Refills: 1 | Status: SHIPPED | OUTPATIENT
Start: 2021-05-19 | End: 2021-06-02

## 2021-05-24 NOTE — PROGRESS NOTES
HPI:    Pt. Comes in with several months of B nasal drainage. He had sinus surgery several years ago. He had sinus CT scan 12/3/2013 for a similar problem. Clear sinus drainage. No significant sinus pressure. No sore throat or post nasal drip down his throat. No F/C/NS. No neck complaints. He has used nasal steroids in the past with some relief. He states he had an accident in [place] many years ago and has several months of B lower abdominal discomfort and increased urinary frequency w/o dysuria. He has h/o prostate cancer and was seen by Dr. Trotter Urology 4/22/2019. He had a prostate MRI imaging 4/1/2019. He o/w denies additional HEENT, cardiopulmonary, abdominal, , neurological, systemic, psychiatric complaints.     Past Medical History:   Diagnosis Date     Anxiety      Chronic hepatitis C (H)     treated.  Undetectable     Depressive disorder     followed by Dr. Mccormick     ETOH abuse      GERD (gastroesophageal reflux disease)      Malignant neoplasm of prostate (H)      Mucous cyst of joint      Palpitations      Past Surgical History:   Procedure Laterality Date     CYSTOSCOPY N/A 11/27/2017    Procedure: CYSTOSCOPY;  Cystoscopy, Rezum Procedure;  Surgeon: Gerardo Trotter MD;  Location:  OR     ENDOSCOPIC ULTRASOUND, ESOPHAGOSCOPY, GASTROSCOPY, DUODENOSCOPY (EGD), COMBINED  12/16/15     ESOPHAGOSCOPY, GASTROSCOPY, DUODENOSCOPY (EGD), COMBINED N/A 4/9/2015    Procedure: COMBINED ESOPHAGOSCOPY, GASTROSCOPY, DUODENOSCOPY (EGD);  Surgeon: Raina Romo MD;  Location:  GI     EXCISE MASS FINGER  2/19/2013    Procedure: EXCISE MASS FINGER;  Mass Excision Left Index Finger     (local anesthesia);  Surgeon: Raina Dorsey MD;  Location:  OR      UGI ENDOSCOPY W EUS N/A 12/16/2015    Procedure: COMBINED ENDOSCOPIC ULTRASOUND, ESOPHAGOSCOPY, GASTROSCOPY, DUODENOSCOPY (EGD);  Surgeon: Brady Acuna MD;  Location:  GI     PROSTATE SURGERY       TRANSURETHERAL DESTRUCTION  OF PROSTATE BY THERMOTHERAPY N/A 11/27/2017    Procedure: TRANSURETHERAL DESTRUCTION OF PROSTATE BY THERMOTHERAPY;;  Surgeon: Gerardo Trotter MD;  [facility]     PE:    Vitals noted, gen nad cooperative alert, HEENT, oropharynx clear no tenderness to maxillary or frontal sinus areas, no nasal drainage, neck supple nl rom, no adenopathy, lungs with good air movement, RRR, S1, S2, no MRG, abdomen, positive BS's no masses, and no tenderness, grossly normal neurological exam    A/P:    1. He got the influenza vaccination 10/4/2019  2. Ordered sinus CT imaging (from previous surgery) done 12/6/2019, Seen by Dr. Ryan,  ENT 12/20/2019  3. Abdominal complaints. Labs, UA and abdominal/pelvic CT imaging possible pancreatic mass? And MRI scheduled for 12/27/2019.   4. He remains on his psychiatric medications and states he continues to follow with outside psychiatry.   5. Nicotine for smoking cessation.   6. H/o hep C:  His last Hep C viral load 2/28/18 and 4/1/2019  was undetectable. Liver U/S 3/8/208 fairly unremarkable. He had lab testing 1/14/2019 including AFP that are stable.  Abdominal U/s from 4/1/2019 did not show any worrisome findings. Ordered U/S for next year 4/2020.   7. He has Urology appt. With Ms. Akhtar 1/18/2020 for prostate cancer.     I will see him back 1/10/2020 follow up on MRI     Total time spent 25 minutes.  More than 50% of the time spent with Mr. Agee on counseling / coordinating his care           Harriet

## 2021-06-02 ENCOUNTER — VIRTUAL VISIT (OUTPATIENT)
Dept: INTERNAL MEDICINE | Facility: CLINIC | Age: 66
End: 2021-06-02
Payer: COMMERCIAL

## 2021-06-02 DIAGNOSIS — Z12.11 SPECIAL SCREENING FOR MALIGNANT NEOPLASMS, COLON: ICD-10-CM

## 2021-06-02 DIAGNOSIS — J34.89 SINUS DRAINAGE: ICD-10-CM

## 2021-06-02 DIAGNOSIS — Z13.220 SCREENING CHOLESTEROL LEVEL: ICD-10-CM

## 2021-06-02 DIAGNOSIS — R97.8 OTHER ABNORMAL TUMOR MARKERS: ICD-10-CM

## 2021-06-02 DIAGNOSIS — N52.8 OTHER MALE ERECTILE DYSFUNCTION: ICD-10-CM

## 2021-06-02 DIAGNOSIS — B19.20 HEPATITIS C VIRUS INFECTION, UNSPECIFIED CHRONICITY: Primary | ICD-10-CM

## 2021-06-02 DIAGNOSIS — R79.9 ABNORMAL FINDING OF BLOOD CHEMISTRY, UNSPECIFIED: ICD-10-CM

## 2021-06-02 PROCEDURE — 99213 OFFICE O/P EST LOW 20 MIN: CPT | Mod: TEL | Performed by: INTERNAL MEDICINE

## 2021-06-02 RX ORDER — FLUTICASONE PROPIONATE 50 MCG
1 SPRAY, SUSPENSION (ML) NASAL DAILY
Qty: 16 G | Refills: 3 | Status: SHIPPED | OUTPATIENT
Start: 2021-06-02 | End: 2023-06-15

## 2021-06-02 RX ORDER — SILDENAFIL CITRATE 20 MG/1
TABLET ORAL
Qty: 90 TABLET | Refills: 11 | Status: SHIPPED | OUTPATIENT
Start: 2021-06-02 | End: 2023-06-15
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
## 2021-06-02 NOTE — NURSING NOTE
Chief Complaint   Patient presents with     Recheck Medication       Kimberly Nissen, EMT at 1:55 PM on 6/2/2021

## 2021-06-10 ENCOUNTER — TELEPHONE (OUTPATIENT)
Dept: GASTROENTEROLOGY | Facility: CLINIC | Age: 66
End: 2021-06-10

## 2021-06-10 DIAGNOSIS — Z11.59 ENCOUNTER FOR SCREENING FOR OTHER VIRAL DISEASES: ICD-10-CM

## 2021-06-10 NOTE — TELEPHONE ENCOUNTER
Screening Questions  1. What insurance is in the chart? ucare/medicare    2.  Ordering/Referring Provider: [facility]    3. BMI 23.0    4. Are you on daily home oxygen? no    5. Do you have a history of difficult airway? no    6. Have you had a heart, lung, or liver transplant? no    7. Are you currently on dialysis? no    8. Have you had a stroke or Transient ischemic atttack (TIA) within 6 months? no    9. In the past 6 months, have you had any heart related issues including cardiomyopathy or heart attack?         If yes, did it require cardiac stenting or other implantable device? no    10. Do you have any implantable devices in your body (pacemaker, defib, LVAD)? no    11. Do you take nitroglycerin? If yes, how often? no    12. Are you currently taking any blood thinners?no    13. Are you a diabetic? no    14. (Females) Are you currently pregnant?   If yes, how many weeks?    15. Have you had a procedure in the past that was difficult to tolerate with conscious sedation? Any allergies to Fentanyl or Versed no    16. Are you taking any scheduled prescription narcotics more than once daily? no    17. Do you have any chemical dependencies such as alcohol, street drugs, or methadone? no    18. Do you have any history of post-traumatic stress syndrome or mental health issues? no    19. Do you transfer independently? yes    20.  Do you have any issues with constipation? no    21. Preferred Pharmacy for Pre Prescription [facility] on chart     Scheduling Details    Procedure Scheduled: Colonoscopy   Provider/Surgeon: GATITO López  Date of Procedure: 7/9/2021  Location: [facility]  Caller (Please ask for phone number if not scheduled by patient): Russ Agee      Sedation Type: [facility]  Conscious Sedation- Needs  for 6 hours after the procedure  MAC/General-Needs  for 24 hours after procedure    Pre-op Required at [facility] and OR for  MAC sedation:   (if yes advise patient they will need a pre-op prior to procedure)      Is  patient on blood thinners? -no (If yes- inform patient to follow up with PCP or provider for follow up instructions)     Informed patient they will need an adult  yes  Cannot take any type of public or medical transportation alone    Informed Patient of COVID Test Requirement yes    Confirmed Nurse will call to complete assessment y    Additional comments: no

## 2021-06-30 ENCOUNTER — TELEPHONE (OUTPATIENT)
Dept: GASTROENTEROLOGY | Facility: CLINIC | Age: 66
End: 2021-06-30

## 2021-06-30 DIAGNOSIS — Z12.11 ENCOUNTER FOR SCREENING COLONOSCOPY: Primary | ICD-10-CM

## 2021-06-30 RX ORDER — BISACODYL 5 MG
TABLET, DELAYED RELEASE (ENTERIC COATED) ORAL
Qty: 2 TABLET | Refills: 0 | Status: SHIPPED | OUTPATIENT
Start: 2021-06-30 | End: 2023-06-15

## 2021-06-30 NOTE — TELEPHONE ENCOUNTER
Writer reviewed pre-assessment questions with patient prior to upcoming colonoscopy on 7.9.2021.  COVID test scheduled for 7.5.2021.    Pt states he does have constipation.  Will order extended prep for colonoscopy to [place], [place] - [address] [place] [address] [phone].  Extended prep instructions sent via Letter.    Attempted to review  extended prep instructions with patient.  Noting no nuts, seeds, or popcorn 7 days prior to procedure.     Reviewed conscious sedation with pt and answered all of pt's questions.    Designated  policy reviewed with patient.     Pt requesting a call next week closer to procedure to review prep instructions.    Ashlyn Salguero RN

## 2021-06-30 NOTE — LETTER
June 30, 2021      Russ Agee  [address]  [facility] [address]              Dear Russ,      Colonoscopy  Your exam is on 7.9.2021  Arrival Time: 1:30pm  [facility] at: [facility]; [address], [address], [place] [address]    Please arrive with an adult who can drive you home after the exam and stay with you for the next 24 hours, unless your provider says otherwise.   The medicines used in the exam will make you sleepy. You will not be able to drive. You cannot take a medical cab, taxi, Uber, train or bus by yourself.                              Please ensure your COVID test is scheduled within 96 hours or 4 days of your procedure. If you have not been contacted to schedule please call [phone].    For questions or appointments, call:  [facility]: [phone] Opt 2  Monday through Friday, 7 a.m. to 4:30 p.m.  (If it is after hours, call [phone]. Ask for the GI fellow resident on call.)    Extended Colonoscopy Prep    The inside of your intestine must be clean in order to allow examination of the colon for presence of any growths and abnormalities, as well as their biopsy or removal.  A number of tips are included in order to make this part of the procedure as comfortable as possible.    Immediately:     Discontinue iron supplements and multivitamins    Fill your prescription for Golytely (2 containers), a bottle of Magnesium Citrate, and 2 Dulcolax tablets at the pharmacy.    It is very important that you stay well hydrated during the colonoscopy prep. The large volume of the bowel cleansing liquid is designed to clean your colon, but it will not provide hydration. While you are getting the prescribed prep, you may also get 64 oz. of Gatorade or similar sports drink product. (Avoid red and purple colors)    Discontinue Fiber supplements    3 days prior: 7.6.2021    Begin restricted, low residue diet    2 days prior:  7.7.2021    Remember to discontinue NSAIDs, example: Advil, Aleve, Ibuprofen, Naproxen, Motrin    Make sure to stay well hydrated, drink at least 4 large glasses of Gatorade or similar sports drink    Drink to be well hydrated, this is important.    Discontinue non-steroidal anti-inflammatory medications as these drugs may increase the risk of bleeding.    4:00 PM: Drink 10oz Magnesium Citrate (one bottle)    1 day prior: 7.8.2021    Plan on being at home this day.    Begin clear liquid diet only.    Avoid any red or purple colored items    10:00 AM:  Take 2 Dulcolax tablets at 10 AM    3:00 PM:  You will start drinking the Golytely solution.  Drink one, eight oz. glass every 10-15 minutes until   of the 1st container of Golytely is gone.    You will likely start having frequent liquid bowel movements within an hour of drinking the Golytely solution.    8:00 PM: Drink the second half of the 1st container of Golytely.  Drink one, eight oz. glass every 10-15 minutes until   of the 1st container of Golytely is gone.    The prep liquid will not keep you hydrated. You should continue drinking clear liquids throughout the day.    Before you go to bed, mix the 2nd container of Golytely.    Procedure day: 7.9.2021    6 hours before your check-in time @ 7:30, drink one, eight oz. glass every 10-15 minutes until   of the 2nd  container of Golytely is gone.    You may take your necessary morning medications.    You can have clear liquids until 4 hours prior to your exam.    Be sure to have a     Please do your nebs and airway clearance therapy in the morning prior to the procedure.    Please arrive with an adult who can drive you home after the exam. If using public transportation you must have someone to ride with you.    Low Residue:   Do not have:     Corn in any form    Raw vegetables    Foods with seeds    Whole wheat breads and cereals    Brown or Wild Rice    Granola    Raisins and dried fruit    Berries     Popcorn    Raw Fruits    You can have:    White breads, rolls, biscuits, plain crackers    White rice    Skinless potatoes    Low fiber cereals such as Rice Krispies    Chicken, Turkey, Fish, Seafood    Eggs    Applesauce, pear sauce    Ripe bananas    Canned fruit  without seeds or skins    Cooked vegetables or canned vegetables without seeds      Clear Liquid Diet:    Sports drinks such as Gatorade, PowerAde, etc.    Coffee, tea, water    Pulp free juices    Lemonade from powdered mixes    Fruit flavored drinks such as Emil Aid, Crystal Light, ect    Carbonated beverages and soda    Fat Free broth    Plain or flavored gelatins    Fruit ices, Italian Ices    Sorbet    Popsicles without milk or added fruit pieces    Clear liquid nutritional drinks such as Enlive (clear drink), Resource Breeze    Do not have:    Alcoholic beverages    Red or Purple colored items

## 2021-07-05 DIAGNOSIS — R97.8 OTHER ABNORMAL TUMOR MARKERS: ICD-10-CM

## 2021-07-05 DIAGNOSIS — R79.9 ABNORMAL FINDING OF BLOOD CHEMISTRY, UNSPECIFIED: ICD-10-CM

## 2021-07-05 DIAGNOSIS — B19.20 HEPATITIS C VIRUS INFECTION, UNSPECIFIED CHRONICITY: ICD-10-CM

## 2021-07-05 DIAGNOSIS — Z13.220 SCREENING CHOLESTEROL LEVEL: ICD-10-CM

## 2021-07-05 DIAGNOSIS — Z11.59 ENCOUNTER FOR SCREENING FOR OTHER VIRAL DISEASES: ICD-10-CM

## 2021-07-05 LAB
AFP SERPL-MCNC: <1.5 UG/L (ref 0–8)
ALBUMIN SERPL-MCNC: 3.6 G/DL (ref 3.4–5)
ALP SERPL-CCNC: 52 U/L (ref 40–150)
ALT SERPL W P-5'-P-CCNC: 13 U/L (ref 0–70)
ANION GAP SERPL CALCULATED.3IONS-SCNC: 5 MMOL/L (ref 3–14)
AST SERPL W P-5'-P-CCNC: 14 U/L (ref 0–45)
BASOPHILS # BLD AUTO: 0 10E9/L (ref 0–0.2)
BASOPHILS NFR BLD AUTO: 0.5 %
BILIRUB SERPL-MCNC: 0.2 MG/DL (ref 0.2–1.3)
BUN SERPL-MCNC: 18 MG/DL (ref 7–30)
CALCIUM SERPL-MCNC: 9.2 MG/DL (ref 8.5–10.1)
CHLORIDE SERPL-SCNC: 108 MMOL/L (ref 94–109)
CHOLEST SERPL-MCNC: 165 MG/DL
CO2 SERPL-SCNC: 28 MMOL/L (ref 20–32)
CREAT SERPL-MCNC: 0.94 MG/DL (ref 0.66–1.25)
DIFFERENTIAL METHOD BLD: ABNORMAL
EOSINOPHIL # BLD AUTO: 0.1 10E9/L (ref 0–0.7)
EOSINOPHIL NFR BLD AUTO: 0.9 %
ERYTHROCYTE [DISTWIDTH] IN BLOOD BY AUTOMATED COUNT: 13.3 % (ref 10–15)
GFR SERPL CREATININE-BSD FRML MDRD: 85 ML/MIN/{1.73_M2}
GLUCOSE SERPL-MCNC: 120 MG/DL (ref 70–99)
HCT VFR BLD AUTO: 37.9 % (ref 40–53)
HDLC SERPL-MCNC: 64 MG/DL
HGB BLD-MCNC: 12.8 G/DL (ref 13.3–17.7)
IMM GRANULOCYTES # BLD: 0 10E9/L (ref 0–0.4)
IMM GRANULOCYTES NFR BLD: 0.3 %
LABORATORY COMMENT REPORT: NORMAL
LDLC SERPL CALC-MCNC: 85 MG/DL
LYMPHOCYTES # BLD AUTO: 1.4 10E9/L (ref 0.8–5.3)
LYMPHOCYTES NFR BLD AUTO: 20.4 %
MCH RBC QN AUTO: 31.2 PG (ref 26.5–33)
MCHC RBC AUTO-ENTMCNC: 33.8 G/DL (ref 31.5–36.5)
MCV RBC AUTO: 92 FL (ref 78–100)
MONOCYTES # BLD AUTO: 0.6 10E9/L (ref 0–1.3)
MONOCYTES NFR BLD AUTO: 9.6 %
NEUTROPHILS # BLD AUTO: 4.6 10E9/L (ref 1.6–8.3)
NEUTROPHILS NFR BLD AUTO: 68.3 %
NONHDLC SERPL-MCNC: 101 MG/DL
NRBC # BLD AUTO: 0 10*3/UL
NRBC BLD AUTO-RTO: 0 /100
PLATELET # BLD AUTO: 160 10E9/L (ref 150–450)
POTASSIUM SERPL-SCNC: 4.4 MMOL/L (ref 3.4–5.3)
PROT SERPL-MCNC: 7.5 G/DL (ref 6.8–8.8)
RBC # BLD AUTO: 4.1 10E12/L (ref 4.4–5.9)
SARS-COV-2 RNA RESP QL NAA+PROBE: NEGATIVE
SARS-COV-2 RNA RESP QL NAA+PROBE: NORMAL
SODIUM SERPL-SCNC: 141 MMOL/L (ref 133–144)
SPECIMEN SOURCE: NORMAL
SPECIMEN SOURCE: NORMAL
TRIGL SERPL-MCNC: 78 MG/DL
WBC # BLD AUTO: 6.7 10E9/L (ref 4–11)

## 2021-07-05 PROCEDURE — 87522 HEPATITIS C REVRS TRNSCRPJ: CPT | Mod: 90 | Performed by: PATHOLOGY

## 2021-07-05 PROCEDURE — U0003 INFECTIOUS AGENT DETECTION BY NUCLEIC ACID (DNA OR RNA); SEVERE ACUTE RESPIRATORY SYNDROME CORONAVIRUS 2 (SARS-COV-2) (CORONAVIRUS DISEASE [COVID-19]), AMPLIFIED PROBE TECHNIQUE, MAKING USE OF HIGH THROUGHPUT TECHNOLOGIES AS DESCRIBED BY CMS-2020-01-R: HCPCS | Mod: 90 | Performed by: PATHOLOGY

## 2021-07-05 PROCEDURE — 80053 COMPREHEN METABOLIC PANEL: CPT | Performed by: PATHOLOGY

## 2021-07-05 PROCEDURE — 99000 SPECIMEN HANDLING OFFICE-LAB: CPT | Performed by: PATHOLOGY

## 2021-07-05 PROCEDURE — 36415 COLL VENOUS BLD VENIPUNCTURE: CPT | Performed by: PATHOLOGY

## 2021-07-05 PROCEDURE — 86803 HEPATITIS C AB TEST: CPT | Mod: 90 | Performed by: PATHOLOGY

## 2021-07-05 PROCEDURE — U0005 INFEC AGEN DETEC AMPLI PROBE: HCPCS | Mod: 90 | Performed by: PATHOLOGY

## 2021-07-05 PROCEDURE — 85025 COMPLETE CBC W/AUTO DIFF WBC: CPT | Performed by: PATHOLOGY

## 2021-07-05 PROCEDURE — 82105 ALPHA-FETOPROTEIN SERUM: CPT | Mod: 90 | Performed by: PATHOLOGY

## 2021-07-05 PROCEDURE — 80061 LIPID PANEL: CPT | Performed by: PATHOLOGY

## 2021-07-05 NOTE — TELEPHONE ENCOUNTER
"Reviewed extended prep instructions with patient multiple times. RN asked pt if there was anyone that would be helping pt with the prep.  Pt stated \"no.\"  Reviewed clear liquid diet.    Pt has picked up his colonoscopy prep prescriptions.      Pt has not received his prep instruction yet in the mail.  Pt declines prep instructions to be sent via e-mail.    Pt has [facility] number to call if needed [phone] Opt 2.    Patient verbalized understanding.  No further questions or concerns.    Ashlyn Salguero RN      "

## 2021-07-06 LAB — HCV AB SERPL QL IA: REACTIVE

## 2021-07-07 ENCOUNTER — TELEPHONE (OUTPATIENT)
Dept: INTERNAL MEDICINE | Facility: CLINIC | Age: 66
End: 2021-07-07

## 2021-07-07 LAB
HCV RNA SERPL NAA+PROBE-ACNC: NORMAL [IU]/ML
HCV RNA SERPL NAA+PROBE-LOG IU: NORMAL LOG IU/ML

## 2021-07-07 NOTE — TELEPHONE ENCOUNTER
"No show 7/7/2021 but copied A/P from that day.     A/P:    1. Immunizations; he has completed the Pfizer COVID vaccination series.   2. Colonoscopy scheduled for 7/9/2021  3. H/o Hep C and abdominal U/S scheduled for today. AFP negative 7/5/2021. Positive Hep C antibody but Viral load RNA pending. Negative in the past. See GI note from 5/30/2017 and was treated with Harvoni and ribavirin in the past and felt he is \"cured\"  Hepatitis B infection? And ordered labs today 7/7/2021  4. Lipids done 7/5/2021 with HDL 64 and LDL 85  5. PSA done ordered by Dr. Trotter, Urology 4/9/2020 elevated at 4.01. Pt. Was [facility] with Dr. Trotter Urology on 1/11/2021. There is a note from 10/19/2020  6. Thyroid nodule seen in Endocrinology 9/19/2018. U/s done 10/15/2018 several benign appearing thyroid nodules unchanged from 2017.       "

## 2021-07-08 NOTE — TELEPHONE ENCOUNTER
"RN contacted pt to review extended prep instructions again.     Pt stated \"I didn't understand all the instructions.\"  \"I don't know if I am doing this right.\"    Pt states that he took the magnesium citrate and bisacodyl tabs at 10am    Pt has been on a clear liquid diet today.      Pt has started his first container of golytely at 3pm.    Pt stated he doesn't have a  for tomorrow.  RN reiterated to pt that he needs someone to drive him home and someone to stay with pt for 6 hours post procedure due to conscious sedation.  Pt is aware that he needs to r/s procedure if he is unable to find a .  Pt is also aware he cannot take an uber or taxi home from appt.    Pt has no further questions or concerns.      Ashlyn Salguero RN    "

## 2021-07-09 ENCOUNTER — HOSPITAL ENCOUNTER (OUTPATIENT)
Facility: AMBULATORY SURGERY CENTER | Age: 66
Discharge: HOME | End: 2021-07-09
Attending: INTERNAL MEDICINE | Admitting: INTERNAL MEDICINE
Payer: COMMERCIAL

## 2021-07-09 ENCOUNTER — APPOINTMENT (OUTPATIENT)
Dept: INTERPRETER SERVICES | Facility: CLINIC | Age: 66
End: 2021-07-09
Payer: COMMERCIAL

## 2021-07-09 VITALS
TEMPERATURE: 97.1 F | BODY MASS INDEX: 22.96 KG/M2 | WEIGHT: 164 LBS | RESPIRATION RATE: 14 BRPM | DIASTOLIC BLOOD PRESSURE: 77 MMHG | SYSTOLIC BLOOD PRESSURE: 133 MMHG | HEART RATE: 65 BPM | OXYGEN SATURATION: 98 % | HEIGHT: 71 IN

## 2021-07-09 LAB — COLONOSCOPY: NORMAL

## 2021-07-09 PROCEDURE — 45380 COLONOSCOPY AND BIOPSY: CPT | Mod: PT,59

## 2021-07-09 PROCEDURE — 88305 TISSUE EXAM BY PATHOLOGIST: CPT | Performed by: PATHOLOGY

## 2021-07-09 PROCEDURE — 45385 COLONOSCOPY W/LESION REMOVAL: CPT | Mod: PT

## 2021-07-09 RX ORDER — LIDOCAINE 40 MG/G
CREAM TOPICAL
Status: DISCONTINUED | OUTPATIENT
Start: 2021-07-09 | End: 2021-07-10 | Stop reason: HOSPADM

## 2021-07-09 RX ORDER — SIMETHICONE
LIQUID (ML) MISCELLANEOUS PRN
Status: DISCONTINUED | OUTPATIENT
Start: 2021-07-09 | End: 2021-07-09 | Stop reason: HOSPADM

## 2021-07-09 RX ORDER — FENTANYL CITRATE 50 UG/ML
INJECTION, SOLUTION INTRAMUSCULAR; INTRAVENOUS PRN
Status: DISCONTINUED | OUTPATIENT
Start: 2021-07-09 | End: 2021-07-09 | Stop reason: HOSPADM

## 2021-07-09 RX ORDER — ONDANSETRON 2 MG/ML
4 INJECTION INTRAMUSCULAR; INTRAVENOUS
Status: DISCONTINUED | OUTPATIENT
Start: 2021-07-09 | End: 2021-07-10 | Stop reason: HOSPADM

## 2021-07-09 ASSESSMENT — MIFFLIN-ST. JEOR: SCORE: 1551.03

## 2021-07-10 LAB — COPATH REPORT: NORMAL

## 2021-09-23 ENCOUNTER — TELEPHONE (OUTPATIENT)
Dept: INTERNAL MEDICINE | Facility: CLINIC | Age: 66
End: 2021-09-23

## 2021-09-23 NOTE — TELEPHONE ENCOUNTER
Left Message with [facility] #for patient to call back to confirm appointment on 10/25 at 4pm with Dr. Haque.

## 2021-09-23 NOTE — TELEPHONE ENCOUNTER
----- Message from Марина Oshea sent at 9/22/2021 12:25 PM CDT -----  Hey,  I won't be here to call the patient. Can one of you reach out like after 4:00 PM today to confirmed his appointment that is reschedule to 10/25/21 with Dr Haque.    He originally have an appointment for 10/19 but the provider schedule change so he need to be reschedule. I asked twice if 10/25 at 4:00 PM work and he said yes but it was loud in the background.    He ask to call back after 4 PM today to confirmed and if it will work for him.    Thanks    Марина

## 2021-10-24 NOTE — PROGRESS NOTES
"HPI:    He states he saw a \"[facility] provider twice who did some testing (swab and/or blood tests) and diagnosed him with a \"mouth infection\" possibly viral. I could not locate any of this documentation. He states bad breath. He has been evaluated for this in the past. He denies any poor dentition, no sore throat or mouth sores. He denies any reflux. He did have a normal EGD 4/9/2015; no history of Zenker's diverticulum. He states trouble B nasal breathing and obstruction. He states he is mouth breathing. No injury. He states flonase has not been that effective. He had a sinus CT scan 12/6/2019. No sinus pressure. No nasal drainage. No systemic sxs. No F/C/NS. No other breathing issues. No additional HEENT, cardiopulmonary, abdominal, , neurological, endocrine, vascular complaints.   Past Medical History:   Diagnosis Date     Anemia      Anxiety      Chronic hepatitis C (H)     treated.  Undetectable     Depressive disorder     followed by Dr. Mccormick     ETOH abuse      GERD (gastroesophageal reflux disease)      Malignant neoplasm of prostate (H)      Mucous cyst of joint      Palpitations      Past Surgical History:   Procedure Laterality Date     COLONOSCOPY N/A 7/9/2021    Procedure: COLONOSCOPY, WITH POLYPECTOMY;  Surgeon: Brady López MD;  [place] [facility]     CYSTOSCOPY N/A 11/27/2017    Procedure: CYSTOSCOPY;  Cystoscopy, Rezum Procedure;  Surgeon: Gerardo Trotter MD;  Location:  OR     ENDOSCOPIC ULTRASOUND, ESOPHAGOSCOPY, GASTROSCOPY, DUODENOSCOPY (EGD), COMBINED  12/16/15     ESOPHAGOSCOPY, GASTROSCOPY, DUODENOSCOPY (EGD), COMBINED N/A 4/9/2015    Procedure: COMBINED ESOPHAGOSCOPY, GASTROSCOPY, DUODENOSCOPY (EGD);  Surgeon: Raina Romo MD;  Location:  GI     EXCISE MASS FINGER  2/19/2013    Procedure: EXCISE MASS FINGER;  Mass Excision Left Index Finger     (local anesthesia);  Surgeon: Raina Dorsey MD;  Location:  OR      UGI ENDOSCOPY W EUS N/A 12/16/2015    " "Procedure: COMBINED ENDOSCOPIC ULTRASOUND, ESOPHAGOSCOPY, GASTROSCOPY, DUODENOSCOPY (EGD);  Surgeon: Brady Acuna MD;  Location: [facility] GI     PROSTATE SURGERY       TRANSURETHERAL DESTRUCTION OF PROSTATE BY THERMOTHERAPY N/A 11/27/2017    Procedure: TRANSURETHERAL DESTRUCTION OF PROSTATE BY THERMOTHERAPY;;  Surgeon: Gerardo Trotter MD;  [facility]     TRANSURETHERAL DESTRUCTION OF PROSTATE BY THERMOTHERAPY N/A 6/26/2020    Procedure: CYSTOSCOPY and REZUM Procedure;  Surgeon: Gerardo Trotter MD;  [facility] OR     PE:    Vitals noted, gen, nad, cooperative, alert, neck supple nl rom, oropharynx no obvious dental abnormalities, clear, no oral lesions, no pharyngeal erythema or exudate, no neck adenopathy, no tenderness to sinus palpation, Lungs with good air movement, RRR, S1, S2, no MRG, abdomen, no acute findings. Grossly normal neurological exam.       A/P:    1.  Immunizations; he has gotten 2 doses of the Pfizer COVID vaccination. Check with insurance regarding Shingrix. Influenza vaccine today 10/25/2021  2. He remains on Wellbutrin, Resperidone?  and Remeron  3. Reflux on Nexium  4. PSA; elevated 4.01 ordered by Dr. Trotter, Urology 4/9/2020. Placed Urology referral on 10/25/2021  5. Chronic hepatitis C; abdominal U/S 3/6/2020 normal. AFP undetectable 7/5/2021. Undetectable HCV viral load 7/5/2021  6. Lipids 7/5/2021 HDL 64 and LDL 85.  7. Colonoscopy 7/9/2021  8. Referred to ENT for nasal issues  9. \"mouth infection\" unclear what his sxs are related to normal exam. He has brought this up in the past and he has seen ENT. EGD as above. Will follow.     40 minutes spent on the date of the encounter doing chart review, history and exam, documentation and further activities as noted above        "

## 2021-10-25 ENCOUNTER — OFFICE VISIT (OUTPATIENT)
Dept: INTERNAL MEDICINE | Facility: CLINIC | Age: 66
End: 2021-10-25
Payer: COMMERCIAL

## 2021-10-25 ENCOUNTER — TELEPHONE (OUTPATIENT)
Dept: UROLOGY | Facility: CLINIC | Age: 66
End: 2021-10-25

## 2021-10-25 VITALS
OXYGEN SATURATION: 99 % | TEMPERATURE: 98.1 F | HEIGHT: 71 IN | BODY MASS INDEX: 23.17 KG/M2 | HEART RATE: 58 BPM | SYSTOLIC BLOOD PRESSURE: 130 MMHG | WEIGHT: 165.5 LBS | DIASTOLIC BLOOD PRESSURE: 74 MMHG

## 2021-10-25 DIAGNOSIS — R97.20 ELEVATED PROSTATE SPECIFIC ANTIGEN (PSA): Primary | ICD-10-CM

## 2021-10-25 DIAGNOSIS — Z23 NEED FOR VACCINATION: ICD-10-CM

## 2021-10-25 DIAGNOSIS — J34.89 NASAL OBSTRUCTION: ICD-10-CM

## 2021-10-25 PROCEDURE — 90662 IIV NO PRSV INCREASED AG IM: CPT | Performed by: INTERNAL MEDICINE

## 2021-10-25 PROCEDURE — G0008 ADMIN INFLUENZA VIRUS VAC: HCPCS | Performed by: INTERNAL MEDICINE

## 2021-10-25 PROCEDURE — 99215 OFFICE O/P EST HI 40 MIN: CPT | Mod: 25 | Performed by: INTERNAL MEDICINE

## 2021-10-25 RX ORDER — ERGOCALCIFEROL 1.25 MG/1
CAPSULE, LIQUID FILLED ORAL
COMMUNITY
Start: 2021-09-30 | End: 2023-06-15

## 2021-10-25 RX ORDER — FERROUS GLUCONATE 324(38)MG
TABLET ORAL
COMMUNITY
Start: 2021-09-30 | End: 2023-06-15

## 2021-10-25 RX ORDER — MIRTAZAPINE 15 MG/1
TABLET, FILM COATED ORAL
COMMUNITY
Start: 2021-10-22 | End: 2023-06-15

## 2021-10-25 RX ORDER — ISONIAZID 300 MG/1
TABLET ORAL
COMMUNITY
Start: 2021-05-22 | End: 2023-06-15

## 2021-10-25 ASSESSMENT — PAIN SCALES - GENERAL: PAINLEVEL: NO PAIN (0)

## 2021-10-25 ASSESSMENT — MIFFLIN-ST. JEOR: SCORE: 1557.83

## 2021-10-25 NOTE — NURSING NOTE
Chief Complaint   Patient presents with     Consult     Pt can't breathe through nose; infection in mouth       Navin Soler, JOE at 3:57 PM on 10/25/2021.

## 2021-10-25 NOTE — TELEPHONE ENCOUNTER
Health Call Center    Phone Message    May a detailed message be left on voicemail: yes     Reason for Call: Other: Pt called in to schedule a f/u with Dr. Trotter. Per pt he does not have video capability, and Dr. Trotter is only virtual at this time. Pt would like to know if Dr. Trotter can make an exception and see him either in person or over the phone. Please call pt back to discuss     Action Taken: Message routed to:  [facility] uro    Travel Screening: Not Applicable

## 2021-10-26 NOTE — TELEPHONE ENCOUNTER
FUTURE VISIT INFORMATION      FUTURE VISIT INFORMATION:    Date: 11/24/21    Time: 2 PM    Location[place] [facility]  REFERRAL INFORMATION:    Referring provider: Dr. Donnie Haque    Referring providers clinic: [facility]    Reason for visit/diagnosis: Nasal Obstruction    RECORDS REQUESTED FROM:       Clinic name Comments Records Status Imaging Status   [facility] 10/25/21, 6/2/21 - [facility] OV with Dr. Haque  12/20/19 - ENT OV with Dr. Ryan  10/30/17 - ENT OV with Dr. Delaney [facility] 12/6/19 - CT Sinus  7/26/17 - MRI Brain [facility] PACs

## 2021-11-12 ENCOUNTER — PRE VISIT (OUTPATIENT)
Dept: UROLOGY | Facility: CLINIC | Age: 66
End: 2021-11-12
Payer: COMMERCIAL

## 2021-11-12 NOTE — TELEPHONE ENCOUNTER
Reason for visit: follow up      Dx/Hx/Sx: LUTS, had rezum 2020    Records/imaging/labs/orders: in epic     At Room[facility]: telephone visit

## 2021-11-24 ENCOUNTER — OFFICE VISIT (OUTPATIENT)
Dept: OTOLARYNGOLOGY | Facility: CLINIC | Age: 66
End: 2021-11-24
Attending: INTERNAL MEDICINE
Payer: COMMERCIAL

## 2021-11-24 ENCOUNTER — PRE VISIT (OUTPATIENT)
Dept: OTOLARYNGOLOGY | Facility: CLINIC | Age: 66
End: 2021-11-24

## 2021-11-24 VITALS — BODY MASS INDEX: 22.96 KG/M2 | WEIGHT: 164 LBS | HEIGHT: 71 IN

## 2021-11-24 DIAGNOSIS — R09.81 CONGESTION OF PARANASAL SINUS: Primary | ICD-10-CM

## 2021-11-24 DIAGNOSIS — J32.0 CHRONIC MAXILLARY SINUSITIS: ICD-10-CM

## 2021-11-24 DIAGNOSIS — J34.3 HYPERTROPHY OF INFERIOR NASAL TURBINATE: ICD-10-CM

## 2021-11-24 DIAGNOSIS — R19.6 HALITOSIS: ICD-10-CM

## 2021-11-24 PROCEDURE — 99212 OFFICE O/P EST SF 10 MIN: CPT | Mod: 25 | Performed by: OTOLARYNGOLOGY

## 2021-11-24 PROCEDURE — 31231 NASAL ENDOSCOPY DX: CPT | Performed by: OTOLARYNGOLOGY

## 2021-11-24 RX ORDER — FLUTICASONE PROPIONATE 50 MCG
1 SPRAY, SUSPENSION (ML) NASAL 2 TIMES DAILY
Qty: 9.9 ML | Refills: 1 | Status: SHIPPED | OUTPATIENT
Start: 2021-11-24 | End: 2022-03-04

## 2021-11-24 ASSESSMENT — MIFFLIN-ST. JEOR: SCORE: 1546.03

## 2021-11-24 ASSESSMENT — PAIN SCALES - GENERAL: PAINLEVEL: NO PAIN (0)

## 2021-11-24 NOTE — LETTER
11/24/2021       RE: Russ Agee  [address]  [facility] [address]     Dear Colleague,    Thank you for referring your patient, Russ Agee, to the [facility] EAR NOSE AND THROAT CLINIC [place] at [facility]. Please see a copy of my visit note below.      [facility]  Return Visit  Encounter date:   November 24, 2021    Chief Complaint:   Sinusitis, halitosis    Interval History:   Russ Agee is a 66 year old male who presents for follow up for several years of sinusitis and halitosis. He has history of limited ESS for sinusitis. He has not been since since 12/20/2019.    He has been dealing with crescendoing bilateral congestion since our last visit; today he is also feeling congested. He has not been using any medications for this recently. He denies any rhinorrhea.    Sino-Nasal Outcome Test (SNOT - 22)  DNT    [place] Operative History  Maxillary antrostomy, partial ethmoidectomy     Review of systems: A 14-point review of systems has been conducted and is negative for any notable symptoms, except as dictated in the history of present illness.     Physical Exam:  Vital signs: There were no vitals taken for this visit.   General Appearance: No acute distress, appropriate demeanor, conversant  Eyes: moist conjunctivae; EOMI; pupils symmetric;   Head: normocephalic; overall symmetric appearance without deformity  Face: overall symmetric;  Nose: No external deformity; septum deviated to left ausing greater than 70% obstruction; inferior turbinate hypertrophy  Lungs: symmetric chest rise; no wheezing  CV: Good distal perfusion; normal hear rate  Extremities: No deformity        Procedure Note  Procedure performed: Rigid nasal endoscopy  Indication: To evaluate for sinonasal pathology not visualized on routine anterior rhinoscopy  Anesthesia: 4% topical lidocaine with 0.05 % oxymetazoline  Description of procedure: A 30 degree, 3 mm  rigid endoscope was inserted into bilateral nasal cavities and the nasal valves, nasal cavity, middle meatus, sphenoethmoid recess, nasopharynx were evaluated for evidence of obstruction, edema, purulence, polyps and/or mass/lesion.     [place]-Dave Endoscopic Scoring System  Endoscopic observation Right Left   Polyps in middle meatus (0 = absent, 1 = restricted to middle meatus, 2 = Beyond middle meatus) 0 0   Discharge (0 = absent, 1 = thin and clear, 2 = thick, purulent) 0 0   Edema (0 = absent, 1 = mild-moderate, 2 = moderate-severe) 0 0   Crusting (0 = absent, 1 = mild-moderate, 2 = moderate-severe) 0 0   Scarring (0= absent, 1 = mild-moderate, 2 = moderate-severe) 0 0   Total 0 0     Findings  RT: MM and SER clear.  LT: Patent cavity with evidence of prior maxillary antrostomy and partial ethmoidectomy. MM and SER clear.    Nasopharynx clear.     The patient tolerated the procedure well without complication.     Laboratory Review:  n/a    Imaging Review:  n/a    Pathology Review:  n/a    Assessment/Medical Decision Making:  Inferior turbinate hypertrophy  Prior maxillary antrostomy, partial ethmoidectomy    Plan:  He has signs of inferior turbinate hypertrophy and we discussed medical intervention vs turbinate reduction. We will try Flonase and if he does not improve he may return for follow-up.     Vitaliy Ryan MD    [facility]  Rhinology, Endoscopic Skull Base Surgery  [facility]  Department of Otolaryngology - Head & Neck Surgery    Scribe Disclosure:Scribe Disclosure:  I, oJnatan Luna, am serving as a scribe to document services personally performed by Vitaliy Ryan MD at this visit, based upon the provider's statements to me. All documentation has been reviewed by the aforementioned provider prior to being entered into the official medical record.     Additional portions of the patient's history have been reviewed below.    ~~~~~~~~~~~~~~~~~~~~~~~~~~~~~~~~~~~~~~~~~~~~~~~~~~~~~~~~~~~~~~~~~~~~~~~~~~~~~~~~~~~~~~~~~~~~~~~~~~~~~~~~~~~~~~~~~~~~~~~~~~~~~~~~~~~~~~~    Past Medical History:   Diagnosis Date     Anemia      Anxiety      Chronic hepatitis C (H)     treated.  Undetectable     Depressive disorder     followed by Dr. Mccormick     ETOH abuse      GERD (gastroesophageal reflux disease)      Malignant neoplasm of prostate (H)      Mucous cyst of joint      Palpitations         Past Surgical History:   Procedure Laterality Date     COLONOSCOPY N/A 7/9/2021    Procedure: COLONOSCOPY, WITH POLYPECTOMY;  Surgeon: Brady López MD;  Location: [facility] OR     CYSTOSCOPY N/A 11/27/2017    Procedure: CYSTOSCOPY;  Cystoscopy, Rezum Procedure;  Surgeon: Gerardo Trotter MD;  Location:  OR     ENDOSCOPIC ULTRASOUND, ESOPHAGOSCOPY, GASTROSCOPY, DUODENOSCOPY (EGD), COMBINED  12/16/15     ESOPHAGOSCOPY, GASTROSCOPY, DUODENOSCOPY (EGD), COMBINED N/A 4/9/2015    Procedure: COMBINED ESOPHAGOSCOPY, GASTROSCOPY, DUODENOSCOPY (EGD);  Surgeon: Raina Romo MD;  Location:  GI     EXCISE MASS FINGER  2/19/2013    Procedure: EXCISE MASS FINGER;  Mass Excision Left Index Finger     (local anesthesia);  Surgeon: Raina Dorsey MD;  Location:  OR      UGI ENDOSCOPY W EUS N/A 12/16/2015    Procedure: COMBINED ENDOSCOPIC ULTRASOUND, ESOPHAGOSCOPY, GASTROSCOPY, DUODENOSCOPY (EGD);  Surgeon: Brady Acuna MD;  Location:  GI     PROSTATE SURGERY       TRANSURETHERAL DESTRUCTION OF PROSTATE BY THERMOTHERAPY N/A 11/27/2017    Procedure: TRANSURETHERAL DESTRUCTION OF PROSTATE BY THERMOTHERAPY;;  Surgeon: Gerardo Trotter MD;  Location:  OR     TRANSURETHERAL DESTRUCTION OF PROSTATE BY THERMOTHERAPY N/A 6/26/2020    Procedure: CYSTOSCOPY and REZUM Procedure;  Surgeon: Gerardo Trotter MD;  Location: [facility] OR        Family History   Problem Relation Age of Onset     Diabetes Sister      Diabetes Son       Alcohol/Drug Other      Arthritis Other      Circulatory Other      Eye Disorder Other      Depression Other      Genetic Disorder Other      Psychotic Disorder Other      Unknown/Adopted Mother      Unknown/Adopted Father      No Known Problems Sister      Cancer No family hx of         no skin cancer     Skin Cancer No family hx of      Thyroid Disease No family hx of      Thyroid Cancer No family hx of         Social History     Socioeconomic History     Marital status:      Spouse name: Not on file     Number of children: 10     Years of education: Not on file     Highest education level: Not on file   Occupational History     Occupation: retired   Tobacco Use     Smoking status: Former Smoker     Packs/day: 0.50     Years: 6.00     Pack years: 3.00     Types: Cigarettes     Quit date: 12/29/2017     Years since quitting: 3.9     Smokeless tobacco: Never Used   Substance and Sexual Activity     Alcohol use: No     Alcohol/week: 0.0 standard drinks     Comment:       Drug use: No     Sexual activity: Not on file   Other Topics Concern     Parent/sibling w/ CABG, MI or angioplasty before 65F 55M? Not Asked   Social History Narrative    Single.  [place] American.     Retired.    10 children - healthy    9 siblings - 2 alive.  ? History.     No family history of bleeding, clotting disorders or complications with anesthesia.         Social Determinants of Health     Financial Resource Strain: Not on file   Food Insecurity: Not on file   Transportation Needs: Not on file   Physical Activity: Not on file   Stress: Not on file   Social Connections: Not on file   Intimate Partner Violence: Not on file   Housing Stability: Not on file           Again, thank you for allowing me to participate in the care of your patient.      Sincerely,    Vitaliy Ryan MD

## 2021-11-24 NOTE — NURSING NOTE
"Chief Complaint   Patient presents with     RECHECK     nasal obstruction       Height 1.803 m (5' 11\"), weight 74.4 kg (164 lb).    Anuradha Ray, EMT    "

## 2021-11-24 NOTE — PROGRESS NOTES
[facility]  Return Visit  Encounter date:   November 24, 2021    Chief Complaint:   Sinusitis, halitosis    Interval History:   Russ Agee is a 66 year old male who presents for follow up for several years of sinusitis and halitosis. He has history of limited ESS for sinusitis. He has not been since since 12/20/2019.    He has been dealing with crescendoing bilateral congestion since our last visit; today he is also feeling congested. He has not been using any medications for this recently. He denies any rhinorrhea.    Sino-Nasal Outcome Test (SNOT - 22)  DNT    [place] Operative History  Maxillary antrostomy, partial ethmoidectomy     Review of systems: A 14-point review of systems has been conducted and is negative for any notable symptoms, except as dictated in the history of present illness.     Physical Exam:  Vital signs: There were no vitals taken for this visit.   General Appearance: No acute distress, appropriate demeanor, conversant  Eyes: moist conjunctivae; EOMI; pupils symmetric;   Head: normocephalic; overall symmetric appearance without deformity  Face: overall symmetric;  Nose: No external deformity; septum deviated to left ausing greater than 70% obstruction; inferior turbinate hypertrophy  Lungs: symmetric chest rise; no wheezing  CV: Good distal perfusion; normal hear rate  Extremities: No deformity        Procedure Note  Procedure performed: Rigid nasal endoscopy  Indication: To evaluate for sinonasal pathology not visualized on routine anterior rhinoscopy  Anesthesia: 4% topical lidocaine with 0.05 % oxymetazoline  Description of procedure: A 30 degree, 3 mm rigid endoscope was inserted into bilateral nasal cavities and the nasal valves, nasal cavity, middle meatus, sphenoethmoid recess, nasopharynx were evaluated for evidence of obstruction, edema, purulence, polyps and/or mass/lesion.     [place]-Dave Endoscopic Scoring System  Endoscopic observation Right Left   Polyps in  middle meatus (0 = absent, 1 = restricted to middle meatus, 2 = Beyond middle meatus) 0 0   Discharge (0 = absent, 1 = thin and clear, 2 = thick, purulent) 0 0   Edema (0 = absent, 1 = mild-moderate, 2 = moderate-severe) 0 0   Crusting (0 = absent, 1 = mild-moderate, 2 = moderate-severe) 0 0   Scarring (0= absent, 1 = mild-moderate, 2 = moderate-severe) 0 0   Total 0 0     Findings  RT: MM and SER clear.  LT: Patent cavity with evidence of prior maxillary antrostomy and partial ethmoidectomy. MM and SER clear.    Nasopharynx clear.     The patient tolerated the procedure well without complication.     Laboratory Review:  n/a    Imaging Review:  n/a    Pathology Review:  n/a    Assessment/Medical Decision Making:  Inferior turbinate hypertrophy  Prior maxillary antrostomy, partial ethmoidectomy    Plan:  He has signs of inferior turbinate hypertrophy and we discussed medical intervention vs turbinate reduction. We will try Flonase and if he does not improve he may return for follow-up.     Vitaliy Ryan MD    [facility]  Rhinology, Endoscopic Skull Base Surgery  [facility]  Department of Otolaryngology - Head & Neck Surgery    Scribe Disclosure:Scribe Disclosure:  I, Jonatan Luna, am serving as a scribe to document services personally performed by Vitaliy Ryan MD at this visit, based upon the provider's statements to me. All documentation has been reviewed by the aforementioned provider prior to being entered into the official medical record.     Additional portions of the patient's history have been reviewed below.   ~~~~~~~~~~~~~~~~~~~~~~~~~~~~~~~~~~~~~~~~~~~~~~~~~~~~~~~~~~~~~~~~~~~~~~~~~~~~~~~~~~~~~~~~~~~~~~~~~~~~~~~~~~~~~~~~~~~~~~~~~~~~~~~~~~~~~~~    Past Medical History:   Diagnosis Date     Anemia      Anxiety      Chronic hepatitis C (H)     treated.  Undetectable     Depressive disorder     followed by Dr. Mccormick     ETOH abuse      GERD  (gastroesophageal reflux disease)      Malignant neoplasm of prostate (H)      Mucous cyst of joint      Palpitations         Past Surgical History:   Procedure Laterality Date     COLONOSCOPY N/A 7/9/2021    Procedure: COLONOSCOPY, WITH POLYPECTOMY;  Surgeon: Brady López MD;  Location: [facility] OR     CYSTOSCOPY N/A 11/27/2017    Procedure: CYSTOSCOPY;  Cystoscopy, Rezum Procedure;  Surgeon: Gerardo Trotter MD;  Location: [facility] OR     ENDOSCOPIC ULTRASOUND, ESOPHAGOSCOPY, GASTROSCOPY, DUODENOSCOPY (EGD), COMBINED  12/16/15     ESOPHAGOSCOPY, GASTROSCOPY, DUODENOSCOPY (EGD), COMBINED N/A 4/9/2015    Procedure: COMBINED ESOPHAGOSCOPY, GASTROSCOPY, DUODENOSCOPY (EGD);  Surgeon: Raina Romo MD;  [facility] GI     EXCISE MASS FINGER  2/19/2013    Procedure: EXCISE MASS FINGER;  Mass Excision Left Index Finger     (local anesthesia);  Surgeon: Raina Dorsey MD;  [facility] OR      UGI ENDOSCOPY W EUS N/A 12/16/2015    Procedure: COMBINED ENDOSCOPIC ULTRASOUND, ESOPHAGOSCOPY, GASTROSCOPY, DUODENOSCOPY (EGD);  Surgeon: Brady Acuna MD;  Location:  GI     PROSTATE SURGERY       TRANSURETHERAL DESTRUCTION OF PROSTATE BY THERMOTHERAPY N/A 11/27/2017    Procedure: TRANSURETHERAL DESTRUCTION OF PROSTATE BY THERMOTHERAPY;;  Surgeon: Gerardo Trotter MD;  Location: [facility] OR     TRANSURETHERAL DESTRUCTION OF PROSTATE BY THERMOTHERAPY N/A 6/26/2020    Procedure: CYSTOSCOPY and REZUM Procedure;  Surgeon: Gerardo Trotter MD;  Location: [facility] OR        Family History   Problem Relation Age of Onset     Diabetes Sister      Diabetes Son      Alcohol/Drug Other      Arthritis Other      Circulatory Other      Eye Disorder Other      Depression Other      Genetic Disorder Other      Psychotic Disorder Other      Unknown/Adopted Mother      Unknown/Adopted Father      No Known Problems Sister      Cancer No family hx of         no skin cancer     Skin Cancer No family hx of       Thyroid Disease No family hx of      Thyroid Cancer No family hx of         Social History     Socioeconomic History     Marital status:      Spouse name: Not on file     Number of children: 10     Years of education: Not on file     Highest education level: Not on file   Occupational History     Occupation: retired   Tobacco Use     Smoking status: Former Smoker     Packs/day: 0.50     Years: 6.00     Pack years: 3.00     Types: Cigarettes     Quit date: 12/29/2017     Years since quitting: 3.9     Smokeless tobacco: Never Used   Substance and Sexual Activity     Alcohol use: No     Alcohol/week: 0.0 standard drinks     Comment:       Drug use: No     Sexual activity: Not on file   Other Topics Concern     Parent/sibling w/ CABG, MI or angioplasty before 65F 55M? Not Asked   Social History Narrative    Single.  [place] American.     Retired.    10 children - healthy    9 siblings - 2 alive.  ? History.     No family history of bleeding, clotting disorders or complications with anesthesia.         Social Determinants of Health     Financial Resource Strain: Not on file   Food Insecurity: Not on file   Transportation Needs: Not on file   Physical Activity: Not on file   Stress: Not on file   Social Connections: Not on file   Intimate Partner Violence: Not on file   Housing Stability: Not on file

## 2021-11-24 NOTE — PATIENT INSTRUCTIONS
1. You were seen in the ENT Clinic today by Dr. Ryan. The following has been recommended:   - Flonase    2.  Plan is to return to clinic as needed.    If you have any questions or concerns after your appointment, please call the clinic.    - Clinic phone: [phone]. Press option #1 for scheduling related needs. Press option #3 for Nurse advice.  - Agueda Pimentel RN (Dr. Ryan's nurse) [phone]      Lynn Steward RN  [phone]  [facility]  Department of Otolaryngology

## 2021-11-29 ENCOUNTER — VIRTUAL VISIT (OUTPATIENT)
Dept: UROLOGY | Facility: CLINIC | Age: 66
End: 2021-11-29
Payer: COMMERCIAL

## 2021-11-29 DIAGNOSIS — C61 PROSTATE CANCER (H): Primary | ICD-10-CM

## 2021-11-29 PROCEDURE — 99212 OFFICE O/P EST SF 10 MIN: CPT | Mod: 95 | Performed by: UROLOGY

## 2021-11-29 NOTE — NURSING NOTE
Chief Complaint   Patient presents with     Follow Up     LUTS       Patient Active Problem List   Diagnosis     CARDIOVASCULAR SCREENING; LDL GOAL LESS THAN 160     Alcohol addiction (H)     Malignant neoplasm of prostate (H)     Mucous cyst of joint     Depressive disorder     Skin lesion     Patellofemoral disorder of left knee     Right knee DJD     Oral lesion     Candidiasis of mouth     SVT (supraventricular tachycardia) (H)     Other chest pain     Dermatitis, seborrheic     History of hepatitis C     Palpitations     Hepatitis B core antibody positive     Benign prostatic hyperplasia with nocturia     Pain of both hip joints       Allergies   Allergen Reactions     Nkda [No Known Drug Allergies]        Current Outpatient Medications   Medication Sig Dispense Refill     acetaminophen (TYLENOL) 325 MG tablet Take 2 tablets (650 mg) by mouth every 4 hours as needed for mild pain 50 tablet 0     bisacodyl (DULCOLAX) 5 MG EC tablet Take 2 tablets by mouth at 10am the day before procedure. 2 tablet 0     buPROPion (WELLBUTRIN XL) 150 MG 24 hr tablet Take 1 tablet by mouth 1 time per day for 30 days       calcium polycarbophil (RA FIBER-CAP) 625 MG tablet Take 3 tablets by mouth twice daily       desonide (DESOWEN) 0.05 % ointment To affected areas of the face for itch and red bumps twice a day as needed. 60 g 11     esomeprazole (NEXIUM) 40 MG DR capsule Take 1 capsule (40 mg) by mouth every morning (before breakfast) For additional refills, please schedule a follow-up appointment at [phone] 90 capsule 0     ferrous gluconate (FERGON) 324 (38 Fe) MG tablet TAKE ONE (1) TABLET BY MOUTH DAILY       fluticasone (FLONASE) 50 MCG/ACT nasal spray Spray 1 spray into both nostrils 2 times daily for 200 doses 9.9 mL 1     fluticasone (FLONASE) 50 MCG/ACT nasal spray Spray 1 spray into both nostrils daily 16 g 3     gentamicin 0.008% in 1000ml 0.9% NaCl (GARAMYCIN) SOLN nasal irrigation Spray 30 mLs in nostril 2 times  daily Irrigate 30 mL between each nostril BID for a total of 60ml/day PLEASE MAIL TO PATIENT 1000 mL 3     isoniazid (NYDRAZID) 300 MG tablet        ketoconazole (NIZORAL) 2 % shampoo To entire wet scalp and face in affected areas and then wash off after several minutes three times a week. 240 mL 11     lidocaine (XYLOCAINE) 2 % jelly Apply 1 Tube topically       magnesium citrate solution Drink entire bottle at 4pm two days prior to procedure 296 mL 0     mirabegron (MYRBETRIQ) 25 MG 24 hr tablet Take 1 tablet (25 mg) by mouth daily (Patient taking differently: Take 25 mg by mouth every morning ) 90 tablet 3     Mirtazapine (REMERON PO) Take 15 mg by mouth At Bedtime       mirtazapine (REMERON) 15 MG tablet Take 1 tablet(s) by oral route daily at bedtime time for 30 days.       order for DME Equipment being ordered: Compression stockings (pair) 2 Device 0     polyethylene glycol (GOLYTELY) 236 g suspension Day before procedure: Start drinking the Golytely solution at 3pm.  Drink one, eight oz. glass every 10-15 minutes until half of the 1st container of Golytely is gone.  At 8pm drink the second half of the 1st container of Golytely.  Drink one, eight oz. glass every 10-15 minutes until gone.  Before you go to bed, mix the 2nd container of Golytely.  Day of procedure:  6 hours before your check-in time @ 730am, drink one, eight oz. glass every 10-15 minutes until half of the 2nd  container of Golytely is gone. 8000 mL 0     Psyllium (METAMUCIL PO) Take 3 tsp by mouth 2 times daily.       RisperiDONE (RISPERDAL PO) Take 3 mg by mouth At Bedtime       sildenafil (REVATIO) 20 MG tablet Take 1 to 5 tabs as needed for sexual activity. 90 tablet 11     traZODone (DESYREL) 50 MG tablet Take 50 mg by mouth At Bedtime        Urea 20 % CREA To affected, ridged nails daily. 60 g 5     vitamin D2 (ERGOCALCIFEROL) 22231 units (1250 mcg) capsule TAKE ONE (1) CAPSULE BY MOUTH WEEKLY         Social History     Tobacco Use      Smoking status: Former Smoker     Packs/day: 0.50     Years: 6.00     Pack years: 3.00     Types: Cigarettes     Quit date: 12/29/2017     Years since quitting: 3.9     Smokeless tobacco: Never Used   Substance Use Topics     Alcohol use: No     Alcohol/week: 0.0 standard drinks     Comment:       Drug use: No       Pari Hernandez LPN  11/29/2021  1:01 PM

## 2021-11-29 NOTE — LETTER
"11/29/2021       RE: Russ Agee  [address]  [facility] [address]     Dear Colleague,    Thank you for referring your patient, Russ Agee, to the [facility] [place] at [facility]. Please see a copy of my visit note below.    Russ is a 66 year old who is being evaluated via a billable telephone visit.      What phone number would you like to be contacted at? [phone]  How would you like to obtain your AVS? Mail a copy     Russ Agee is a 66 year old-year-old gentleman who is followed in urology clinic for a history of BPH with lower urinary tract symptoms. Symptoms have worsened after past REZUM treatment, he is now status post repeat cystoscopy, REZUM procedure on 6/26/20. He saw Dr. Schmitz for ejaculatory dysfunction.     After his most recent surgery 06/26/20, he is not able to ejaculate. He does have orgasm. No fluid or anything comes out. Does not want to have more children. He feels he is not \"enjoying\" if he does not ejaculate. Otherwise he is able to get erections. He also notes he has some increased frequency of urination. He was offered evaluation for ejaculatory duct unroofing but declined.  The patient is still taking myrbetriq and notes his urination is doing well currently.  He is now interested in pursuing work-up for obstructed ejaculatory ducts.    The patient is also on surveillance for low risk prosate cancer since 2009.  Most recent biopsy was in 4/8/19 which was negative for malignancy.  Most recent MRI was in 4/1/19 with a volume of 48 ml and only PIRADS 2 lesions.      ASSESSMENT AND PLAN: Over half of today's 9-minute visit was spent reviewing the chart, results and counseling the patient regarding LUTS and ejaculatory function.  I will refer the patient back to Dr. Schmitz for a TRUS to see if there is an option of TUR of the ejaculatory ducts to restore ejaculation.     In addition, the patient will get a " PSA at his convenience and call us back a week later for the result.        Phone call duration: 7 minutes    Again, thank you for allowing me to participate in the care of your patient.      Sincerely,    Gerardo Trotter MD

## 2021-11-29 NOTE — PROGRESS NOTES
"Russ is a 66 year old who is being evaluated via a billable telephone visit.      What phone number would you like to be contacted at? [phone]  How would you like to obtain your AVS? Mail a copy     Russ Agee is a 66 year old-year-old gentleman who is followed in urology clinic for a history of BPH with lower urinary tract symptoms. Symptoms have worsened after past REZUM treatment, he is now status post repeat cystoscopy, REZUM procedure on 6/26/20. He saw Dr. Schmitz for ejaculatory dysfunction.     After his most recent surgery 06/26/20, he is not able to ejaculate. He does have orgasm. No fluid or anything comes out. Does not want to have more children. He feels he is not \"enjoying\" if he does not ejaculate. Otherwise he is able to get erections. He also notes he has some increased frequency of urination. He was offered evaluation for ejaculatory duct unroofing but declined.  The patient is still taking myrbetriq and notes his urination is doing well currently.  He is now interested in pursuing work-up for obstructed ejaculatory ducts.    The patient is also on surveillance for low risk prosate cancer since 2009.  Most recent biopsy was in 4/8/19 which was negative for malignancy.  Most recent MRI was in 4/1/19 with a volume of 48 ml and only PIRADS 2 lesions.      ASSESSMENT AND PLAN: Over half of today's 9-minute visit was spent reviewing the chart, results and counseling the patient regarding LUTS and ejaculatory function.  I will refer the patient back to Dr. Schmitz for a TRUS to see if there is an option of TUR of the ejaculatory ducts to restore ejaculation.     In addition, the patient will get a PSA at his convenience and call us back a week later for the result.        Phone call duration: 7 minutes  "

## 2021-12-02 ENCOUNTER — TELEPHONE (OUTPATIENT)
Dept: UROLOGY | Facility: CLINIC | Age: 66
End: 2021-12-02
Payer: COMMERCIAL

## 2021-12-02 NOTE — TELEPHONE ENCOUNTER
LVM for patient to   Please get the patient in to see Dr. Schmitz for a TRUS to evaluate the ejaculatory ducts for possible unroofing.  He also needs a PSA and should call Kevyn a week after to get the result.  Otherwise, he should get in with Ana in 6 months with a PSA again.

## 2022-01-12 NOTE — PROGRESS NOTES
Clinical Consult:                                                    Russ Agee is a 62 year old male coming in for a clinical pharmacist consult.  He was referred to me from Dr. Haque.     Reason for Consult: Help him learn to use NRT for smoking cessation.     Discussion: He is chewing nicorette gum right now and his last cigarette was 1 week ago. When he has a craving, he will grab another piece of gum and wait for it to pass.  We review the required technique for maximal effectiveness of nicotine gum.  Patient would prefer to use patches but states they are not covered by his plan.     Plan:  1. Chew nicotine gum for a few minutes and, when the gum starts the create a tingling sensation, park it on you gums for maximal absorption of nicotine.    2. Will determine insurance coverage for patches.       Alejandrina Ji, Pharm.D., [facility]  Medication Therapy Management Pharmacist  Page/VM:  [phone]       Pt awake & conversing, VSS on RA, pain is 4-5/10, tolerating ice chips. Breathing is easier after duoneb, Transfer to Phase II.

## 2022-03-18 ENCOUNTER — MEDICAL CORRESPONDENCE (OUTPATIENT)
Dept: HEALTH INFORMATION MANAGEMENT | Facility: CLINIC | Age: 67
End: 2022-03-18
Payer: COMMERCIAL

## 2022-03-22 ENCOUNTER — TELEPHONE (OUTPATIENT)
Dept: INTERNAL MEDICINE | Facility: CLINIC | Age: 67
End: 2022-03-22

## 2022-03-22 NOTE — TELEPHONE ENCOUNTER
No show 3/21/2022    HPI:    Last visit with us 10/25/2021  Past Medical History:   Diagnosis Date     Anemia      Anxiety      Chronic hepatitis C (H)     treated.  Undetectable     Depressive disorder     followed by Dr. Mccormick     ETOH abuse      GERD (gastroesophageal reflux disease)      Malignant neoplasm of prostate (H)      Mucous cyst of joint      Palpitations      Past Surgical History:   Procedure Laterality Date     COLONOSCOPY N/A 7/9/2021    Procedure: COLONOSCOPY, WITH POLYPECTOMY;  Surgeon: Brady López MD;  Location: [facility] OR     CYSTOSCOPY N/A 11/27/2017    Procedure: CYSTOSCOPY;  Cystoscopy, Rezum Procedure;  Surgeon: Gerardo Trotter MD;  [facility]     ENDOSCOPIC ULTRASOUND, ESOPHAGOSCOPY, GASTROSCOPY, DUODENOSCOPY (EGD), COMBINED  12/16/15     ESOPHAGOSCOPY, GASTROSCOPY, DUODENOSCOPY (EGD), COMBINED N/A 4/9/2015    Procedure: COMBINED ESOPHAGOSCOPY, GASTROSCOPY, DUODENOSCOPY (EGD);  Surgeon: Raina Romo MD;  Location:  GI     EXCISE MASS FINGER  2/19/2013    Procedure: EXCISE MASS FINGER;  Mass Excision Left Index Finger     (local anesthesia);  Surgeon: Raina Dorsey MD;  [facility]      UGI ENDOSCOPY W EUS N/A 12/16/2015    Procedure: COMBINED ENDOSCOPIC ULTRASOUND, ESOPHAGOSCOPY, GASTROSCOPY, DUODENOSCOPY (EGD);  Surgeon: Brady Acuna MD;  Location:  GI     PROSTATE SURGERY       TRANSURETHERAL DESTRUCTION OF PROSTATE BY THERMOTHERAPY N/A 11/27/2017    Procedure: TRANSURETHERAL DESTRUCTION OF PROSTATE BY THERMOTHERAPY;;  Surgeon: Gerardo Trotter MD;  [facility]     TRANSURETHERAL DESTRUCTION OF PROSTATE BY THERMOTHERAPY N/A 6/26/2020    Procedure: CYSTOSCOPY and REZUM Procedure;  Surgeon: Gerardo Trotter MD;  [facility]     PE:    Vitals noted, gen, nad, cooperative, alert    A/P:    1. Pfizer COVID vaccine x 3. Influenza vaccine 10/25/2021. Tdap 9/26/2017. Pneumococcal 23 6/4/2007, Prevnar 13 done  4/12/2016.   2. On Risperidone for psychosis? Followed by outside Psychiatry   3. Colonoscopy 7/9/2021 and repeat on   4. Lipids checked 7/5/2021 with TG's 78, Hdl 64 and LDL 85  5. Hepatitis C Reactive 7/5/2021 and Hep C viral load No detected. AFP 7/5/2021 negative Abdominal U/S 3/6/2020 normal and ordered repeat 3/21/2022. Unremarkable abdominal U/S 12/27/2019  6. Seen Urology Dr. Trotter 11/29/2021 for BPH. Dr. Trotter ordered future PSA 11/29/2021  7. Seen ENT, Dr. Ryan 11/24/2021 for halitosis. He had EGD 4/9/2015 that was normal.

## 2022-04-24 ENCOUNTER — HEALTH MAINTENANCE LETTER (OUTPATIENT)
Age: 67
End: 2022-04-24

## 2022-04-25 ENCOUNTER — TELEPHONE (OUTPATIENT)
Dept: INTERNAL MEDICINE | Facility: CLINIC | Age: 67
End: 2022-04-25

## 2022-04-25 NOTE — TELEPHONE ENCOUNTER
No show 4/25/2022    HPI;    Past Medical History:   Diagnosis Date     Anemia      Anxiety      Chronic hepatitis C (H)     treated.  Undetectable     Depressive disorder     followed by Dr. Mccormick     ETOH abuse      GERD (gastroesophageal reflux disease)      Malignant neoplasm of prostate (H)      Mucous cyst of joint      Palpitations      Past Surgical History:   Procedure Laterality Date     COLONOSCOPY N/A 7/9/2021    Procedure: COLONOSCOPY, WITH POLYPECTOMY;  Surgeon: Brady López MD;  Location: [facility] OR     CYSTOSCOPY N/A 11/27/2017    Procedure: CYSTOSCOPY;  Cystoscopy, Rezum Procedure;  Surgeon: Gerardo Trotter MD;  [facility]     ENDOSCOPIC ULTRASOUND, ESOPHAGOSCOPY, GASTROSCOPY, DUODENOSCOPY (EGD), COMBINED  12/16/15     ESOPHAGOSCOPY, GASTROSCOPY, DUODENOSCOPY (EGD), COMBINED N/A 4/9/2015    Procedure: COMBINED ESOPHAGOSCOPY, GASTROSCOPY, DUODENOSCOPY (EGD);  Surgeon: Raina Romo MD;  Location:  GI     EXCISE MASS FINGER  2/19/2013    Procedure: EXCISE MASS FINGER;  Mass Excision Left Index Finger     (local anesthesia);  Surgeon: Raina Dorsey MD;  [facility]      UGI ENDOSCOPY W EUS N/A 12/16/2015    Procedure: COMBINED ENDOSCOPIC ULTRASOUND, ESOPHAGOSCOPY, GASTROSCOPY, DUODENOSCOPY (EGD);  Surgeon: Brady Acuna MD;  Location:  GI     PROSTATE SURGERY       TRANSURETHERAL DESTRUCTION OF PROSTATE BY THERMOTHERAPY N/A 11/27/2017    Procedure: TRANSURETHERAL DESTRUCTION OF PROSTATE BY THERMOTHERAPY;;  Surgeon: Gerardo Trotter MD;  [facility]     TRANSURETHERAL DESTRUCTION OF PROSTATE BY THERMOTHERAPY N/A 6/26/2020    Procedure: CYSTOSCOPY and REZUM Procedure;  Surgeon: Gerardo Trotter MD;  [facility]     PE:    Vitals noted, gen, nad, cooperative, alert    A/P:    1. Immunizations; Pfizer COVID vaccine x 2. Tdap 9/26/2017. Check with insurance regarding Shingrix. Pneumococcal 23 done 6/4/2007. Prevnar 13 done  4/12/2016  2. Colonoscopy 7/9/2021 and repeat in   3. Urology with Dr. Trotter, 11/19/2021 for lower tract urinary sxs. PSA ordered 11/29/2021   4. Lipids  5. Reflux on Nexium  6. On Resperidone for

## 2022-05-16 ENCOUNTER — TRANSFERRED RECORDS (OUTPATIENT)
Dept: HEALTH INFORMATION MANAGEMENT | Facility: CLINIC | Age: 67
End: 2022-05-16
Payer: COMMERCIAL

## 2022-07-13 ENCOUNTER — OFFICE VISIT (OUTPATIENT)
Dept: INTERNAL MEDICINE | Facility: CLINIC | Age: 67
End: 2022-07-13

## 2022-07-13 ENCOUNTER — LAB (OUTPATIENT)
Dept: LAB | Facility: CLINIC | Age: 67
End: 2022-07-13
Payer: COMMERCIAL

## 2022-07-13 VITALS
SYSTOLIC BLOOD PRESSURE: 129 MMHG | WEIGHT: 163.4 LBS | DIASTOLIC BLOOD PRESSURE: 71 MMHG | OXYGEN SATURATION: 99 % | BODY MASS INDEX: 22.88 KG/M2 | HEIGHT: 71 IN | RESPIRATION RATE: 16 BRPM | HEART RATE: 60 BPM

## 2022-07-13 DIAGNOSIS — E55.9 VITAMIN D DEFICIENCY: ICD-10-CM

## 2022-07-13 DIAGNOSIS — R79.9 ABNORMAL FINDING OF BLOOD CHEMISTRY, UNSPECIFIED: ICD-10-CM

## 2022-07-13 DIAGNOSIS — R09.81 NASAL CONGESTION: Primary | ICD-10-CM

## 2022-07-13 DIAGNOSIS — B18.2 HEPATITIS C VIRUS CARRIER STATE (H): ICD-10-CM

## 2022-07-13 DIAGNOSIS — R97.20 ELEVATED PROSTATE SPECIFIC ANTIGEN (PSA): ICD-10-CM

## 2022-07-13 DIAGNOSIS — K83.09 CHOLANGITIS (H): ICD-10-CM

## 2022-07-13 DIAGNOSIS — Z12.5 ENCOUNTER FOR SCREENING FOR MALIGNANT NEOPLASM OF PROSTATE: ICD-10-CM

## 2022-07-13 DIAGNOSIS — R09.81 NASAL CONGESTION: ICD-10-CM

## 2022-07-13 DIAGNOSIS — Z23 NEED FOR VACCINATION: ICD-10-CM

## 2022-07-13 LAB
AFP SERPL-MCNC: 1.8 NG/ML
ALBUMIN SERPL-MCNC: 3.4 G/DL (ref 3.4–5)
ALP SERPL-CCNC: 65 U/L (ref 40–150)
ALT SERPL W P-5'-P-CCNC: 16 U/L (ref 0–70)
ANION GAP SERPL CALCULATED.3IONS-SCNC: 6 MMOL/L (ref 3–14)
AST SERPL W P-5'-P-CCNC: 15 U/L (ref 0–45)
BASOPHILS # BLD AUTO: 0 10E3/UL (ref 0–0.2)
BASOPHILS NFR BLD AUTO: 0 %
BILIRUB SERPL-MCNC: 0.2 MG/DL (ref 0.2–1.3)
BUN SERPL-MCNC: 9 MG/DL (ref 7–30)
CALCIUM SERPL-MCNC: 8.8 MG/DL (ref 8.5–10.1)
CHLORIDE BLD-SCNC: 107 MMOL/L (ref 94–109)
CO2 SERPL-SCNC: 28 MMOL/L (ref 20–32)
CREAT SERPL-MCNC: 1.11 MG/DL (ref 0.66–1.25)
EOSINOPHIL # BLD AUTO: 0.1 10E3/UL (ref 0–0.7)
EOSINOPHIL NFR BLD AUTO: 2 %
ERYTHROCYTE [DISTWIDTH] IN BLOOD BY AUTOMATED COUNT: 14.1 % (ref 10–15)
GFR SERPL CREATININE-BSD FRML MDRD: 73 ML/MIN/1.73M2
GLUCOSE BLD-MCNC: 84 MG/DL (ref 70–99)
HCT VFR BLD AUTO: 35.8 % (ref 40–53)
HGB BLD-MCNC: 11.6 G/DL (ref 13.3–17.7)
IMM GRANULOCYTES # BLD: 0 10E3/UL
IMM GRANULOCYTES NFR BLD: 0 %
LYMPHOCYTES # BLD AUTO: 1.5 10E3/UL (ref 0.8–5.3)
LYMPHOCYTES NFR BLD AUTO: 25 %
MCH RBC QN AUTO: 31 PG (ref 26.5–33)
MCHC RBC AUTO-ENTMCNC: 32.4 G/DL (ref 31.5–36.5)
MCV RBC AUTO: 96 FL (ref 78–100)
MONOCYTES # BLD AUTO: 0.8 10E3/UL (ref 0–1.3)
MONOCYTES NFR BLD AUTO: 13 %
NEUTROPHILS # BLD AUTO: 3.5 10E3/UL (ref 1.6–8.3)
NEUTROPHILS NFR BLD AUTO: 60 %
NRBC # BLD AUTO: 0 10E3/UL
NRBC BLD AUTO-RTO: 0 /100
PLATELET # BLD AUTO: 147 10E3/UL (ref 150–450)
POTASSIUM BLD-SCNC: 4.6 MMOL/L (ref 3.4–5.3)
PROT SERPL-MCNC: 7 G/DL (ref 6.8–8.8)
PSA SERPL-MCNC: 2.57 UG/L (ref 0–4)
RBC # BLD AUTO: 3.74 10E6/UL (ref 4.4–5.9)
SODIUM SERPL-SCNC: 141 MMOL/L (ref 133–144)
WBC # BLD AUTO: 5.9 10E3/UL (ref 4–11)

## 2022-07-13 PROCEDURE — 80053 COMPREHEN METABOLIC PANEL: CPT | Performed by: PATHOLOGY

## 2022-07-13 PROCEDURE — 85025 COMPLETE CBC W/AUTO DIFF WBC: CPT | Performed by: PATHOLOGY

## 2022-07-13 PROCEDURE — G0009 ADMIN PNEUMOCOCCAL VACCINE: HCPCS | Performed by: INTERNAL MEDICINE

## 2022-07-13 PROCEDURE — 82306 VITAMIN D 25 HYDROXY: CPT | Mod: 90 | Performed by: PATHOLOGY

## 2022-07-13 PROCEDURE — 90677 PCV20 VACCINE IM: CPT | Performed by: INTERNAL MEDICINE

## 2022-07-13 PROCEDURE — G0103 PSA SCREENING: HCPCS | Performed by: PATHOLOGY

## 2022-07-13 PROCEDURE — 82105 ALPHA-FETOPROTEIN SERUM: CPT | Mod: 90 | Performed by: PATHOLOGY

## 2022-07-13 PROCEDURE — 36415 COLL VENOUS BLD VENIPUNCTURE: CPT | Performed by: PATHOLOGY

## 2022-07-13 PROCEDURE — 99000 SPECIMEN HANDLING OFFICE-LAB: CPT | Performed by: PATHOLOGY

## 2022-07-13 PROCEDURE — 99214 OFFICE O/P EST MOD 30 MIN: CPT | Mod: 25 | Performed by: INTERNAL MEDICINE

## 2022-07-13 RX ORDER — IPRATROPIUM BROMIDE 21 UG/1
2 SPRAY, METERED NASAL EVERY 12 HOURS
Qty: 30 ML | Refills: 1 | Status: SHIPPED | OUTPATIENT
Start: 2022-07-13

## 2022-07-13 ASSESSMENT — PAIN SCALES - GENERAL: PAINLEVEL: NO PAIN (0)

## 2022-07-13 NOTE — NURSING NOTE
Chief Complaint   Patient presents with     Recheck Medication       Navin Soler, EMT at 3:05 PM on 7/13/2022.

## 2022-07-13 NOTE — PROGRESS NOTES
HPI:    He states chronic nasal drainage possibly allergic. He states flonase is not effective. No other sinus complaints. He states he continues to follow with his psychiatrist provider. No other HEENT, cardiopulmonary, abdominal, , neurological, systemic, psychiatric, lymphatic, vascular complaints.       Past Medical History:   Diagnosis Date     Anemia      Anxiety      Chronic hepatitis C (H)     treated.  Undetectable     Depressive disorder     followed by Dr. Mccormick     ETOH abuse      GERD (gastroesophageal reflux disease)      Malignant neoplasm of prostate (H)      Mucous cyst of joint      Palpitations      Past Surgical History:   Procedure Laterality Date     COLONOSCOPY N/A 7/9/2021    Procedure: COLONOSCOPY, WITH POLYPECTOMY;  Surgeon: Brady López MD;  Location: [facility] OR     CYSTOSCOPY N/A 11/27/2017    Procedure: CYSTOSCOPY;  Cystoscopy, Rezum Procedure;  Surgeon: Gerardo Trotter MD;  Location:  OR     ENDOSCOPIC ULTRASOUND, ESOPHAGOSCOPY, GASTROSCOPY, DUODENOSCOPY (EGD), COMBINED  12/16/15     ESOPHAGOSCOPY, GASTROSCOPY, DUODENOSCOPY (EGD), COMBINED N/A 4/9/2015    Procedure: COMBINED ESOPHAGOSCOPY, GASTROSCOPY, DUODENOSCOPY (EGD);  Surgeon: Raina Romo MD;  Location:  GI     EXCISE MASS FINGER  2/19/2013    Procedure: EXCISE MASS FINGER;  Mass Excision Left Index Finger     (local anesthesia);  Surgeon: Raina Dorsey MD;  Location:  OR      UGI ENDOSCOPY W EUS N/A 12/16/2015    Procedure: COMBINED ENDOSCOPIC ULTRASOUND, ESOPHAGOSCOPY, GASTROSCOPY, DUODENOSCOPY (EGD);  Surgeon: Brady Acuna MD;  Location:  GI     PROSTATE SURGERY       TRANSURETHERAL DESTRUCTION OF PROSTATE BY THERMOTHERAPY N/A 11/27/2017    Procedure: TRANSURETHERAL DESTRUCTION OF PROSTATE BY THERMOTHERAPY;;  Surgeon: Gerardo Trotter MD;  Location:  OR     TRANSURETHERAL DESTRUCTION OF PROSTATE BY THERMOTHERAPY N/A 6/26/2020    Procedure: CYSTOSCOPY and REZUM  Procedure;  Surgeon: Gerardo Trotter MD;  [facility]     PE:    Vitals noted, gen, nad, cooperative, alert, neck supple nl rom, lungs with good air movement, RRR, S1, S2, no MRG, abdomen, no acute findings. Grossly normal neurological exam.       1. Elevated PSA ordered today. He had seen Dr. Trotter, Urology 11/29/2021  2. History of Hepatitis C. Ordered labs, AFP and abdominal U/S today  3. Changed nasal spray to Atrovent today  4. Ordered lipids for cholesterol check today  5. Colonoscopy  7/9/2021  6. Immunizations; Pfizer COVID vaccine x 4. Tdap 9/26/2017. He got the new Prevnar 20 today. Check with insurance regarding Shingrix.  7. He should continue to follow with his psychiatric provider and remains on his same medications.       30 minutes spent on the date of the encounter doing chart review, history and exam, documentation and further activities as noted above exclusive of procedures and other billable interpretations

## 2022-07-14 ENCOUNTER — LAB (OUTPATIENT)
Dept: LAB | Facility: CLINIC | Age: 67
End: 2022-07-14
Payer: COMMERCIAL

## 2022-07-14 DIAGNOSIS — B18.2 HEPATITIS C VIRUS CARRIER STATE (H): ICD-10-CM

## 2022-07-14 DIAGNOSIS — R09.81 NASAL CONGESTION: ICD-10-CM

## 2022-07-14 DIAGNOSIS — R97.20 ELEVATED PROSTATE SPECIFIC ANTIGEN (PSA): ICD-10-CM

## 2022-07-14 DIAGNOSIS — R79.9 ABNORMAL FINDING OF BLOOD CHEMISTRY, UNSPECIFIED: ICD-10-CM

## 2022-07-14 DIAGNOSIS — E55.9 VITAMIN D DEFICIENCY: ICD-10-CM

## 2022-07-14 DIAGNOSIS — Z12.5 ENCOUNTER FOR SCREENING FOR MALIGNANT NEOPLASM OF PROSTATE: ICD-10-CM

## 2022-07-14 DIAGNOSIS — K83.09 CHOLANGITIS (H): ICD-10-CM

## 2022-07-14 LAB
CHOLEST SERPL-MCNC: 173 MG/DL
DEPRECATED CALCIDIOL+CALCIFEROL SERPL-MC: 18 UG/L (ref 20–75)
FASTING STATUS PATIENT QL REPORTED: NO
HDLC SERPL-MCNC: 58 MG/DL
LDLC SERPL CALC-MCNC: 96 MG/DL
NONHDLC SERPL-MCNC: 115 MG/DL
TRIGL SERPL-MCNC: 94 MG/DL

## 2022-07-14 PROCEDURE — 36415 COLL VENOUS BLD VENIPUNCTURE: CPT | Performed by: PATHOLOGY

## 2022-07-14 PROCEDURE — 80061 LIPID PANEL: CPT | Performed by: PATHOLOGY

## 2022-08-03 ENCOUNTER — MEDICAL CORRESPONDENCE (OUTPATIENT)
Dept: HEALTH INFORMATION MANAGEMENT | Facility: CLINIC | Age: 67
End: 2022-08-03

## 2022-08-05 ENCOUNTER — TELEPHONE (OUTPATIENT)
Dept: INTERNAL MEDICINE | Facility: CLINIC | Age: 67
End: 2022-08-05

## 2022-08-05 NOTE — TELEPHONE ENCOUNTER
[facility] Call Center    Phone Message    May a detailed message be left on voicemail: yes     Reason for Call: Other: Noni said she faxed the request for incontinent supplies on July/23, they never got the request sent back to them to process the request please review and follow up once request has been complete she will send it again today just in case you never got them.     Fax: [phone]    Action Taken: Message routed to:  [facility]    Travel Screening: Not Applicable

## 2022-08-06 ENCOUNTER — MEDICAL CORRESPONDENCE (OUTPATIENT)
Dept: HEALTH INFORMATION MANAGEMENT | Facility: CLINIC | Age: 67
End: 2022-08-06

## 2022-08-13 ENCOUNTER — MEDICAL CORRESPONDENCE (OUTPATIENT)
Dept: INTERNAL MEDICINE | Facility: CLINIC | Age: 67
End: 2022-08-13

## 2022-09-02 NOTE — NURSING NOTE
Chief Complaint   Patient presents with     Testicular/scrotal Pain     left testicule swollen and sore     JOSH GRANADOS at 1:13 PM on 1/31/2017.     No

## 2022-09-10 NOTE — PROGRESS NOTES
HPI:    Overall stable. He states B groin rash that itches for about 3 months. He has not tried any OTC topical treatment(s) for this. No skin breakdown. No other HEENT, cardiopulmonary, abdominal, , neurological, systemic, psychiatric, lymphatic, endocrine, vascular complaints.     Past Medical History:   Diagnosis Date     Anemia      Anxiety      Chronic hepatitis C (H)     treated.  Undetectable     Depressive disorder     followed by Dr. Mccormick     ETOH abuse      GERD (gastroesophageal reflux disease)      Malignant neoplasm of prostate (H)      Mucous cyst of joint      Palpitations      Past Surgical History:   Procedure Laterality Date     COLONOSCOPY N/A 7/9/2021    Procedure: COLONOSCOPY, WITH POLYPECTOMY;  Surgeon: Brady López MD;  Location: [facility] OR     CYSTOSCOPY N/A 11/27/2017    Procedure: CYSTOSCOPY;  Cystoscopy, Rezum Procedure;  Surgeon: Gerardo Trotter MD;  Location:  OR     ENDOSCOPIC ULTRASOUND, ESOPHAGOSCOPY, GASTROSCOPY, DUODENOSCOPY (EGD), COMBINED  12/16/15     ESOPHAGOSCOPY, GASTROSCOPY, DUODENOSCOPY (EGD), COMBINED N/A 4/9/2015    Procedure: COMBINED ESOPHAGOSCOPY, GASTROSCOPY, DUODENOSCOPY (EGD);  Surgeon: Raina Romo MD;  Location:  GI     EXCISE MASS FINGER  2/19/2013    Procedure: EXCISE MASS FINGER;  Mass Excision Left Index Finger     (local anesthesia);  Surgeon: Raina Dorsey MD;  Location:  OR      UGI ENDOSCOPY W EUS N/A 12/16/2015    Procedure: COMBINED ENDOSCOPIC ULTRASOUND, ESOPHAGOSCOPY, GASTROSCOPY, DUODENOSCOPY (EGD);  Surgeon: Brady Acuna MD;  Location:  GI     PROSTATE SURGERY       TRANSURETHERAL DESTRUCTION OF PROSTATE BY THERMOTHERAPY N/A 11/27/2017    Procedure: TRANSURETHERAL DESTRUCTION OF PROSTATE BY THERMOTHERAPY;;  Surgeon: Gerardo Trotter MD;  Location:  OR     TRANSURETHERAL DESTRUCTION OF PROSTATE BY THERMOTHERAPY N/A 6/26/2020    Procedure: CYSTOSCOPY and REZUM Procedure;  Surgeon:  Gerardo Trotter MD;  [facility]     PE:    Vitals noted, gen, nad, cooperative, alert, neck supple nl rom, lungs with good air movement, RRR, S1, S2, no MRG, abdomen, no acute findings. He has some mild B groin/inguinal erythema, no skin breakdown. Grossly normal neurological exam.     A/P:    1. Elevated PSA at 2.57 on 7/13/2022.  He had seen Dr. Trotter, Urology 11/29/2021  2. History of Hepatitis C. Ordered labs, AFP done normal at 1.8 on 7/13/2022 and abdominal U/S done 7/14/2022 unremarkable   3. Changed nasal spray to Atrovent  On 7/13/2022  4. Lipids done 7/13/2022 with TG's 94, HDL 58 and LDL 96  5. Colonoscopy  7/9/2021  6. Immunizations; Pfizer COVID vaccine x 4. Tdap 9/26/2017. He got the new Prevnar 20  On 7/13/2022.  Check with insurance regarding Shingrix. Influenza vaccine today   7. He should continue to follow with his psychiatric provider and remains on his same medications.   8. Low Vitamin D level at 18 on 7/13/2022 and recommend replacement. Will check in the future. Sent in Rx. For 2000 international unit(s) Vitamin D today.   9. Anemia (Hgb 11.6) and thrombocytopenia (platelets 147) on 7/13/2022. Abdominal U/S as above 7/14/2022 no significant liver or spleen findings. MVC 96 and normal RDW. Ordered labs 9/10/2022  10. Sent in Rx. For topical Lotrimin; if worse will have him see Dermatology.         30 minutes spent on the date of the encounter doing chart review, history and exam, documentation and further activities as noted above exclusive of procedures and other billable interpretations

## 2022-09-12 ENCOUNTER — LAB (OUTPATIENT)
Dept: LAB | Facility: CLINIC | Age: 67
End: 2022-09-12

## 2022-09-12 ENCOUNTER — OFFICE VISIT (OUTPATIENT)
Dept: INTERNAL MEDICINE | Facility: CLINIC | Age: 67
End: 2022-09-12
Payer: COMMERCIAL

## 2022-09-12 VITALS
DIASTOLIC BLOOD PRESSURE: 68 MMHG | SYSTOLIC BLOOD PRESSURE: 114 MMHG | BODY MASS INDEX: 22.44 KG/M2 | HEIGHT: 71 IN | HEART RATE: 64 BPM | OXYGEN SATURATION: 97 % | WEIGHT: 160.3 LBS

## 2022-09-12 DIAGNOSIS — Z23 NEED FOR VACCINATION: ICD-10-CM

## 2022-09-12 DIAGNOSIS — D64.9 ANEMIA, UNSPECIFIED TYPE: Primary | ICD-10-CM

## 2022-09-12 DIAGNOSIS — D64.9 ANEMIA, UNSPECIFIED TYPE: ICD-10-CM

## 2022-09-12 DIAGNOSIS — R21 RASH: ICD-10-CM

## 2022-09-12 DIAGNOSIS — E55.9 VITAMIN D DEFICIENCY: ICD-10-CM

## 2022-09-12 LAB
BASOPHILS # BLD AUTO: 0 10E3/UL (ref 0–0.2)
BASOPHILS NFR BLD AUTO: 1 %
EOSINOPHIL # BLD AUTO: 0.1 10E3/UL (ref 0–0.7)
EOSINOPHIL NFR BLD AUTO: 2 %
ERYTHROCYTE [DISTWIDTH] IN BLOOD BY AUTOMATED COUNT: 13.9 % (ref 10–15)
FERRITIN SERPL-MCNC: 147 NG/ML (ref 31–409)
HCT VFR BLD AUTO: 38 % (ref 40–53)
HGB BLD-MCNC: 12.5 G/DL (ref 13.3–17.7)
IMM GRANULOCYTES # BLD: 0 10E3/UL
IMM GRANULOCYTES NFR BLD: 0 %
IRON BINDING CAPACITY (ROCHE): 248 UG/DL (ref 240–430)
IRON SATN MFR SERPL: 20 % (ref 15–46)
IRON SERPL-MCNC: 49 UG/DL (ref 61–157)
LYMPHOCYTES # BLD AUTO: 1.3 10E3/UL (ref 0.8–5.3)
LYMPHOCYTES NFR BLD AUTO: 21 %
MCH RBC QN AUTO: 31.3 PG (ref 26.5–33)
MCHC RBC AUTO-ENTMCNC: 32.9 G/DL (ref 31.5–36.5)
MCV RBC AUTO: 95 FL (ref 78–100)
MONOCYTES # BLD AUTO: 0.7 10E3/UL (ref 0–1.3)
MONOCYTES NFR BLD AUTO: 12 %
NEUTROPHILS # BLD AUTO: 4.2 10E3/UL (ref 1.6–8.3)
NEUTROPHILS NFR BLD AUTO: 64 %
NRBC # BLD AUTO: 0 10E3/UL
NRBC BLD AUTO-RTO: 0 /100
PLATELET # BLD AUTO: 147 10E3/UL (ref 150–450)
RBC # BLD AUTO: 4 10E6/UL (ref 4.4–5.9)
RETICS # AUTO: 0.04 10E6/UL (ref 0.03–0.1)
RETICS/RBC NFR AUTO: 1 % (ref 0.5–2)
VIT B12 SERPL-MCNC: 465 PG/ML (ref 232–1245)
WBC # BLD AUTO: 6.4 10E3/UL (ref 4–11)

## 2022-09-12 PROCEDURE — 90662 IIV NO PRSV INCREASED AG IM: CPT | Performed by: INTERNAL MEDICINE

## 2022-09-12 PROCEDURE — 82607 VITAMIN B-12: CPT | Mod: 90 | Performed by: PATHOLOGY

## 2022-09-12 PROCEDURE — 99214 OFFICE O/P EST MOD 30 MIN: CPT | Mod: 25 | Performed by: INTERNAL MEDICINE

## 2022-09-12 PROCEDURE — 83540 ASSAY OF IRON: CPT | Performed by: PATHOLOGY

## 2022-09-12 PROCEDURE — 83550 IRON BINDING TEST: CPT | Performed by: PATHOLOGY

## 2022-09-12 PROCEDURE — 82728 ASSAY OF FERRITIN: CPT | Mod: 90 | Performed by: PATHOLOGY

## 2022-09-12 PROCEDURE — 85060 BLOOD SMEAR INTERPRETATION: CPT | Performed by: STUDENT IN AN ORGANIZED HEALTH CARE EDUCATION/TRAINING PROGRAM

## 2022-09-12 PROCEDURE — 85045 AUTOMATED RETICULOCYTE COUNT: CPT | Performed by: PATHOLOGY

## 2022-09-12 PROCEDURE — 99000 SPECIMEN HANDLING OFFICE-LAB: CPT | Performed by: PATHOLOGY

## 2022-09-12 PROCEDURE — 82746 ASSAY OF FOLIC ACID SERUM: CPT | Mod: 90 | Performed by: PATHOLOGY

## 2022-09-12 PROCEDURE — 85025 COMPLETE CBC W/AUTO DIFF WBC: CPT | Performed by: PATHOLOGY

## 2022-09-12 PROCEDURE — 90471 IMMUNIZATION ADMIN: CPT | Performed by: INTERNAL MEDICINE

## 2022-09-12 PROCEDURE — 36415 COLL VENOUS BLD VENIPUNCTURE: CPT | Performed by: PATHOLOGY

## 2022-09-12 RX ORDER — CLOTRIMAZOLE 1 %
CREAM (GRAM) TOPICAL 2 TIMES DAILY
Qty: 30 G | Refills: 1 | Status: SHIPPED | OUTPATIENT
Start: 2022-09-12 | End: 2023-06-15

## 2022-09-12 RX ORDER — CHOLECALCIFEROL (VITAMIN D3) 50 MCG
1 TABLET ORAL DAILY
Qty: 90 TABLET | Refills: 3 | Status: SHIPPED | OUTPATIENT
Start: 2022-09-12

## 2022-09-12 NOTE — NURSING NOTE
Russ Agee is a 66 year old male patient that presents today in clinic for the following:    Chief Complaint   Patient presents with     RECHECK     Routine follow up     The patient's allergies and medications were reviewed as noted. A set of vitals were recorded as noted without incident. The patient does not have any other questions for the provider.    Angel Lawson, EMT at 3:01 PM on 9/12/2022

## 2022-09-13 LAB — FOLATE SERPL-MCNC: 8.3 NG/ML (ref 4.6–34.8)

## 2022-09-14 LAB
PATH REPORT.COMMENTS IMP SPEC: NORMAL
PATH REPORT.FINAL DX SPEC: NORMAL
PATH REPORT.MICROSCOPIC SPEC OTHER STN: NORMAL
PATH REPORT.MICROSCOPIC SPEC OTHER STN: NORMAL
PATH REPORT.RELEVANT HX SPEC: NORMAL

## 2022-09-15 ENCOUNTER — DOCUMENTATION ONLY (OUTPATIENT)
Dept: INTERNAL MEDICINE | Facility: CLINIC | Age: 67
End: 2022-09-15

## 2022-09-15 NOTE — PROGRESS NOTES
Type of Form Received: [facility] FORM    Form Received (Date) 9/15/22   Form Filled out Yes 9/19/22   Placed in provider folder Yes

## 2022-12-19 ENCOUNTER — TELEPHONE (OUTPATIENT)
Dept: UROLOGY | Facility: CLINIC | Age: 67
End: 2022-12-19

## 2022-12-19 NOTE — TELEPHONE ENCOUNTER
[facility] Call Center    Phone Message    May a detailed message be left on voicemail: yes     Reason for Call: Other: Russ is calling wanting to schedule annual folow up with Dr. Trotter, Russ states he has no internet and needs to be in person. Writer is showing no availabilities for in person. Please review and call, thanks.     Action Taken: Message routed to:  [facility]    Travel Screening: Not Applicable

## 2022-12-21 ENCOUNTER — TELEPHONE (OUTPATIENT)
Dept: INTERNAL MEDICINE | Facility: CLINIC | Age: 67
End: 2022-12-21

## 2022-12-21 NOTE — TELEPHONE ENCOUNTER
Discussed with Dr. Bah, Neurology.    Please have Mr. Agee see me in clinic in the next few weeks.     Thanks, LUTHER Haque

## 2022-12-21 NOTE — TELEPHONE ENCOUNTER
Patient has appointment with Dr. Haque on 1/23/23 at 5 pm. Spoke with provider - okay to wait until appointment on 1/23/23.     Navin Soler, EMT at 2:37 PM on 12/21/2022.  Primary Care Clinic: [phone]

## 2022-12-21 NOTE — TELEPHONE ENCOUNTER
[facility] Call Center    Phone Message    May a detailed message be left on voicemail: yes     Reason for Call: Other: Neurologist Dr. Tiana Bah is requesting a call back from Dr. Haque to discuss this patient. No other details available per nurse.      Action Taken: Message routed to:  [facility]    Travel Screening: Not Applicable

## 2022-12-29 DIAGNOSIS — C61 PROSTATE CANCER (H): Primary | ICD-10-CM

## 2023-01-22 NOTE — PROGRESS NOTES
HPI:    Overall stable. He would like to quit smoking again. He has about 10 year smoking history. No other HEENT, cardiopulmonary, abdominal, , neurological, systemic, psychiatric, lymphatic, endocrine, vascular complaints.     Past Medical History:   Diagnosis Date     Anemia      Anxiety      Chronic hepatitis C (H)     treated.  Undetectable     Depressive disorder     followed by Dr. Mccorimck     ETOH abuse      GERD (gastroesophageal reflux disease)      Malignant neoplasm of prostate (H)      Mucous cyst of joint      Palpitations      Past Surgical History:   Procedure Laterality Date     COLONOSCOPY N/A 7/9/2021    Procedure: COLONOSCOPY, WITH POLYPECTOMY;  Surgeon: Brady López MD;  Location: [facility] OR     CYSTOSCOPY N/A 11/27/2017    Procedure: CYSTOSCOPY;  Cystoscopy, Rezum Procedure;  Surgeon: Gerardo Trotter MD;  [facility]     ENDOSCOPIC ULTRASOUND, ESOPHAGOSCOPY, GASTROSCOPY, DUODENOSCOPY (EGD), COMBINED  12/16/15     ESOPHAGOSCOPY, GASTROSCOPY, DUODENOSCOPY (EGD), COMBINED N/A 4/9/2015    Procedure: COMBINED ESOPHAGOSCOPY, GASTROSCOPY, DUODENOSCOPY (EGD);  Surgeon: Raina Romo MD;  Location:  GI     EXCISE MASS FINGER  2/19/2013    Procedure: EXCISE MASS FINGER;  Mass Excision Left Index Finger     (local anesthesia);  Surgeon: Raina Dorsey MD;  Location:  OR      UGI ENDOSCOPY W EUS N/A 12/16/2015    Procedure: COMBINED ENDOSCOPIC ULTRASOUND, ESOPHAGOSCOPY, GASTROSCOPY, DUODENOSCOPY (EGD);  Surgeon: Brady Acuna MD;  Location:  GI     PROSTATE SURGERY       TRANSURETHERAL DESTRUCTION OF PROSTATE BY THERMOTHERAPY N/A 11/27/2017    Procedure: TRANSURETHERAL DESTRUCTION OF PROSTATE BY THERMOTHERAPY;;  Surgeon: Gerardo Trotter MD;  [facility]     TRANSURETHERAL DESTRUCTION OF PROSTATE BY THERMOTHERAPY N/A 6/26/2020    Procedure: CYSTOSCOPY and REZUM Procedure;  Surgeon: Gerardo Trotter MD;  Location:  OR      PE:    Vitals noted, gen, nad, cooperative, alert, neck supple nl rom, lungs with good air movement, RRR, S1, S2, no MRG, abdomen, no acute findings. Grossly normal neurological exam.     A/P:     1. Elevated PSA at 2.57 on 7/13/2022.  He had seen Dr. Trotter, Urology 11/29/2021. He has 2/3/2023 Urology follow up with Ms. Spear.   2. History of Hepatitis C. Ordered labs, AFP done normal at 1.8 on 7/13/2022 and abdominal U/S done 7/14/2022 unremarkable   3. Changed nasal spray to Atrovent  On 7/13/2022  4. Lipids done 7/13/2022 with TG's 94, HDL 58 and LDL 96  5. Colonoscopy  7/9/2021  6. Immunizations; Pfizer COVID vaccine x 4. Tdap 9/26/2017. He got the new Prevnar 20 on 7/13/2022.  Check with insurance regarding Shingrix. Influenza vaccine done 9/12/2022  7. He should continue to follow with his psychiatric provider and remains on his same medications.   8. Low Vitamin D level at 18 on 7/13/2022 and recommend replacement. Will check in the future. Sent in Rx. For 2000 international unit(s) Vitamin D. Ordered Vit D 1/22/2023.   9. Anemia (Hgb 11.6) and thrombocytopenia (platelets 147) on 7/13/2022. Abdominal U/S as above 7/14/2022 no significant liver or spleen findings. MVC 96 and normal RDW. Labs done 9/12/2022 including peripheral smear with slight normochromic anemia and slight thrombocytopenia.   10. Placed [facility] referral to Donnie Rain for smoking cessation. He states he has used nicotine gum in the past. Also about 10 year smoking history and previous lung nodule (7/27/2017). Placed an order for chest CT scan today 1/23/2023.       Total time spent today with this patient including chart review, exam time with patient and documentation : 30 minutes.  This time was exclusive of the EKG interpretation

## 2023-01-23 ENCOUNTER — OFFICE VISIT (OUTPATIENT)
Dept: INTERNAL MEDICINE | Facility: CLINIC | Age: 68
End: 2023-01-23
Payer: COMMERCIAL

## 2023-01-23 VITALS
DIASTOLIC BLOOD PRESSURE: 69 MMHG | TEMPERATURE: 98.1 F | SYSTOLIC BLOOD PRESSURE: 133 MMHG | WEIGHT: 162 LBS | HEART RATE: 70 BPM | BODY MASS INDEX: 22.68 KG/M2 | HEIGHT: 71 IN | OXYGEN SATURATION: 96 %

## 2023-01-23 DIAGNOSIS — E55.9 VITAMIN D DEFICIENCY: Primary | ICD-10-CM

## 2023-01-23 DIAGNOSIS — Z71.6 ENCOUNTER FOR SMOKING CESSATION COUNSELING: ICD-10-CM

## 2023-01-23 DIAGNOSIS — R91.8 PULMONARY NODULES: ICD-10-CM

## 2023-01-23 PROCEDURE — 99214 OFFICE O/P EST MOD 30 MIN: CPT | Performed by: INTERNAL MEDICINE

## 2023-01-23 NOTE — NURSING NOTE
"Russ Agee is a 67 year old male patient that presents today in clinic for the following:    Chief Complaint   Patient presents with     RECHECK     Follow up.  Smoking cessation help.  Medication refill.     The patient's allergies and medications were reviewed as noted. A set of vitals were recorded as noted without incident: /69 (BP Location: Right arm, Patient Position: Sitting, Cuff Size: Adult Regular)   Pulse 70   Temp 98.1  F (36.7  C) (Oral)   Ht 1.803 m (5' 11\")   Wt 73.5 kg (162 lb)   SpO2 96%   BMI 22.59 kg/m  . The patient does not have any other questions for the provider.    Nilda Avila, EMT at 5:30 PM on 1/23/2023.  Primary care clinic: [phone]  "

## 2023-01-24 ENCOUNTER — ANCILLARY PROCEDURE (OUTPATIENT)
Dept: CT IMAGING | Facility: CLINIC | Age: 68
End: 2023-01-24
Attending: INTERNAL MEDICINE
Payer: COMMERCIAL

## 2023-01-24 DIAGNOSIS — R91.8 PULMONARY NODULES: ICD-10-CM

## 2023-01-24 DIAGNOSIS — E55.9 VITAMIN D DEFICIENCY: ICD-10-CM

## 2023-01-24 DIAGNOSIS — Z71.6 ENCOUNTER FOR SMOKING CESSATION COUNSELING: ICD-10-CM

## 2023-01-24 PROCEDURE — 71250 CT THORAX DX C-: CPT | Mod: GC | Performed by: RADIOLOGY

## 2023-02-13 ENCOUNTER — LAB REQUISITION (OUTPATIENT)
Dept: LAB | Facility: CLINIC | Age: 68
End: 2023-02-13
Payer: COMMERCIAL

## 2023-02-13 DIAGNOSIS — R79.89 OTHER SPECIFIED ABNORMAL FINDINGS OF BLOOD CHEMISTRY: ICD-10-CM

## 2023-02-15 ENCOUNTER — LAB REQUISITION (OUTPATIENT)
Dept: LAB | Facility: CLINIC | Age: 68
End: 2023-02-15
Payer: COMMERCIAL

## 2023-02-15 DIAGNOSIS — R79.89 OTHER SPECIFIED ABNORMAL FINDINGS OF BLOOD CHEMISTRY: ICD-10-CM

## 2023-02-15 LAB
ANION GAP SERPL CALCULATED.3IONS-SCNC: 11 MMOL/L (ref 7–15)
BUN SERPL-MCNC: 16.6 MG/DL (ref 8–23)
CALCIUM SERPL-MCNC: 9 MG/DL (ref 8.8–10.2)
CHLORIDE SERPL-SCNC: 106 MMOL/L (ref 98–107)
CREAT SERPL-MCNC: 1.01 MG/DL (ref 0.67–1.17)
DEPRECATED HCO3 PLAS-SCNC: 23 MMOL/L (ref 22–29)
ERYTHROCYTE [DISTWIDTH] IN BLOOD BY AUTOMATED COUNT: 15.3 % (ref 10–15)
GFR SERPL CREATININE-BSD FRML MDRD: 82 ML/MIN/1.73M2
GLUCOSE SERPL-MCNC: 135 MG/DL (ref 70–99)
HCT VFR BLD AUTO: 27.7 % (ref 40–53)
HGB BLD-MCNC: 8.9 G/DL (ref 13.3–17.7)
MCH RBC QN AUTO: 31 PG (ref 26.5–33)
MCHC RBC AUTO-ENTMCNC: 32.1 G/DL (ref 31.5–36.5)
MCV RBC AUTO: 97 FL (ref 78–100)
PLATELET # BLD AUTO: 500 10E3/UL (ref 150–450)
POTASSIUM SERPL-SCNC: 4.4 MMOL/L (ref 3.4–5.3)
RBC # BLD AUTO: 2.87 10E6/UL (ref 4.4–5.9)
SODIUM SERPL-SCNC: 140 MMOL/L (ref 136–145)
WBC # BLD AUTO: 10.7 10E3/UL (ref 4–11)

## 2023-02-15 PROCEDURE — 36415 COLL VENOUS BLD VENIPUNCTURE: CPT | Mod: ORL | Performed by: NURSE PRACTITIONER

## 2023-02-15 PROCEDURE — 85027 COMPLETE CBC AUTOMATED: CPT | Mod: ORL | Performed by: NURSE PRACTITIONER

## 2023-02-15 PROCEDURE — P9603 ONE-WAY ALLOW PRORATED MILES: HCPCS | Mod: ORL | Performed by: NURSE PRACTITIONER

## 2023-02-15 PROCEDURE — 80048 BASIC METABOLIC PNL TOTAL CA: CPT | Mod: ORL | Performed by: NURSE PRACTITIONER

## 2023-04-24 ENCOUNTER — OFFICE VISIT (OUTPATIENT)
Dept: INTERNAL MEDICINE | Facility: CLINIC | Age: 68
End: 2023-04-24
Payer: COMMERCIAL

## 2023-04-24 ENCOUNTER — LAB (OUTPATIENT)
Dept: LAB | Facility: CLINIC | Age: 68
End: 2023-04-24
Payer: COMMERCIAL

## 2023-04-24 VITALS
DIASTOLIC BLOOD PRESSURE: 73 MMHG | OXYGEN SATURATION: 96 % | BODY MASS INDEX: 21.91 KG/M2 | HEART RATE: 85 BPM | WEIGHT: 156.5 LBS | HEIGHT: 71 IN | SYSTOLIC BLOOD PRESSURE: 120 MMHG

## 2023-04-24 DIAGNOSIS — E55.9 VITAMIN D DEFICIENCY: ICD-10-CM

## 2023-04-24 DIAGNOSIS — D64.9 ANEMIA, UNSPECIFIED TYPE: Primary | ICD-10-CM

## 2023-04-24 DIAGNOSIS — R97.20 ELEVATED PROSTATE SPECIFIC ANTIGEN (PSA): ICD-10-CM

## 2023-04-24 DIAGNOSIS — D64.9 ANEMIA, UNSPECIFIED TYPE: ICD-10-CM

## 2023-04-24 LAB
ALBUMIN SERPL BCG-MCNC: 4.1 G/DL (ref 3.5–5.2)
ALP SERPL-CCNC: 70 U/L (ref 40–129)
ALT SERPL W P-5'-P-CCNC: 10 U/L (ref 10–50)
ANION GAP SERPL CALCULATED.3IONS-SCNC: 8 MMOL/L (ref 7–15)
AST SERPL W P-5'-P-CCNC: 19 U/L (ref 10–50)
BASOPHILS # BLD AUTO: 0 10E3/UL (ref 0–0.2)
BASOPHILS NFR BLD AUTO: 1 %
BILIRUB SERPL-MCNC: 0.2 MG/DL
BUN SERPL-MCNC: 21.6 MG/DL (ref 8–23)
CALCIUM SERPL-MCNC: 9.7 MG/DL (ref 8.8–10.2)
CHLORIDE SERPL-SCNC: 106 MMOL/L (ref 98–107)
CREAT SERPL-MCNC: 1.05 MG/DL (ref 0.67–1.17)
DEPRECATED HCO3 PLAS-SCNC: 26 MMOL/L (ref 22–29)
EOSINOPHIL # BLD AUTO: 0.1 10E3/UL (ref 0–0.7)
EOSINOPHIL NFR BLD AUTO: 1 %
ERYTHROCYTE [DISTWIDTH] IN BLOOD BY AUTOMATED COUNT: 13.6 % (ref 10–15)
GFR SERPL CREATININE-BSD FRML MDRD: 78 ML/MIN/1.73M2
GLUCOSE SERPL-MCNC: 100 MG/DL (ref 70–99)
HCT VFR BLD AUTO: 39.3 % (ref 40–53)
HGB BLD-MCNC: 12.7 G/DL (ref 13.3–17.7)
IMM GRANULOCYTES # BLD: 0 10E3/UL
IMM GRANULOCYTES NFR BLD: 0 %
LYMPHOCYTES # BLD AUTO: 1.1 10E3/UL (ref 0.8–5.3)
LYMPHOCYTES NFR BLD AUTO: 17 %
MCH RBC QN AUTO: 29.7 PG (ref 26.5–33)
MCHC RBC AUTO-ENTMCNC: 32.3 G/DL (ref 31.5–36.5)
MCV RBC AUTO: 92 FL (ref 78–100)
MONOCYTES # BLD AUTO: 0.7 10E3/UL (ref 0–1.3)
MONOCYTES NFR BLD AUTO: 10 %
NEUTROPHILS # BLD AUTO: 4.7 10E3/UL (ref 1.6–8.3)
NEUTROPHILS NFR BLD AUTO: 71 %
NRBC # BLD AUTO: 0 10E3/UL
NRBC BLD AUTO-RTO: 0 /100
PLATELET # BLD AUTO: 203 10E3/UL (ref 150–450)
POTASSIUM SERPL-SCNC: 4.3 MMOL/L (ref 3.4–5.3)
PROT SERPL-MCNC: 7.6 G/DL (ref 6.4–8.3)
RBC # BLD AUTO: 4.27 10E6/UL (ref 4.4–5.9)
SODIUM SERPL-SCNC: 140 MMOL/L (ref 136–145)
WBC # BLD AUTO: 6.6 10E3/UL (ref 4–11)

## 2023-04-24 PROCEDURE — 99000 SPECIMEN HANDLING OFFICE-LAB: CPT | Performed by: PATHOLOGY

## 2023-04-24 PROCEDURE — 80053 COMPREHEN METABOLIC PANEL: CPT | Performed by: PATHOLOGY

## 2023-04-24 PROCEDURE — 82306 VITAMIN D 25 HYDROXY: CPT | Mod: 90 | Performed by: PATHOLOGY

## 2023-04-24 PROCEDURE — 85025 COMPLETE CBC W/AUTO DIFF WBC: CPT | Performed by: PATHOLOGY

## 2023-04-24 PROCEDURE — 36415 COLL VENOUS BLD VENIPUNCTURE: CPT | Performed by: PATHOLOGY

## 2023-04-24 PROCEDURE — 99214 OFFICE O/P EST MOD 30 MIN: CPT | Performed by: INTERNAL MEDICINE

## 2023-04-24 RX ORDER — CHOLECALCIFEROL (VITAMIN D3) 50 MCG
1 TABLET ORAL DAILY
Qty: 90 TABLET | Refills: 1 | Status: SHIPPED | OUTPATIENT
Start: 2023-04-24 | End: 2023-06-15

## 2023-04-24 NOTE — NURSING NOTE
"Russ Agee is a 67 year old male patient that presents today in clinic for the following:    Chief Complaint   Patient presents with     Follow Up     Pt here for 3 month follow up     The patient's allergies and medications were reviewed as noted. A set of vitals were recorded as noted without incident: /73 (BP Location: Right arm, Patient Position: Sitting, Cuff Size: Adult Regular)   Pulse 85   Ht 1.803 m (5' 10.98\")   Wt 71 kg (156 lb 8 oz)   SpO2 96%   BMI 21.84 kg/m  . The patient does not have any other questions for the provider.    Valeria Workman, EMT at 2:01 PM on 4/24/2023  "

## 2023-04-24 NOTE — PROGRESS NOTES
HPI:    Mr. Agee comes in for follow up today. He fell and fracture L femur/hip and had surgery at [facility]. Overall now stable. He is using a cane but has more mobility.  He has gotten PT in the past. He does request a handicap parking permit. He states he is taking all his medications. No other HEENT, cardiopulmonary, abdominal, , neurological, systemic, psychiatric, lymphatic, endocrine, vascular complaints.     Past Medical History:   Diagnosis Date     Anemia      Anxiety      Chronic hepatitis C (H)     treated.  Undetectable     Depressive disorder     followed by Dr. Mccormick     ETOH abuse      GERD (gastroesophageal reflux disease)      Malignant neoplasm of prostate (H)      Mucous cyst of joint      Palpitations      Past Surgical History:   Procedure Laterality Date     COLONOSCOPY N/A 7/9/2021    Procedure: COLONOSCOPY, WITH POLYPECTOMY;  Surgeon: Brady López MD;  [place] [facility]     CYSTOSCOPY N/A 11/27/2017    Procedure: CYSTOSCOPY;  Cystoscopy, Rezum Procedure;  Surgeon: Gerardo Trotter MD;  Location:  OR     ENDOSCOPIC ULTRASOUND, ESOPHAGOSCOPY, GASTROSCOPY, DUODENOSCOPY (EGD), COMBINED  12/16/15     ESOPHAGOSCOPY, GASTROSCOPY, DUODENOSCOPY (EGD), COMBINED N/A 4/9/2015    Procedure: COMBINED ESOPHAGOSCOPY, GASTROSCOPY, DUODENOSCOPY (EGD);  Surgeon: Riana Romo MD;  Location:  GI     EXCISE MASS FINGER  2/19/2013    Procedure: EXCISE MASS FINGER;  Mass Excision Left Index Finger     (local anesthesia);  Surgeon: Raina Dorsey MD;  Location:  OR      UGI ENDOSCOPY W EUS N/A 12/16/2015    Procedure: COMBINED ENDOSCOPIC ULTRASOUND, ESOPHAGOSCOPY, GASTROSCOPY, DUODENOSCOPY (EGD);  Surgeon: Brady Acuna MD;  Location:  GI     PROSTATE SURGERY       TRANSURETHERAL DESTRUCTION OF PROSTATE BY THERMOTHERAPY N/A 11/27/2017    Procedure: TRANSURETHERAL DESTRUCTION OF PROSTATE BY THERMOTHERAPY;;  Surgeon: Gerardo Trotter MD;  Location:  OR      TRANSURETHERAL DESTRUCTION OF PROSTATE BY THERMOTHERAPY N/A 6/26/2020    Procedure: CYSTOSCOPY and REZUM Procedure;  Surgeon: Gerardo Trotter MD;  Location: [facility] OR     PE:    Vitals noted, gen, nad, cooperative, alert, neck supple nl rom, lungs with good air movement, RRR, S1, S2, no MRG, abdomen, no acute findings. Grossly normal neurological exam. L hip w/o tenderness and he is able to bear weight on his L leg.     Results for orders placed or performed in visit on 04/24/23   Comprehensive metabolic panel     Status: Abnormal   Result Value Ref Range    Sodium 140 136 - 145 mmol/L    Potassium 4.3 3.4 - 5.3 mmol/L    Chloride 106 98 - 107 mmol/L    Carbon Dioxide (CO2) 26 22 - 29 mmol/L    Anion Gap 8 7 - 15 mmol/L    Urea Nitrogen 21.6 8.0 - 23.0 mg/dL    Creatinine 1.05 0.67 - 1.17 mg/dL    Calcium 9.7 8.8 - 10.2 mg/dL    Glucose 100 (H) 70 - 99 mg/dL    Alkaline Phosphatase 70 40 - 129 U/L    AST 19 10 - 50 U/L    ALT 10 10 - 50 U/L    Protein Total 7.6 6.4 - 8.3 g/dL    Albumin 4.1 3.5 - 5.2 g/dL    Bilirubin Total 0.2 <=1.2 mg/dL    GFR Estimate 78 >60 mL/min/1.73m2   CBC with platelets and differential     Status: Abnormal   Result Value Ref Range    WBC Count 6.6 4.0 - 11.0 10e3/uL    RBC Count 4.27 (L) 4.40 - 5.90 10e6/uL    Hemoglobin 12.7 (L) 13.3 - 17.7 g/dL    Hematocrit 39.3 (L) 40.0 - 53.0 %    MCV 92 78 - 100 fL    MCH 29.7 26.5 - 33.0 pg    MCHC 32.3 31.5 - 36.5 g/dL    RDW 13.6 10.0 - 15.0 %    Platelet Count 203 150 - 450 10e3/uL    % Neutrophils 71 %    % Lymphocytes 17 %    % Monocytes 10 %    % Eosinophils 1 %    % Basophils 1 %    % Immature Granulocytes 0 %    NRBCs per 100 WBC 0 <1 /100    Absolute Neutrophils 4.7 1.6 - 8.3 10e3/uL    Absolute Lymphocytes 1.1 0.8 - 5.3 10e3/uL    Absolute Monocytes 0.7 0.0 - 1.3 10e3/uL    Absolute Eosinophils 0.1 0.0 - 0.7 10e3/uL    Absolute Basophils 0.0 0.0 - 0.2 10e3/uL    Absolute Immature Granulocytes 0.0 <=0.4 10e3/uL    Absolute NRBCs  0.0 10e3/uL   CBC with platelets and differential     Status: Abnormal    Narrative    The following orders were created for panel order CBC with platelets and differential.  Procedure                               Abnormality         Status                     ---------                               -----------         ------                     CBC with platelets and d...[[id number]]  Abnormal            Final result                 Please view results for these tests on the individual orders.         A/P:     1. Elevated PSA at 2.57 on 7/13/2022.  He had seen Dr. Trotter, Urology 11/29/2021. He had a 2/3/2023 Urology follow up with Ms. Spear but did not show. Placed Urology referral today.   2. History of Hepatitis C. Ordered labs, AFP done normal at 1.8 on 7/13/2022 and abdominal U/S done 7/14/2022 unremarkable   3. Changed nasal spray to Atrovent  On 7/13/2022  4. Lipids done 7/13/2022 with TG's 94, HDL 58 and LDL 96  5. Colonoscopy  7/9/2021  6. Immunizations; Pfizer COVID vaccine x 4. Tdap 9/26/2017. He got the new Prevnar 20 on 7/13/2022.  Check with insurance regarding Shingrix. Influenza vaccine done 9/12/2022  7. He should continue to follow with his psychiatric provider and remains on his same medications.   8. Low Vitamin D level at 18 on 7/13/2022 and recommend replacement. Will check in the future. Sent in Rx. For 2000 international unit(s) Vitamin D. Vitamin D level 2/1/2023 was low normal at 22. Sent in Rx. For 2000 U Vit D today.   9. Anemia (Hgb 11.6) and thrombocytopenia (platelets 147) on 7/13/2022. Abdominal U/S as above 7/14/2022 no significant liver or spleen findings. MVC 96 and normal RDW. Labs done 9/12/2022 including peripheral smear with slight normochromic anemia and slight thrombocytopenia.   10. Placed MT referral to Donnie Rain for smoking cessation; he cancelled his 2/14/2023 appt.  He states he has used nicotine gum in the past. Also about 10 year smoking history and previous  lung nodule (7/27/2017). Chest CT scan 1/23/2023; no acute findings.   11. See Care Everywhere 2/1/2023 L hip Arthoplasty [facility] for  Fracture from a fall.       30 minutes spent on the date of the encounter doing chart review, history and exam, documentation and further activities as noted above

## 2023-04-25 ENCOUNTER — TELEPHONE (OUTPATIENT)
Dept: UROLOGY | Facility: CLINIC | Age: 68
End: 2023-04-25
Payer: COMMERCIAL

## 2023-04-25 LAB — DEPRECATED CALCIDIOL+CALCIFEROL SERPL-MC: 27 UG/L (ref 20–75)

## 2023-04-25 NOTE — TELEPHONE ENCOUNTER
Betzaida Che [facility]  Good morning,     This patient needs to schedule a return appointment with Dr. Trotter, but I'm unable to pull up template to get him scheduled.     Sending message for clinic review and follow-up with patient for scheduling. Thank you!     Betzaida   Referral Team

## 2023-06-15 ENCOUNTER — VIRTUAL VISIT (OUTPATIENT)
Dept: PHARMACY | Facility: CLINIC | Age: 68
End: 2023-06-15
Payer: COMMERCIAL

## 2023-06-15 DIAGNOSIS — G47.9 SLEEP DISORDER: ICD-10-CM

## 2023-06-15 DIAGNOSIS — Z78.9 TAKES DIETARY SUPPLEMENTS: ICD-10-CM

## 2023-06-15 DIAGNOSIS — J30.2 SEASONAL ALLERGIC RHINITIS, UNSPECIFIED TRIGGER: ICD-10-CM

## 2023-06-15 DIAGNOSIS — Z72.0 TOBACCO ABUSE: Primary | ICD-10-CM

## 2023-06-15 DIAGNOSIS — K59.00 CONSTIPATION, UNSPECIFIED CONSTIPATION TYPE: ICD-10-CM

## 2023-06-15 DIAGNOSIS — R52 PAIN: ICD-10-CM

## 2023-06-15 PROCEDURE — 99607 MTMS BY PHARM ADDL 15 MIN: CPT | Performed by: PHARMACIST

## 2023-06-15 PROCEDURE — 99605 MTMS BY PHARM NP 15 MIN: CPT | Performed by: PHARMACIST

## 2023-06-15 NOTE — LETTER
June 16, 2023  Russ Agee  [address]  [place] [place] [address]    Dear  Anuel, [facility]     Thank you for talking with me on Eitan 15, 2023 about your health and medications. As a follow-up to our conversation, I have included two documents:      1. Your Recommended To-Do List has steps you should take to get the best results from your medications.  2. Your Medication List will help you keep track of your medications and how to take them.    If you want to talk about these documents, please call Donnie Rain RPH at phone: [phone], Monday-Friday 8-4:30pm.    I look forward to working with you and your doctors to make sure your medications work well for you.    Sincerely,  Donnie Rain RPH  [facility] Pharmacist, [facility]

## 2023-06-15 NOTE — LETTER
_  Medication List        Prepared on: Eitan 15, 2023     Bring your Medication List when you go to the doctor, hospital, or   emergency room. And, share it with your family or caregivers.     Note any changes to how you take your medications.  Cross out medications when you no longer use them.    Medication How I take it Why I use it Prescriber   acetaminophen (TYLENOL) 325 MG tablet Take 2 tablets (650 mg) by mouth every 4 hours as needed for mild pain Benign prostatic hyperplasia with nocturia Ismael Huerta MD   calcium polycarbophil (FIBERCON) 625 MG tablet Take 3 tablets by mouth twice daily  Constipation Patient Reported   ipratropium (ATROVENT) 0.03 % nasal spray Spray 2 sprays into both nostrils every 12 hours Nasal Congestion Donnie Haque MD   Mirtazapine (REMERON PO) Take 15 mg by mouth At Bedtime  Sleep Patient Reported   nicotine (NICORETTE) 2 MG gum Place 1 each (2 mg) inside cheek every hour as needed for smoking cessation Tobacco Abuse Donnie Haque MD   order for DME Equipment being ordered: Compression stockings (pair) Lower Extremity Edema Brady Faith MD   RisperiDONE (RISPERDAL PO) Take 3 mg by mouth At Bedtime  Sleep Patient Reported   traZODone (DESYREL) 50 MG tablet Take 50 mg by mouth At Bedtime   Sleep Patient Reported   vitamin D3 (CHOLECALCIFEROL) 50 mcg (2000 units) tablet Take 1 tablet (50 mcg) by mouth daily Anemia, unspecified type; Vitamin D Deficiency; Rash Donnie Haque MD         Add new medications, over-the-counter drugs, herbals, vitamins, or  minerals in the blank rows below.    Medication How I take it Why I use it Prescriber                                      Allergies:      nkda [no known drug allergy]        Side effects I have had:               Other Information:              My notes and questions:

## 2023-06-15 NOTE — PROGRESS NOTES
Medication Therapy Management (MTM) Encounter    ASSESSMENT:                            Medication Adherence/Access: No issues identified    Smoking Cessation:  Patient would benefit from as needed nicotine use.    Pain:   Stable.     Constipation:   Stable.     Allergies:   Stable.     Sleep:  Stable.     Vitamin D Deficiency:  Stable.     PLAN:                            1. Start using nicotine gum during social activities to avoid smoking during these times.    Follow-up: Return in about 1 month (around 7/17/2023) for Follow up, with me.    SUBJECTIVE/OBJECTIVE:                          Russ Agee is a 67 year old male called for an initial visit. He was referred to me from Donnie Haque MD.      Reason for visit: Smoking cessation    Allergies/ADRs: Reviewed in chart  Past Medical History: Reviewed in chart  Tobacco: He reports that he quit smoking about 5 years ago. His smoking use included cigarettes. He has a 3.00 pack-year smoking history. He has never used smokeless tobacco. reports that he smokes on and off and takes one or two from friends  Alcohol: none      Medication Adherence/Access: no issues reported    Smoking Cessation:  No Current Therapy  How much does patient smoke:  2-4 cigarrettes a couple times per week  Triggers for smoking include:  Social aspect  Tried quitting in the past: Yes.  What worked/didn t work in the past:    Why does patient want to quit (motivators for quitting): bad breath and odor.   Is patient currently pregnant: No  History of seizures/bipolar disease: No    Pain:   Acetaminophen 325 mg as needed for headach or body aches and it is helpful    Constipation:   Fibercon 625 mg tablets - takes 2 three times a day     Reports he used to use metamucil and that was more helpful so he may go back.     Allergies:   Ipratropium nasal spray as needed reports this is helpful.     Sleep:  Mirtazapine 15 mg at bedtime and this works with no night time drowsiness.   Trazodone 100 mg  daily  Risperidone 3 mg nightly.     Vitamin D Deficiency:  Vitamin D 2000 units daily    Vitamin D Deficiency Screening Results:  Lab Results   Component Value Date    VITDT 27 04/24/2023    VITDT 18 (L) 07/13/2022    VITDT 29 (L) 11/11/2013         Today's Vitals: There were no vitals taken for this visit.  ----------------      I spent 30 minutes with this patient today. All changes were made via collaborative practice agreement with Donnie Haque MD. A copy of the visit note was provided to the patient's provider(s).    A summary of these recommendations was mailed to the patient.    Donnie Rain, Pharm. D., BCACP  Medication Therapy Management Pharmacist        Telemedicine Visit Details  Type of service:  Telephone visit  Start Time: 1230 p  End Time: 1 p     Medication Therapy Recommendations  Tobacco abuse    Rationale: Untreated condition - Needs additional medication therapy - Indication   Recommendation: Start Medication - nicotine 2 MG gum   Status: Accepted per CPA

## 2023-06-15 NOTE — LETTER
"Recommended To-Do List      Prepared on: Eitan 15, 2023       You can get the best results from your medications by completing the items on this \"To-Do List.\"      Bring your To-Do List when you go to your doctor. And, share it with your family or caregivers.    My To-Do List:  What we talked about: What I should do:   A new medication that may be of benefit to you    Start taking nicotine (NICORETTE) gum when you are around friends who smoke          What we talked about: What I should do:                     "

## 2023-06-16 NOTE — PATIENT INSTRUCTIONS
"Recommendations from today's MTM visit:                                                    MTM (medication therapy management) is a service provided by a clinical pharmacist designed to help you get the most of out of your medicines.   Today we reviewed what your medicines are for, how to know if they are working, that your medicines are safe and how to make your medicine regimen as easy as possible.      1. Start using nicotine gum during social activities to avoid smoking during these times.    Follow-up: Return in about 1 month (around 7/17/2023) for Follow up, with me.    It was great speaking with you today.  I value your experience and would be very thankful for your time in providing feedback in our clinic survey. In the next few days, you may receive an email or text message from [email or web address] with a link to a survey related to your  clinical pharmacist.\"     To schedule another MTM appointment, please call the clinic directly or you may call the MTM scheduling line at [phone] or toll-free at [phone].     My Clinical Pharmacist's contact information:                                                      Please feel free to contact me with any questions or concerns you have.      Donnie Rain, Pharm. D., BCACP  Medication Therapy Management Pharmacist    "

## 2023-07-04 NOTE — PROGRESS NOTES
HPI;      Last visit with us 4/24/2023. Overall he is stable. He states his orthopedic L hip fracture is healed and has no complaints. He is traveling to [facility] at the end of this month and is leaving for about 2 weeks. He would like travel advice. No other HEENT, cardiopulmonary, abdominal, , neurological, systemic, psychiatric, lymphatic, endocrine, vascular complaints.     Past Medical History:   Diagnosis Date     Anemia      Anxiety      Chronic hepatitis C (H)     treated.  Undetectable     Depressive disorder     followed by Dr. Mccormick     ETOH abuse      GERD (gastroesophageal reflux disease)      Malignant neoplasm of prostate (H)      Mucous cyst of joint      Palpitations      Past Surgical History:   Procedure Laterality Date     COLONOSCOPY N/A 7/9/2021    Procedure: COLONOSCOPY, WITH POLYPECTOMY;  Surgeon: Brady López MD;  [place] [facility] [place]     CYSTOSCOPY N/A 11/27/2017    Procedure: CYSTOSCOPY;  Cystoscopy, Rezum Procedure;  Surgeon: Gerardo Trotter MD;  Location:  OR     ENDOSCOPIC ULTRASOUND, ESOPHAGOSCOPY, GASTROSCOPY, DUODENOSCOPY (EGD), COMBINED  12/16/15     ESOPHAGOSCOPY, GASTROSCOPY, DUODENOSCOPY (EGD), COMBINED N/A 4/9/2015    Procedure: COMBINED ESOPHAGOSCOPY, GASTROSCOPY, DUODENOSCOPY (EGD);  Surgeon: Raina Romo MD;  Location:  GI     EXCISE MASS FINGER  2/19/2013    Procedure: EXCISE MASS FINGER;  Mass Excision Left Index Finger     (local anesthesia);  Surgeon: Raina Dorsey MD;  Location:  OR      UGI ENDOSCOPY W EUS N/A 12/16/2015    Procedure: COMBINED ENDOSCOPIC ULTRASOUND, ESOPHAGOSCOPY, GASTROSCOPY, DUODENOSCOPY (EGD);  Surgeon: Brady Acuna MD;  Location:  GI     PROSTATE SURGERY       TRANSURETHERAL DESTRUCTION OF PROSTATE BY THERMOTHERAPY N/A 11/27/2017    Procedure: TRANSURETHERAL DESTRUCTION OF PROSTATE BY THERMOTHERAPY;;  Surgeon: Gerardo Trotter MD;  Location:  OR     TRANSURETHERAL DESTRUCTION OF PROSTATE  BY THERMOTHERAPY N/A 6/26/2020    Procedure: CYSTOSCOPY and REZUM Procedure;  Surgeon: Gerardo Trotter MD;  Location: [facility] OR     PE:    Vitals noted, gen, nad, cooperative, alert, neck supple nl rom, lungs with good air movement, RRR, S1, S2, no MRG, abdomen, no acute findings Grossly normal neurological exam.     A/P:     1. Elevated PSA at 2.57 on 7/13/2022.  He had seen Dr. Trotter, Urology 11/29/2021. He had a 2/3/2023 Urology follow up with Ms. Spear but did not show. He has a 9/21/2023 appt.    2. History of Hepatitis C. Ordered labs, AFP done normal at 1.8 on 7/13/2022 and abdominal U/S done 7/14/2022 unremarkable. Ordered future abdominal U/S today 7/5/2023  3. Changed nasal spray to Atrovent  On 7/13/2022  4. Lipids done 7/13/2022 with TG's 94, HDL 58 and LDL 96  5. Colonoscopy  7/9/2021  6. Immunizations; Pfizer COVID vaccine x 4. Tdap 9/26/2017. He got the new Prevnar 20 on 7/13/2022.  Check with insurance regarding Shingrix.   7. He should continue to follow with his psychiatric provider and remains on his same medications.   8. Low Vitamin D level at 18 on 7/13/2022 and recommend replacement. Will check in the future. Sent in Rx. For 2000 international unit(s) Vitamin D. Vitamin D level 2/1/2023 was low normal at 22. Sent in Rx. For 2000 U Vit D in the past  9. Anemia (Hgb 11.6) and thrombocytopenia (platelets 147) on 7/13/2022. Abdominal U/S as above 7/14/2022 no significant liver or spleen findings. MVC 96 and normal RDW. Labs done 9/12/2022 including peripheral smear with slight normochromic anemia and slight thrombocytopenia. Recheck of CBC 4/24/2023 with Hgb 12.7 and Platelets normal at 203.   10.  MTM visit with   Donnie Rain for smoking cessation; last 6/15/2023.  He states he has used nicotine gum in the past. Also about 10 year smoking history and previous lung nodule (7/27/2017). Chest CT scan 1/23/2023; no acute findings. Next appt. With Donnie 7/17/2023.   11. See Care Everywhere  2/1/2023 [place] [facility] [place] from a [place].   12. Care Everywhere 6/14/2023 Iliana Silva Driving Assessment; recommended NO DRIVING.   13. Recommend he visit with Ariadna Rice for Travel advice to [facility]         30 minutes spent on the date of the encounter doing chart review, history and exam, documentation and further activities as noted above

## 2023-07-05 ENCOUNTER — OFFICE VISIT (OUTPATIENT)
Dept: INTERNAL MEDICINE | Facility: CLINIC | Age: 68
End: 2023-07-05
Payer: COMMERCIAL

## 2023-07-05 VITALS
HEIGHT: 71 IN | BODY MASS INDEX: 22.46 KG/M2 | SYSTOLIC BLOOD PRESSURE: 116 MMHG | DIASTOLIC BLOOD PRESSURE: 71 MMHG | WEIGHT: 160.4 LBS | OXYGEN SATURATION: 98 % | HEART RATE: 75 BPM

## 2023-07-05 DIAGNOSIS — B19.20 HEPATITIS C VIRUS INFECTION WITHOUT HEPATIC COMA, UNSPECIFIED CHRONICITY: Primary | ICD-10-CM

## 2023-07-05 PROCEDURE — 99214 OFFICE O/P EST MOD 30 MIN: CPT | Performed by: INTERNAL MEDICINE

## 2023-07-05 NOTE — NURSING NOTE
Russ Agee is a 67 year old male that presents in clinic today for the following:     Chief Complaint   Patient presents with     Follow Up     Pt here to follow up and check in        The patient's allergies and medications were reviewed. The patient's vitals were obtained without incident. The patient does not have any other questions for the provider.     Barbara Bliss, EMT at 2:28 PM on 7/5/2023.  Primary Care Clinic: [phone]

## 2023-07-11 ENCOUNTER — TELEPHONE (OUTPATIENT)
Dept: CARE COORDINATION | Facility: CLINIC | Age: 68
End: 2023-07-11
Payer: COMMERCIAL

## 2023-07-17 ENCOUNTER — VIRTUAL VISIT (OUTPATIENT)
Dept: PHARMACY | Facility: CLINIC | Age: 68
End: 2023-07-17
Payer: COMMERCIAL

## 2023-07-17 DIAGNOSIS — Z72.0 TOBACCO ABUSE: ICD-10-CM

## 2023-07-17 DIAGNOSIS — K59.00 CONSTIPATION, UNSPECIFIED CONSTIPATION TYPE: Primary | ICD-10-CM

## 2023-07-17 PROCEDURE — 99606 MTMS BY PHARM EST 15 MIN: CPT | Performed by: PHARMACIST

## 2023-07-17 NOTE — PROGRESS NOTES
Medication Therapy Management (MTM) Encounter    ASSESSMENT:                            Medication Adherence/Access: No issues identified    Smoking Cessation:  Patient should ideally continue on nicotine gum for a total of 12 weeks. Will work with him on going off at next visit which will be about 11 weeks since starting.      Constipation:   Improved.     PLAN:                            1. Continue current medications as planned    Follow-up: No follow-ups on file.      SUBJECTIVE/OBJECTIVE:                          Russ Agee is a 67 year old male called for a follow-up visit from 6/15.       Reason for visit: smoking follow-up.    Allergies/ADRs: Reviewed in chart  Past Medical History: Reviewed in chart  Tobacco: He reports that he quit smoking about 5 years ago. His smoking use included cigarettes. He has a 3.00 pack-year smoking history. He has never used smokeless tobacco.  Alcohol: none      Medication Adherence/Access: no issues reported    Smoking Cessation:  Nicotine gum 2 mg using 4-5 pieces per day and has not smoked for 2 weeks.      Constipation:   Reports he has returned to using metamucil and this has been better for him than the fiber capsules.        Today's Vitals: There were no vitals taken for this visit.  ----------------      I spent 4 minutes with this patient today. All changes were made via collaborative practice agreement with Donnie Haque MD. A copy of the visit note was provided to the patient's provider(s).    A summary of these recommendations was declined by the patient.    Donnie Rain, Pharm. D., [facility]  Medication Therapy Management Pharmacist    Telemedicine Visit Details  Type of service:  Telephone visit  Start Time: 12:32 pm  End Time: 12:36 pm     Medication Therapy Recommendations  No medication therapy recommendations to display

## 2023-07-27 ENCOUNTER — ANCILLARY PROCEDURE (OUTPATIENT)
Dept: ULTRASOUND IMAGING | Facility: CLINIC | Age: 68
End: 2023-07-27
Attending: INTERNAL MEDICINE
Payer: COMMERCIAL

## 2023-07-27 DIAGNOSIS — B19.20 HEPATITIS C VIRUS INFECTION WITHOUT HEPATIC COMA, UNSPECIFIED CHRONICITY: ICD-10-CM

## 2023-07-27 PROCEDURE — 76700 US EXAM ABDOM COMPLETE: CPT | Performed by: RADIOLOGY

## 2023-08-01 ENCOUNTER — OFFICE VISIT (OUTPATIENT)
Dept: INTERNAL MEDICINE | Facility: CLINIC | Age: 68
End: 2023-08-01
Payer: COMMERCIAL

## 2023-08-01 VITALS
HEIGHT: 71 IN | HEART RATE: 72 BPM | DIASTOLIC BLOOD PRESSURE: 63 MMHG | WEIGHT: 160.1 LBS | BODY MASS INDEX: 22.41 KG/M2 | SYSTOLIC BLOOD PRESSURE: 101 MMHG | OXYGEN SATURATION: 100 %

## 2023-08-01 DIAGNOSIS — Z71.84 COUNSELING FOR TRAVEL: Primary | ICD-10-CM

## 2023-08-01 PROCEDURE — 90691 TYPHOID VACCINE IM: CPT | Performed by: NURSE PRACTITIONER

## 2023-08-01 PROCEDURE — 99402 PREV MED CNSL INDIV APPRX 30: CPT | Mod: 25 | Performed by: NURSE PRACTITIONER

## 2023-08-01 PROCEDURE — 90471 IMMUNIZATION ADMIN: CPT | Performed by: NURSE PRACTITIONER

## 2023-08-01 RX ORDER — ATOVAQUONE AND PROGUANIL HYDROCHLORIDE 250; 100 MG/1; MG/1
1 TABLET, FILM COATED ORAL DAILY
Qty: 40 TABLET | Refills: 0 | Status: SHIPPED | OUTPATIENT
Start: 2023-08-01 | End: 2024-02-21

## 2023-08-01 RX ORDER — AZITHROMYCIN 500 MG/1
TABLET, FILM COATED ORAL
Qty: 3 TABLET | Refills: 0 | Status: SHIPPED | OUTPATIENT
Start: 2023-08-01 | End: 2024-02-21

## 2023-08-01 NOTE — PROGRESS NOTES
Patient administered typhoid vaccine, see vaccine admin record. Briefly went over AVS and when to take zithro (travelers diarrhea) and timing for Malarone.    Alonzo Finch RN on 8/1/2023 at 2:46 PM

## 2023-08-01 NOTE — NURSING NOTE
"Russ Agee is a 67 year old male patient that presents today in clinic for the following:    Chief Complaint   Patient presents with    [facility] Clinic     The patient's allergies and medications were reviewed as noted. A set of vitals were recorded as noted without incident: /63 (BP Location: Right arm, Patient Position: Sitting, Cuff Size: Adult Regular)   Pulse 72   Ht 1.803 m (5' 10.98\")   Wt 72.6 kg (160 lb 1.6 oz)   SpO2 100%   BMI 22.34 kg/m  . The patient does not have any other questions for the provider.    Valeria Workman, EMT at 1:23 PM on 8/1/2023  "

## 2023-08-03 NOTE — PROGRESS NOTES
Russ Agee seen today alone in preparation for travel to [facility],  leaving on 8/10/23 and staying for 30 days. . Detailed itinerary: [facility] only   Patient will be staying malarial risk area and Yellow fever risk area  in urban areas. Reason for travel: visit friends and family. Country of birth: [place] . He will be traveling with:alone. Planned activities during travel: visiting friends and family.    Risk level qualifier: risk travel.   .    Past Medical History:   Diagnosis Date     Anemia      Anxiety      Chronic hepatitis C (H)     treated.  Undetectable     Depressive disorder     followed by Dr. Mccormick     ETOH abuse      GERD (gastroesophageal reflux disease)      Malignant neoplasm of prostate (H)      Mucous cyst of joint      Palpitations        Patient Active Problem List   Diagnosis     CARDIOVASCULAR SCREENING; LDL GOAL LESS THAN 160     Alcohol addiction (H)     Malignant neoplasm of prostate (H)     Mucous cyst of joint     Depressive disorder     Skin lesion     Patellofemoral disorder of left knee     Right knee DJD     Oral lesion     Candidiasis of mouth     SVT (supraventricular tachycardia) (H)     Other chest pain     Dermatitis, seborrheic     History of hepatitis C     Palpitations     Hepatitis B core antibody positive     Benign prostatic hyperplasia with nocturia     Pain of both hip joints          Patient Active Problem List   Diagnosis     CARDIOVASCULAR SCREENING; LDL GOAL LESS THAN 160     Alcohol addiction (H)     Malignant neoplasm of prostate (H)     Mucous cyst of joint     Depressive disorder     Skin lesion     Patellofemoral disorder of left knee     Right knee DJD     Oral lesion     Candidiasis of mouth     SVT (supraventricular tachycardia) (H)     Other chest pain     Dermatitis, seborrheic     History of hepatitis C     Palpitations     Hepatitis B core antibody positive     Benign prostatic hyperplasia with nocturia     Pain of both hip joints       TB Testing  "History: not discussed.      Current Outpatient Medications   Medication     acetaminophen (TYLENOL) 325 MG tablet     ipratropium (ATROVENT) 0.03 % nasal spray     Mirtazapine (REMERON PO)     nicotine (NICORETTE) 2 MG gum     order for DME     psyllium (METAMUCIL/KONSYL) 58.6 % powder     RisperiDONE (RISPERDAL PO)     traZODone (DESYREL) 50 MG tablet     vitamin D3 (CHOLECALCIFEROL) 50 mcg (2000 units) tablet     No current facility-administered medications for this visit.       Immunization History   Administered Date(s) Administered     COVID-19 MONOVALENT 12+ (Pfizer) 02/15/2021, 03/08/2021, 12/09/2021, 04/09/2022     COVID-19 Monovalent 12+ (Pfizer 2022) 02/15/2021, 04/09/2022     FLU 6-35 months 09/14/2009     Flu, Unspecified 12/07/2001, 12/23/2010     Influenza (IIV3) PF 12/12/2000, 12/07/2001, 10/25/2002, 01/09/2007, 09/14/2009, 12/23/2010, 10/10/2012, 10/27/2012, 11/11/2013, 09/28/2015, 10/19/2016, 09/26/2017     Influenza Vaccine 50-64 or 18-64 w/egg allergy (Flublok) 10/01/2020     Influenza Vaccine 65+ (Fluzone HD) 10/25/2021, 09/12/2022     Influenza Vaccine >6 months (Alfuria,Fluzone) 09/14/2009, 10/15/2018, 10/04/2019     PNEUMOCOCCAL 15 VALENT CONJUGATE 07/13/2022     Pneumo Conj 13-V (2010&after) 04/12/2016     Pneumococcal 20 valent Conjugate (Prevnar 20) 07/13/2022     Pneumococcal 23 valent 06/04/2007     TD,PF 7+ (Tenivac) 08/07/2007, 09/07/2007     TDAP (Adacel,Boostrix) 07/14/1999, 10/25/2002, 08/07/2007     TDAP Vaccine (Boostrix) 09/26/2017     Td (Adult), Adsorbed 07/14/1999, 10/25/2002     Typhoid IM 08/01/2023     Varicella 08/07/2007     He is Hepatitis A Ab positive.     REVIEW OF SYSTEMS:  ROS:9 system ROS reviewed w/o changes except for above     PE:   General: well-nourished, well-developed male in no acute distress./63 (BP Location: Right arm, Patient Position: Sitting, Cuff Size: Adult Regular)   Pulse 72   Ht 1.803 m (5' 10.98\")   Wt 72.6 kg (160 lb 1.6 oz)   SpO2 " 100%   BMI 22.34 kg/m    Neuro: alert and oriented.     ASSESSMENT AND PLAN:    Russ Agee 67 year old old male whom I had the pleasure of seeing today for counseling for international travel.    Counseling on vaccinations for travel:     Vaccination status:   Immunization History   Administered Date(s) Administered     COVID-19 MONOVALENT 12+ (Pfizer) 02/15/2021, 03/08/2021, 12/09/2021, 04/09/2022     COVID-19 Monovalent 12+ (Pfizer 2022) 02/15/2021, 04/09/2022     FLU 6-35 months 09/14/2009     Flu, Unspecified 12/07/2001, 12/23/2010     Influenza (IIV3) PF 12/12/2000, 12/07/2001, 10/25/2002, 01/09/2007, 09/14/2009, 12/23/2010, 10/10/2012, 10/27/2012, 11/11/2013, 09/28/2015, 10/19/2016, 09/26/2017     Influenza Vaccine 50-64 or 18-64 w/egg allergy (Flublok) 10/01/2020     Influenza Vaccine 65+ (Fluzone HD) 10/25/2021, 09/12/2022     Influenza Vaccine >6 months (Alfuria,Fluzone) 09/14/2009, 10/15/2018, 10/04/2019     PNEUMOCOCCAL 15 VALENT CONJUGATE 07/13/2022     Pneumo Conj 13-V (2010&after) 04/12/2016     Pneumococcal 20 valent Conjugate (Prevnar 20) 07/13/2022     Pneumococcal 23 valent 06/04/2007     TD,PF 7+ (Tenivac) 08/07/2007, 09/07/2007     TDAP (Adacel,Boostrix) 07/14/1999, 10/25/2002, 08/07/2007     TDAP Vaccine (Boostrix) 09/26/2017     Td (Adult), Adsorbed 07/14/1999, 10/25/2002     Varicella 08/07/2007     Russ was seen today for travel clinic.    Diagnoses and all orders for this visit:    Counseling for travel  -     azithromycin (ZITHROMAX) 500 MG tablet; 1 tab daily until diarrhea resolves.  -     atovaquone-proguanil (MALARONE) 250-100 MG tablet; Take 1 tablet by mouth daily Start 2 days before travel and continue 7 days after return.  -     Infectious Disease Referral; Future    Other orders  -     TYPHOID VACCINE, IM    He chooses to pursue a Yellow Fever vaccines despite his age, and very slight increased risk of vaccine after counseling. He is referred to [facility] for  "Yellow Fever vaccine.    Counseling on malaria and other insect borne diseases  Prescription for malaria prevention: Malarone.Discussed when to start and instructions.    We also discussed general precautions for mosquito, other insect borne and ecto-parasite avoidance.    Counseling on traveler's diarrhea  We discussed precautions for clean water and food preparation as follows:  Recommended bottled or boiled water, including ice, and for tooth brushing.  Peel it, boil it, cook it, or forget it  Gave handout on traveler's diarrhea.  Handwashing or use of sanitizers several times daily and prior to eating  We discussed medications for management of traveler's diarrhea, once symptomatic:  Gave handout to patient.  Recommended going to clinic, if dehydrated or if high fever and/or blood in stool.  Recommended loperamide (Imodium, an antimotility agent), for diarrhea without fever\"}  Given RX for Azithromycin 500 mg to take in the case of traveler's diarrhea.    Counseling on the epidemiology of travel related morbidity and mortality    Discussed precautions for accident avoidance.    Discussed flying: drinking plenty of fluids and exercising legs every 2 hours    Discussed health concerns overseas.    Gave copy of CDC recommendations.    Discussed safety and security:     Other counseling for travel    Recommended checking medicine cabinet - looking for any meds normally used at home for common illnesses.    Provided education regarding sun exposure and use of high SPF sunscreen.    Discussed avoidance of blood and body secretions.     High altitude sickness: not an issue.    Motion sickness.    Jet lag: Not an issue.    HIV Post Exposure : aware      Creating a Medical Travel Kit    Every busy traveler should have a kit containing personal care and medical items that can be easily tossed into a suitcase.  Quantities can be adjusted for the length of travel, for destinations, and availability of replacement at your " destination.    Plan to see your Travel medicine Clinic 4-6 weeks prior to departure as some vaccines require time to become effective.     Items to Pack:    -Basic antibiotics  -Mild laxatives: Metamucil or Senokot, available without a prescription.  -Anti-diarrheal medication: Imodium AD or Pepto Bismol, available in tablet form without a prescription.  -Medical thermometer  -Antacid tablets  -Band aides, guaze bandages, ace wrap, adhesive tape, antibiotic ointment  -Topical ointment or creams for rash or bites (over the counter hydrocortisone cream).    -Motion sickness: Dramamine or Antivert (Meclazine)tablets  -Aspirin, acetaminophen(tylenol) ibuprofen  -Cough medicine or drops  -Anti-fungal foot powder  -Nasal spray or decongestants  -Eyeglasses.  Take an extra pair or copy of your prescription.  _Insect repellent with 30% DEET time release, such as Ultrathon or Eli products. Available at [employer]  -Permethrin clothing insecticide treatment.Available at [employer]  -Ear Plugs  -Sunscreen  -Sunglasses, Hat  -Safety pins, toilet paper, money belt, padlock, duct tape    Regular Medications should be left in original containers and carried in your carry on bag. Take enough with you for the entire trip. You might need a letter to give to your insurance to obtain a sufficient amount for your trip.      Carry with you the following info: Known allergies, medical conditions, medications, name of your insurance company and policy number, blood type if known, and phone numbers for emergency contact.        Medical resources  Medical Alert [facility]  [phone]  Provides Medical Alert Tags    [employer] Emergency Services [phone] (9 am TO 5 pm EST)  [phone] (after hours)  Deals with emergencies relating to passport problems.    [facility] Department [facility]  [phone]( Mon-Fri 8:30-10pm  EST)  [phone] Emergencies only, 24 hour facilitator)  [phone] ( travel advisories recording) Provides information on current health conditions around the world.    FOLLOW-UP PLAN:     Please see after visit summary for details regarding the patient's planned vaccination schedule.  For any labs ordered, we will contact the patient with a plan for further care.  The patient was also encouraged to be seen for any post-travel illness, or with future travel plans.    The total time spent during this visit for individual counseling was 35  Minutes. all of which was spent in counseling time, including reviewing the past medical and vaccination history, the travel itinerary, the risks of travel and suggested precautions, and the recommended vaccines and medicines with their side effects -- all for upcoming travel.     Ariadna SHAIKH, CNP

## 2023-08-27 DIAGNOSIS — N52.8 OTHER MALE ERECTILE DYSFUNCTION: ICD-10-CM

## 2023-08-28 ENCOUNTER — VIRTUAL VISIT (OUTPATIENT)
Dept: PHARMACY | Facility: CLINIC | Age: 68
End: 2023-08-28
Payer: COMMERCIAL

## 2023-08-28 DIAGNOSIS — Z72.0 TOBACCO ABUSE: ICD-10-CM

## 2023-08-28 DIAGNOSIS — K59.00 CONSTIPATION, UNSPECIFIED CONSTIPATION TYPE: Primary | ICD-10-CM

## 2023-08-28 PROCEDURE — 99606 MTMS BY PHARM EST 15 MIN: CPT | Performed by: PHARMACIST

## 2023-08-28 PROCEDURE — 99607 MTMS BY PHARM ADDL 15 MIN: CPT | Performed by: PHARMACIST

## 2023-08-28 NOTE — PROGRESS NOTES
Medication Therapy Management (MTM) Encounter    ASSESSMENT:                            Medication Adherence/Access: No issues identified    Smoking:   Patient has successfully quit smoking and can stop using the nicotine gum.     Constipation:  Stable.     PLAN:                            Stop nicotine gum. Congratulations on quitting smoking.     Follow-up: Return if symptoms worsen or fail to improve.    SUBJECTIVE/OBJECTIVE:                          Russ Agee is a 67 year old male called for a follow-up visit from 7/17.   Patient reports he is leaving the country today.     Reason for visit: Smoking follow-up.    Allergies/ADRs: Reviewed in chart  Past Medical History: Reviewed in chart  Tobacco: He reports that he quit smoking about 5 years ago. His smoking use included cigarettes. He has a 3.00 pack-year smoking history. He has never used smokeless tobacco.  Alcohol: none    Medication Adherence/Access: no issues reported    Smoking Cessation:  Nicotine gum 2 mg using 2 pieces per day and has not smoked for 8 weeks. Has been on nicotine gum for almost 12 weeks.     Constipation:   Reports he has returned to using metamucil. Reports that this has still been going well.     Today's Vitals: There were no vitals taken for this visit.  ----------------      I spent 3 minutes with this patient today. All changes were made via collaborative practice agreement with Donnie Haque MD. A copy of the visit note was provided to the patient's provider(s).    A summary of these recommendations was mailed to the patient.    Donnie Rain, Pharm. D., [facility]  Medication Therapy Management Pharmacist      Telemedicine Visit Details  Type of service:  Telephone visit  Start Time:  12:33 pm  End Time:  12:36 pm     Medication Therapy Recommendations  Tobacco abuse    Current Medication: nicotine (NICORETTE) 2 MG gum (Discontinued)   Rationale: Nonmedication therapy more appropriate - Unnecessary medication therapy - Indication    Recommendation: Discontinue Medication   Status: Accepted - no CPA Needed

## 2023-08-28 NOTE — PATIENT INSTRUCTIONS
"Recommendations from today's MTM visit:                                                         Stop nicotine gum. Congratulations on quitting smoking.     Follow-up: Return if symptoms worsen or fail to improve.    It was great speaking with you today.  I value your experience and would be very thankful for your time in providing feedback in our clinic survey. In the next few days, you may receive an email or text message from [employer] with a link to a survey related to your  clinical pharmacist.\"     To schedule another MTM appointment, please call the clinic directly or you may call the MTM scheduling line at [phone] or toll-free at [phone].     My Clinical Pharmacist's contact information:                                                      Please feel free to contact me with any questions or concerns you have.      Donnie Rain, Pharm. D., [facility]  Medication Therapy Management Pharmacist    "

## 2023-08-30 RX ORDER — SILDENAFIL CITRATE 20 MG/1
TABLET ORAL
Qty: 90 TABLET | Refills: 0 | OUTPATIENT
Start: 2023-08-30

## 2023-08-30 NOTE — TELEPHONE ENCOUNTER
sildenafil (REVATIO) 20 MG tablet       Last Written Prescription Date:  6/15/21- 6/15/23  Last Fill Quantity: 90,   # refills: 11  Last Office Visit : 7/5/23  Future Office visit:  10/9/23       Drug not active on patient's medication list. Discontinued 6/15/23 / Briseyda

## 2023-09-14 ENCOUNTER — DOCUMENTATION ONLY (OUTPATIENT)
Dept: INTERNAL MEDICINE | Facility: CLINIC | Age: 68
End: 2023-09-14
Payer: COMMERCIAL

## 2023-09-14 NOTE — PROGRESS NOTES
Type of Form Received:     Form Received (Date) 9/14/23   Form Filled out Yes, faxed 9/18   Placed in provider folder Yes

## 2023-09-18 ENCOUNTER — PRE VISIT (OUTPATIENT)
Dept: UROLOGY | Facility: CLINIC | Age: 68
End: 2023-09-18
Payer: COMMERCIAL

## 2023-09-18 NOTE — TELEPHONE ENCOUNTER
Reason for visit: Return Patient Prostate Cancer     Records/imaging/labs/orders: Called Patient to schedule PSA Lab     Pt called: Yes, spoke with Russ Agee, to schedule for PSA Lab before appointment.     At Rooming: Meet Vilchis  9/18/2023  3:14 PM

## 2023-09-21 ENCOUNTER — VIRTUAL VISIT (OUTPATIENT)
Dept: UROLOGY | Facility: CLINIC | Age: 68
End: 2023-09-21
Payer: COMMERCIAL

## 2023-09-21 DIAGNOSIS — C61 PROSTATE CANCER (H): Primary | ICD-10-CM

## 2023-09-21 PROCEDURE — 99441 PR PHYSICIAN TELEPHONE EVALUATION 5-10 MIN: CPT | Mod: 95 | Performed by: PHYSICIAN ASSISTANT

## 2023-09-21 NOTE — LETTER
9/21/2023       RE: Russ Agee  [address]  [place] [address]     Dear Colleague,    Thank you for referring your patient, Russ Agee, to the [facility] [place] [facility]. Please see a copy of my visit note below.    Virtual Visit Details    Type of service:  Telephone Visit (patient was unable to connect on video)  TELEPHONE DURATION:  7 minutes (3:38 - 4:45) + 5 minutes documentation on DOS    Originating Location (pt. Location): Home  Distant Location (provider location):  On-site  Platform used for Video Visit: AmWell was attempted but failed    HPI: Mr. Russ Agee is a 67 year old year old male presenting today for evaluation of chief complaint(s): RECHECK    Today, he is unaccompanied on the telephone     He is an otherwise healthy gentleman with low risk prostate cancer on surveillance since 2009. Also prostatic hypertrophy with luts.    - Underwent Rezum on 6/26/20 with Dr. Trotter. Today he tells me he has no VOIDING concerns.  No incontinence.  No incontinence.  No UTis, hematuria, dysuria.    - He has previously seen Dr. Schmitz for ejaculatory dysfunction.     - Most recent MRI was in 4/1/19 with a volume of 48 ml and only PIRADS 2 lesions. Most recent biopsy was in 4/8/19 which was negative for malignancy.  See biopsy history below.   - Last PSA was last summer --> 2.57.  He is due for another PSA right now.     REVIEW OF DIAGNOSTICS:  7/13/22 - PSA 2.57  4/9/20 - PSA 4.01  11/29/19 - PSA 3.70  4/8/19 - 14 core biopsy --> all benign  4/1/19 - Prostate MRI: volume 48 grams, PIRADS 2   12/3/18 - PSA 3.29  6/17/14 - biopsy of left mid, right base and left apex ==> all benign  10/29/12 - 14 core biopsy ==> all benign  10/22/10 - 14 core biopsy ==> All benign       Current Outpatient Medications   Medication Sig Dispense Refill    acetaminophen (TYLENOL) 325 MG tablet Take 2 tablets (650 mg) by mouth every 4 hours as  needed for mild pain 50 tablet 0    atovaquone-proguanil (MALARONE) 250-100 MG tablet Take 1 tablet by mouth daily Start 2 days before travel and continue 7 days after return. 40 tablet 0    azithromycin (ZITHROMAX) 500 MG tablet 1 tab daily until diarrhea resolves. 3 tablet 0    ipratropium (ATROVENT) 0.03 % nasal spray Spray 2 sprays into both nostrils every 12 hours 30 mL 1    Mirtazapine (REMERON PO) Take 15 mg by mouth At Bedtime      order for DME Equipment being ordered: Compression stockings (pair) 2 Device 0    psyllium (METAMUCIL/KONSYL) 58.6 % powder Take by mouth daily      RisperiDONE (RISPERDAL PO) Take 3 mg by mouth At Bedtime      traZODone (DESYREL) 50 MG tablet Take 50 mg by mouth At Bedtime       vitamin D3 (CHOLECALCIFEROL) 50 mcg (2000 units) tablet Take 1 tablet (50 mcg) by mouth daily 90 tablet 3       ALLERGIES: Nkda [no known drug allergy]      REVIEW OF SYSTEMS:  As above in HPI    GENERAL PHYSICAL EXAM:   No vitals for virtual visit    NEURO: Alert and oriented x 3.  PSYCH: Normal mood and affect, pleasant and agreeable during interview      RADIOLOGY: The following tests were reviewed:   Prostate MRI 4/1/19 -   1. Based on the most suspicious abnormality, this exam is  characterized as PIRADS 2 - Low probability.  Clinically significant  cancer is unlikely to be present.    2. No evidence of extraprostatic malignancy. No suspicious adenopathy  or evidence of pelvic metastases.     LABS: The last test results for Mr. Russ Agee were reviewed:  PSA -   Lab Results   Component Value Date    PSA 2.57 07/13/2022    PSA 4.01 04/09/2020    PSA 3.70 11/29/2019    PSA 3.29 12/03/2018    PSA 2.92 07/11/2018    PSA 6.42 07/26/2017    PSA 6.08 04/10/2017    PSA 5.38 04/05/2016    PSA 5.04 07/22/2015    PSA 6.02 04/28/2015    PSA 4.23 06/09/2014     BMP -   Recent Labs   Lab Test 04/24/23  1443 02/15/23  1010 07/13/22  1553    140 141   POTASSIUM 4.3 4.4 4.6   CHLORIDE 106 106 107   CO2 26 23 28    BUN 21.6 16.6 9   CR 1.05 1.01 1.11   * 135* 84   JUNE 9.7 9.0 8.8       CBC -   Recent Labs   Lab Test 04/24/23  1443 02/15/23  1010 09/12/22  1557   WBC 6.6 10.7 6.4   HGB 12.7* 8.9* 12.5*    500* 147*       ASSESSMENT:   1) prostate cancer - low grade  2) Hx luts s/p REZUM ==> pleased with urinary symptoms today     PLAN:   - You already have an appointment with Dr. Haque on 10/9/23 at 1:15.   - I will ask our coordinators to call you to schedule a lab appointment after your appointment with Dr. Haque, probably around 2:15.  You can get your PSA checked at that time.    - Assuming your PSA on 10/9/23 looks ok, we will plan to have you return in 1 year to see Parish Bonilla PA-C with another PSA first.      TELEPHONE DURATION:  12 minutes (3:38 - 4:45) + 5 minutes documentation on OMAR Spear PA-C  Department of Urologic Surgery

## 2023-09-21 NOTE — NURSING NOTE
Is the patient currently in the state of [place]? YES    Visit mode:VIDEO    If the visit is dropped, the patient can be reconnected by: VIDEO VISIT: Text to cell phone:   Telephone Information:   Mobile [phone]       Will anyone else be joining the visit? NO  (If patient encounters technical issues they should call [phone]    How would you like to obtain your AVS? MyChart    Are changes needed to the allergy or medication list? Yes, t states he is not taking malarone or zithromax.     Reason for visit: RECHECK    Nasra Jackson CMA [facility]

## 2023-09-21 NOTE — PATIENT INSTRUCTIONS
PLAN:   - You already have an appointment with Dr. Haque on 10/9/23 at 1:15.   - I will ask our coordinators to call you to schedule a lab appointment after your appointment with Dr. Haque, probably around 2:15.  You can get your PSA checked at that time.    - Assuming your PSA on 10/9/23 looks ok, we will plan to have you return in 1 year to see Parish Bonilla PA-C with another PSA first.      [facility]

## 2023-09-25 ENCOUNTER — TELEPHONE (OUTPATIENT)
Dept: UROLOGY | Facility: CLINIC | Age: 68
End: 2023-09-25
Payer: COMMERCIAL

## 2023-09-25 NOTE — TELEPHONE ENCOUNTER
----- Message from KHALIF Rowley sent at 9/25/2023  8:39 AM CDT -----  Regarding: lab appt  Can you switch his lab appt to 10/9/23 at 2:15 (rather than 11/9/23 at 2:15).  Then let him know.      He has an appt with Dr. Haque on 10/9/23 at 1:15 so the October date will be much more convenient for him.    Thanks!

## 2023-10-09 ENCOUNTER — PATIENT OUTREACH (OUTPATIENT)
Dept: GASTROENTEROLOGY | Facility: CLINIC | Age: 68
End: 2023-10-09

## 2024-02-19 DIAGNOSIS — N52.8 OTHER MALE ERECTILE DYSFUNCTION: ICD-10-CM

## 2024-02-21 ENCOUNTER — LAB (OUTPATIENT)
Dept: LAB | Facility: CLINIC | Age: 69
End: 2024-02-21
Payer: COMMERCIAL

## 2024-02-21 ENCOUNTER — OFFICE VISIT (OUTPATIENT)
Dept: INTERNAL MEDICINE | Facility: CLINIC | Age: 69
End: 2024-02-21
Payer: COMMERCIAL

## 2024-02-21 VITALS
OXYGEN SATURATION: 99 % | HEART RATE: 74 BPM | WEIGHT: 166.3 LBS | BODY MASS INDEX: 23.2 KG/M2 | DIASTOLIC BLOOD PRESSURE: 67 MMHG | SYSTOLIC BLOOD PRESSURE: 127 MMHG

## 2024-02-21 DIAGNOSIS — Z86.2 HISTORY OF ANEMIA: ICD-10-CM

## 2024-02-21 DIAGNOSIS — F10.21 ALCOHOL DEPENDENCE IN REMISSION (H): ICD-10-CM

## 2024-02-21 DIAGNOSIS — N52.9 ERECTILE DYSFUNCTION, UNSPECIFIED ERECTILE DYSFUNCTION TYPE: ICD-10-CM

## 2024-02-21 DIAGNOSIS — C61 MALIGNANT NEOPLASM OF PROSTATE (H): ICD-10-CM

## 2024-02-21 DIAGNOSIS — E55.9 VITAMIN D DEFICIENCY: ICD-10-CM

## 2024-02-21 DIAGNOSIS — Z13.220 SCREENING FOR LIPID DISORDERS: ICD-10-CM

## 2024-02-21 DIAGNOSIS — N52.9 ERECTILE DYSFUNCTION, UNSPECIFIED ERECTILE DYSFUNCTION TYPE: Primary | ICD-10-CM

## 2024-02-21 DIAGNOSIS — R01.1 HEART MURMUR: ICD-10-CM

## 2024-02-21 DIAGNOSIS — Z23 NEED FOR COVID-19 VACCINE: ICD-10-CM

## 2024-02-21 DIAGNOSIS — Z29.11 NEED FOR VACCINATION AGAINST RESPIRATORY SYNCYTIAL VIRUS: ICD-10-CM

## 2024-02-21 LAB
ALBUMIN SERPL BCG-MCNC: 4.2 G/DL (ref 3.5–5.2)
ALP SERPL-CCNC: 67 U/L (ref 40–150)
ALT SERPL W P-5'-P-CCNC: 10 U/L (ref 0–70)
ANION GAP SERPL CALCULATED.3IONS-SCNC: 9 MMOL/L (ref 7–15)
AST SERPL W P-5'-P-CCNC: 16 U/L (ref 0–45)
BILIRUB SERPL-MCNC: <0.2 MG/DL
BUN SERPL-MCNC: 16.2 MG/DL (ref 8–23)
CALCIUM SERPL-MCNC: 9.3 MG/DL (ref 8.8–10.2)
CHLORIDE SERPL-SCNC: 106 MMOL/L (ref 98–107)
CHOLEST SERPL-MCNC: 212 MG/DL
CREAT SERPL-MCNC: 1.16 MG/DL (ref 0.67–1.17)
DEPRECATED HCO3 PLAS-SCNC: 27 MMOL/L (ref 22–29)
EGFRCR SERPLBLD CKD-EPI 2021: 69 ML/MIN/1.73M2
ERYTHROCYTE [DISTWIDTH] IN BLOOD BY AUTOMATED COUNT: 14 % (ref 10–15)
FASTING STATUS PATIENT QL REPORTED: NO
GLUCOSE SERPL-MCNC: 107 MG/DL (ref 70–99)
HCT VFR BLD AUTO: 37.7 % (ref 40–53)
HDLC SERPL-MCNC: 59 MG/DL
HGB BLD-MCNC: 12.6 G/DL (ref 13.3–17.7)
LDLC SERPL CALC-MCNC: 136 MG/DL
MCH RBC QN AUTO: 30.7 PG (ref 26.5–33)
MCHC RBC AUTO-ENTMCNC: 33.4 G/DL (ref 31.5–36.5)
MCV RBC AUTO: 92 FL (ref 78–100)
NONHDLC SERPL-MCNC: 153 MG/DL
PLATELET # BLD AUTO: 172 10E3/UL (ref 150–450)
POTASSIUM SERPL-SCNC: 3.9 MMOL/L (ref 3.4–5.3)
PROT SERPL-MCNC: 7.2 G/DL (ref 6.4–8.3)
RBC # BLD AUTO: 4.1 10E6/UL (ref 4.4–5.9)
SODIUM SERPL-SCNC: 142 MMOL/L (ref 135–145)
TRIGL SERPL-MCNC: 85 MG/DL
WBC # BLD AUTO: 6.7 10E3/UL (ref 4–11)

## 2024-02-21 PROCEDURE — 80053 COMPREHEN METABOLIC PANEL: CPT | Performed by: PATHOLOGY

## 2024-02-21 PROCEDURE — 85027 COMPLETE CBC AUTOMATED: CPT | Performed by: PATHOLOGY

## 2024-02-21 PROCEDURE — 91320 SARSCV2 VAC 30MCG TRS-SUC IM: CPT

## 2024-02-21 PROCEDURE — 84153 ASSAY OF PSA TOTAL: CPT | Performed by: PATHOLOGY

## 2024-02-21 PROCEDURE — 82306 VITAMIN D 25 HYDROXY: CPT

## 2024-02-21 PROCEDURE — 36415 COLL VENOUS BLD VENIPUNCTURE: CPT | Performed by: PATHOLOGY

## 2024-02-21 PROCEDURE — 99214 OFFICE O/P EST MOD 30 MIN: CPT | Mod: 25

## 2024-02-21 PROCEDURE — 90480 ADMN SARSCOV2 VAC 1/ONLY CMP: CPT

## 2024-02-21 PROCEDURE — 80061 LIPID PANEL: CPT | Performed by: PATHOLOGY

## 2024-02-21 PROCEDURE — 99000 SPECIMEN HANDLING OFFICE-LAB: CPT | Performed by: PATHOLOGY

## 2024-02-21 RX ORDER — RESPIRATORY SYNCYTIAL VIRUS VACCINE 120MCG/0.5
0.5 KIT INTRAMUSCULAR ONCE
Qty: 1 EACH | Refills: 0 | Status: CANCELLED | OUTPATIENT
Start: 2024-02-21 | End: 2024-02-21

## 2024-02-21 RX ORDER — SILDENAFIL CITRATE 20 MG/1
20-60 TABLET ORAL DAILY PRN
Qty: 90 TABLET | Refills: 1 | Status: SHIPPED | OUTPATIENT
Start: 2024-02-21

## 2024-02-21 NOTE — PROGRESS NOTES
Assessment & Plan     Erectile dysfunction, unspecified erectile dysfunction type  ED, managed with sildenafil. He has tolerated this well in the past, will refill today.  - sildenafil (REVATIO) 20 MG tablet  Dispense: 90 tablet; Refill: 1  - Comprehensive metabolic panel (BMP + Alb, Alk Phos, ALT, AST, Total. Bili, TP)    Heart murmur  Soft systolic murmur heard on exam today. He denies fatigue, edema, orthopnea, PND, or exercise intolerance. Echo last performed in 2015 which was normal. Will repeat echo at this time.  - Echocardiogram Complete    Malignant neoplasm of prostate (H)  He saw urology last on 9/21/23. Note reviewed. Plan was to repeat PSA in October, if lab was ok, then return to see urology fall 2024. He has not had the PSA completed yet. Plan to have PSA drawn today. 2/22: appears lab did not draw the order from urology, will add PSA to labs drawn yesterday.   - PSA, tumor marker    History of anemia  History of anemia, followed by nephrology. Will repeat CBC today.  - CBC with platelets    Screening for lipid disorders  Due for lipid screening. Ordered today.  - Lipid panel reflex to direct LDL Non-fasting    Vitamin D deficiency  History of vitamin D deficiency on replacement. Will check lab today.  - Vitamin D Deficiency    Alcohol dependence in remission (H)  Stable. He has been sober for years.     Need for COVID-19 vaccine  Need for covid vaccination. He is agreeable to the updated booster today.   - COVID-19 12+ (2023-24) (PFIZER)    Need for vaccination against respiratory syncytial virus  Discussed obtaining the RSV vaccine at his local pharmacy.    Lakeisha Abreu NP    Return in about 3 months (around 5/21/2024).    Anat Solano is a 68 year old, presenting for the following health issues:  No chief complaint on file.        2/21/2024     5:44 PM   Additional Questions   Roomed by JOE NEAL     History of Present Illness       Reason for visit:  Refills    He eats 2-3 servings of fruits  and vegetables daily.He consumes 2 sweetened beverage(s) daily.He exercises with enough effort to increase his heart rate 20 to 29 minutes per day.  He exercises with enough effort to increase his heart rate 3 or less days per week.   He is taking medications regularly.               Russ presents to the clinic today for medication refills.    He is interested in a refill for sildenafil. It was prescribed for him previously, though is not currently on the medication list. He reports he uses 60 mg PRN to achieve an erection. Notes the medication works well for him and he tolerates this well. No lightheadedness, fatigue, chest pain. He is not on nitrates.    Review of Systems  Constitutional, HEENT, cardiovascular, pulmonary, GI, , musculoskeletal, neuro, skin, endocrine and psych systems are negative, except as otherwise noted.      Objective    /67 (BP Location: Right arm, Patient Position: Sitting, Cuff Size: Adult Regular)   Pulse 74   Wt 75.4 kg (166 lb 4.8 oz)   SpO2 99%   BMI 23.20 kg/m    Body mass index is 23.2 kg/m .  Physical Exam   GENERAL: alert and no distress  RESP: lungs clear to auscultation - no rales, rhonchi or wheezes  CV: regular rate and rhythm, normal S1 S2, no S3 or S4, systolic murmur I-II/VI noted, no click or rub, no peripheral edema  MS: no gross musculoskeletal defects noted, no edema  PSYCH: mentation appears normal, affect normal/bright    Results for orders placed or performed in visit on 02/21/24   Lipid panel reflex to direct LDL Non-fasting     Status: Abnormal   Result Value Ref Range    Cholesterol 212 (H) <200 mg/dL    Triglycerides 85 <150 mg/dL    Direct Measure HDL 59 >=40 mg/dL    LDL Cholesterol Calculated 136 (H) <=100 mg/dL    Non HDL Cholesterol 153 (H) <130 mg/dL    Patient Fasting > 8hrs? No     Narrative    Cholesterol  Desirable:  <200 mg/dL    Triglycerides  Normal:  Less than 150 mg/dL  Borderline High:  150-199 mg/dL  High:  200-499 mg/dL  Very High:   Greater than or equal to 500 mg/dL    Direct Measure HDL  Female:  Greater than or equal to 50 mg/dL   Male:  Greater than or equal to 40 mg/dL    LDL Cholesterol  Desirable:  <100mg/dL  Above Desirable:  100-129 mg/dL   Borderline High:  130-159 mg/dL   High:  160-189 mg/dL   Very High:  >= 190 mg/dL    Non HDL Cholesterol  Desirable:  130 mg/dL  Above Desirable:  130-159 mg/dL  Borderline High:  160-189 mg/dL  High:  190-219 mg/dL  Very High:  Greater than or equal to 220 mg/dL   Vitamin D Deficiency     Status: Normal   Result Value Ref Range    Vitamin D, Total (25-Hydroxy) 26 20 - 50 ng/mL    Narrative    Season, race, dietary intake, and treatment affect the concentration of 25-hydroxy-Vitamin D. Values may decrease during winter months and increase during summer months.    Vitamin D determination is routinely performed by an immunoassay specific for 25 hydroxyvitamin D3.  If an individual is on vitamin D2(ergocalciferol) supplementation, please specify 25 OH vitamin D2 and D3 level determination by LCMSMS test VITD23.     CBC with platelets     Status: Abnormal   Result Value Ref Range    WBC Count 6.7 4.0 - 11.0 10e3/uL    RBC Count 4.10 (L) 4.40 - 5.90 10e6/uL    Hemoglobin 12.6 (L) 13.3 - 17.7 g/dL    Hematocrit 37.7 (L) 40.0 - 53.0 %    MCV 92 78 - 100 fL    MCH 30.7 26.5 - 33.0 pg    MCHC 33.4 31.5 - 36.5 g/dL    RDW 14.0 10.0 - 15.0 %    Platelet Count 172 150 - 450 10e3/uL   Comprehensive metabolic panel (BMP + Alb, Alk Phos, ALT, AST, Total. Bili, TP)     Status: Abnormal   Result Value Ref Range    Sodium 142 135 - 145 mmol/L    Potassium 3.9 3.4 - 5.3 mmol/L    Carbon Dioxide (CO2) 27 22 - 29 mmol/L    Anion Gap 9 7 - 15 mmol/L    Urea Nitrogen 16.2 8.0 - 23.0 mg/dL    Creatinine 1.16 0.67 - 1.17 mg/dL    GFR Estimate 69 >60 mL/min/1.73m2    Calcium 9.3 8.8 - 10.2 mg/dL    Chloride 106 98 - 107 mmol/L    Glucose 107 (H) 70 - 99 mg/dL    Alkaline Phosphatase 67 40 - 150 U/L    AST 16 0 - 45 U/L     ALT 10 0 - 70 U/L    Protein Total 7.2 6.4 - 8.3 g/dL    Albumin 4.2 3.5 - 5.2 g/dL    Bilirubin Total <0.2 <=1.2 mg/dL           Signed Electronically by: Lakeisha Abreu NP

## 2024-02-22 LAB
PSA SERPL DL<=0.01 NG/ML-MCNC: 3.54 NG/ML (ref 0–4.5)
VIT D+METAB SERPL-MCNC: 26 NG/ML (ref 20–50)

## 2024-02-23 ENCOUNTER — TELEPHONE (OUTPATIENT)
Dept: UROLOGY | Facility: CLINIC | Age: 69
End: 2024-02-23
Payer: COMMERCIAL

## 2024-02-23 DIAGNOSIS — C61 PROSTATE CANCER (H): Primary | ICD-10-CM

## 2024-02-23 RX ORDER — SILDENAFIL CITRATE 20 MG/1
TABLET ORAL
Qty: 90 TABLET | Refills: 0 | OUTPATIENT
Start: 2024-02-23

## 2024-05-22 ENCOUNTER — OFFICE VISIT (OUTPATIENT)
Dept: INTERNAL MEDICINE | Facility: CLINIC | Age: 69
End: 2024-05-22
Payer: COMMERCIAL

## 2024-05-22 VITALS
OXYGEN SATURATION: 98 % | WEIGHT: 168.5 LBS | BODY MASS INDEX: 23.51 KG/M2 | SYSTOLIC BLOOD PRESSURE: 126 MMHG | HEART RATE: 65 BPM | DIASTOLIC BLOOD PRESSURE: 75 MMHG

## 2024-05-22 DIAGNOSIS — R01.1 HEART MURMUR: ICD-10-CM

## 2024-05-22 DIAGNOSIS — Z29.11 NEED FOR VACCINATION AGAINST RESPIRATORY SYNCYTIAL VIRUS: ICD-10-CM

## 2024-05-22 DIAGNOSIS — E78.5 HYPERLIPIDEMIA LDL GOAL <100: Primary | ICD-10-CM

## 2024-05-22 DIAGNOSIS — L98.9 CHANGING SKIN LESION: ICD-10-CM

## 2024-05-22 PROCEDURE — 99214 OFFICE O/P EST MOD 30 MIN: CPT

## 2024-05-22 RX ORDER — RESPIRATORY SYNCYTIAL VIRUS VACCINE 120MCG/0.5
0.5 KIT INTRAMUSCULAR ONCE
Qty: 1 EACH | Refills: 0 | Status: CANCELLED | OUTPATIENT
Start: 2024-05-22 | End: 2024-05-22

## 2024-05-22 ASSESSMENT — PATIENT HEALTH QUESTIONNAIRE - PHQ9
10. IF YOU CHECKED OFF ANY PROBLEMS, HOW DIFFICULT HAVE THESE PROBLEMS MADE IT FOR YOU TO DO YOUR WORK, TAKE CARE OF THINGS AT HOME, OR GET ALONG WITH OTHER PEOPLE: VERY DIFFICULT
SUM OF ALL RESPONSES TO PHQ QUESTIONS 1-9: 9
SUM OF ALL RESPONSES TO PHQ QUESTIONS 1-9: 9

## 2024-05-22 NOTE — PROGRESS NOTES
Assessment & Plan     Hyperlipidemia LDL goal <100  We discussed his elevated cholesterol that was completed in February. . ASCVD risk of 10.5%. LDL previously 96 last year, 85 year prior. Will plan to work on conservative management, working to increase activity and alter diet. Will plan to repeat fasting lipids in 3 months.   - Lipid panel reflex to direct LDL Fasting    Heart murmur  Faint hear murmur. He feels well without chest pain, palpitations, fatigue, dyspnea. Will plan to repeat echo.    Changing skin lesion  Dermatology referral placed.   - Adult Dermatology  Referral    Need for vaccination against respiratory syncytial virus  Discussed obtaining RSV vaccine at his local pharmacy.      Return in about 6 months (around 11/22/2024).    Subjective   Russ is a 68 year old, presenting for the following health issues:  Follow Up (Pt here for follow up )        5/22/2024     5:48 PM   Additional Questions   Roomed by MILLIEL, EMT     HPI     Russ presents to the clinic today for routine follow-up. He is feeling well overall. He does have concern about a mole to his right lower leg, has been present 10 years. He thinks this has been growing recently. Can feel itchy. No bleeding, no pain. He is interested in a dermatology referral.       Review of Systems  Constitutional, HEENT, cardiovascular, pulmonary, gi and gu systems are negative, except as otherwise noted.      Objective    /75 (BP Location: Left arm, Patient Position: Sitting, Cuff Size: Adult Regular)   Pulse 65   Wt 76.4 kg (168 lb 8 oz)   SpO2 98%   BMI 23.51 kg/m    Body mass index is 23.51 kg/m .  Physical Exam   GENERAL: alert and no distress  RESP: lungs clear to auscultation - no rales, rhonchi or wheezes  CV: regular rate and rhythm, faint systolic murmur  SKIN: dry, flaky, waxy lesion to RLE  PSYCH: mentation appears normal, affect normal/bright          Signed Electronically by: Lakeisha Abreu NP

## 2024-05-22 NOTE — PATIENT INSTRUCTIONS
Obtain second shingles vaccine and consider RSV vaccine at your local pharmacy.     Schedule the echocardiogram by calling [phone].    You will receive a call for scheduling with dermatology.    Work on diet and exercise, repeat fasting cholesterol in 3 months.

## 2024-09-16 NOTE — Clinical Note
10/28/2017       RE: Russ Agee  [address]  [facility] [address]     Dear Colleague,    Thank you for referring your patient, Russ Agee, to the [facility] FOR PROSTATE AND UROLOGIC CANCERS at [facility]. Please see a copy of my visit note below.    REASON FOR VISIT TODAY:  Prostate cancer.      HISTORY OF PRESENT ILLNESS:  Mr. Russ Agee is a 61-year-old gentleman followed in our clinic for a history of prostate cancer.  The patient was originally diagnosed with Radha 3 + 3 = 6 in 1% of 1 core in 2009.  He subsequently had a negative prostate biopsy on 10/22/2010 as well as a negative biopsy in 2012 and an MRI-guided biopsy in 2014, all of which have been negative for further evidence of cancer.  The patient's PSAs have been stable in the mid 5 range for most of the time period.         The patient also had an MRI on 09/12/2016 that showed a 59 gram prostate with only PI-RADS 2 lesions.  He also had urodynamics on 10/26/2016 that did show obstruction.        The patient has tried multiple medications for his urination including most recently Uroxatral, but this did not help.  He has tried some Chinese herbs, which he feels helped a little but did not completely alleviate his symptoms.  He is interested in other possible interventions for his urination at this time.  The patient does also take sildenafil for some erectile dysfunction and notes that he does have what appears to be retrograde ejaculation.      PHYSICAL EXAMINATION:  On exam, his blood pressure 119/75, pulse is 82.  He is in no acute distress.      DATA:  The patient's PSAs include values of 6.08 in 04/2017, 6.42 on 07/26/2017 and 6.02 back on 04/20/2015.  The patient's AUA symptom score today is measured at 35/35, bothersome index of 6, and his BRENDA is 0.      ASSESSMENT AND PLAN:  Over half of today's 45-minute visit was spent counseling the patient regarding his prostate cancer and  his lower urinary tract symptoms.  I suggested to Mr. Mc that his PSA remains largely stable close to the values they have been over the last several years, and given his 4 previous biopsies at this point, we will not intervene further, but instead will continue to monitor him.      With regard to his urination, the patient again is interested in further intervention.  We discussed medication options including trying Myrbetriq versus finasteride versus interventional procedures including a Rezum water vapor treatment versus transurethral resection of the prostate or even a ***.  The patient has had a cystoscopy in the past.        At this point in time, the patient is interested in moving forward with Rezum water vapor treatment.  The patient did also request some medications to try again in the meantime.  We therefore have given him a prescription for Myrbetriq but the patient was cautioned that it may not be covered by his insurance and the patient will therefore have to determine if it is financially possible or not.       We will look to get the patient on the schedule in the near future for his Rezum and we will collect a urine today for culture in preparation.         ERICKA TROTTER MD             D: 10/28/2017 12:54   T: 10/31/2017 13:21   MT: JASON      Name:     TONIA MC   MRN:      [id number]        Account:      [id number]   :      1955           Service Date: 10/28/2017      Document: [id number]       Again, thank you for allowing me to participate in the care of your patient.      Sincerely,    Ericka Trotter MD      OB Discharge Instructions

## 2024-09-25 ENCOUNTER — PATIENT OUTREACH (OUTPATIENT)
Dept: GASTROENTEROLOGY | Facility: CLINIC | Age: 69
End: 2024-09-25
Payer: COMMERCIAL

## 2024-09-25 DIAGNOSIS — Z12.11 SPECIAL SCREENING FOR MALIGNANT NEOPLASMS, COLON: Primary | ICD-10-CM

## 2024-09-25 NOTE — LETTER
September 25, 2024      Russ Agee  [address]  [facility] [address]              Roman Solano,    We hope this letter finds you doing well.     Your health is important to us at [facility]. Our records indicate that you could be due, or possibly overdue, for a screening or surveillance colonoscopy if you have not had a colonoscopy since 2019.     An order has been placed for you to have this completed. Our scheduling team will be reaching out to you to assist in getting this scheduled. If you do not hear from them within 7 days or you would like to schedule sooner, please call [phone], option 2 for endoscopy scheduling.     If you believe this is in error and have already had a colonoscopy done, please have your results and recommendations faxed to [phone].    If you have questions about this order or have further concerns, please reach out to your primary care provider.     Chintan     Colorectal Cancer RN Screening Team  [facility]

## 2024-09-25 NOTE — PROGRESS NOTES
"Patient has a history of polyps and surveillance colonoscopy is overdue. Patient meets criteria for CRC high risk standing order protocol.    CRC Screening Colonoscopy Referral Review    Patient meets the inclusion criteria for screening colonoscopy standing order.    Ordering/Referring Provider:  Donnie Haque MD    BMI: Estimated body mass index is 23.51 kg/m  as calculated from the following:    Height as of 8/1/23: 1.803 m (5' 10.98\").    Weight as of 5/22/24: 76.4 kg (168 lb 8 oz).     Sedation:  Does patient have any of the following conditions affecting sedation?  Hx of chemical dependency: MAC sedation recommended    Previous Scopes:  Any previous recommendations or follow up needs based on previous scope?  na / No recommendations.    Medical Concerns to Postpone Order:  Does patient have any of the following medical concerns that should postpone/delay colonoscopy referral?  No medical conditions affecting colonoscopy referral.    Final Referral Details:  Based on patient's medical history patient is appropriate for referral order with MAC/deep sedation.   BMI<= 45 45 < BMI <= 48 48 < BMI < = 50  BMI > 50   No Restrictions No [facility]  No [facility]  [facility] with exceptions Hospital Only OR Only     "

## 2024-11-01 ENCOUNTER — TELEPHONE (OUTPATIENT)
Dept: GASTROENTEROLOGY | Facility: CLINIC | Age: 69
End: 2024-11-01
Payer: COMMERCIAL

## 2024-11-01 NOTE — TELEPHONE ENCOUNTER
"Endoscopy Scheduling Screen    Have you had any respiratory illness or flu-like symptoms in the last 10 days?  No    What is your communication preference for Instructions and/or Bowel Prep?   Mail/USPS    What insurance is in the chart?  Other:  [facility]    Ordering/Referring Provider: Donnie Haque MD   (If ordering provider performs procedure, schedule with ordering provider unless otherwise instructed. )    BMI: Estimated body mass index is 23.51 kg/m  as calculated from the following:    Height as of 8/1/23: 1.803 m (5' 10.98\").    Weight as of 5/22/24: 76.4 kg (168 lb 8 oz).     Sedation Ordered  MAC/deep sedation.   BMI<= 45 45 < BMI <= 48 48 < BMI < = 50  BMI > 50   No Restrictions No [facility]  No [facility]  [facility] with exceptions Hospital Only OR Only       Do you have a history of malignant hyperthermia?  No    (Females) Are you currently pregnant?   No     Have you been diagnosed or told you have pulmonary hypertension?   No    Do you have an LVAD?  No    Have you been told you have moderate to severe sleep apnea?  No.    Have you been told you have COPD, asthma, or any other lung disease?  No    Do you have any heart conditions?  No     Have you ever had or are you waiting for an organ transplant?  No. Continue scheduling, no site restrictions.    Have you had a stroke or transient ischemic attack (TIA aka \"mini stroke\" in the last 6 months?   No    Have you been diagnosed with or been told you have cirrhosis of the liver?   No.    Are you currently on dialysis?   No    Do you need assistance transferring?   No    BMI: Estimated body mass index is 23.51 kg/m  as calculated from the following:    Height as of 8/1/23: 1.803 m (5' 10.98\").    Weight as of 5/22/24: 76.4 kg (168 lb 8 oz).     Is patients BMI > 40 and scheduling location [facility]?  No    Do you take an injectable or oral medication for weight loss or diabetes (excluding insulin)?  No    Do you take the medication Naltrexone?  No    Do you take " blood thinners?  No       Prep   Are you currently on dialysis or do you have chronic kidney disease?  No    Do you have a diagnosis of diabetes?  No    Do you have a diagnosis of cystic fibrosis (CF)?  No    On a regular basis do you go 3 -5 days between bowel movements?  No    BMI > 40?  No    Preferred Pharmacy:      [facility] [place] [address]          Final Scheduling Details     Procedure scheduled  Colonoscopy    Surgeon:  Chandler     Date of procedure:  02/21/2025     Pre-OP / PAC:   No - Not required for this site.    Location  [facility] - [facility] - Patient preference.    Sedation   MAC/Deep Sedation - Per order.      Patient Reminders:   You will receive a call from a Nurse to review instructions and health history.  This assessment must be completed prior to your procedure.  Failure to complete the Nurse assessment may result in the procedure being cancelled.      On the day of your procedure, please designate an adult(s) who can drive you home stay with you for the next 24 hours. The medicines used in the exam will make you sleepy. You will not be able to drive.      You cannot take public transportation, ride share services, or non-medical taxi service without a responsible caregiver.  Medical transport services are allowed with the requirement that a responsible caregiver will receive you at your destination.  We require that drivers and caregivers are confirmed prior to your procedure.

## 2024-12-09 ENCOUNTER — OFFICE VISIT (OUTPATIENT)
Dept: INTERNAL MEDICINE | Facility: CLINIC | Age: 69
End: 2024-12-09
Payer: COMMERCIAL

## 2024-12-09 DIAGNOSIS — R93.89 ABNORMAL FINDINGS ON DIAGNOSTIC IMAGING OF OTHER SPECIFIED BODY STRUCTURES: ICD-10-CM

## 2024-12-09 DIAGNOSIS — E55.9 VITAMIN D DEFICIENCY: ICD-10-CM

## 2024-12-09 DIAGNOSIS — B19.20 HEPATITIS C VIRUS INFECTION WITHOUT HEPATIC COMA, UNSPECIFIED CHRONICITY: ICD-10-CM

## 2024-12-09 DIAGNOSIS — R97.20 ELEVATED PROSTATE SPECIFIC ANTIGEN (PSA): Primary | ICD-10-CM

## 2024-12-09 PROCEDURE — 99207 PR NO CHARGE LOS: CPT | Performed by: INTERNAL MEDICINE

## 2025-02-07 ENCOUNTER — TELEPHONE (OUTPATIENT)
Dept: GASTROENTEROLOGY | Facility: CLINIC | Age: 70
End: 2025-02-07
Payer: COMMERCIAL

## 2025-02-07 DIAGNOSIS — Z12.11 SPECIAL SCREENING FOR MALIGNANT NEOPLASMS, COLON: Primary | ICD-10-CM

## 2025-02-07 NOTE — LETTER
February 7, 2025      Russ Agee  [address]  [facility] [address]              Colonoscopy     Procedure date: 2/21/25    Anticipated arrival time: 11:00 AM   (Please note that arrival times may change)    [facility]; [address], 5th Floor, [place] [address] - Check in location: 5th Floor. Parking information: Self pay parking is available in West Lot located directly across from the main entrance of the [facility]. Entry and exit to this lot is on [facility]. In addition, self pay parking is available in [place] [address] which is located west of [facility]. Follow the center patito designated ' [employer]' to ground-level spaces that are reserved for patients. Please disregard any signage indicating the ramp is full or available by reservation only as this does not pertain to the spaces dedicated for patients coming to the clinic.  services are available for patients with limited mobility. Services available Monday-Friday, 7:00a.m.-  5:00p.m.    Important Procedure Reminders:     Prep Instructions:   Instructions on how to prepare for your upcoming procedure are found below. Please read instructions carefully. Deviation from instructions may result in less than desired outcomes and procedure may need to be rescheduled.   If you have additional questions regarding how to prepare for your upcoming procedure, contact our endoscopy pre assessment nurses at [phone] option 2 Monday through Friday 7:00am-5:00pm.      Policy:   The medications used during the procedure will make you sleepy, so you won't be able to drive. On the day of your procedure, please have an adult ready to drive you home and stay with you for the next 24 hours.   You can't use public transportation, ride-share services, or non-medical taxi services without a responsible caregiver. Medical transport services are okay, but a caregiver must be there to receive you at your  destination.   Make sure your  and caregiver are confirmed before your procedure.    Day of procedure:  Please keep in mind that arrival times may change. Let your  know there might be a one-hour window for changes.  We ask that you please check in at the  with your . Your  should remain on campus.  Expect to be at the procedure center for about 1.5-2.5 hours.    Please do not wear jewelry (i.e. earrings, rings, necklaces, watches, etc). Leave your purse, billfold, credit cards, and other valuables at home.   Bring insurance card and ID.     To cancel or reschedule your procedure:   If you need to cancel or reschedule, our endoscopy scheduling team can be reached at [phone], option 2. Monday through Friday, 7:00am-5:00pm.    Medication Reminders:    Please note the following medication holding recommendations:   N/A    ---------------------------------------------------------------------------------------------------------------------------------------------------------------------------------------------------------------      Standard Miralax Bowel Prep   Prep instructions for your colonoscopy     [facility]; [address] [place] [address], 5th Floor, [place] [address] - Check in location: 5th Floor.  For prep questions, please call: [facility] - [phone] option 2    Please read these instructions carefully at least 7 days prior to your colonoscopy procedure. Be sure to follow all directions completely. The inside of your colon must be clean to allow for a complete examination for the presence of any growths, polyps, and/or abnormalities, as well as their biopsy or removal. A number of tips are included in order to make this part of the procedure as comfortable as possible.    Getting ready   Purchase the following items over-the-counter/off the shelf at the drug store:    Four (4) - Dulcolax laxative (Bisacodyl) 5mg tablets (Do not use Dulcolax stool  softener)   8.3 ounce bottle of Miralax powder (ClearLAX, SmoothLAX, PowderLAX)  64 ounces of Gatorade or similar sports drink. Not red or purple. (Pedialyte, Propel, Gatorade G2/Zero, Powerade, Powerade Zero)   10 ounce bottle of clear Magnesium Citrate    A nurse will call you to go over your appointment details and prep instructions.    You must arrange for an adult to drive you home after your exam. Your colonoscopy cannot be done unless you have a ride. If you need to use public transportation, someone must ride with you and stay with you for up to 24 hours.       7 days before procedure   Medications that may need to be held before procedure:     GLP-1 agonist medication for diabetes or weight loss: such as Mounjaro (Tirzepatide).  Ozempic (Semaglutide). Rybelsus (Semaglutide), Tirzepatide-Weight Management (Zepbound), Wegovy (Semaglutide) or others, holding times may vary based on how you take this medication. This may be up to a 7 day hold. Our pre assessment nurses will call and discuss holding recommendations 1-2 weeks before scheduled procedure.     Blood thinning and/or anti platelet medications: such as Coumadin, Plavix, Xarelto, Eliquis, Lovenox or others, ask your your prescribing provider about holding recommendations.     If you take insulin for diabetes, ask your prescribing provider for instructions on how to manage this medication while preparing for a colonoscopy.     Stop taking iron (ferrous sulfate), multivitamins that contain iron, and/or fiber supplements (Metamucil, Benefiber, Psyllium husk powder, Fibercon, Bran, etc.) 7 days before procedure.     Stop eating whole kernel corn, popcorn, nuts, and foods that contain seeds. These can stay in the colon for many days and they can clog up the colonoscope.       3 days before procedure     Begin a low-fiber diet (see examples below). No Olestra (a fat substitute).    Consume no more than 10-15 grams of fiber each day.     It is important to  stay hydrated. Drink at least eight 8-ounce glasses of water a day.      LOW FIBER DIET   You can have:   Do not have:    Starches: White bread, rolls, biscuits, croissants, [place] toast, white flour tortillas, waffles, pancakes, Vincentian toast; white rice, noodles, pasta, macaroni; cooked and peeled potatoes; plain crackers, saltines; cooked farina or cream of rice; puffed rice, corn flakes, Rice Krispies, Special K      Vegetables: tender cooked and canned, vegetable broths     Fruits and fruit juices: Strained fruit juice, canned fruit without seeds or skin (not pineapple), applesauce, pear sauce, ripe bananas, melons (not watermelon)     Milk products: Milk (plain or flavored), cheese, cottage cheese, yogurt (no berries), custard, ice cream       Proteins: Tender, well-cooked ground beef, lamb, veal, ham, pork, chicken, turkey, fish or organ meat, Tofu, eggs, creamy peanut butter      Fats and condiments:  Margarine, butter, oils, mayonnaise, sour cream, salad dressing, plain gravy; spices, cooked herbs; sugar, clear jelly, honey, syrup      Snacks, sweets and drinks: Pretzels, hard candy; plain cakes and cookies (no nuts or seeds); gelatin, plain pudding, sherbet, Popsicles; coffee, tea, carbonated ( fizzy ) drinks  Starches: Breads or rolls that contain nuts, seeds or fruit; whole wheat or whole grain breads that contain more than 2 grams of fiber per serving; cornbread; corn or whole wheat tortillas; potatoes with skin; brown rice, wild rice, quinoa, kasha (buckwheat), and oatmeal      Vegetables: Any raw or steamed vegetables; vegetables with seeds; corn in any form      Fruits and fruit juices: Prunes, prune juice, raisins and other dried fruits, berries and other fruits with seeds, canned pineapple juices with pulp such as orange, grapefruit, pineapple or tomato juice     Milk products: Any yogurt with nuts, seeds or berries      Proteins: Tough, fibrous meats with gristle; cooked dried beans, peas or  lentils; crunchy peanut butter     Fats and condiments: Pickles, olives, relish, horseradish; jam, marmalade, preserves      Snacks, sweets and drinks: Popcorn, nuts, seeds, granola, coconut, candies made with nuts or seeds; all desserts that contain nuts, seeds, raisins and other dried fruits, coconut, whole grains or bran.     1 day before procedure     Start a clear liquid diet (see examples below). Do not eat any solid food.      Drink at least eight to ten 8-ounce glasses of water throughout the day. ? ? ? ? ? ? ? ?    CLEAR LIQUID DIET:  You can have: Do not have:    Water, tea, coffee (no milk or cream)   Soda pop, Gatorade (not red or purple)   Coconut water   Jell-O, Popsicles (no milk or fruit pieces - not red or purple)   Fat-free soup broth or bouillon   Plain hard candy, such as clear life savers (not red or purple)   Clear juices and fruit-flavored drinks, such as apple juice, white grape juice, Hi-C, and Emil-Aid (not red or purple)  Milk or milk products such as ice cream, malts or shakes, or coffee creamer   Red or purple drinks of any kind such as cranberry juice, grape juice, or Emil-Aid. Avoid red or purple Jell-O, Popsicles, sorbet, sherbet, and candy.   Juices with pulp such as orange, grapefruit, pineapple, or tomato juice   Cream soups of any kind   Alcohol and beer   Protein drinks or protein powder     Step 1     At 4 PM, take 2 Dulcolax (Bisacodyl) tablets.   At 5 PM, mix the entire bottle of Miralax with 64 ounces of Gatorade in a pitcher and stir to dissolve the powder. Start drinking one 8-ounce glass of the Miralax and Gatorade mixture every 15 minutes until the pitcher is HALF empty (about 4 glasses). Drink each glass quickly. Store the rest in the refrigerator.   Continue to drink clear liquids.    Step 2     At 10 PM, take 2 Dulcolax (Bisacodyl) tablets.  At 10 PM start drinking one 8-ounce glass of Miralax and Gatorade mixture every 15 minutes until the pitcher is empty (about 4  glasses). Drink each glass quickly.    Step 3     If you arrive for your procedure BEFORE 11 AM:  6 hours prior to your scheduled arrival to the endoscopy unit drink 10 ounces of clear Magnesium Citrate    If you arrive for your procedure AFTER 11 AM:  At 6 AM on the day of the exam drink 10 ounces of clear Magnesium Citrate       Reminders While Drinking Laxatives:     After you start drinking the solution, stay near a toilet. You may have watery stools (diarrhea), mild cramping, bloating, and nausea. You may want to use Vaseline on the skin around your anus after each bowel movement or use wet wipes to prevent irritation. Bowel movements will be liquid and dark in color at first and then should turn clear yellow in color.      Some find it easier to drink the Miralax and Gatorade mixture when it is chilled. Do not add ice as this will dilute the laxative. Drinking from a straw can be helpful to drink the liquid faster.     If you have nausea or vomiting during drinking the solution, rinse your mouth with water and take a 15-30 minute break and then continue drinking solution.       Day of procedure     2 hours before your arrival time stop drinking all liquids, including water.   Do not smoke or swallow anything, including water or gum for at least 2 hours before your arrival time. This is a safety issue. Your procedure could be cancelled if you do not follow directions.  No chewing tobacco 6 hours prior to procedure arrival time.     You may take your necessary morning medications with sips of water (4 ounces).   Do not take diabetes medicine by mouth until after your exam.  If you have asthma, bring your inhalers.  Please perform your nebulizer treatments and airway clearance therapy in the morning prior to the procedure (if applicable).    Arrive with a responsible adult who can drive you home and stay with you for up to 24 hours. The medications used during the procedure will make you sleepy, so you won't be  able to drive yourself home.   You cannot use public transportation, ride-share services, or non-medical taxi services without a responsible caregiver. Medical transport services are okay, but a caregiver must be there to receive you at your destination.  Please check in with your  when you arrive. Drivers should stay on campus.    Expect to be at the procedure center for about 1.5-2.5 hours.    Do not wear jewelry (i.e. earrings, rings, necklaces, watches, etc.). Leave your purse, billfold, credit cards, and other valuables at home.      Bring insurance card and ID.       Answers to Commonly Asked Questions     How soon can I eat after the procedure?  You may resume your normal diet when you feel ready, unless advised otherwise by the doctor performing your procedure. We recommend starting with a light meal.   Do not drink alcohol for 24 hours after your procedure.  You may resume normal activities (work, exercise, etc.) after 24 hours.    How might I feel after the procedure?  It is normal to feel bloated and gassy after your procedure. Walking will help move the air through your colon. You can take non-aspirin pain relievers that contain acetaminophen (Tylenol).  If you are having sedation, we require a responsible adult to take you home for your safety. The sedation medicines used to relax you during the procedure can impair your judgement and reaction time, and make you forgetful and possibly a little unsteady.  Do not drive, make any important decisions, or sign any legal documents for 24 hours after your procedure.    When will I get my test results?  You should have your procedure results and any lab results (if applicable) by letter, OrbFlexhart message, or phone call within 2 weeks. If you have any questions, please call the doctor that referred you for the procedure.    How do I know if my colon is cleaned out?   After completing the bowel prep, your bowel movements should be all liquid and yellow. Your  bowel movements will look similar to urine in the toilet. If there are pieces of stool (poop) in the toilet, or if you can't see to the bottom of the toilet, please call our office for advice. Call [phone] and ask to speak with a nurse.    Why is the Miralax bowel prep taken in several steps?   The stool is flushed out by a large wave of fluid going through the colon. Just sipping a large volume of the solution will not achieve the desired result. Studies have shown that two smaller waves (or more in some cases) are better than one large one.      Why do I need to drink the magnesium citrate so close to the procedure arrival time?   The intestine continues to produce mucus and waste. Longer intervals between the prep and the exam can lead to less than desired results. However, the stomach must be empty at the time of the exam in order to allow safe sedation. Therefore, there should be nothing by mouth 2 hours before the exam is started.    What if I need to cancel or reschedule my procedure?  Contact our endoscopy scheduling team at [phone], option 2. Monday through Friday, 7:00am-5:00pm.

## 2025-02-07 NOTE — TELEPHONE ENCOUNTER
Attempted to contact patient in order to complete pre assessment questions.     No answer. Unable to leave a voicemail for the patient. Patient is not MyChart active.        Cary Maki LPN  Endoscopy Procedure Pre Assessment

## 2025-02-07 NOTE — TELEPHONE ENCOUNTER
Pre visit planning completed.      Procedure details:    Patient scheduled for Colonoscopy on 2/21/25.     Arrival time: 1100. Procedure time 1200    Facility location: [facility]; [address] [place] [address], 5th Floor, [place] [address]. Check in location: 5th Floor.    Sedation type: MAC    Pre op exam needed? No.    Indication for procedure: screening colonoscopy       Chart review:     Electronic implanted devices? No    Recent diagnosis of diverticulitis within the last 6 weeks? No      Medication review:    Diabetic? No    Anticoagulants? No    Weight loss medication/injectable? No GLP-1 medication per patient's medication list. Nursing to verify with pre-assessment call.    Other medication HOLDING recommendations:  N/A      Prep for procedure:     Bowel prep recommendation: Standard Miralax.   Due to: standard bowel prep    Procedure information and instructions sent via letter         Olivia Dotson RN  Endoscopy Procedure Pre Assessment   [phone] option 2

## 2025-02-11 RX ORDER — BISACODYL 5 MG/1
TABLET, DELAYED RELEASE ORAL
Qty: 4 TABLET | Refills: 0 | Status: SHIPPED | OUTPATIENT
Start: 2025-02-11

## 2025-02-11 NOTE — TELEPHONE ENCOUNTER
Attempted to call the patient to do his pre assessment. Spoke with the patient and his PCA. The patient gave this writer verbal permission to speak with the PCA. She states that the patient has short term memory issues. Tried explaining that the patient is scheduled for a colonoscopy, the date, time, and location. Also, that the patient will need to do a bowel prep prior to the colonoscopy and will need to  the bowel prep from a pharmacy. The patient and the PCA are having a hard time understanding the instructions. The PCA states that they will call the patients provider to discuss the colonoscopy and then call the endoscopy pre assessment line back to let us know if they want to keep the scheduled procedure time and date. Pre assessment not completed. If the patient wishes to do the procedure, recommend that the patient have the standard Golytely bowel prep, due to language barriers, and difficulty understanding what items need to be purchased at the store or pharmacy.    Cary Maki LPN  Endoscopy Procedure Pre Assessment   [phone] option 2

## 2025-02-16 NOTE — PROGRESS NOTES
Pre-procedure note:     70 yo M with a PMH of GERD, chronic HCV infection, depression and anxiety, who was referred for elective colonoscopy for polyp surveillance. Colonoscopy in 9/2021 revealed few small polyps in the AC with at least one TA. No reported family history of CRC.     Ref: Donnie Haque MD

## 2025-02-18 NOTE — TELEPHONE ENCOUNTER
Pre assessment completed for upcoming procedure.   (Please see previous telephone encounter notes for complete details)    Spoke with the patient and his PCADe, regarding his upcoming procedure. Patient gave permission to speak with his PCA. Both of them state that the patient will be coming for his colonoscopy on 2/21/25. They verbalize understanding of the bowel prep instructions and will call if they have any questions.      Procedure details:    Arrival time and facility location reviewed.    Pre op exam needed? No.    Designated  policy reviewed. Instructed to have someone stay 24  hours post procedure.       Medication review:    Medications reviewed. Please see supporting documentation below. Holding recommendations discussed (if applicable).       Prep for procedure:     Procedure prep instructions reviewed.        Any additional information needed:  N/A      Patient and PCA, De, verbalized understanding and had no questions or concerns at this time.      Cary Maki LPN  Endoscopy Procedure Pre Assessment   [phone] option 3

## 2025-02-21 ENCOUNTER — HOSPITAL ENCOUNTER (OUTPATIENT)
Facility: AMBULATORY SURGERY CENTER | Age: 70
Discharge: HOME | End: 2025-02-21
Attending: INTERNAL MEDICINE | Admitting: INTERNAL MEDICINE
Payer: COMMERCIAL

## 2025-02-21 VITALS
RESPIRATION RATE: 15 BRPM | DIASTOLIC BLOOD PRESSURE: 73 MMHG | SYSTOLIC BLOOD PRESSURE: 144 MMHG | OXYGEN SATURATION: 97 % | HEART RATE: 72 BPM | BODY MASS INDEX: 24.05 KG/M2 | TEMPERATURE: 97.7 F | WEIGHT: 168 LBS | HEIGHT: 70 IN

## 2025-02-21 LAB — COLONOSCOPY: NORMAL

## 2025-02-21 PROCEDURE — 45385 COLONOSCOPY W/LESION REMOVAL: CPT | Mod: PT | Performed by: INTERNAL MEDICINE

## 2025-02-21 PROCEDURE — 88305 TISSUE EXAM BY PATHOLOGIST: CPT | Mod: TC | Performed by: INTERNAL MEDICINE

## 2025-02-21 PROCEDURE — 88305 TISSUE EXAM BY PATHOLOGIST: CPT | Mod: 26 | Performed by: PATHOLOGY

## 2025-02-21 RX ORDER — ONDANSETRON 2 MG/ML
4 INJECTION INTRAMUSCULAR; INTRAVENOUS EVERY 6 HOURS PRN
Status: CANCELLED | OUTPATIENT
Start: 2025-02-21

## 2025-02-21 RX ORDER — NALOXONE HYDROCHLORIDE 0.4 MG/ML
0.2 INJECTION, SOLUTION INTRAMUSCULAR; INTRAVENOUS; SUBCUTANEOUS
Status: CANCELLED | OUTPATIENT
Start: 2025-02-21

## 2025-02-21 RX ORDER — LIDOCAINE 40 MG/G
CREAM TOPICAL
Status: DISCONTINUED | OUTPATIENT
Start: 2025-02-21 | End: 2025-02-22 | Stop reason: HOSPADM

## 2025-02-21 RX ORDER — ONDANSETRON 4 MG/1
4 TABLET, ORALLY DISINTEGRATING ORAL EVERY 6 HOURS PRN
Status: CANCELLED | OUTPATIENT
Start: 2025-02-21

## 2025-02-21 RX ORDER — ONDANSETRON 2 MG/ML
4 INJECTION INTRAMUSCULAR; INTRAVENOUS
Status: DISCONTINUED | OUTPATIENT
Start: 2025-02-21 | End: 2025-02-22 | Stop reason: HOSPADM

## 2025-02-21 RX ORDER — NALOXONE HYDROCHLORIDE 0.4 MG/ML
0.4 INJECTION, SOLUTION INTRAMUSCULAR; INTRAVENOUS; SUBCUTANEOUS
Status: CANCELLED | OUTPATIENT
Start: 2025-02-21

## 2025-02-21 RX ORDER — PROCHLORPERAZINE MALEATE 5 MG/1
5 TABLET ORAL EVERY 6 HOURS PRN
Status: CANCELLED | OUTPATIENT
Start: 2025-02-21

## 2025-02-21 RX ORDER — FLUMAZENIL 0.1 MG/ML
0.2 INJECTION, SOLUTION INTRAVENOUS
Status: CANCELLED | OUTPATIENT
Start: 2025-02-21 | End: 2025-02-22

## 2025-02-21 NOTE — OR NURSING
"Pt colonoscopy canceled in pre-op due to questionable bowel prep. Per Dr Cuello's note, \"Patient reportedly had a sandwich and spaghetti yesterday evening but it is was not clear what time he ate (provided inconsistent answer to different providers). He started his bowel prep after 8 pm last night and noted that his stool was \"soft/water\" initially but later stated that it was clear. After discussion with anesthesia and the patient, he agreed to rescheduled the procedure given the concern for inadequate prep and inconsistent answers regarding last PO intake.\"  "

## 2025-02-21 NOTE — LETTER
"February 27, 2025      Russ Agee  [address]  [facility] [address]        Dear ,    We are writing to inform you of your test results.    {results letter list:[id number]}    Resulted Orders   Surgical Pathology Exam   Result Value Ref Range    Case Report       Surgical Pathology Report                         Case: [id number]                                  Authorizing Provider:  Pedro Cuello MD      Collected:           02/21/2025 02:53 PM          Ordering Location:     [facility] OR  Received:            02/21/2025 03:27 PM                                 [place]                                                                  Pathologist:           Edmar Hoang MD                                                           Specimen:    Large Intestine, Colon, Transverse colon polyp                                             Final Diagnosis       TRANSVERSE COLON, POLYP, BIOPSY:   Sessile serrated adenoma; 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 Large Intestine, Colon, Transverse colon polyp:  The specimen is received in formalin with proper patient identification, labeled \"large intestine, transverse colon polyp\".  The specimen consists of a single 0.3 cm in greatest dimension, gray-tan, irregular fragment of mucosal tissue.  The specimen is submitted in toto as A1.       Microscopic Description       Microscopic examination has been performed.     Case was reviewed by the following:  Resident Pathologist: Viktoria Young  A resident or fellow in a training program was involved in the initial review, preparation, and/or interpretation of this case.  I, as the senior physician, attest that I have personally reviewed all specimens and or slides, including the listed special stains, and used them with my medical judgement to determine the final diagnosis.              Performing Labs       The " technical component of this testing was completed at [facility].    Stain controls for all stains resulted within this report have been reviewed and show appropriate reactivity.       Case Images         If you have any questions or concerns, please call the clinic at the number listed above.       Sincerely,          Electronically signed

## 2025-02-21 NOTE — LETTER
"February 27, 2025      Russ Agee  [address]  [facility] [address]        Dear ,    The polyp that was removed during your colonoscopy returned as benign. The polyp was identified as what we call sessile serrated lesion, which is a pre-malignant polyp. I recommend you have a repeat colonoscopy in 5-10 years to look for any new polyps.    Pedro Cuello MD  [facility]  Division of Gastroenterology and Hepatology  [address], [place] [address]     Resulted Orders   Surgical Pathology Exam   Result Value Ref Range    Case Report       Surgical Pathology Report                         Case: [id number]                                  Authorizing Provider:  Pedro Cuello MD      Collected:           02/21/2025 02:53 PM          Ordering Location:     [facility] OR  Received:            02/21/2025 03:27 PM                                 [place]                                                                  Pathologist:           Edmar Hoang MD                                                           Specimen:    Large Intestine, Colon, Transverse colon polyp                                             Final Diagnosis       TRANSVERSE COLON, POLYP, BIOPSY:   Sessile serrated adenoma; 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 Large Intestine, Colon, Transverse colon polyp:  The specimen is received in formalin with proper patient identification, labeled \"large intestine, transverse colon polyp\".  The specimen consists of a single 0.3 cm in greatest dimension, gray-tan, irregular fragment of mucosal tissue.  The specimen is submitted in toto as A1.       Microscopic Description       Microscopic examination has been performed.     Case was reviewed by the following:  Resident Pathologist: Viktoria Young  A resident or fellow in a training program was " involved in the initial review, preparation, and/or interpretation of this case.  I, as the senior physician, attest that I have personally reviewed all specimens and or slides, including the listed special stains, and used them with my medical judgement to determine the final diagnosis.              Performing Labs       The technical component of this testing was completed at [facility].    Stain controls for all stains resulted within this report have been reviewed and show appropriate reactivity.       Case Images         If you have any questions or concerns, please call the clinic at the number listed above.       Sincerely,      Pedro Cuello MD    Electronically signed

## 2025-02-21 NOTE — DISCHARGE INSTRUCTIONS
Discharge Instructions after Colonoscopy  or Sigmoidoscopy    Today you had a Colonoscopy    Activity and Diet  You were given medicine for pain. You may be dizzy or sleepy.  For 24 hours:   Do not drive or use heavy equipment.   Do not make important decisions.   Do not drink any alcohol.  You may return to your normal diet and medicines.    Discomfort   Air was placed in your colon during the exam in order to see it. Walking helps to pass the air.   You may take Tylenol (acetaminophen) for pain unless your doctor has told you not to.  Do not take aspirin or ibuprofen (Advil, Motrin, or other anti-inflammatory  drugs) for _____ days.    Follow-up  ____ We took small tissue samples or polyps to study. Your doctor will call you with the results  within two weeks.    When to call:    Call right away if you have:   Unusual pain in belly or chest pain not relieved with passing air.   More than 1 to 2 Tablespoons of bleeding from your rectum.   Fever above 100.6  F (37.5  C).    If you have severe pain, bleeding, or shortness of breath, go to an emergency room.    If you have questions, call:  Monday to Friday, 8 a.m. to 4:30 p.m.  Central Scheduling Department: [phone]    After hours  Hospital: [phone] (Ask for the GI fellow on call)    [facility]  Home Care Following Anesthesia  For 24 hours after surgery:  Get plenty of rest.  A responsible adult must stay with you for at least 24 hours after you leave the surgery center.  Do not drive or use heavy equipment.  If you have weakness or tingling, don't drive or use heavy equipment until this feeling goes away.   Do not drink alcohol.   Avoid strenuous or risky activities.  Ask for help when climbing stairs.  You may feel lightheaded.  IF so, sit for a few minutes before standing.  Have someone help you get up.   If you have nausea (feel sick to your stomach): Drink only clear liquids such as apple juice, ginger ale, broth  or 7-Up.  Rest may also help.  Be sure to drink enough fluids.  Move to a regular diet as you feel able.   You may have a slight fever.  Call the doctor if your fever is over 100 F (37.7 C) (taken under the tongue) or lasts longer than 24 hours.  You may have a dry mouth, a sore throat, muscle aches or trouble sleeping. These should go away after 24 hours.  Do not make important or legal decisions.   It is recommended to avoid smoking.   Tips for taking pain medications  To get the best pain relief possible, remember these points:  Take pain medications as directed, before pain becomes severe.  Pain medication can upset your stomach: taking it with food may help.  Constipation is a common side effect of pain medication. Drink plenty of  fluids.  Eat foods high in fiber. Take a stool softener if recommended by your doctor or pharmacist.  Do not drink alcohol, drive or operate machinery while taking pain medications.  Ask about other ways to control pain, such as with heat, ice or relaxation.    Tylenol/Acetaminophen Consumption    If you feel your pain relief is insufficient, you may take Tylenol/Acetaminophen in addition to your narcotic pain medication.   Be careful not to exceed 4,000 mg of Tylenol/Acetaminophen in a 24 hour period from all sources.  If you are taking extra strength Tylenol/acetaminophen (500 mg), the maximum dose is 8 tablets in 24 hours.  If you are taking regular strength acetaminophen (325 mg), the maximum dose is 12 tablets in 24 hours.    Call a doctor for any of the following:  Signs of infection (fever, growing tenderness at the surgery site, a large amount of drainage or bleeding, severe pain, foul-smelling drainage, redness, swelling).  It has been over 8 to 10 hours since surgery and you are still not able to urinate (pass water).  Headache for over 24 hours.  Numbness, tingling or weakness the day after surgery (if you had spinal anesthesia).  Signs of Covid-19 infection (temperature  over 100 degrees, shortness of breath, cough, loss of taste/smell, generalized body aches, persistent headache, chills, sore throat, nausea/vomiting/diarrhea)    After hours and weekends call the hospital @ [phone] and ask for the resident on call for:  Gastroenterology  For emergency care, call the:  [facility]:  [phone] (TTY for hearing impaired: [phone])

## 2025-02-24 LAB
PATH REPORT.COMMENTS IMP SPEC: NORMAL
PATH REPORT.COMMENTS IMP SPEC: NORMAL
PATH REPORT.FINAL DX SPEC: NORMAL
PATH REPORT.GROSS SPEC: NORMAL
PATH REPORT.MICROSCOPIC SPEC OTHER STN: NORMAL
PATH REPORT.RELEVANT HX SPEC: NORMAL
PHOTO IMAGE: NORMAL

## 2025-04-03 ENCOUNTER — PRE VISIT (OUTPATIENT)
Dept: UROLOGY | Facility: CLINIC | Age: 70
End: 2025-04-03
Payer: COMMERCIAL

## 2025-04-03 NOTE — TELEPHONE ENCOUNTER
Reason for visit: Return patient     Relevant information: referred for Elevated PSA on 12/9/24 via      Records/imaging/labs/orders: Surgical pathology done 2/21/25    At Rooming: Virtual visit      Candelario Reynolds  4/3/2025  7:16 AM

## 2025-06-26 ENCOUNTER — PRE VISIT (OUTPATIENT)
Dept: UROLOGY | Facility: CLINIC | Age: 70
End: 2025-06-26
Payer: COMMERCIAL

## 2025-06-26 NOTE — TELEPHONE ENCOUNTER
Reason for visit: Return     Dx/Hx/Sx: low risk prostate cancer    Records/imaging/labs/orders: PSA done 7/15/25    At Rooming: Ann-Marie Reynolds  6/26/2025  3:18 PM

## 2025-07-21 ENCOUNTER — TELEPHONE (OUTPATIENT)
Dept: UROLOGY | Facility: CLINIC | Age: 70
End: 2025-07-21
Payer: COMMERCIAL

## 2025-07-21 NOTE — TELEPHONE ENCOUNTER
Left detailed voicemail advising pt he needs PSA Lab done today or tomorrow morning by calling [phone] I let him know if not we will be cancelling the appointment.

## 2025-07-21 NOTE — TELEPHONE ENCOUNTER
Clinic coordinators, would you please assist me in contacting this patient today to get him on the calendar today or early tomorrow morning to get his PSA blood test drawn?  This is the second time that he has not completed his PSA blood test prior to his follow-up with me.  If he is unable to do it today or early tomorrow morning, he will need to get on the calendar for this PSA blood test and reschedule with me sometime afterwards.  Thank you!

## 2025-07-23 ENCOUNTER — DOCUMENTATION ONLY (OUTPATIENT)
Dept: INTERNAL MEDICINE | Facility: CLINIC | Age: 70
End: 2025-07-23
Payer: COMMERCIAL

## 2025-07-23 NOTE — PROGRESS NOTES
Type of Form Received: [facility] ICD-10     Form Received (Date) 7/21/2025   Form Filled out No   Placed in provider folder Yes

## (undated) DEVICE — SUCTION MANIFOLD NEPTUNE 2 SYS 1 PORT 702-025-000

## (undated) DEVICE — ENDO TRAP POLYP E-TRAP 00711099

## (undated) DEVICE — GOWN IMPERVIOUS 2XL BLUE

## (undated) DEVICE — GOWN IMPERVIOUS SPECIALTY XLG/XLONG 32474

## (undated) DEVICE — GOWN XLG DISP 9545

## (undated) DEVICE — TUBING SUCTION MEDI-VAC 1/4"X20' N620A

## (undated) DEVICE — SOL WATER IRRIG 3000ML BAG 2B7117

## (undated) DEVICE — SOL NACL 0.9% IRRIG 3000ML BAG 2B7477

## (undated) DEVICE — ENDO FORCEP SPIKED SERRATED SHAFT JUMBO 239CM G56998

## (undated) DEVICE — SPECIMEN CONTAINER 3OZ W/FORMALIN 59901

## (undated) DEVICE — ENDO SNARE POLYPECTOMY OVAL 10MM LOOP SD-240U-10

## (undated) DEVICE — TUBING SUCTION 12"X1/4" N612

## (undated) DEVICE — SOL WATER IRRIG 500ML BOTTLE 2F7113

## (undated) DEVICE — GLOVE PROTEXIS W/NEU-THERA 7.5  2D73TE75

## (undated) DEVICE — PACK CYSTO CUSTOM ASC

## (undated) DEVICE — DRAPE MAYO STAND 23X54 8337

## (undated) DEVICE — SUCTION MANIFOLD NEPTUNE 2 SYS 4 PORT 0702-020-000

## (undated) DEVICE — GOWN REINFORCED XXLG 9071

## (undated) DEVICE — SOL WATER IRRIG 1000ML BOTTLE 2F7114

## (undated) DEVICE — PAD CHUX UNDERPAD 30X30"

## (undated) DEVICE — LINEN TOWEL PACK X5 5464

## (undated) DEVICE — PACK SET-UP STD 9102

## (undated) DEVICE — Device

## (undated) DEVICE — SOL NACL 0.9% INJ 100ML BAG 2B1307

## (undated) DEVICE — KIT REZUM DELIVERY DEVICE D2201-003

## (undated) DEVICE — SOL NACL 0.9% INJ 1000ML BAG 2B1324X

## (undated) DEVICE — LINEN GOWN X4 5410

## (undated) DEVICE — ENDO SNARE EXACTO COLD 9MM LOOP 2.4MMX230CM 00711115

## (undated) DEVICE — KIT ENDO TURNOVER/PROCEDURE CARRY-ON 101822

## (undated) DEVICE — SUCTION MANIFOLD DORNOCH ULTRA CART UL-CL500

## (undated) DEVICE — DRSG GAUZE 4X4" TRAY 6939

## (undated) DEVICE — DRAPE GYN/UROLOGY FLUID POUCH TUR 29455

## (undated) DEVICE — PAD CHUX UNDERPAD 30X36" P3036C

## (undated) RX ORDER — FENTANYL CITRATE 50 UG/ML
INJECTION, SOLUTION INTRAMUSCULAR; INTRAVENOUS
Status: DISPENSED
Start: 2020-06-26

## (undated) RX ORDER — LIDOCAINE HYDROCHLORIDE 10 MG/ML
INJECTION, SOLUTION INFILTRATION; PERINEURAL
Status: DISPENSED
Start: 2017-06-02

## (undated) RX ORDER — CIPROFLOXACIN 500 MG/1
TABLET, FILM COATED ORAL
Status: DISPENSED
Start: 2019-04-08

## (undated) RX ORDER — FENTANYL CITRATE 50 UG/ML
INJECTION, SOLUTION INTRAMUSCULAR; INTRAVENOUS
Status: DISPENSED
Start: 2021-07-09

## (undated) RX ORDER — CIPROFLOXACIN 500 MG/1
TABLET, FILM COATED ORAL
Status: DISPENSED
Start: 2020-06-29

## (undated) RX ORDER — PROPOFOL 10 MG/ML
INJECTION, EMULSION INTRAVENOUS
Status: DISPENSED
Start: 2017-11-27

## (undated) RX ORDER — TRIAMCINOLONE ACETONIDE 40 MG/ML
INJECTION, SUSPENSION INTRA-ARTICULAR; INTRAMUSCULAR
Status: DISPENSED
Start: 2017-06-02

## (undated) RX ORDER — GABAPENTIN 300 MG/1
CAPSULE ORAL
Status: DISPENSED
Start: 2017-11-27

## (undated) RX ORDER — ACETAMINOPHEN 325 MG/1
TABLET ORAL
Status: DISPENSED
Start: 2017-11-27

## (undated) RX ORDER — CIPROFLOXACIN 500 MG/1
TABLET, FILM COATED ORAL
Status: DISPENSED
Start: 2017-11-30

## (undated) RX ORDER — ONDANSETRON 2 MG/ML
INJECTION INTRAMUSCULAR; INTRAVENOUS
Status: DISPENSED
Start: 2017-11-27

## (undated) RX ORDER — LIDOCAINE HYDROCHLORIDE 10 MG/ML
INJECTION, SOLUTION EPIDURAL; INFILTRATION; INTRACAUDAL; PERINEURAL
Status: DISPENSED
Start: 2019-04-08

## (undated) RX ORDER — FENTANYL CITRATE 50 UG/ML
INJECTION, SOLUTION INTRAMUSCULAR; INTRAVENOUS
Status: DISPENSED
Start: 2017-11-27

## (undated) RX ORDER — CEFTRIAXONE SODIUM 1 G
VIAL (EA) INJECTION
Status: DISPENSED
Start: 2018-03-01